# Patient Record
Sex: FEMALE | Race: WHITE | Employment: FULL TIME | URBAN - METROPOLITAN AREA
[De-identification: names, ages, dates, MRNs, and addresses within clinical notes are randomized per-mention and may not be internally consistent; named-entity substitution may affect disease eponyms.]

---

## 2017-05-08 RX ORDER — CITALOPRAM 20 MG/1
TABLET ORAL
Qty: 30 TABLET | Refills: 2 | Status: SHIPPED | OUTPATIENT
Start: 2017-05-08 | End: 2017-05-11 | Stop reason: SDUPTHER

## 2017-05-11 ENCOUNTER — OFFICE VISIT (OUTPATIENT)
Dept: INTERNAL MEDICINE | Age: 57
End: 2017-05-11

## 2017-05-11 VITALS
HEIGHT: 63 IN | SYSTOLIC BLOOD PRESSURE: 120 MMHG | BODY MASS INDEX: 43.23 KG/M2 | DIASTOLIC BLOOD PRESSURE: 82 MMHG | WEIGHT: 244 LBS

## 2017-05-11 DIAGNOSIS — F41.1 ANXIETY STATE: Primary | ICD-10-CM

## 2017-05-11 PROCEDURE — 99213 OFFICE O/P EST LOW 20 MIN: CPT | Performed by: INTERNAL MEDICINE

## 2017-05-11 RX ORDER — ALBUTEROL SULFATE 90 UG/1
2 AEROSOL, METERED RESPIRATORY (INHALATION) EVERY 4 HOURS PRN
Qty: 1 INHALER | Refills: 10 | Status: SHIPPED | OUTPATIENT
Start: 2017-05-11 | End: 2018-05-23 | Stop reason: SDUPTHER

## 2017-05-11 RX ORDER — CITALOPRAM 20 MG/1
20 TABLET ORAL DAILY
Qty: 90 TABLET | Refills: 3 | Status: SHIPPED | OUTPATIENT
Start: 2017-05-11 | End: 2018-05-23 | Stop reason: SDUPTHER

## 2017-05-11 RX ORDER — ALPRAZOLAM 0.25 MG/1
0.25 TABLET ORAL 3 TIMES DAILY
Qty: 90 TABLET | Refills: 0 | Status: ON HOLD | OUTPATIENT
Start: 2017-05-11 | End: 2019-05-19 | Stop reason: HOSPADM

## 2017-05-11 ASSESSMENT — ENCOUNTER SYMPTOMS
COUGH: 0
BACK PAIN: 0
ABDOMINAL PAIN: 0
NAUSEA: 0
CHEST TIGHTNESS: 0
SHORTNESS OF BREATH: 0
EYE REDNESS: 0

## 2017-06-05 ENCOUNTER — OFFICE VISIT (OUTPATIENT)
Dept: URGENT CARE | Age: 57
End: 2017-06-05

## 2017-06-05 VITALS
WEIGHT: 235 LBS | BODY MASS INDEX: 41.64 KG/M2 | HEIGHT: 63 IN | SYSTOLIC BLOOD PRESSURE: 120 MMHG | RESPIRATION RATE: 16 BRPM | TEMPERATURE: 99.4 F | HEART RATE: 89 BPM | DIASTOLIC BLOOD PRESSURE: 78 MMHG | OXYGEN SATURATION: 96 %

## 2017-06-05 DIAGNOSIS — M06.9 RHEUMATOID ARTHRITIS OF CARPOMETACARPAL JOINT OF THUMB (HCC): Primary | ICD-10-CM

## 2017-06-05 DIAGNOSIS — M79.645 THUMB PAIN, LEFT: ICD-10-CM

## 2017-06-05 PROCEDURE — 99203 OFFICE O/P NEW LOW 30 MIN: CPT | Performed by: EMERGENCY MEDICINE

## 2017-06-05 RX ORDER — PREDNISONE 10 MG/1
10 TABLET ORAL 2 TIMES DAILY
Qty: 10 TABLET | Refills: 0 | Status: SHIPPED | OUTPATIENT
Start: 2017-06-05 | End: 2017-06-10

## 2017-06-05 ASSESSMENT — ENCOUNTER SYMPTOMS: COLOR CHANGE: 1

## 2017-06-08 ENCOUNTER — OFFICE VISIT (OUTPATIENT)
Dept: RHEUMATOLOGY | Age: 57
End: 2017-06-08

## 2017-06-08 VITALS
BODY MASS INDEX: 42.7 KG/M2 | HEIGHT: 63 IN | SYSTOLIC BLOOD PRESSURE: 130 MMHG | WEIGHT: 241 LBS | DIASTOLIC BLOOD PRESSURE: 80 MMHG | TEMPERATURE: 98.7 F

## 2017-06-08 DIAGNOSIS — M18.12 PRIMARY OSTEOARTHRITIS OF FIRST CARPOMETACARPAL JOINT OF LEFT HAND: Primary | ICD-10-CM

## 2017-06-08 DIAGNOSIS — D17.79 LIPOMA OF OTHER SPECIFIED SITES: ICD-10-CM

## 2017-06-08 PROCEDURE — 99213 OFFICE O/P EST LOW 20 MIN: CPT | Performed by: INTERNAL MEDICINE

## 2018-02-22 LAB — RUBELLA ANTIBODY IGG: >500 IU/ML

## 2018-02-24 LAB
MUV IGG SER QL: <5 AU/ML
RUBEOLA (MEASLES) AB IGG: >300 AU/ML

## 2018-05-23 ENCOUNTER — OFFICE VISIT (OUTPATIENT)
Dept: INTERNAL MEDICINE | Age: 58
End: 2018-05-23

## 2018-05-23 VITALS
BODY MASS INDEX: 43.59 KG/M2 | WEIGHT: 246 LBS | RESPIRATION RATE: 16 BRPM | HEART RATE: 88 BPM | DIASTOLIC BLOOD PRESSURE: 86 MMHG | OXYGEN SATURATION: 91 % | HEIGHT: 63 IN | SYSTOLIC BLOOD PRESSURE: 134 MMHG

## 2018-05-23 DIAGNOSIS — R49.0 HOARSENESS: ICD-10-CM

## 2018-05-23 DIAGNOSIS — Z12.31 ENCOUNTER FOR SCREENING MAMMOGRAM FOR BREAST CANCER: ICD-10-CM

## 2018-05-23 DIAGNOSIS — Z72.0 TOBACCO ABUSE: ICD-10-CM

## 2018-05-23 DIAGNOSIS — J45.20 MILD INTERMITTENT ASTHMA WITHOUT COMPLICATION: Primary | ICD-10-CM

## 2018-05-23 DIAGNOSIS — F41.1 ANXIETY STATE: ICD-10-CM

## 2018-05-23 PROCEDURE — 99213 OFFICE O/P EST LOW 20 MIN: CPT | Performed by: INTERNAL MEDICINE

## 2018-05-23 RX ORDER — CITALOPRAM 20 MG/1
20 TABLET ORAL DAILY
Qty: 90 TABLET | Refills: 3 | Status: SHIPPED | OUTPATIENT
Start: 2018-05-23 | End: 2019-04-16 | Stop reason: SDUPTHER

## 2018-05-23 RX ORDER — ALBUTEROL SULFATE 90 UG/1
2 AEROSOL, METERED RESPIRATORY (INHALATION) EVERY 4 HOURS PRN
Qty: 1 INHALER | Refills: 10 | Status: SHIPPED | OUTPATIENT
Start: 2018-05-23 | End: 2020-11-02 | Stop reason: SDUPTHER

## 2018-05-23 ASSESSMENT — ENCOUNTER SYMPTOMS
EYE REDNESS: 0
ABDOMINAL PAIN: 0
ASSOCIATED PULMONARY SYMPTOMS: COUGH
NAUSEA: 0
BACK PAIN: 0
SHORTNESS OF BREATH: 0
SORE THROAT: 0
WHEEZING: 1
CHEST TIGHTNESS: 0
VOICE CHANGE: 1
COUGH: 0

## 2018-05-23 ASSESSMENT — PATIENT HEALTH QUESTIONNAIRE - PHQ9
SUM OF ALL RESPONSES TO PHQ QUESTIONS 1-9: 0
2. FEELING DOWN, DEPRESSED OR HOPELESS: 0
1. LITTLE INTEREST OR PLEASURE IN DOING THINGS: 0
SUM OF ALL RESPONSES TO PHQ9 QUESTIONS 1 & 2: 0

## 2018-07-16 ENCOUNTER — TELEPHONE (OUTPATIENT)
Dept: INTERNAL MEDICINE | Age: 58
End: 2018-07-16

## 2019-04-16 ENCOUNTER — OFFICE VISIT (OUTPATIENT)
Dept: INTERNAL MEDICINE CLINIC | Age: 59
End: 2019-04-16
Payer: COMMERCIAL

## 2019-04-16 VITALS
WEIGHT: 266 LBS | RESPIRATION RATE: 16 BRPM | HEART RATE: 106 BPM | SYSTOLIC BLOOD PRESSURE: 136 MMHG | BODY MASS INDEX: 47.13 KG/M2 | OXYGEN SATURATION: 80 % | TEMPERATURE: 98.7 F | DIASTOLIC BLOOD PRESSURE: 88 MMHG | HEIGHT: 63 IN

## 2019-04-16 DIAGNOSIS — R00.2 PALPITATIONS: ICD-10-CM

## 2019-04-16 DIAGNOSIS — J45.41 MODERATE PERSISTENT ASTHMATIC BRONCHITIS WITH ACUTE EXACERBATION: Primary | ICD-10-CM

## 2019-04-16 DIAGNOSIS — F41.1 ANXIETY STATE: ICD-10-CM

## 2019-04-16 PROCEDURE — 99213 OFFICE O/P EST LOW 20 MIN: CPT | Performed by: INTERNAL MEDICINE

## 2019-04-16 PROCEDURE — 93000 ELECTROCARDIOGRAM COMPLETE: CPT | Performed by: INTERNAL MEDICINE

## 2019-04-16 RX ORDER — PREDNISONE 20 MG/1
60 TABLET ORAL SEE ADMIN INSTRUCTIONS
Qty: 26 TABLET | Refills: 0 | Status: SHIPPED | OUTPATIENT
Start: 2019-04-16 | End: 2019-04-28

## 2019-04-16 RX ORDER — AZITHROMYCIN 250 MG/1
250 TABLET, FILM COATED ORAL SEE ADMIN INSTRUCTIONS
Qty: 6 TABLET | Refills: 0 | Status: SHIPPED | OUTPATIENT
Start: 2019-04-16 | End: 2019-04-28

## 2019-04-16 RX ORDER — CITALOPRAM 20 MG/1
20 TABLET ORAL DAILY
Qty: 90 TABLET | Refills: 3 | Status: SHIPPED | OUTPATIENT
Start: 2019-04-16 | End: 2019-11-18 | Stop reason: SDUPTHER

## 2019-04-16 ASSESSMENT — ENCOUNTER SYMPTOMS
ABDOMINAL PAIN: 0
ASSOCIATED PULMONARY SYMPTOMS: RED EYES
CHEST TIGHTNESS: 1
FREQUENT THROAT CLEARING: 1
NAUSEA: 0
EYE REDNESS: 0
SHORTNESS OF BREATH: 1
WHEEZING: 1
HOARSE VOICE: 1
BACK PAIN: 0
COUGH: 1
DIFFICULTY BREATHING: 1
SPUTUM PRODUCTION: 1

## 2019-04-16 ASSESSMENT — PATIENT HEALTH QUESTIONNAIRE - PHQ9
2. FEELING DOWN, DEPRESSED OR HOPELESS: 0
SUM OF ALL RESPONSES TO PHQ QUESTIONS 1-9: 0
SUM OF ALL RESPONSES TO PHQ9 QUESTIONS 1 & 2: 0
SUM OF ALL RESPONSES TO PHQ QUESTIONS 1-9: 0
1. LITTLE INTEREST OR PLEASURE IN DOING THINGS: 0

## 2019-04-16 NOTE — PROGRESS NOTES
Subjective:      Patient ID: Mitchel Wilde is a 66 y.o. female    Chief Complaint   Patient presents with    Cough    Congestion    Breathing Problem       Asthma   She complains of chest tightness, cough, difficulty breathing, frequent throat clearing, hoarse voice, shortness of breath, sputum production and wheezing. This is a new problem. The current episode started more than 1 month ago. The problem occurs constantly. The cough is productive of purulent sputum. Associated symptoms include dyspnea on exertion. Pertinent negatives include no chest pain, fever or headaches. Associated symptoms comments: Red eyes . Her symptoms are aggravated by any activity and exposure to smoke (reclining on her back). Her symptoms are alleviated by beta-agonist. She reports minimal (She was given an antibiotic and steroid taper in late March, but her breathing never did improve. ) improvement on treatment. Risk factors for lung disease include smoking/tobacco exposure. Her past medical history is significant for asthma. Current Outpatient Medications on File Prior to Visit   Medication Sig Dispense Refill    albuterol sulfate  (90 Base) MCG/ACT inhaler Inhale 2 puffs into the lungs every 4 hours as needed for Wheezing or Shortness of Breath 1 Inhaler 10    diclofenac sodium 1 % GEL 1 gm in upper extremity joint, 3 times a day as needed. Maximum dose 12 gram/day. 2 Tube 10    ALPRAZolam (XANAX) 0.25 MG tablet Take 1 tablet by mouth 3 times daily 90 tablet 0    diclofenac sodium 1 % GEL 2 gm in upper extremity joint, 3 times a day as needed. Maximum dose 12 gram/day. 3 Tube 5     No current facility-administered medications on file prior to visit. No Known Allergies    Review of Systems   Constitutional: Negative for fatigue, fever and unexpected weight change. HENT: Positive for hoarse voice. Negative for congestion and hearing loss. Eyes: Negative for redness and visual disturbance. suggested she go to the emergency room. She refused to follow instructions. I told her we could get a chest x-ray and blood work start her on antibiotic and steroids. I suggested highly that she reconsider going to the emergency room. - Comprehensive Metabolic Panel; Future  - CBC Auto Differential; Future  - XR CHEST STANDARD (2 VW); Future    2. Palpitations  She is reporting palpitations. I was concerned she may have atrial fibrillation or other arrhythmia. We did an EKG did show sinus tachycardia. - EKG 12 Lead    3. Anxiety state  Chronic anxiety state. Refill medication. - citalopram (CELEXA) 20 MG tablet; Take 1 tablet by mouth daily  Dispense: 90 tablet;  Refill: 3

## 2019-04-17 ENCOUNTER — TELEPHONE (OUTPATIENT)
Dept: INTERNAL MEDICINE CLINIC | Age: 59
End: 2019-04-17

## 2019-04-17 DIAGNOSIS — J45.41 MODERATE PERSISTENT ASTHMATIC BRONCHITIS WITH ACUTE EXACERBATION: Primary | ICD-10-CM

## 2019-04-17 DIAGNOSIS — J45.41 MODERATE PERSISTENT ASTHMA WITH ACUTE EXACERBATION: ICD-10-CM

## 2019-04-17 RX ORDER — ALBUTEROL SULFATE 1.25 MG/3ML
1 SOLUTION RESPIRATORY (INHALATION) EVERY 6 HOURS PRN
Qty: 360 ML | Refills: 3 | Status: ON HOLD | OUTPATIENT
Start: 2019-04-17 | End: 2019-05-29 | Stop reason: HOSPADM

## 2019-04-17 NOTE — TELEPHONE ENCOUNTER
Patient called and stated she spoke to you regarding a nebulizer machine. She said they have them where she works but needs a prescription in order to get one. Can you please right her a RX. Please call to advise.

## 2019-04-17 NOTE — TELEPHONE ENCOUNTER
Faxed to [phone] of [facility]. Patient will need Rx for nebulizer solution sent to [facility] in [place].

## 2019-04-28 ENCOUNTER — APPOINTMENT (OUTPATIENT)
Dept: CT IMAGING | Age: 59
DRG: 207 | End: 2019-04-28
Payer: COMMERCIAL

## 2019-04-28 ENCOUNTER — HOSPITAL ENCOUNTER (INPATIENT)
Age: 59
LOS: 22 days | Discharge: NURSING, REHAB OR LONG-TERM CARE | DRG: 207 | End: 2019-05-20
Attending: EMERGENCY MEDICINE | Admitting: INTERNAL MEDICINE
Payer: COMMERCIAL

## 2019-04-28 ENCOUNTER — APPOINTMENT (OUTPATIENT)
Dept: GENERAL RADIOLOGY | Age: 59
DRG: 207 | End: 2019-04-28
Payer: COMMERCIAL

## 2019-04-28 DIAGNOSIS — I50.9 NEW ONSET OF CONGESTIVE HEART FAILURE (HCC): Primary | ICD-10-CM

## 2019-04-28 DIAGNOSIS — R06.03 RESPIRATORY DISTRESS: ICD-10-CM

## 2019-04-28 DIAGNOSIS — F41.1 ANXIETY STATE: ICD-10-CM

## 2019-04-28 PROBLEM — J96.01 ACUTE RESPIRATORY FAILURE WITH HYPOXIA (HCC): Status: ACTIVE | Noted: 2019-04-28

## 2019-04-28 PROBLEM — J96.00 ACUTE RESPIRATORY FAILURE (HCC): Status: ACTIVE | Noted: 2019-04-28

## 2019-04-28 LAB
A/G RATIO: 1.1 (ref 1.1–2.2)
ALBUMIN SERPL-MCNC: 3.7 G/DL (ref 3.4–5)
ALP BLD-CCNC: 95 U/L (ref 40–129)
ALT SERPL-CCNC: 117 U/L (ref 10–40)
ANION GAP SERPL CALCULATED.3IONS-SCNC: 10 MMOL/L (ref 3–16)
ANISOCYTOSIS: ABNORMAL
AST SERPL-CCNC: 105 U/L (ref 15–37)
BACTERIA: ABNORMAL /HPF
BASE EXCESS ARTERIAL: 7.7 MMOL/L (ref -3–3)
BASOPHILS ABSOLUTE: 0 K/UL (ref 0–0.2)
BASOPHILS RELATIVE PERCENT: 0 %
BILIRUB SERPL-MCNC: 1.3 MG/DL (ref 0–1)
BILIRUBIN URINE: NEGATIVE
BLOOD, URINE: ABNORMAL
BUN BLDV-MCNC: 20 MG/DL (ref 7–20)
CALCIUM SERPL-MCNC: 9.2 MG/DL (ref 8.3–10.6)
CARBOXYHEMOGLOBIN ARTERIAL: 6.1 % (ref 0–1.5)
CASTS: ABNORMAL /LPF
CHLORIDE BLD-SCNC: 91 MMOL/L (ref 99–110)
CLARITY: CLEAR
CO2: 37 MMOL/L (ref 21–32)
COLOR: YELLOW
CREAT SERPL-MCNC: 0.6 MG/DL (ref 0.6–1.1)
EKG ATRIAL RATE: 103 BPM
EKG DIAGNOSIS: NORMAL
EKG P AXIS: 77 DEGREES
EKG P-R INTERVAL: 126 MS
EKG Q-T INTERVAL: 326 MS
EKG QRS DURATION: 82 MS
EKG QTC CALCULATION (BAZETT): 427 MS
EKG R AXIS: 123 DEGREES
EKG T AXIS: 41 DEGREES
EKG VENTRICULAR RATE: 103 BPM
EOSINOPHILS ABSOLUTE: 0 K/UL (ref 0–0.6)
EOSINOPHILS RELATIVE PERCENT: 0 %
EPITHELIAL CELLS, UA: ABNORMAL /HPF
GFR AFRICAN AMERICAN: >60
GFR NON-AFRICAN AMERICAN: >60
GLOBULIN: 3.3 G/DL
GLUCOSE BLD-MCNC: 124 MG/DL (ref 70–99)
GLUCOSE URINE: NEGATIVE MG/DL
HCO3 ARTERIAL: 36.5 MMOL/L (ref 21–29)
HCT VFR BLD CALC: 57 % (ref 36–48)
HEMOGLOBIN, ART, EXTENDED: 19 G/DL (ref 12–16)
HEMOGLOBIN: 18.1 G/DL (ref 12–16)
KETONES, URINE: NEGATIVE MG/DL
LACTIC ACID: 1.5 MMOL/L (ref 0.4–2)
LEUKOCYTE ESTERASE, URINE: NEGATIVE
LYMPHOCYTES ABSOLUTE: 2.4 K/UL (ref 1–5.1)
LYMPHOCYTES RELATIVE PERCENT: 15 %
MCH RBC QN AUTO: 28.9 PG (ref 26–34)
MCHC RBC AUTO-ENTMCNC: 31.7 G/DL (ref 31–36)
MCV RBC AUTO: 91.1 FL (ref 80–100)
METHEMOGLOBIN ARTERIAL: 0.5 %
MICROSCOPIC EXAMINATION: YES
MONOCYTES ABSOLUTE: 1.4 K/UL (ref 0–1.3)
MONOCYTES RELATIVE PERCENT: 9 %
NEUTROPHILS ABSOLUTE: 12.2 K/UL (ref 1.7–7.7)
NEUTROPHILS RELATIVE PERCENT: 76 %
NITRITE, URINE: NEGATIVE
O2 CONTENT ARTERIAL: 26 ML/DL
O2 SAT, ARTERIAL: 97.2 %
O2 THERAPY: ABNORMAL
PCO2 ARTERIAL: 65.7 MMHG (ref 35–45)
PDW BLD-RTO: 16.1 % (ref 12.4–15.4)
PH ARTERIAL: 7.36 (ref 7.35–7.45)
PH UA: 5.5 (ref 5–8)
PLATELET # BLD: 211 K/UL (ref 135–450)
PLATELET SLIDE REVIEW: ADEQUATE
PMV BLD AUTO: 7.3 FL (ref 5–10.5)
PO2 ARTERIAL: 100.6 MMHG (ref 75–108)
POIKILOCYTES: ABNORMAL
POLYCHROMASIA: ABNORMAL
POTASSIUM SERPL-SCNC: 4.1 MMOL/L (ref 3.5–5.1)
PRO-BNP: 3747 PG/ML (ref 0–124)
PROTEIN UA: NEGATIVE MG/DL
RBC # BLD: 6.26 M/UL (ref 4–5.2)
RBC UA: ABNORMAL /HPF (ref 0–2)
SLIDE REVIEW: ABNORMAL
SODIUM BLD-SCNC: 138 MMOL/L (ref 136–145)
SPECIFIC GRAVITY UA: <=1.005 (ref 1–1.03)
TARGET CELLS: ABNORMAL
TCO2 ARTERIAL: 38.6 MMOL/L
TEAR DROP CELLS: ABNORMAL
TOTAL PROTEIN: 7 G/DL (ref 6.4–8.2)
TROPONIN: <0.01 NG/ML
URINE REFLEX TO CULTURE: ABNORMAL
URINE TYPE: ABNORMAL
UROBILINOGEN, URINE: 0.2 E.U./DL
WBC # BLD: 16.1 K/UL (ref 4–11)
WBC UA: ABNORMAL /HPF (ref 0–5)

## 2019-04-28 PROCEDURE — 83880 ASSAY OF NATRIURETIC PEPTIDE: CPT

## 2019-04-28 PROCEDURE — 80053 COMPREHEN METABOLIC PANEL: CPT

## 2019-04-28 PROCEDURE — 96366 THER/PROPH/DIAG IV INF ADDON: CPT

## 2019-04-28 PROCEDURE — 36415 COLL VENOUS BLD VENIPUNCTURE: CPT

## 2019-04-28 PROCEDURE — 94761 N-INVAS EAR/PLS OXIMETRY MLT: CPT

## 2019-04-28 PROCEDURE — 94660 CPAP INITIATION&MGMT: CPT

## 2019-04-28 PROCEDURE — 51702 INSERT TEMP BLADDER CATH: CPT

## 2019-04-28 PROCEDURE — 6360000002 HC RX W HCPCS: Performed by: INTERNAL MEDICINE

## 2019-04-28 PROCEDURE — 2580000003 HC RX 258: Performed by: INTERNAL MEDICINE

## 2019-04-28 PROCEDURE — 81001 URINALYSIS AUTO W/SCOPE: CPT

## 2019-04-28 PROCEDURE — 83605 ASSAY OF LACTIC ACID: CPT

## 2019-04-28 PROCEDURE — 96365 THER/PROPH/DIAG IV INF INIT: CPT

## 2019-04-28 PROCEDURE — 96375 TX/PRO/DX INJ NEW DRUG ADDON: CPT

## 2019-04-28 PROCEDURE — 87040 BLOOD CULTURE FOR BACTERIA: CPT

## 2019-04-28 PROCEDURE — 84443 ASSAY THYROID STIM HORMONE: CPT

## 2019-04-28 PROCEDURE — 6370000000 HC RX 637 (ALT 250 FOR IP): Performed by: INTERNAL MEDICINE

## 2019-04-28 PROCEDURE — 85025 COMPLETE CBC W/AUTO DIFF WBC: CPT

## 2019-04-28 PROCEDURE — 99291 CRITICAL CARE FIRST HOUR: CPT

## 2019-04-28 PROCEDURE — 84484 ASSAY OF TROPONIN QUANT: CPT

## 2019-04-28 PROCEDURE — 71260 CT THORAX DX C+: CPT

## 2019-04-28 PROCEDURE — 82803 BLOOD GASES ANY COMBINATION: CPT

## 2019-04-28 PROCEDURE — 2500000003 HC RX 250 WO HCPCS: Performed by: PHYSICIAN ASSISTANT

## 2019-04-28 PROCEDURE — 93010 ELECTROCARDIOGRAM REPORT: CPT | Performed by: INTERNAL MEDICINE

## 2019-04-28 PROCEDURE — 6360000004 HC RX CONTRAST MEDICATION: Performed by: EMERGENCY MEDICINE

## 2019-04-28 PROCEDURE — 2000000000 HC ICU R&B

## 2019-04-28 PROCEDURE — 71045 X-RAY EXAM CHEST 1 VIEW: CPT

## 2019-04-28 PROCEDURE — 2700000000 HC OXYGEN THERAPY PER DAY

## 2019-04-28 PROCEDURE — 6360000002 HC RX W HCPCS: Performed by: PHYSICIAN ASSISTANT

## 2019-04-28 PROCEDURE — 6370000000 HC RX 637 (ALT 250 FOR IP): Performed by: PHYSICIAN ASSISTANT

## 2019-04-28 PROCEDURE — 93005 ELECTROCARDIOGRAM TRACING: CPT | Performed by: EMERGENCY MEDICINE

## 2019-04-28 RX ORDER — SODIUM CHLORIDE 0.9 % (FLUSH) 0.9 %
10 SYRINGE (ML) INJECTION EVERY 12 HOURS SCHEDULED
Status: DISCONTINUED | OUTPATIENT
Start: 2019-04-28 | End: 2019-05-20 | Stop reason: HOSPADM

## 2019-04-28 RX ORDER — CITALOPRAM 20 MG/1
20 TABLET ORAL DAILY
Status: DISCONTINUED | OUTPATIENT
Start: 2019-04-28 | End: 2019-05-20 | Stop reason: HOSPADM

## 2019-04-28 RX ORDER — SODIUM CHLORIDE 0.9 % (FLUSH) 0.9 %
10 SYRINGE (ML) INJECTION PRN
Status: DISCONTINUED | OUTPATIENT
Start: 2019-04-28 | End: 2019-05-20 | Stop reason: HOSPADM

## 2019-04-28 RX ORDER — ALBUTEROL SULFATE 90 UG/1
2 AEROSOL, METERED RESPIRATORY (INHALATION) EVERY 4 HOURS PRN
Status: DISCONTINUED | OUTPATIENT
Start: 2019-04-28 | End: 2019-05-09

## 2019-04-28 RX ORDER — ALBUTEROL SULFATE 90 UG/1
2 AEROSOL, METERED RESPIRATORY (INHALATION) EVERY 4 HOURS PRN
Status: DISCONTINUED | OUTPATIENT
Start: 2019-04-28 | End: 2019-04-28

## 2019-04-28 RX ORDER — ONDANSETRON 2 MG/ML
4 INJECTION INTRAMUSCULAR; INTRAVENOUS EVERY 6 HOURS PRN
Status: DISCONTINUED | OUTPATIENT
Start: 2019-04-28 | End: 2019-05-20 | Stop reason: HOSPADM

## 2019-04-28 RX ORDER — LISINOPRIL 5 MG/1
5 TABLET ORAL DAILY
Status: DISCONTINUED | OUTPATIENT
Start: 2019-04-28 | End: 2019-04-30

## 2019-04-28 RX ORDER — IPRATROPIUM BROMIDE AND ALBUTEROL SULFATE 2.5; .5 MG/3ML; MG/3ML
1 SOLUTION RESPIRATORY (INHALATION) ONCE
Status: COMPLETED | OUTPATIENT
Start: 2019-04-28 | End: 2019-04-28

## 2019-04-28 RX ORDER — ALPRAZOLAM 0.25 MG/1
0.25 TABLET ORAL 3 TIMES DAILY
Status: DISCONTINUED | OUTPATIENT
Start: 2019-04-28 | End: 2019-05-03

## 2019-04-28 RX ORDER — FUROSEMIDE 10 MG/ML
40 INJECTION INTRAMUSCULAR; INTRAVENOUS 2 TIMES DAILY
Status: DISCONTINUED | OUTPATIENT
Start: 2019-04-28 | End: 2019-05-02

## 2019-04-28 RX ORDER — NITROGLYCERIN 20 MG/100ML
5 INJECTION INTRAVENOUS CONTINUOUS
Status: DISCONTINUED | OUTPATIENT
Start: 2019-04-28 | End: 2019-04-30

## 2019-04-28 RX ORDER — FUROSEMIDE 10 MG/ML
40 INJECTION INTRAMUSCULAR; INTRAVENOUS ONCE
Status: COMPLETED | OUTPATIENT
Start: 2019-04-28 | End: 2019-04-28

## 2019-04-28 RX ADMIN — IOPAMIDOL 85 ML: 755 INJECTION, SOLUTION INTRAVENOUS at 16:18

## 2019-04-28 RX ADMIN — FUROSEMIDE 40 MG: 10 INJECTION, SOLUTION INTRAMUSCULAR; INTRAVENOUS at 21:15

## 2019-04-28 RX ADMIN — LISINOPRIL 5 MG: 5 TABLET ORAL at 21:15

## 2019-04-28 RX ADMIN — IPRATROPIUM BROMIDE AND ALBUTEROL SULFATE 1 AMPULE: .5; 3 SOLUTION RESPIRATORY (INHALATION) at 14:18

## 2019-04-28 RX ADMIN — ENOXAPARIN SODIUM 40 MG: 40 INJECTION SUBCUTANEOUS at 21:15

## 2019-04-28 RX ADMIN — Medication 10 ML: at 21:16

## 2019-04-28 RX ADMIN — NITROGLYCERIN 5 MCG/MIN: 20 INJECTION INTRAVENOUS at 14:22

## 2019-04-28 RX ADMIN — FUROSEMIDE 40 MG: 10 INJECTION, SOLUTION INTRAMUSCULAR; INTRAVENOUS at 14:18

## 2019-04-28 NOTE — ED PROVIDER NOTES
[address] ED  eMERGENCY dEPARTMENT eNCOUnter        Pt Name: Deidre Busby  MRN: [id number]  [place] 1960  Date of evaluation: 4/28/2019  Provider: DAYAMI Tee  PCP: Dee Meade MD    This patient was seen and evaluated by the attending physician Izzy Dela Cruz, [address]       Chief Complaint   Patient presents with    Shortness of Breath     SOB x2 months. Has been on 2 different antibiotics, 2 rounds of steroid with no improvement. RA 77% upon arrival, lips cyanotic. HISTORY OF PRESENT ILLNESS   (Location/Symptom, Timing/Onset, Context/Setting, Quality, Duration, Modifying Factors, Severity)  Note limiting factors. Deidre Busby is a 64 y.o. female who presents to the emergency department, the patient was in respiratory distress with an oxygen saturation rate of 77%. 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 She took that, she was not any better, she then went to her primary care physician who gave her Zithromax and prednisone and ordered a chest x-ray. She states that she did not get the chest x-ray, today she woke up and she felt very short of breath, she states that her left breast felt very full, she has had swelling to her feet, legs, and even feels as if her pants are a little tight. Nothing seems to make her feel completely better. She states that she feels very short of breath at the present time. Nursing Notes were all reviewed and agreed with or any disagreements were addressed  in the HPI. REVIEW OF SYSTEMS    (2-9 systems for level 4, 10 or more for level 5)     Review of Systems    Positives and Pertinent negatives as per HPI. Except as noted abovein the ROS, all other systems were reviewed and negative.        PAST MEDICAL HISTORY     Past Medical History:   Diagnosis Date    Anxiety state, unspecified 5/17/2010    Impacted cerumen 5/17/2010    Rheumatoid arthritis(714.0) 5/17/2010         SURGICAL HISTORY     Past Surgical History:   Procedure Laterality Date    CARPAL TUNNEL RELEASE      both hands 1990's    LUMBAR SPINE SURGERY           CURRENTMEDICATIONS       Previous Medications    ALBUTEROL (ACCUNEB) 1.25 MG/3ML NEBULIZER SOLUTION    Inhale 3 mLs into the lungs every 6 hours as needed for Wheezing    ALBUTEROL SULFATE  (90 BASE) MCG/ACT INHALER    Inhale 2 puffs into the lungs every 4 hours as needed for Wheezing or Shortness of Breath    ALPRAZOLAM (XANAX) 0.25 MG TABLET    Take 1 tablet by mouth 3 times daily    CITALOPRAM (CELEXA) 20 MG TABLET    Take 1 tablet by mouth daily    DICLOFENAC SODIUM 1 % GEL    2 gm in upper extremity joint, 3 times a day as needed. Maximum dose 12 gram/day. ALLERGIES     Patient has no known allergies.     FAMILYHISTORY       Family History   Problem Relation Age of Onset    Stroke Paternal Grandmother     Heart Attack Paternal Grandfather 62    Diabetes Paternal Grandfather     Other Mother         Factor 5 Lieden           SOCIAL HISTORY       Social History     Socioeconomic History    Marital status: Single     Spouse name: None    Number of children: 0    Years of education: None    Highest education level: None   Occupational History    None   Social Needs    Financial resource strain: None    Food insecurity:     Worry: None     Inability: None    Transportation needs:     Medical: None     Non-medical: None   Tobacco Use    Smoking status: Current Every Day Smoker     Packs/day: 0.10     Years: 30.00     Pack years: 3.00     Types: Cigarettes    Smokeless tobacco: Never Used   Substance and Sexual Activity    Alcohol use: No     Alcohol/week: 0.0 oz    Drug use: No    Sexual activity: None   Lifestyle    Physical activity:     Days per week: None     Minutes per session: None    Stress: None   Relationships    Social connections:     Talks on phone: None     Gets together: None     Attends Restorationism service: None     Active member of club or organization: None     Attends meetings of clubs or organizations: None     Relationship status: None    Intimate partner violence:     Fear of current or ex partner: None     Emotionally abused: None     Physically abused: None     Forced sexual activity: None   Other Topics Concern    None   Social History Narrative    None       SCREENINGS    [place] Coma Scale  Eye Opening: Spontaneous  Best Verbal Response: Oriented  Best Motor Response: Obeys commands  [place] Coma Scale Score: 15        PHYSICAL EXAM    (up to 7 for level 4, 8 or more for level 5)     ED Triage Vitals   BP Temp Temp Source Pulse Resp SpO2 Height Weight   04/28/19 1349 04/28/19 1340 04/28/19 1340 04/28/19 1340 04/28/19 1340 04/28/19 1340 04/28/19 1346 04/28/19 1346   (!) 154/102 99 °F (37.2 °C) Oral 104 26 (!) 77 % 5' 3\" (1.6 m) 240 lb (108.9 kg)       Physical Exam   Constitutional: She is oriented to person, place, and time. She appears well-developed and well-nourished. HENT:   Head: Normocephalic and atraumatic. Right Ear: External ear normal.   Left Ear: External ear normal.   Nose: Nose normal.   Eyes: Right eye exhibits no discharge. Left eye exhibits no discharge. Neck: Normal range of motion. Neck supple. Cardiovascular: Regular rhythm, normal heart sounds and intact distal pulses. Tachycardia present. Exam reveals no gallop and no friction rub. No murmur heard. Pulmonary/Chest: No stridor. She is in respiratory distress. She has wheezes. She has rales. She exhibits no tenderness. Abdominal: Soft. Bowel sounds are normal. She exhibits no distension and no mass. There is no tenderness. There is no rebound and no guarding. Musculoskeletal: Normal range of motion. She exhibits edema (4+ pitting edema). Neurological: She is alert and oriented to person, place, and time.    Skin: Skin is warm and dry. She is not diaphoretic. No pallor. Psychiatric: She has a normal mood and affect. Her behavior is normal.   Nursing note and vitals reviewed.       DIAGNOSTIC RESULTS   LABS:    Labs Reviewed   URINE RT REFLEX TO CULTURE - Abnormal; Notable for the following components:       Result Value    Blood, Urine TRACE-INTACT (*)     All other components within normal limits    Narrative:     Performed at:  [facility],  [facility]   Phone [phone]   BLOOD GAS, ARTERIAL - Abnormal; Notable for the following components:    pCO2, Arterial 65.7 (*)     HCO3, Arterial 36.5 (*)     Base Excess, Arterial 7.7 (*)     Hemoglobin, Art, Extended 19.0 (*)     Carboxyhgb, Arterial 6.1 (*)     All other components within normal limits    Narrative:     Performed at:  [facility],  [facility]   Phone [phone]   MICROSCOPIC URINALYSIS - Abnormal; Notable for the following components:    Casts 0-1 Hyaline (*)     Bacteria, UA 1+ (*)     All other components within normal limits    Narrative:     Performed at:  [facility],  [facility] [facility]   Phone [phone]   CBC WITH AUTO DIFFERENTIAL - Abnormal; Notable for the following components:    WBC 16.1 (*)     RBC 6.26 (*)     Hemoglobin 18.1 (*)     Hematocrit 57.0 (*)     RDW 16.1 (*)     Neutrophils # 12.2 (*)     Monocytes # 1.4 (*)     Anisocytosis 2+ (*)     Polychromasia Occasional (*)     Poikilocytes Occasional (*)     Target Cells Occasional (*)     Tear Drop Cells Occasional (*)     All other components within normal limits    Narrative:     Performed at:  [facility]) - [facility],  [employer]   Phone [phone]   COMPREHENSIVE METABOLIC PANEL - Abnormal; Notable for the following components:    Chloride 91 (*)     CO2 37 (*)     Glucose 124 (*)     Total Bilirubin 1.3 (*)      (*)      (*)     All other components within normal limits    Narrative:     Performed at:  [facility]) - [facility],  [place], [facility]   Phone [phone]   BRAIN NATRIURETIC PEPTIDE - Abnormal; Notable for the following components:    Pro-BNP 3,747 (*)     All other components within normal limits    Narrative:     Performed at:  [facility],  [place], [facility]   Phone [phone]   CULTURE BLOOD #1   LACTIC ACID, PLASMA    Narrative:     Performed at:  [facility] [address],  [place], [facility]   Phone [phone]   TROPONIN    Narrative:     Performed at:  [facility]) - [facility],  [place], [facility]   Phone [phone]       All other labs were within normal range or not returned as of this dictation. EKG: All EKG's are interpreted by the Emergency Department Physician who either signs orCo-signs this chart in the absence of a cardiologist.  Please see their note for interpretation of EKG. RADIOLOGY:   Non-plain film images such as CT, Ultrasound and MRI are read by the radiologist. Jose Sciara radiographic images are visualized andpreliminarily interpreted by the  ED Provider with the below findings:        Interpretation perthe Radiologist below, if available at the time of this note:    CT Chest Pulmonary Embolism W Contrast   Final Result   Suboptimal contrast timing although there is no evidence for large or central   pulmonary embolism. The segmental and subsegmental branches are not well   evaluated. Small left basilar effusion with adjacent consolidation and there are also   infiltrates within the lingula and right lung base that may represent   atelectasis versus pneumonia. Small amount of free fluid in the visualized upper abdomen.          XR CHEST PORTABLE   Final Result   Cardiomegaly and mild pulmonary edema. Xr Chest Portable    Result Date: 4/28/2019  EXAMINATION: SINGLE XRAY VIEW OF THE CHEST 4/28/2019 1:59 pm COMPARISON: Radiograph 06/09/2011 HISTORY: ORDERING SYSTEM PROVIDED HISTORY: SOB TECHNOLOGIST PROVIDED HISTORY: Reason for exam:->SOB Ordering Physician Provided Reason for Exam: sob Acuity: Acute Type of Exam: Initial FINDINGS: Cardiomediastinal silhouette is enlarged. No pneumothorax. Bilateral perihilar airspace opacities. Small left effusion. No acute osseous abnormality. Cardiomegaly and mild pulmonary edema. PROCEDURES   Unless otherwise noted below, none     Procedures    CRITICAL CARE TIME     Critical Care  There was a high probability of life-threatening clinical deterioration in the patient's condition requiring my urgent intervention. Total critical care time with the patient was 45 minutes excluding separately reportable procedures.   Critical care required due to patients respiratory distress and new onset CHF/fluid overload      CONSULTS:  IP CONSULT TO HOSPITALIST  IP CONSULT TO PULMONOLOGY  IP CONSULT TO DIETITIAN  IP CONSULT TO CARDIOLOGY  IP CONSULT TO PULMONOLOGY      EMERGENCY DEPARTMENT COURSE and DIFFERENTIALDIAGNOSIS/MDM:   Vitals:    Vitals:    04/28/19 1744 04/28/19 1753 04/28/19 1803 04/28/19 1813   BP: (!) 148/77 108/82 (!) 133/90 125/82   Pulse: 100 108 100 98   Resp: 30 19 18 24   Temp:       TempSrc:       SpO2: 93% 90% 92% 92%   Weight:       Height:           Patient was given thefollowing medications:  Medications   nitroGLYCERIN 50 mg in dextrose 5% 250 mL infusion (20 mcg/min Intravenous Rate/Dose Change 4/28/19 1534)   ipratropium-albuterol (DUONEB) nebulizer solution 1 ampule (1 ampule Inhalation Given 4/28/19 1418)   furosemide (LASIX) injection 40 mg (40 mg Intravenous Given 4/28/19 1418)   iopamidol (ISOVUE-370) 76 % injection 85 mL (85 mLs Intravenous Given 4/28/19 1618) Patient presented to the emergency department in respiratory distress, she was evaluated immediately, placed on high flow oxygen via nasal cannula. Because of the clinical presentation of congestive heart failure, we began nitroglycerin, Lasix, and BiPAP. She did not tolerate BiPAP very well however she did diurese, and upon multiple re-evaluations continued to improve. We discontinued the BiPAP, she has remained stable here in the department, currently on 20 mcg/m of nitroglycerin and 8 L of high flow oxygen by nasal cannula. I spoke to the intensivist and to the hospitalist who agreed to admit the patient      FINAL IMPRESSION      1. New onset of congestive heart failure ([facility] [address]    2. Respiratory distress          DISPOSITION/PLAN   DISPOSITION Admitted 04/28/2019 06:10:20 PM      PATIENT REFERREDTO:  Rachell Gill MD  [address] [place] [address]  [phone]            DISCHARGE MEDICATIONS:  New Prescriptions    No medications on file       DISCONTINUED MEDICATIONS:  Discontinued Medications    AZITHROMYCIN (ZITHROMAX) 250 MG TABLET    Take 1 tablet by mouth See Admin Instructions Take 2 tablets at once on the first day , then one tablet daily till all are gone    DICLOFENAC SODIUM 1 % GEL    1 gm in upper extremity joint, 3 times a day as needed. Maximum dose 12 gram/day.     PREDNISONE (DELTASONE) 20 MG TABLET    Take 3 tablets by mouth See Admin Instructions for 16 days 3 x 4 days, 2 x 4 days, 1x 4 days, 1/2 x 4 days              (Please note that portions ofthis note were completed with a voice recognition program.  Efforts were made to edit the dictations but occasionally words are mis-transcribed.)    DAYAMI Fofana (electronically signed)           DAYAMI Fofana  04/28/19 7362

## 2019-04-28 NOTE — PROGRESS NOTES
Called pulmonary answering service and spoke with Geeta regarding consult.      Also called cardiology answering service and spoke with [facility] regarding consult Carlito Phlegm

## 2019-04-28 NOTE — ED TRIAGE NOTES
This RN discussed plan of care w/provider who agreed that pt. Can be taken off BiPap and replaced on hiflow d/t saturation levels and toleration of BiPap. This RN notified Respiratory who stated they would be to bedside. This RN called lab x2 and requested to bedside to 3rd lab draw. This RN notified provider pt. Will be increased to 20mcg on Nitro drip and pressures are increasing rather than decreasing. Provider requested to maintain Nitro drip @ 20mcg and monitor. Plan of care communicated to pt. Who verbalized understanding. Will cont. To monitor.

## 2019-04-28 NOTE — ED NOTES
Ok per provider for pt. To po ice chips. Pt. Provided w/ice chips. Will cont. To monitor.      Tim Ardon RN  04/28/19 6127

## 2019-04-28 NOTE — ED NOTES
Andrew completed with a call back from Dr. Liz Cortés at [facility]  04/28/19 1759    Consult to Pulmonology, Dr. Eliazar Duffy, at [address] [place] [address]  04/28/19 1811    Dr. Eliazar Duffy called back at [address] [place] [address]  04/28/19 1812

## 2019-04-28 NOTE — ED NOTES
Ok per MD Narciso Bernstein for pt's Nitro drip to be paused while in [address], [address] [facility]  04/28/19 3329

## 2019-04-28 NOTE — ED NOTES
Nitro increased to 10mcg/min to main sbp <140. piv to R ac and R hand appears wnl. This RN notified by lab that hemolyzed labs had been received. Requested lab to bedside to re-collect. Pt. Is alert and oriented at this time, tolerating bipap w/out difficulty.      Orlando Acuna RN  04/28/19 7803

## 2019-04-28 NOTE — ED NOTES
Chief Complaint   Patient presents with    Shortness of Breath     SOB x2 months. Has been on 2 different antibiotics, 2 rounds of steroid with no improvement. RA 77% upon arrival, lips cyanotic. Pt. Is alert and oriented at this time. Piv placed to R ac and R hand w/labs collected. Pt. Is visibly sob and provider to bedside for more rapid evaluation. Pt. Placed on hr/rr monitors and bp is cycling q5 mins w/nitro infusing. Respiratory at bedside to initiate BiPap. Will cont. To monitor.      Bravo Love RN  04/28/19 0964

## 2019-04-28 NOTE — ED NOTES
This RN notified CT that CMP had resulted and pt. Could be taken for CT. CT stated they would be to retrieve pt.      Vin Christy RN  04/28/19 5314

## 2019-04-29 LAB
ANION GAP SERPL CALCULATED.3IONS-SCNC: 8 MMOL/L (ref 3–16)
BASE EXCESS ARTERIAL: 13.4 MMOL/L (ref -3–3)
BASOPHILS ABSOLUTE: 0.1 K/UL (ref 0–0.2)
BASOPHILS RELATIVE PERCENT: 0.6 %
BUN BLDV-MCNC: 16 MG/DL (ref 7–20)
CALCIUM SERPL-MCNC: 8.6 MG/DL (ref 8.3–10.6)
CARBOXYHEMOGLOBIN ARTERIAL: 2.5 % (ref 0–1.5)
CHLORIDE BLD-SCNC: 90 MMOL/L (ref 99–110)
CHOLESTEROL, TOTAL: 145 MG/DL (ref 0–199)
CO2: 45 MMOL/L (ref 21–32)
CREAT SERPL-MCNC: <0.5 MG/DL (ref 0.6–1.1)
EOSINOPHILS ABSOLUTE: 0.1 K/UL (ref 0–0.6)
EOSINOPHILS RELATIVE PERCENT: 0.5 %
GFR AFRICAN AMERICAN: >60
GFR NON-AFRICAN AMERICAN: >60
GLUCOSE BLD-MCNC: 101 MG/DL (ref 70–99)
HCO3 ARTERIAL: 48.7 MMOL/L (ref 21–29)
HCT VFR BLD CALC: 55.1 % (ref 36–48)
HDLC SERPL-MCNC: 19 MG/DL (ref 40–60)
HEMOGLOBIN, ART, EXTENDED: 17.9 G/DL (ref 12–16)
HEMOGLOBIN: 17.4 G/DL (ref 12–16)
LDL CHOLESTEROL CALCULATED: 101 MG/DL
LV EF: 55 %
LVEF MODALITY: NORMAL
LYMPHOCYTES ABSOLUTE: 1.5 K/UL (ref 1–5.1)
LYMPHOCYTES RELATIVE PERCENT: 10.5 %
MAGNESIUM: 2 MG/DL (ref 1.8–2.4)
MCH RBC QN AUTO: 29.6 PG (ref 26–34)
MCHC RBC AUTO-ENTMCNC: 31.7 G/DL (ref 31–36)
MCV RBC AUTO: 93.3 FL (ref 80–100)
METHEMOGLOBIN ARTERIAL: 0.5 %
MONOCYTES ABSOLUTE: 1.3 K/UL (ref 0–1.3)
MONOCYTES RELATIVE PERCENT: 9.6 %
NEUTROPHILS ABSOLUTE: 11 K/UL (ref 1.7–7.7)
NEUTROPHILS RELATIVE PERCENT: 78.8 %
O2 CONTENT ARTERIAL: 24 ML/DL
O2 SAT, ARTERIAL: 95.6 %
O2 THERAPY: ABNORMAL
PCO2 ARTERIAL: 121.1 MMHG (ref 35–45)
PDW BLD-RTO: 16.6 % (ref 12.4–15.4)
PH ARTERIAL: 7.22 (ref 7.35–7.45)
PLATELET # BLD: 165 K/UL (ref 135–450)
PMV BLD AUTO: 7.7 FL (ref 5–10.5)
PO2 ARTERIAL: 99.2 MMHG (ref 75–108)
POTASSIUM SERPL-SCNC: 3.7 MMOL/L (ref 3.5–5.1)
PROCALCITONIN: 0.09 NG/ML (ref 0–0.15)
RBC # BLD: 5.9 M/UL (ref 4–5.2)
SODIUM BLD-SCNC: 143 MMOL/L (ref 136–145)
TCO2 ARTERIAL: 52.4 MMOL/L
TRIGL SERPL-MCNC: 123 MG/DL (ref 0–150)
VLDLC SERPL CALC-MCNC: 25 MG/DL
WBC # BLD: 14 K/UL (ref 4–11)

## 2019-04-29 PROCEDURE — 94640 AIRWAY INHALATION TREATMENT: CPT

## 2019-04-29 PROCEDURE — 80048 BASIC METABOLIC PNL TOTAL CA: CPT

## 2019-04-29 PROCEDURE — 2700000000 HC OXYGEN THERAPY PER DAY

## 2019-04-29 PROCEDURE — 83735 ASSAY OF MAGNESIUM: CPT

## 2019-04-29 PROCEDURE — 99232 SBSQ HOSP IP/OBS MODERATE 35: CPT | Performed by: INTERNAL MEDICINE

## 2019-04-29 PROCEDURE — 6360000002 HC RX W HCPCS: Performed by: INTERNAL MEDICINE

## 2019-04-29 PROCEDURE — 99254 IP/OBS CNSLTJ NEW/EST MOD 60: CPT | Performed by: INTERNAL MEDICINE

## 2019-04-29 PROCEDURE — 94660 CPAP INITIATION&MGMT: CPT

## 2019-04-29 PROCEDURE — 82803 BLOOD GASES ANY COMBINATION: CPT

## 2019-04-29 PROCEDURE — 84145 PROCALCITONIN (PCT): CPT

## 2019-04-29 PROCEDURE — 6370000000 HC RX 637 (ALT 250 FOR IP): Performed by: INTERNAL MEDICINE

## 2019-04-29 PROCEDURE — 2000000000 HC ICU R&B

## 2019-04-29 PROCEDURE — 80061 LIPID PANEL: CPT

## 2019-04-29 PROCEDURE — 85025 COMPLETE CBC W/AUTO DIFF WBC: CPT

## 2019-04-29 PROCEDURE — 2580000003 HC RX 258: Performed by: INTERNAL MEDICINE

## 2019-04-29 PROCEDURE — 99255 IP/OBS CONSLTJ NEW/EST HI 80: CPT | Performed by: INTERNAL MEDICINE

## 2019-04-29 PROCEDURE — 36415 COLL VENOUS BLD VENIPUNCTURE: CPT

## 2019-04-29 PROCEDURE — 93306 TTE W/DOPPLER COMPLETE: CPT

## 2019-04-29 PROCEDURE — 94761 N-INVAS EAR/PLS OXIMETRY MLT: CPT

## 2019-04-29 RX ORDER — POTASSIUM CHLORIDE 750 MG/1
40 TABLET, EXTENDED RELEASE ORAL ONCE
Status: COMPLETED | OUTPATIENT
Start: 2019-04-29 | End: 2019-04-29

## 2019-04-29 RX ORDER — GUAIFENESIN 600 MG/1
600 TABLET, EXTENDED RELEASE ORAL 2 TIMES DAILY
Status: DISCONTINUED | OUTPATIENT
Start: 2019-04-29 | End: 2019-04-30

## 2019-04-29 RX ORDER — IBUPROFEN 600 MG/1
600 TABLET ORAL EVERY 8 HOURS PRN
Status: DISCONTINUED | OUTPATIENT
Start: 2019-04-29 | End: 2019-05-20 | Stop reason: HOSPADM

## 2019-04-29 RX ADMIN — IBUPROFEN 600 MG: 600 TABLET ORAL at 16:59

## 2019-04-29 RX ADMIN — LISINOPRIL 5 MG: 5 TABLET ORAL at 12:36

## 2019-04-29 RX ADMIN — ENOXAPARIN SODIUM 40 MG: 40 INJECTION SUBCUTANEOUS at 10:21

## 2019-04-29 RX ADMIN — Medication 10 ML: at 17:00

## 2019-04-29 RX ADMIN — GUAIFENESIN 600 MG: 600 TABLET, EXTENDED RELEASE ORAL at 21:03

## 2019-04-29 RX ADMIN — MUPIROCIN: 20 OINTMENT TOPICAL at 21:05

## 2019-04-29 RX ADMIN — CITALOPRAM HYDROBROMIDE 20 MG: 20 TABLET ORAL at 10:15

## 2019-04-29 RX ADMIN — FUROSEMIDE 40 MG: 10 INJECTION, SOLUTION INTRAMUSCULAR; INTRAVENOUS at 10:15

## 2019-04-29 RX ADMIN — MUPIROCIN: 20 OINTMENT TOPICAL at 10:15

## 2019-04-29 RX ADMIN — POTASSIUM CHLORIDE 40 MEQ: 10 TABLET, EXTENDED RELEASE ORAL at 12:36

## 2019-04-29 RX ADMIN — FUROSEMIDE 40 MG: 10 INJECTION, SOLUTION INTRAMUSCULAR; INTRAVENOUS at 21:04

## 2019-04-29 RX ADMIN — Medication 10 ML: at 21:04

## 2019-04-29 ASSESSMENT — PAIN SCALES - GENERAL: PAINLEVEL_OUTOF10: 4

## 2019-04-29 NOTE — FLOWSHEET NOTE
04/28/19 1900   Vital Signs   Pulse 99   Resp 20   /68   MAP (mmHg) 86   Oxygen Therapy   SpO2 95 %     Pt alert & oriented x 4. Denies pain. Was on 6 L NC HF but patient kept desating to 87-88 % increased to 8 L HF. Now sats are now 93%. Pt desats with minimal exertion. Lungs sounds diminished with exp wheezes. Nitroglycerin infusion @ 20 mcg/min. Pt tolerating well. PIV x 2 in right hand and AC. WNL. Inserted andujar using sterile procedure. Admission question completed. Scheduled medications to be given. No signs of distress noted  Will continue to monitor.

## 2019-04-29 NOTE — PROGRESS NOTES
RESPIRATORY THERAPY ASSESSMENT    Name:  [facility]al[facility] Record Number:  [id number]  Age: 64 y.o. Gender: female  : 1960  Today's Date:  2019  Room:  30093009-01    Assessment     Is the patient being admitted for a COPD or Asthma exacerbation? No   (If yes the patient will be seen every 4 hours for the first 24 hours and then reassessed)    Patient Admission Diagnosis      Allergies  No Known Allergies    Minimum Predicted Vital Capacity:     782          Actual Vital Capacity:      750              Pulmonary History:CHF/Pulmonary Edema  Home Oxygen Therapy:  room air  Home Respiratory Therapy:Albuterol   Current Respiratory Therapy:  Albuterol 2 puff Q4 PRN, Albuterol Q6 PRN          Respiratory Severity Index(RSI)   Patients with orders for inhalation medications, oxygen, or any therapeutic treatment modality will be placed on Respiratory Protocol. They will be assessed with the first treatment and at least every 72 hours thereafter. The following severity scale will be used to determine frequency of treatment intervention.     Smoking History: Smoking History Less than 1ppd or less than 15 pack year = 1    Social History  Social History     Tobacco Use    Smoking status: Current Every Day Smoker     Packs/day: 0.10     Years: 30.00     Pack years: 3.00     Types: Cigarettes    Smokeless tobacco: Never Used   Substance Use Topics    Alcohol use: No     Alcohol/week: 0.0 oz    Drug use: No       Recent Surgical History: None = 0  Past Surgical History  Past Surgical History:   Procedure Laterality Date    CARPAL TUNNEL RELEASE      both hands     LUMBAR SPINE SURGERY         Level of Consciousness: Alert, Oriented, and Cooperative = 0    Level of Activity: Walking unassisted = 0    Respiratory Pattern: Regular Pattern; RR 8-20 = 0    Breath Sounds: Diminshed bilaterally and/or crackles = 2    Sputum   ,  ,    Cough: Strong, spontaneous, non-productive = 0    Vital Signs Home        c. Unable to demonstrate proper use of MDI with spacer     d. RR > 30 bpm   5. Bronchodilators will be delivered via Metered Dose Inhaler (MDI), HHN, Aerogen to intubated patients on mechanical ventilation. 6. Inhalation medication orders will be delivered and/or substituted as outlined below. Aerosolized Medications Ordering and Administration Guidelines:    1. All Medications will be ordered by a physician, and their frequency and/or modality will be adjusted as defined by the patients Respiratory Severity Index (RSI) score. 2. If the patient does not have documented COPD, consider discontinuing anticholinergics when RSI is less than 9.  3. If the bronchospasm worsens (increased RSI), then the bronchodilator frequency can be increased to a maximum of every 4 hours. If greater than every 4 hours is required, the physician will be contacted. 4. If the bronchospasm improves, the frequency of the bronchodilator can be decreased, based on the patient's RSI, but not less than home treatment regimen frequency. 5. Bronchodilator(s) will be discontinued if patient has a RSI less than 9 and has received no scheduled or as needed treatment for 72  Hrs. Patients Ordered on a Mucolytic Agent:    1. Must always be administered with a bronchodilator. 2. Discontinue if patient experiences worsened bronchospasm, or secretions have lessened to the point that the patient is able to clear them with a cough. Anti-inflammatory and Combination Medications:    1. If the patient lacks prior history of lung disease, is not using inhaled anti-inflammatory medication at home, and lacks wheezing by examination or by history for at least 24 hours, contact physician for possible discontinuation.

## 2019-04-29 NOTE — PLAN OF CARE
Nutrition Problem: Overweight/Obese  Intervention: Food and/or Nutrient Delivery: Continue current diet  Nutritional Goals: patient will consume 50% or greater of meals on 2 g Na diet order x 3 meals per day

## 2019-04-29 NOTE — PROGRESS NOTES
Nutrition Assessment    Type and Reason for Visit: Initial, Positive Nutrition Screen, Consult(+ screen for MST = 2; consult for CHF nutrition education)    Nutrition Recommendations:   1. Continue 2 g Na diet order. 2. Monitor appetite, po intake, and respiratory function/cardiac function. 3. Monitor for additional in-patient weight changes. 4. Monitor nutrition-related lab values and bowel function/regimen. 5. Will provide CHF nutrition education once patient is transferred out of ICU. Nutrition Assessment: patient is nutritionally compromised AEB not feeling well x 1 month PTA and SOB/hypoxia upon admission and she is at risk for further compromise d/t weight gain of approx 15# within the few days PTA (new diagnosis of CHF) and altered nutrition-related lab values; will continue 2 g Na diet order and monitor nutrition status    Malnutrition Assessment:  · Malnutrition Status: At risk for malnutrition  · Context: Acute illness or injury  · Findings of the 6 clinical characteristics of malnutrition (Minimum of 2 out of 6 clinical characteristics is required to make the diagnosis of moderate or severe Protein Calorie Malnutrition based on AND/ASPEN Guidelines):  1. Energy Intake-(> 75% of estimated nutrition needs), Greater than or equal to 7 days    2. Weight Loss-No significant weight loss, in 1 year  3. Fat Loss-No significant subcutaneous fat loss,    4. Muscle Loss-No significant muscle mass loss,    5. Fluid Accumulation-Moderate to severe fluid accumulation, Extremities(BLE + 1-2 pitting edema )  6.  Strength-Not measured    Nutrition Risk Level:  Moderate    Nutrient Needs:  · Estimated Daily Total Kcal: 984 - 1845 kcals based on 8-15 kcals/kg/CBW  · Estimated Daily Protein (g): 104 - 114 g protein based on 2.0-2.2 g/kg/IBW  · Estimated Daily Total Fluid (ml/day): 1000 - 1500 ml    Nutrition Diagnosis:   · Problem: Overweight/Obese  · Etiology: related to Cardiac dysfunction, Excessive

## 2019-04-29 NOTE — FLOWSHEET NOTE
04/29/19 0400   Assessment   Charting Type Reassessment   Neurological   Neuro (WDL) WDL   Level of Consciousness 0   Swallow Screening   Is the patient able to remain alert for testing? Yes   [place] Coma Scale   Eye Opening 4   Best Verbal Response 5   Best Motor Response 6   [place] Coma Scale Score 15   NIH/MNHISS Stroke Scale   NIH/MNIHSS Stroke Scale Assessed No   HEENT   HEENT (WDL) WDL   Respiratory   Respiratory (WDL) WDL   Breath Sounds   Right Upper Lobe Expiratory Wheezes   Right Middle Lobe Expiratory Wheezes   Right Lower Lobe Diminished   Left Upper Lobe Expiratory Wheezes   Left Lower Lobe Diminished   Cardiac   Cardiac (WDL)   (denies cp)   Cardiac Rhythm ST   Rhythm Interpretation   Pulse 91   Cardiac Monitor   Telemetry Monitor On Yes   Telemetry Audible Yes   Telemetry Alarms Set Yes   Telemetry Box Number 9   Telemetry Monitor Alarm Parameters yes   Gastrointestinal   Abdominal (WDL) WDL   Peripheral Vascular   Peripheral Vascular (WDL) X   Edema Right lower extremity; Left lower extremity   RLE Edema +2;Non-pitting   LLE Edema +2;Pitting   Skin Color/Condition   Skin Color/Condition (WDL) WDL   Skin Integrity   Skin Integrity (WDL) X   Skin Integrity Intact   Location BLE   Musculoskeletal   Musculoskeletal (WDL) WDL   Genitourinary   Genitourinary (WDL) WDL   Anus/Rectum   Anus/Rectum (WDL) WDL   Urethral Catheter   Placement Date/Time: 04/28/19 1956   Inserted by: MANE Horne RN  Catheter Balloon Size: 10 mL  Securement Method: Securing device (Describe)  Urine Returned: Yes   Catheter Indications Need for fluid management in critically ill patients in a critical care setting not able to be managed by other means such as BSC with hat, bedpan, urinal, condom catheter, or short term intermittent urethral catherization   Site Assessment Pink;Moist   Urine Color Yellow   Urine Appearance Clear   Psychosocial   Psychosocial (WDL) WDL     Reassessment complete. No changes noted.  Pt alert and oriented. States she feels better this morning. No signs of distress noted. Will continue to monitor.

## 2019-04-29 NOTE — CARE COORDINATION
Case Management Assessment  Initial Evaluation    Date/Time of Evaluation: 4/29/2019 1:09 PM  Assessment Completed by: Jd Wills    Patient Name: Lela Phillips  YOB: 1960  Diagnosis: Acute respiratory failure ([address] [J96.00]  Date / Time: 4/28/2019  1:37 PM  Admission status/Date:04/28/19  Chart Reviewed: Yes      Patient Interviewed: Yes   Family Interviewed:  No      Hospitalization in the last 30 days:  No    Contacts  :     Relationship to Patient:   Phone Number:    Alternate Contact:     Relationship to Patient:     Phone Number:    Met with:    Current PCP  [facility]    Financial  Commercial  Precert required for SNF : Y, N        3 night stay required - Y, N    ADLS  Support Systems: Family Members  Transportation: self    Meal Preparation: self    Housing  Home Environment: house  Steps: 1  Plans to Return to Present Housing: Yes  Other Identified Issues: Huey Cottrell  Currently active with [facility] [address] : No     Passport/Waiver : No  :                      Phone Number:    Passport/Waiver Services:  Ruth Ann Marquez   [facility] Provider: n/a  Equipment: Walker__Cane__RTS__ BSC__Shower Chair__  02__ HHN__ CPAP__  BiPap__  Hospital Bed__ W/C___ Other__________      Has Home O2 in place on admit:  No  Informed of need to bring portable home O2 tank on day of discharge for nursing to connect prior to leaving:   Not Indicated  Verbalized agreement/Understanding:   Not Indicated    Community Service Affiliation  Dialysis:  No    · Name:  · Location  · Dialysis Schedule:  · Phone:   · Fax: Outpatient PT/OT: No    Cancer Center: No     CHF Clinic: No     Pulmonary Rehab: No  Pain Clinic: No  Community Mental Health: No    Wound Clinic: No     Other: n/a    DISCHARGE PLAN: Plans to return home alone. [employer]-pta. Works. CM to follow. Explained Case Management role/services.

## 2019-04-29 NOTE — PROGRESS NOTES
04/28/19 7511   NIV Type   $NIV $Daily Charge   Equipment Type V60   Mode BIPAP   Mask Type Full face mask   Mask Size Medium   Settings/Measurements   Comfort Level Good   Using Accessory Muscles No   IPAP 12 cmH20   EPAP 6 cmH2O   Rate Ordered 12   Resp 26   SpO2 95   O2 Flow Rate (L/min) 8 L/min   FiO2  60 %   Vt Exhaled 532 mL   Mask Leak (lpm) 1 lpm   Skin Protection for O2 Device Yes   Breath Sounds   Right Upper Lobe Expiratory Wheezes   Right Middle Lobe Expiratory Wheezes   Right Lower Lobe Diminished   Left Upper Lobe Expiratory Wheezes   Left Lower Lobe Diminished   Alarm Settings   Alarms On Y   Press Low Alarm 8 cmH2O   High Pressure Alarm 35 cmH2O   Delay Alarm 20 sec(s)   Resp Rate Low Alarm 12   High Respiratory Rate 40 br/min

## 2019-04-29 NOTE — PROGRESS NOTES
04/29/19 0335   NIV Type   Equipment Type v60   Mode BIPAP   Mask Type Full face mask   Mask Size Medium   Settings/Measurements   Comfort Level Good   Using Accessory Muscles No   IPAP 12 cmH20   EPAP 6 cmH2O   Rate Ordered 12   Resp 15   SpO2 95   FiO2  60 %   Vt Exhaled 481 mL   Mask Leak (lpm) 5 lpm   Skin Protection for O2 Device Yes   Breath Sounds   Right Upper Lobe Expiratory Wheezes   Right Middle Lobe Expiratory Wheezes   Right Lower Lobe Diminished   Left Upper Lobe Expiratory Wheezes   Left Lower Lobe Diminished   Alarm Settings   Alarms On Y   Press Low Alarm 8 cmH2O   High Pressure Alarm 35 cmH2O   Delay Alarm 20 sec(s)   Resp Rate Low Alarm 12   High Respiratory Rate 40 br/min

## 2019-04-29 NOTE — PLAN OF CARE
Patient's EF (Ejection Fraction) is unknown echo ordered    Patient has a past medical history of Anxiety state, unspecified, Impacted cerumen, and Rheumatoid arthritis(714.0). Comorbidities reviewed and education provided. Patient and family's stated goal of care: reduce shortness of breath, increase activity tolerance, better understand heart failure and disease management, be more comfortable and reduce lower extremity edema prior to discharge    Patient's current functional capacity:  No limitation of physical activity. Ordinary physical activity does not cause undue fatigue, palpitation, dyspnea. Pt up in chair at this time on BiPAP. Pt denies shortness of breath. Pt with pitting lower extremity edema.  Patient's weights and intake/output reviewed:    Patient Vitals for the past 96 hrs (Last 3 readings):   Weight   04/28/19 1840 275 lb (124.7 kg)   04/28/19 1346 240 lb (108.9 kg)       Intake/Output Summary (Last 24 hours) at 4/29/2019 0434  Last data filed at 4/28/2019 2256  Gross per 24 hour   Intake 47 ml   Output 1925 ml   Net -1878 ml         >>For CHF and Comorbidity documentation on Education Time and Topics, please see Education Tab

## 2019-04-29 NOTE — PROGRESS NOTES
07:15 Bedside report received, pt stable on bipap at handoff  08:30 Pt awake a&o x4, vss pt off bipap on high flow NC now, assessment as charted  09:45 Critical care rounds completed w/ Dr. Jose Toussaint at bedside , pt., mother , and father updated by MD. Tanner Half  10:00 Cardiologist @ bedside to see pt   11:38 VS remain stable, pt remains awake a&o x4 reassessment as charted  15:36 Hospitalist  at bedside  15:59 Pt remains awake a&o x4, VSS reassessment as charted pt remains on High Flow  NC @ 10 Liters o2 sats range fro 91-94 %  19:14 Bedside report given to night nurse Tim Child, pt awake A&o x4, pt stable @ handoff

## 2019-04-29 NOTE — PROGRESS NOTES
Admit: 2019    Name:  Mane Betancourt  Room:  [id number]  MRN:    [id number]    Critical Care Daily Progress Note for 2019     Interval History:     She is on 10 L of O2. Scheduled Meds:   mupirocin   Nasal BID    diclofenac sodium  2 g Topical BID    ALPRAZolam  0.25 mg Oral TID    citalopram  20 mg Oral Daily    sodium chloride flush  10 mL Intravenous 2 times per day    enoxaparin  40 mg Subcutaneous Daily    lisinopril  5 mg Oral Daily    furosemide  40 mg Intravenous BID       Continuous Infusions:   nitroGLYCERIN Stopped (19 1001)       PRN Meds:  sodium chloride flush, magnesium hydroxide, ondansetron, albuterol, albuterol sulfate HFA                  Objective:     Temp  Av.5 °F (36.9 °C)  Min: 97.7 °F (36.5 °C)  Max: 99 °F (37.2 °C)  Pulse  Av.1  Min: 91  Max: 108  BP  Min: 102/39  Max: 203/169  SpO2  Av.6 %  Min: 77 %  Max: 96 %  FiO2   Av %  Min: 40 %  Max: 60 %  Patient Vitals for the past 4 hrs:   BP Temp Temp src Pulse Resp SpO2 Height   19 1205 (!) 117/96 -- -- 97 22 91 % --   19 1105 127/82 98.9 °F (37.2 °C) Oral 95 25 92 % --   19 1056 -- -- -- -- -- -- 5' 3\" (1.6 m)   19 1005 114/76 -- -- 95 18 93 % --         Intake/Output Summary (Last 24 hours) at 2019 1313  Last data filed at 2019 1100  Gross per 24 hour   Intake 518.7 ml   Output 3975 ml   Net -3456.3 ml       Physical Exam:  General:  Awake, alert and oriented. Appears to be not in any distress  Mucous Membranes:  Pink , anicteric  Neck: No JVD, no carotid bruit, no thyromegaly  Chest:  Clear to auscultation bilaterally, no added sounds  Cardiovascular:  RRR S1S2 heard, no murmurs or gallops  Abdomen:  Soft, undistended, non tender, no organomegaly, BS present  Extremities: No edema or cyanosis.  Distal pulses well felt  Neurological : no focal deficits    Lab Data:  CBC:   Recent Labs     19  1530 19  0435   WBC 16.1* 14.0*   RBC 6.26* 5.90* HGB 18.1* 17.4*   HCT 57.0* 55.1*   MCV 91.1 93.3   RDW 16.1* 16.6*    165     BMP:   Recent Labs     04/28/19  1530 04/29/19  0435    143   K 4.1 3.7   CL 91* 90*   CO2 37* 45*   BUN 20 16   CREATININE 0.6 <0.5*     BNP: No results for input(s): BNP in the last 72 hours. PT/INR: No results for input(s): PROTIME, INR in the last 72 hours. APTT:No results for input(s): APTT in the last 72 hours. CARDIAC ENZYMES:   Recent Labs     04/28/19  1530 04/28/19  1937 04/28/19  2334   TROPONINI <0.01 <0.01 <0.01     FASTING LIPID PANEL:  Lab Results   Component Value Date    CHOL 145 04/29/2019    HDL 19 04/29/2019    TRIG 123 04/29/2019     LIVER PROFILE:   Recent Labs     04/28/19  1530   *   *   BILITOT 1.3*   ALKPHOS 95         CT A chest   Suboptimal contrast timing although there is no evidence for large or central   pulmonary embolism.  The segmental and subsegmental branches are not well   evaluated.       Small left basilar effusion with adjacent consolidation and there are also   infiltrates within the lingula and right lung base that may represent   atelectasis versus pneumonia.       Small amount of free fluid in the visualized upper abdomen. ECHO-  Left ventricular systolic function is normal with ejection fraction   estimated at 55 %.   No regional wall motion abnormality.   Left ventricle size is normal.   There is moderate concentric left ventricular hypertrophy.   Normal left ventricular diastolic filling pressure.   The aortic valve leaflets are not well visualized.   Aortic valve appears sclerotic but opens adequately.   No evidence of aortic valve stenosis.   Trivial aortic regurgitation is present.   Right ventricular systolic function is normal.   The right ventricle is moderately enlarged.   Systolic pulmonary artery pressure (SPAP) estimated at 54 mmHg (RA pressure   15 mmHg), consistent with moderate pulmonary hypertension.     Assessment & Plan:     Patient Active Problem List    Diagnosis Date Noted    Tobacco abuse 05/31/2011     Priority: Medium    Rheumatoid arthritis ([facility] 05/17/2010     Priority: Medium    Anxiety state 05/17/2010     Priority: Low    Acute respiratory failure with hypoxia ([facility] 04/28/2019    New onset of congestive heart failure ([facility] 04/28/2019    Moderate persistent asthma with acute exacerbation 04/17/2019    SOB (shortness of breath) 06/09/2011     1. Acute hypoxic resp failure. possibly secondary toacute pulmonary edema and acute congestive heart failure. currently on IV Lasix. Echocardiogram is been obtained. Echocardiogram however shows normal LV function does not show diastolic heart failure. she is currently on 10 L of oxygen. 2.  Chronic hypercarbic respiratory failure likely obesity hypoventilation syndrome. 3.  Acute bronchitis. No fevers. Has mildly elevated leukocytosis. Pro-calcitonin however is negative. Likely viral in etiology. 4.  Morbid obesity. Nutrition consult. Lovenox for DVT prophylaxis.           Chico Gaffney

## 2019-04-29 NOTE — CONSULTS
Patient is being seen at the request of Dr. Flo Meyer  for a consultation for Acute Respiratory Failure, CHF    HISTORY OF PRESENT ILLNESS: This is a 61-year-old white female with a history of morbid obesity and rheumatoid arthritis who presented to the emergency department on 4/28/19 with a several week history of progressively worsening severe shortness of breath, worse with exertion associated with bilateral lower extremity swelling and orthopnea. Had 15 pound weight gain over just a few days. No prior similar episodes. She was treated as an outpatient with steroids and antibiotics without improvement. In the emergency department she was noted to be 77% on room air, but had improvement with NIV and IV Lasix. She was placed on a nitroglycerin drip and transferred to the ICU. She specifically denies fevers, chills, body aches, cough, sputum production. PAST MEDICAL HISTORY:  Past Medical History:   Diagnosis Date    Anxiety state, unspecified 5/17/2010    Impacted cerumen 5/17/2010    Rheumatoid arthritis(714.0) 5/17/2010     PAST SURGICAL HISTORY:  Past Surgical History:   Procedure Laterality Date    CARPAL TUNNEL RELEASE      both hands 1990's    LUMBAR SPINE SURGERY         FAMILY HISTORY:  family history includes Diabetes in her paternal grandfather; Heart Attack (age of onset: 62) in her paternal grandfather; Other in her mother; Stroke in her paternal grandmother. SOCIAL HISTORY:   reports that she has been smoking cigarettes. She has a 3.00 pack-year smoking history.  She has never used smokeless tobacco.    Scheduled Meds:   diclofenac sodium  2 g Topical BID    ALPRAZolam  0.25 mg Oral TID    citalopram  20 mg Oral Daily    sodium chloride flush  10 mL Intravenous 2 times per day    enoxaparin  40 mg Subcutaneous Daily    lisinopril  5 mg Oral Daily    furosemide  40 mg Intravenous BID     Continuous Infusions:   nitroGLYCERIN 20 mcg/min (04/28/19 1534)     PRN Meds:  sodium chloride flush, magnesium hydroxide, ondansetron, albuterol, albuterol sulfate HFA    ALLERGIES:  Patient has No Known Allergies. REVIEW OF SYSTEMS:  Constitutional: Negative for fever  HENT: Negative for sore throat  Eyes: Negative for redness   Respiratory: + dyspnea, cough  Cardiovascular: Negative for chest pain  Gastrointestinal: Negative for vomiting, diarrhea   Genitourinary: Negative for hematuria   Musculoskeletal: Negative for arthralgias   Skin: Negative for rash  Neurological: Negative for syncope  Hematological: Negative for adenopathy  Psychiatric/Behavorial: Negative for anxiety    PHYSICAL EXAM:  Blood pressure 128/72, pulse 91, temperature 98 °F (36.7 °C), temperature source Oral, resp. rate 15, height 5' 3\" (1.6 m), weight 270 lb (122.5 kg), SpO2 95 %, not currently breastfeeding.' 10 L  General: ill appearing. Eyes: PERRL. No sclera icterus. No conjunctival injection. ENT: No discharge. Pharynx clear. Neck: Trachea midline. Normal thyroid. Resp: No accessory muscle use. No crackles. No wheezing. No rhonchi. No dullness on percussion. CV: Regular rate. Regular rhythm. No mumur or rub. Bilateral lower extremity edema. Peripheral pulses are 2+. Capillary refill is less than 3 seconds. GI: Non-tender. Non-distended. No masses. No organomegaly. Normal bowel sounds. No hernia. Skin: Warm and dry. No nodule on exposed extremities. No rash on exposed extremities. Lymph: No cervical LAD. No supraclavicular LAD. M/S: No cyanosis. No joint deformity. No clubbing. Neuro: Alert and oriented ×3. Patellar reflexes are symmetric. Psych: No agitation, no anxiety, affect is full.      LABS:  CBC:   Recent Labs     04/28/19  1530 04/29/19  0435   WBC 16.1* 14.0*   HGB 18.1* 17.4*   HCT 57.0* 55.1*   MCV 91.1 93.3    165     BMP:   Recent Labs     04/28/19  1530 04/29/19  0435    143   K 4.1 3.7   CL 91* 90*   CO2 37* 45*   BUN 20 16   CREATININE 0.6 <0.5*     LIVER PROFILE:   Recent Labs     04/28/19  1530   *   *   BILITOT 1.3*   ALKPHOS 95     PT/INR: No results for input(s): PROTIME, INR in the last 72 hours. APTT: No results for input(s): APTT in the last 72 hours. UA:  Recent Labs     04/28/19  1414   COLORU Yellow   PHUR 5.5   LABCAST 0-1 Hyaline*   WBCUA 0-2   RBCUA 0-2   BACTERIA 1+*   CLARITYU Clear   SPECGRAV <=1.005   LEUKOCYTESUR Negative   UROBILINOGEN 0.2   BILIRUBINUR Negative   BLOODU TRACE-INTACT*   GLUCOSEU Negative     Recent Labs     04/28/19  1402   PHART 7.363   OAU2PSC 65.7*   PO2ART 100.6       Cultures:      4/28/19 blood sent    Chest imaging was reviewed by me and showed:   CTPA 4/28/19  FINDINGS:   Pulmonary Arteries: The contrast timing is mildly suboptimal limiting   evaluation of the segmental and subsegmental branches.  There is no large or   central pulmonary embolism.  Main pulmonary artery is normal in caliber.       Mediastinum: No evidence of mediastinal lymphadenopathy.  The heart and   pericardium demonstrate no acute abnormality.  There is no acute abnormality   of the thoracic aorta.       Lungs/pleura: There is a linear infiltrate within the lingula.  There is a   small left basilar effusion with adjacent consolidation.  There is minimal   right basilar infiltrate.  There is no pneumothorax.  The tracheobronchial   tree is patent.       Upper Abdomen: There is a small amount of fluid in the abdomen.       Soft Tissues/Bones: No acute bone or soft tissue abnormality.           Impression   Suboptimal contrast timing although there is no evidence for large or central   pulmonary embolism.  The segmental and subsegmental branches are not well   evaluated.       Small left basilar effusion with adjacent consolidation and there are also   infiltrates within the lingula and right lung base that may represent   atelectasis versus pneumonia.       Small amount of free fluid in the visualized upper abdomen.      ASSESSMENT:  · Acute hypoxemic respiratory failure   · Chronic hypercapnic respiratory failure, favor obesity hypoventilation syndrome  · Acute congestive heart failure  · Pulmonary edema  · Abnormal CT chest, appearance is more consistent with atelectasis than with infiltrative process  · Morbid obesity    PLAN:  · Bilevel non-invasive positive pressure ventilation PRN respiratory failure  · Supplemental oxygen to maintain SaO2 >92%; wean as tolerated    · Diuresis, monitoring of electrolytes  · Inhaled bronchodilators   · ECHO  · Procalcitonin, if elevated add BS abx  · Prophylaxis: Lovenox, Bactroban  · Okay with me for PCU once off nitroglycerin drip  · Discussed with Dr. Heaven Estrada

## 2019-04-29 NOTE — CONSULTS
[facility]  [phone]        Reason for Consultation/Chief Complaint: \"I have been having SOB. \" Consulted for SOB; concern for CHF    History of Present Illness:  Lowell Delcid is a 64 y.o. patient who was admitted to [facility] 4/28/19 with c/o SOB x 2 weeks. No prior cardiology care or cardiac testing. She has PMH tobacco abuse, asthma, obesity, and RA. She c/o 2 weeks of increasing SOB both at rest and exertion. Also with cough with yellow-greenish sputum production. Works at [employer] office and around sick people daily. Also c/o some swelling in feet and legs. Noted hypoxic 77% on RA in ED. Note CXR showed cardiomegaly and mild pulmonary edema. Note elevated BNP 3747, troiponin neg x 3; WBC 16.1, H&H 18.1/57.0. Admitted  To ICU and placed on nitroglycerin gtt and Bipap but now on 10L NC oxygen. Patient with no complaints of chest pain, palpitations, dizziness, or orthopnea/PND. I have been asked to provide consultation regarding further management and testing. Past Medical History:   has a past medical history of Anxiety state, unspecified, Impacted cerumen, and Rheumatoid arthritis(714.0). Surgical History:   has a past surgical history that includes Carpal tunnel release and Lumbar spine surgery. Social History:   She is , works at [employer] [address] in [place], [place], lives alone in [place], [place]. She reports that she has been smoking cigarettes since age 18 1/2-3/4 ppd down to 2 cigs per day. She has a 3.00 pack-year smoking history. She has never used smokeless tobacco. She reports that she does not drink alcohol or use drugs. Family History:  Mom Iliac artery stent, factor 5 age 62 alive age 66 Dad lymphoma alive age 66  family history includes Diabetes in her paternal grandfather; Heart Attack (age of onset: 62) in her paternal grandfather; Other in her mother; Stroke in her paternal grandmother. Home Medications:  Were reviewed and are listed in nursing record. edema   Pulses: 2+ and symmetric   Skin: Skin color, texture, turgor normal, no rashes or lesions   Pysch: Normal mood and affect   Neurologic: Normal gross motor and sensory exam.         Labs  CBC:   Lab Results   Component Value Date    WBC 14.0 2019    RBC 5.90 2019    HGB 17.4 2019    HCT 55.1 2019    MCV 93.3 2019    RDW 16.6 2019     2019     CMP:    Lab Results   Component Value Date     2019    K 3.7 2019    CL 90 2019    CO2 45 2019    BUN 16 2019    CREATININE <0.5 2019    GFRAA >60 2019    GFRAA >60 2012    AGRATIO 1.1 2019    LABGLOM >60 2019    GLUCOSE 101 2019    PROT 7.0 2019    PROT 6.8 2012    CALCIUM 8.6 2019    BILITOT 1.3 2019    ALKPHOS 95 2019     2019     2019     PT/INR:  No results found for: PTINR  Lab Results   Component Value Date    TROPONINI <0.01 2019     CXR 19  Cardiomegaly and mild pulmonary edema. FINDINGS: Cardiomediastinal silhouette is enlarged. No pneumothorax. Bilateral perihilar airspace opacities. Small left effusion. No acute osseous abnormality. EK19  I have reviewed EKG with the following interpretation:  Sinus tachycardia with Premature supraventricular complexesPossible Left atrial enlargementLeft posterior fascicular blockAbnormal ECGNo previous ECGs availableConfirmed by Keli Bell MD, Peyton De Leon ([id number]) on 2019 8:04:56 PM    Assessment:  Tita Weston is a 64 y.o. patient who was admitted to [facility] 19 with c/o SOB x 2 weeks. No prior cardiology care or cardiac testing. She has PMH tobacco abuse, asthma, obesity, and RA. She c/o 2 weeks of increasing SOB both at rest and exertion. Also with cough with yellow-greenish sputum production. Works at [employer] office and around sick people daily. Also c/o some swelling in feet and legs. Noted hypoxic 77% on RA in ED.  Note CXR showed cardiomegaly and mild pulmonary edema. Note elevated BNP 3747, troiponin neg x 3; WBC 16.1, H&H 18.1/57.0. Admitted  To ICU and placed on nitroglycerin gtt and Bipap but now on 10L NC oxygen. Diagnosis of SOB concerning for acute CHF of undetermined type +/- PNA. Recs:  1. IV diurese 40mg BID and adjust accordingly. Note UOP 3L and total output 2.6L. 2. Continue lisinopril 5mg daily. .  3. Ween nitro gtt off.  4. ECHO pending. 5. Treat with abx if necessary. Defer to ICU doc. 6. Further recs to follow after results of ECHO. Patient Active Problem List   Diagnosis    Anxiety state    Rheumatoid [facility] ([facility] [address]    Tobacco abuse    Moderate persistent asthma with acute exacerbation    Acute respiratory failure with hypoxia (HCC)    Acute CHF (congestive heart failure) ([facility] [address]       Thank you for allowing to us to participate in the care or Eladia Angel. Further evaluation will be based upon the patient's clinical course and testing results.

## 2019-04-29 NOTE — H&P
Hospital Medicine History & Physical      PCP: Adal Odonnell MD    Date of Admission: 4/28/2019    Date of Service: Pt seen/examined on 4/28/2019  and Admitted to Inpatient with expected LOS greater than two midnights due to medical therapy. Chief Complaint:    Chief Complaint   Patient presents with    Shortness of Breath     SOB x2 months. Has been on 2 different antibiotics, 2 rounds of steroid with no improvement. RA 77% upon arrival, lips cyanotic. History Of Present Illness: The patient is a 64 y.o. female with history of RA, HLD, GERD, CLBP who has been unwell for the past few weeks with progressively worsening SOB and associated bilateral ankle/leg swelling with orthopnea but no PND. No chest pains, palpitations, nausea/vomiting. On presentation to ER she was desaturating to 77% room with cyanosis. She improved on BiPAP with lasix IV. She is seen in ICU on NC with sats 92%     Past Medical History:        Diagnosis Date    Anxiety state, unspecified 5/17/2010    Impacted cerumen 5/17/2010    Rheumatoid arthritis(714.0) 5/17/2010       Past Surgical History:        Procedure Laterality Date    CARPAL TUNNEL RELEASE      both hands 1990's    LUMBAR SPINE SURGERY         Medications Prior to Admission:    Prior to Admission medications    Medication Sig Start Date End Date Taking? Authorizing Provider   albuterol (ACCUNEB) 1.25 MG/3ML nebulizer solution Inhale 3 mLs into the lungs every 6 hours as needed for Wheezing 4/17/19  Yes Adal Odonnell MD   citalopram (CELEXA) 20 MG tablet Take 1 tablet by mouth daily 4/16/19  Yes Adal Odonnell MD   albuterol sulfate  (90 Base) MCG/ACT inhaler Inhale 2 puffs into the lungs every 4 hours as needed for Wheezing or Shortness of Breath 5/23/18  Yes Adal Odonnell MD   ALPRAZolam Elfida Scarlet) 0.25 MG tablet Take 1 tablet by mouth 3 times daily 5/11/17   Alvin Crespo MD   diclofenac sodium 1 % GEL 2 gm in upper extremity joint, 3 times a day as needed.  Maximum sensory/motor intact upper extremities/lower extremities, bilaterally. Cranial nerves: II-XII intact, grossly non-focal.  Mental status: Alert, oriented, thought content appropriate. CXR:  I have reviewed the CXR with the following interpretation: Cardiomegaly and mild pulmonary edema. EKG:  I have reviewed the EKG with the following interpretation: Sinus tachycardia with Premature supraventricular complexes    CBC   Recent Labs     04/28/19  1530   WBC 16.1*   HGB 18.1*   HCT 57.0*         RENAL  Recent Labs     04/28/19  1530      K 4.1   CL 91*   CO2 37*   BUN 20   CREATININE 0.6     LFT'S  Recent Labs     04/28/19  1530   *   *   BILITOT 1.3*   ALKPHOS 95     COAG  No results for input(s): INR in the last 72 hours.   CARDIAC ENZYMES  Recent Labs     04/28/19  1530 04/28/19  1937   TROPONINI <0.01 <0.01       U/A:    Lab Results   Component Value Date    COLORU Yellow 04/28/2019    WBCUA 0-2 04/28/2019    RBCUA 0-2 04/28/2019    BACTERIA 1+ 04/28/2019    CLARITYU Clear 04/28/2019    SPECGRAV <=1.005 04/28/2019    LEUKOCYTESUR Negative 04/28/2019    BLOODU TRACE-INTACT 04/28/2019    GLUCOSEU Negative 04/28/2019       ABG    Lab Results   Component Value Date    XIV0DHM 36.5 04/28/2019    BEART 7.7 04/28/2019    C0BXXBID 97.2 04/28/2019    PHART 7.363 04/28/2019    QXZ3XNP 65.7 04/28/2019    PO2ART 100.6 04/28/2019    JOT4LHN 38.6 04/28/2019           Active Hospital Problems    Diagnosis Date Noted    Tobacco abuse [Z72.0] 05/31/2011     Priority: Medium    Rheumatoid arthritis ([facility] [address] [M06.9] 05/17/2010     Priority: Medium    Anxiety state [F41.1] 05/17/2010     Priority: Low    Acute respiratory failure with hypoxia (HCC) [J96.01] 04/28/2019    Acute CHF (congestive heart failure) ([facility] [address] [I50.9] 04/28/2019    Moderate persistent asthma with acute exacerbation [J45.41] 04/17/2019         ASSESSMENT/PLAN:  64 y.o. female with history of RA, HLD, GERD, CLBP who has been unwell for

## 2019-04-30 LAB
ANION GAP SERPL CALCULATED.3IONS-SCNC: 8 MMOL/L (ref 3–16)
BASE EXCESS ARTERIAL: 13.3 MMOL/L (ref -3–3)
BASE EXCESS ARTERIAL: 13.7 MMOL/L (ref -3–3)
BASE EXCESS ARTERIAL: 15.2 MMOL/L (ref -3–3)
BASE EXCESS ARTERIAL: 21 (ref -3–3)
BASOPHILS ABSOLUTE: 0 K/UL (ref 0–0.2)
BASOPHILS RELATIVE PERCENT: 0.3 %
BUN BLDV-MCNC: 18 MG/DL (ref 7–20)
CALCIUM SERPL-MCNC: 8.7 MG/DL (ref 8.3–10.6)
CARBOXYHEMOGLOBIN ARTERIAL: 1.4 % (ref 0–1.5)
CARBOXYHEMOGLOBIN ARTERIAL: 2 % (ref 0–1.5)
CARBOXYHEMOGLOBIN ARTERIAL: 2.1 % (ref 0–1.5)
CHLORIDE BLD-SCNC: 88 MMOL/L (ref 99–110)
CO2: 44 MMOL/L (ref 21–32)
CREAT SERPL-MCNC: <0.5 MG/DL (ref 0.6–1.1)
EOSINOPHILS ABSOLUTE: 0.1 K/UL (ref 0–0.6)
EOSINOPHILS RELATIVE PERCENT: 0.5 %
GFR AFRICAN AMERICAN: >60
GFR NON-AFRICAN AMERICAN: >60
GLUCOSE BLD-MCNC: 84 MG/DL (ref 70–99)
HCO3 ARTERIAL: 48.3 MMOL/L (ref 21–29)
HCO3 ARTERIAL: 48.5 MMOL/L (ref 21–29)
HCO3 ARTERIAL: 49.8 MMOL/L (ref 21–29)
HCO3 ARTERIAL: 50.5 MMOL/L (ref 21–29)
HCT VFR BLD CALC: 56.8 % (ref 36–48)
HEMOGLOBIN, ART, EXTENDED: 18.1 G/DL (ref 12–16)
HEMOGLOBIN: 17.5 G/DL (ref 12–16)
LYMPHOCYTES ABSOLUTE: 1.1 K/UL (ref 1–5.1)
LYMPHOCYTES RELATIVE PERCENT: 10.3 %
MAGNESIUM: 2.2 MG/DL (ref 1.8–2.4)
MCH RBC QN AUTO: 29.3 PG (ref 26–34)
MCHC RBC AUTO-ENTMCNC: 30.9 G/DL (ref 31–36)
MCV RBC AUTO: 95.1 FL (ref 80–100)
METHEMOGLOBIN ARTERIAL: 0.5 %
METHEMOGLOBIN ARTERIAL: 0.6 %
METHEMOGLOBIN ARTERIAL: 0.6 %
MONOCYTES ABSOLUTE: 1 K/UL (ref 0–1.3)
MONOCYTES RELATIVE PERCENT: 9.2 %
NEUTROPHILS ABSOLUTE: 8.2 K/UL (ref 1.7–7.7)
NEUTROPHILS RELATIVE PERCENT: 79.7 %
O2 CONTENT ARTERIAL: 22 ML/DL
O2 CONTENT ARTERIAL: 24 ML/DL
O2 CONTENT ARTERIAL: 24 ML/DL
O2 SAT, ARTERIAL: 85.7 %
O2 SAT, ARTERIAL: 94 %
O2 SAT, ARTERIAL: 95.8 %
O2 SAT, ARTERIAL: 97 % (ref 93–100)
O2 THERAPY: ABNORMAL
PCO2 ARTERIAL: 104.2 MM HG (ref 35–45)
PCO2 ARTERIAL: 113.6 MMHG (ref 35–45)
PCO2 ARTERIAL: 120.5 MMHG (ref 35–45)
PCO2 ARTERIAL: 137.7 MMHG (ref 35–45)
PDW BLD-RTO: 16.4 % (ref 12.4–15.4)
PERFORMED ON: ABNORMAL
PH ARTERIAL: 7.18 (ref 7.35–7.45)
PH ARTERIAL: 7.22 (ref 7.35–7.45)
PH ARTERIAL: 7.26 (ref 7.35–7.45)
PH ARTERIAL: 7.27 (ref 7.35–7.45)
PLATELET # BLD: 151 K/UL (ref 135–450)
PMV BLD AUTO: 8 FL (ref 5–10.5)
PO2 ARTERIAL: 101.3 MMHG (ref 75–108)
PO2 ARTERIAL: 112.2 MM HG (ref 75–108)
PO2 ARTERIAL: 66.7 MMHG (ref 75–108)
PO2 ARTERIAL: 84.8 MMHG (ref 75–108)
POC SAMPLE TYPE: ABNORMAL
POTASSIUM SERPL-SCNC: 4.2 MMOL/L (ref 3.5–5.1)
PRO-BNP: 825 PG/ML (ref 0–124)
RBC # BLD: 5.98 M/UL (ref 4–5.2)
SODIUM BLD-SCNC: 140 MMOL/L (ref 136–145)
TCO2 ARTERIAL: 52.2 MMOL/L
TCO2 ARTERIAL: 53.3 MMOL/L
TCO2 ARTERIAL: 54.7 MMOL/L
TSH SERPL DL<=0.05 MIU/L-ACNC: 0.89 UIU/ML (ref 0.27–4.2)
WBC # BLD: 10.3 K/UL (ref 4–11)

## 2019-04-30 PROCEDURE — 94761 N-INVAS EAR/PLS OXIMETRY MLT: CPT

## 2019-04-30 PROCEDURE — 82803 BLOOD GASES ANY COMBINATION: CPT

## 2019-04-30 PROCEDURE — 99291 CRITICAL CARE FIRST HOUR: CPT | Performed by: INTERNAL MEDICINE

## 2019-04-30 PROCEDURE — 2580000003 HC RX 258: Performed by: INTERNAL MEDICINE

## 2019-04-30 PROCEDURE — 6370000000 HC RX 637 (ALT 250 FOR IP): Performed by: INTERNAL MEDICINE

## 2019-04-30 PROCEDURE — 99232 SBSQ HOSP IP/OBS MODERATE 35: CPT | Performed by: INTERNAL MEDICINE

## 2019-04-30 PROCEDURE — 2000000000 HC ICU R&B

## 2019-04-30 PROCEDURE — 2700000000 HC OXYGEN THERAPY PER DAY

## 2019-04-30 PROCEDURE — 85025 COMPLETE CBC W/AUTO DIFF WBC: CPT

## 2019-04-30 PROCEDURE — 6360000002 HC RX W HCPCS: Performed by: INTERNAL MEDICINE

## 2019-04-30 PROCEDURE — 80048 BASIC METABOLIC PNL TOTAL CA: CPT

## 2019-04-30 PROCEDURE — 83735 ASSAY OF MAGNESIUM: CPT

## 2019-04-30 PROCEDURE — 94660 CPAP INITIATION&MGMT: CPT

## 2019-04-30 PROCEDURE — 36415 COLL VENOUS BLD VENIPUNCTURE: CPT

## 2019-04-30 PROCEDURE — 83880 ASSAY OF NATRIURETIC PEPTIDE: CPT

## 2019-04-30 PROCEDURE — 94640 AIRWAY INHALATION TREATMENT: CPT

## 2019-04-30 RX ORDER — LEVOFLOXACIN 5 MG/ML
750 INJECTION, SOLUTION INTRAVENOUS DAILY
Status: DISCONTINUED | OUTPATIENT
Start: 2019-04-30 | End: 2019-05-02

## 2019-04-30 RX ORDER — LEVOFLOXACIN 750 MG/1
750 TABLET ORAL DAILY
Status: DISCONTINUED | OUTPATIENT
Start: 2019-04-30 | End: 2019-04-30

## 2019-04-30 RX ORDER — IPRATROPIUM BROMIDE AND ALBUTEROL SULFATE 2.5; .5 MG/3ML; MG/3ML
1 SOLUTION RESPIRATORY (INHALATION)
Status: DISCONTINUED | OUTPATIENT
Start: 2019-04-30 | End: 2019-05-03

## 2019-04-30 RX ORDER — IPRATROPIUM BROMIDE AND ALBUTEROL SULFATE 2.5; .5 MG/3ML; MG/3ML
1 SOLUTION RESPIRATORY (INHALATION) EVERY 4 HOURS PRN
Status: DISCONTINUED | OUTPATIENT
Start: 2019-04-30 | End: 2019-04-30

## 2019-04-30 RX ADMIN — ENOXAPARIN SODIUM 40 MG: 40 INJECTION SUBCUTANEOUS at 09:42

## 2019-04-30 RX ADMIN — VANCOMYCIN HYDROCHLORIDE 1000 MG: 1 INJECTION, POWDER, LYOPHILIZED, FOR SOLUTION INTRAVENOUS at 20:00

## 2019-04-30 RX ADMIN — DICLOFENAC 2 G: 10 GEL TOPICAL at 09:44

## 2019-04-30 RX ADMIN — Medication 10 ML: at 20:00

## 2019-04-30 RX ADMIN — LEVOFLOXACIN 750 MG: 5 INJECTION, SOLUTION INTRAVENOUS at 09:42

## 2019-04-30 RX ADMIN — ALPRAZOLAM 0.25 MG: 0.25 TABLET ORAL at 20:02

## 2019-04-30 RX ADMIN — MUPIROCIN: 20 OINTMENT TOPICAL at 20:02

## 2019-04-30 RX ADMIN — MUPIROCIN: 20 OINTMENT TOPICAL at 09:42

## 2019-04-30 RX ADMIN — FUROSEMIDE 40 MG: 10 INJECTION, SOLUTION INTRAMUSCULAR; INTRAVENOUS at 09:42

## 2019-04-30 RX ADMIN — IPRATROPIUM BROMIDE AND ALBUTEROL SULFATE 1 AMPULE: .5; 3 SOLUTION RESPIRATORY (INHALATION) at 11:42

## 2019-04-30 RX ADMIN — IPRATROPIUM BROMIDE AND ALBUTEROL SULFATE 1 AMPULE: .5; 3 SOLUTION RESPIRATORY (INHALATION) at 19:16

## 2019-04-30 RX ADMIN — FUROSEMIDE 40 MG: 10 INJECTION, SOLUTION INTRAMUSCULAR; INTRAVENOUS at 20:01

## 2019-04-30 RX ADMIN — VANCOMYCIN HYDROCHLORIDE 1000 MG: 1 INJECTION, POWDER, LYOPHILIZED, FOR SOLUTION INTRAVENOUS at 11:32

## 2019-04-30 RX ADMIN — ALPRAZOLAM 0.25 MG: 0.25 TABLET ORAL at 13:31

## 2019-04-30 RX ADMIN — Medication 10 ML: at 09:42

## 2019-04-30 RX ADMIN — DICLOFENAC 2 G: 10 GEL TOPICAL at 20:02

## 2019-04-30 RX ADMIN — IPRATROPIUM BROMIDE AND ALBUTEROL SULFATE 1 AMPULE: .5; 3 SOLUTION RESPIRATORY (INHALATION) at 15:04

## 2019-04-30 RX ADMIN — IPRATROPIUM BROMIDE AND ALBUTEROL SULFATE 1 AMPULE: .5; 3 SOLUTION RESPIRATORY (INHALATION) at 23:14

## 2019-04-30 ASSESSMENT — PAIN SCALES - GENERAL: PAINLEVEL_OUTOF10: 2

## 2019-04-30 NOTE — PROGRESS NOTES
04/30/19 1919   NIV Type   $NIV $Daily Charge   Equipment Type V60   Mode BIPAP   Mask Type Full face mask   Mask Size Medium   Settings/Measurements   Comfort Level Good   Using Accessory Muscles No   IPAP 20 cmH20   EPAP 5 cmH2O   Rate Ordered 16   Resp 23   SpO2 92   FiO2  45 %   Vt Exhaled 783 mL   Mask Leak (lpm) 11 lpm   Skin Protection for O2 Device Yes   Breath Sounds   Right Upper Lobe Expiratory Wheezes   Right Middle Lobe Expiratory Wheezes   Right Lower Lobe Expiratory Wheezes   Left Upper Lobe Expiratory Wheezes   Left Lower Lobe Expiratory Wheezes   Patient Observation   Observations SPO2  92%  on 45%  FIO2  BIPAP.     Alarm Settings   Alarms On Y

## 2019-04-30 NOTE — PROGRESS NOTES
Results for Leora Delcid (MRN [id number]) as of 4/30/2019 06:48   Ref.  Range 4/30/2019 06:29   pCO2, Arterial Latest Ref Range: 35.0 - 45.0 mmHg 137.7 (HH)   pO2, Arterial Latest Ref Range: 75.0 - 108.0 mmHg 66.7 (L)     Dr. Sourav Flores aware instructed to call Pulmonology

## 2019-04-30 NOTE — PROGRESS NOTES
Handoff received from [employer]. Reviewed plan of care. Patient is in bed resting at this time. VSS. Patient is on 10L high flow canula with O2 stat of 91%. HR 97 NSR. Lopez draining to gravity appropriately. Taking over care at this time.      Boris Brice RN

## 2019-04-30 NOTE — PROGRESS NOTES
04/29/19 2300   NIV Type   $NIV $Daily Charge   Equipment Type V60   Mode BIPAP   Mask Type Full face mask   Mask Size Medium   Settings/Measurements   Comfort Level Good   Using Accessory Muscles No   IPAP 18 cmH20   EPAP 8 cmH2O   Resp 18   SpO2 94   FiO2  60 %   Vt Exhaled 623 mL   Mask Leak (lpm) 22 lpm   Skin Protection for O2 Device Yes   Breath Sounds   Right Upper Lobe Diminished; Expiratory Wheezes   Right Middle Lobe Diminished; Expiratory Wheezes   Right Lower Lobe Diminished   Left Upper Lobe Diminished; Expiratory Wheezes   Left Lower Lobe Diminished   Patient Observation   Observations SPO2  94%  on 60%  FIO2  BIPAP.     Alarm Settings   Alarms On Y

## 2019-04-30 NOTE — FLOWSHEET NOTE
Attempted to follow up again with Pt for Advance Directives consult. Pt asleep. Will attempt to follow up at a later time.     [facility]       04/30/19 1627   Encounter Summary   Services provided to: Patient not available   Continue Visiting   (4/30 AD consult, Pt still asleep)   Advance Directives (For Healthcare)   Healthcare Directive No, patient does not have an advance directive for healthcare treatment   Advance Directives Unable to complete

## 2019-04-30 NOTE — CONSULTS
Pharmacy Note  Vancomycin Consult    Amrita Zimmerman is a 64 y.o. female started on Vancomycin for pneumonia; consult received from Dr. Betty Carr to manage therapy. Also receiving the following antibiotics: levaquin. Patient Active Problem List   Diagnosis    Anxiety state    Rheumatoid arthritis ([address]    Tobacco abuse    SOB (shortness of breath)    Moderate persistent asthma with acute exacerbation    Acute respiratory failure with hypoxia (HCC)    New onset of congestive heart failure (HCC)    Morbid obesity (HCC)    Obesity hypoventilation syndrome (HCC)       Allergies:  Patient has no known allergies. Temp max:     Recent Labs     04/29/19  0435 04/30/19  0423   BUN 16 18       Recent Labs     04/29/19  0435 04/30/19  0423   CREATININE <0.5* <0.5*       Recent Labs     04/28/19  1530 04/29/19  0435   WBC 16.1* 14.0*         Intake/Output Summary (Last 24 hours) at 4/30/2019 0752  Last data filed at 4/30/2019 3485  Gross per 24 hour   Intake 1360 ml   Output 2280 ml   Net -920 ml       Culture Date      Source                       Results      Ht Readings from Last 1 Encounters:   04/30/19 5' 3\" (1.6 m)        Wt Readings from Last 1 Encounters:   04/30/19 263 lb (119.3 kg)         Body mass index is 46.59 kg/m². CrCl cannot be calculated (This lab value cannot be used to calculate CrCl because it is not a number: <0.5). Goal Trough Level: 15-20 mcg/mL    Assessment/Plan:  Please start vancomycin 1 gram ivpb q12h at 0900 on 4-30-19. Please check vancomycin trough 5-1-19 at 2030. Thank you for the consult. Will continue to follow. [address]. Ph.  4/30/2019 7:54 AM

## 2019-04-30 NOTE — PROGRESS NOTES
[address]   Progress Note  Cardiology      HPI: Ms. Janet Murray is being seen today for f/u SOB and ECHO results. Currently on Bipap but denies specific complaints. Note low SBP overnight (90's mmHg) and ABG with PO2=67 and QMA9=796. Physical Examination:    Vitals:    19 0719   BP:    Pulse:    Resp: 19   Temp:    SpO2:           Constitutional and General Appearance: NAD; wearing Bipap mask  Respiratory:  · Normal excursion and expansion without use of accessory muscles  · Resp Auscultation: +soft breath sounds  Cardiovascular:  · The apical impulses not displaced  · Heart tones are crisp and normal  · Cervical veins are not engorged  · The carotid upstroke is normal in amplitude and contour without delay or bruit  · Normal S1S2, No S3, No Murmur  · Peripheral pulses are symmetrical and full  · There is no clubbing, cyanosis of the extremities.   · No edema  · Pedal Pulses: 2+ and equal   Abdomen:  · No masses or tenderness  · Liver/Spleen: No Abnormalities Noted  Neurological/Psychiatric:  · Alert and oriented in all spheres  · Moves all extremities well  · No abnormalities of mood, affect, memory, mentation, or behavior are noted  Skin: warm and dry      Lab Results   Component Value Date    WBC 14.0 (H) 2019    HGB 17.4 (H) 2019    HCT 55.1 (H) 2019    MCV 93.3 2019     2019     Lab Results   Component Value Date    CREATININE <0.5 (L) 2019    BUN 18 2019     2019    K 4.2 2019    CL 88 (L) 2019    CO2 44 (H) 2019        CXR 19  Cardiomegaly and mild pulmonary edema.     FINDINGS: Cardiomediastinal silhouette is enlarged.  No pneumothorax.  Bilateral perihilar airspace opacities.  Small left effusion.  No acute osseous abnormality.      EK19  I have reviewed EKG with the following interpretation:  Sinus tachycardia with Premature supraventricular complexesPossible Left atrial enlargementLeft posterior fascicular blockAbnormal ECGNo previous ECGs availableConfirmed by Keli Bell MD, Peyton Moises ([id number]) on 4/28/2019 8:04:56 PM     ECHO 4/29/19 Summary   Left ventricular systolic function is normal with EF estimated at 55 %.   No regional wall motion abnormality.   Left ventricle size is normal.   There is moderate concentric left ventricular hypertrophy.   Normal left ventricular diastolic filling pressure.   The aortic valve leaflets are not well visualized.   Aortic valve appears sclerotic but opens adequately.   No evidence of aortic valve stenosis.   Trivial aortic regurgitation is present.   Right ventricular systolic function is normal.   The right ventricle is moderately enlarged.   Systolic pulmonary artery pressure (SPAP) estimated at 54 mmHg (RA pressure   15 mmHg), consistent with moderate pulmonary hypertension. Assessment:  Tita Weston is a 64 y.o. patient who was admitted to [facility] 4/28/19 with c/o SOB x 2 weeks. No prior cardiology care or cardiac testing. She has PMH tobacco abuse, asthma, obesity, and RA. She c/o 2 weeks of increasing SOB both at rest and exertion. Also with cough with yellow-greenish sputum production. Works at [employer] office and around sick people daily. Also c/o some swelling in feet and legs. Noted hypoxic 77% on RA in ED. Note CXR showed cardiomegaly and mild pulmonary edema. Note elevated BNP 3747, troiponin neg x 3; WBC 16.1, H&H 18.1/57.0. Admitted  To ICU and placed on nitroglycerin gtt and Bipap but now on 10L NC oxygen. Diagnosis of SOB concerning for acute CHF of undetermined type +/- PNA.      Recs:  1. IV diurese 40mg BID and adjust accordingly. Note UOP 2.3L and total output 3.6L. 2. D/C lisinopril 5mg daily due to hypotension overnight. 3. D/C Nitro gtt  4. I personally reviewed ECHO from 4/29/19 showing normal EF=55%; moderate LVH; RV enlarged; moderate pulm HTN estimated SPAP 54mmHg. .  5. ? Etiology of respiratory decompensation.  No overt diastolic dysfunction or elevated filling pressures and LV function normal on ECHO. I d/w Dr. Tere Byrd and not definite PNA either but will treat empirically for both for now and follow.    6. Respiratory support per ICU team.    Patient Active Problem List   Diagnosis    Anxiety state    Rheumatoid arthritis ([facility] [address]    Tobacco abuse    SOB (shortness of breath)    Moderate persistent asthma with acute exacerbation    Acute respiratory failure with hypoxia (HCC)    New onset of congestive heart failure (HCC)    Morbid obesity (HCC)    Obesity hypoventilation syndrome ([facility] [address]

## 2019-04-30 NOTE — PROGRESS NOTES
Pulmonary & Critical Care Medicine ICU Progress Note    CC: Acute respiratory failure, acute pulmonary edema    Events of Last 24 hours: Worsening hypercapnia  Confusion overnight  Feels okay this morning, is tolerating BiPAP    Invasive Lines: IV: Peripheral    MV: BiPAP     / / /FiO2 : 60 %  Recent Labs     19  2229 19  0021   PHART 7.223* 7.274*   [id number] 120.5* 104.2*   PO2ART 101.3 112.2*       IV:   nitroGLYCERIN Stopped (19 1001)       Vitals:  Blood pressure (!) 100/59, pulse 80, temperature 97.3 °F (36.3 °C), temperature source Oral, resp. rate 22, height 5' 3\" (1.6 m), weight 270 lb (122.5 kg), SpO2 94 %, not currently breastfeeding. BiPAP  Temp  Av.3 °F (36.8 °C)  Min: 97.3 °F (36.3 °C)  Max: 99 °F (37.2 °C)    Intake/Output Summary (Last 24 hours) at 2019 2813  Last data filed at 2019 0200  Gross per 24 hour   Intake 1360 ml   Output 2030 ml   Net -670 ml     EXAM:  General: ill appearing    Eyes: PERRL. No sclera icterus. No conjunctival injection. ENT: No discharge. Pharynx clear. Neck: Trachea midline. Normal thyroid. Resp: No accessory muscle use. No crackles. No wheezing. No rhonchi. No dullness on percussion. CV: Regular rate. Regular rhythm. No mumur or rub. ++ edema. Peripheral pulses are 2+. Capillary refill is less than 3 seconds. GI: Non-tender. Non-distended. No masses. No organomegaly. Normal bowel sounds. No hernia. Skin: Warm and dry. No nodule on exposed extremities. No rash on exposed extremities. Lymph: No cervical LAD. No supraclavicular LAD. M/S: No cyanosis. No joint deformity. No clubbing. Neuro: Alert and oriented ×3.  Patellar reflexes are symmetric  Psych: No agitation, no anxiety, affect is full     Scheduled Meds:   mupirocin   Nasal BID    guaiFENesin  600 mg Oral BID    diclofenac sodium  2 g Topical BID    ALPRAZolam  0.25 mg Oral TID    citalopram  20 mg Oral Daily    sodium chloride flush  10 mL Intravenous 2 times per day    enoxaparin  40 mg Subcutaneous Daily    lisinopril  5 mg Oral Daily    furosemide  40 mg Intravenous BID     PRN Meds:  ibuprofen, sodium chloride flush, magnesium hydroxide, ondansetron, albuterol, albuterol sulfate HFA    Results:  CBC:   Recent Labs     04/28/19  1530 04/29/19  0435   WBC 16.1* 14.0*   HGB 18.1* 17.4*   HCT 57.0* 55.1*   MCV 91.1 93.3    165     BMP:   Recent Labs     04/28/19  1530 04/29/19  0435 04/30/19  0423    143 140   K 4.1 3.7 4.2   CL 91* 90* 88*   CO2 37* 45* 44*   BUN 20 16 18   CREATININE 0.6 <0.5* <0.5*     LIVER PROFILE:   Recent Labs     04/28/19  1530   *   *   BILITOT 1.3*   ALKPHOS 95       Cultures:  4/28/19 blood no growth    CT CHEST 4/28/19  Impression   Suboptimal contrast timing although there is no evidence for large or central   pulmonary embolism.  The segmental and subsegmental branches are not well   evaluated.       Small left basilar effusion with adjacent consolidation and there are also   infiltrates within the lingula and right lung base that may represent   atelectasis versus pneumonia.       Small amount of free fluid in the visualized upper abdomen.      ECHO 4/28/19   Left ventricular systolic function is normal with ejection fraction   estimated at 55 %. No regional wall motion abnormality.   Left ventricle size is normal. There is moderate concentric left ventricular hypertrophy. Normal left ventricular diastolic filling pressure. The aortic valve leaflets are not well visualized.   Aortic valve appears sclerotic but opens adequately. No evidence of aortic valve stenosis. Trivial aortic regurgitation is present.   Right ventricular systolic function is normal. The right ventricle is moderately enlarged. Systolic pulmonary artery pressure (SPAP) estimated at 54 mmHg (RA pressure 15 mmHg), consistent with moderate pulmonary hypertension.     ASSESSMENT:  · Acute hypercapnic respiratory failure - it is not clear to me why she has had a clinical worsening. · Acute hypoxemic respiratory failure   · Chronic hypercapnic respiratory failure, favor obesity hypoventilation syndrome  · Acute HFpEF - ECHO shows surprisingly little evidence of pressure/volume overload  · Abnormal CT chest, appearance is more consistent with atelectasis than with infiltrative process. Pro calcitonin level is normal.  · Morbid obesity     PLAN:  · Bilevel non-invasive positive pressure ventilation for life threatening respiratory failure   · Supplemental oxygen to maintain SaO2 >92%; wean as tolerated    · Diuresis, monitoring of electrolytes  · While pneumonia is unlikely clinically, given worsening will cover empirically with antibiotics levaquin and vancomycin D#1  · Inhaled bronchodilators   · Work up erythrocytosis/DOROTHY-2 once improving. I favor chronic hypoxia as etiology  · Prophylaxis: Lovenox, Bactroban    Total critical care time caring for this patient with life threatening, unstable organ failure, including direct patient contact, management of life support systems, review of data including imaging and labs, discussions with other team members and physicians is 40 minutes so far today, excluding procedures.

## 2019-04-30 NOTE — CONSULTS
[address] [facility] 1960    History:  Past Medical History:   Diagnosis Date    Anxiety state, unspecified 5/17/2010    Impacted cerumen 5/17/2010    Rheumatoid arthritis(714.0) 5/17/2010       ECHO: 55%    Discharge plans: lives alone, but parents and sister are readily available. Family Present: parents    Advanced Directives: patient does not have advance directives and declines assistance completing them at this time    Patient's stated goal of care: reduce fluid intake      Patient's current functional capacity:  Marked limitation of physical activity. Comfortable at rest. Less than ordinary activity causes fatigue, palpitation, or dyspnea. Pt resting in bed at this time high flow o2 and bipap Pt with complaints of shortness of breath. Pt with pitting lower extremity edema. Patient's weights and intake/output reviewed:    Patient states she drinks 4 or more 17oz pepsi's a day and is a current smoker. Last three weights hospital weights reviewed:    Patient Vitals for the past 96 hrs (Last 3 readings):   Weight   04/30/19 0605 263 lb (119.3 kg)   04/29/19 0445 270 lb (122.5 kg)   04/28/19 1840 275 lb (124.7 kg)       Patient provided with both written and verbal education on CHF signs/symptoms, causes, discharge medications, daily weights, low sodium diet, activity, and follow-up. HF zone self management written instructions provided/reviewed and advised to call MD when in \"yellow\" zone. Instructed them to call the doctor post discharge if they experiences increasing worsening shortness of breath, worsening chest pains, increased swelling from their baseline, worsening cough, or weight gain of >2- 3 lbs in a day/ 5 lb gain in a week. Also advised to call the doctor if they feels dizzy, increased fatigue, decreased or difficulty urinating.      Instructed on and emphasized importance of scheduled hospital follow-up appointment with Marisol Torres MD on 5-3-19 at 3:40pm.    Mel Moise education needs reinforcement. No additional questions at this time. Stated she will alert nurse with any questions. PATIENT/CAREGIVER TEACHING:    Level of patient/caregiver understanding able to:   [ X ] Verbalize understanding [ ] Demonstrate understanding [ ] Teach back   [ X ] Needs reinforcement [ ] Other:     Education Time: 15 minutes    Recommendations:   1. Encourage follow-up appointment compliance. Next appointment: 5-3-19  2. Educate further on fluid restriction of <64oz during inpatient stay so they can understand how to measure intake at home. 3. Review sodium restrictions. Encouraged to not add table salt to their foods and avoid foods that are high in sodium. 4. Continue to educate on S/S. Stress the importance of calling the MD with the earliest signs of an acute exacerbation. 5. Emphasize daily weights - instructed to call the MD if the patient gains 3 lb in a day or 5 lb in a week. 6. Provided patient with CHF Resource Line for questions and concerns.      Jalil Portillo HF BSN-RN  Heart Failure Navigator  [address] [place] [address]  [phone]

## 2019-04-30 NOTE — PROGRESS NOTES
Admit: 2019    Name:  Ashley Olea  Room:  [facility]4520-  MRN:    [id number]    Critical Care Daily Progress Note for 2019     Interval History:     She is on 10 L of O2.       Was on bipap all night and this am  Now on 10 L       Scheduled Meds:   vancomycin  1,000 mg Intravenous Q12H    levofloxacin  750 mg Intravenous Daily    ipratropium-albuterol  1 ampule Inhalation Q4H While awake    mupirocin   Nasal BID    diclofenac sodium  2 g Topical BID    ALPRAZolam  0.25 mg Oral TID    citalopram  20 mg Oral Daily    sodium chloride flush  10 mL Intravenous 2 times per day    enoxaparin  40 mg Subcutaneous Daily    furosemide  40 mg Intravenous BID       Continuous Infusions:      PRN Meds:  ibuprofen, sodium chloride flush, magnesium hydroxide, ondansetron, albuterol, albuterol sulfate HFA                  Objective:     Temp  Av.7 °F (36.5 °C)  Min: 96.5 °F (35.8 °C)  Max: 99 °F (37.2 °C)  Pulse  Av.7  Min: 79  Max: 101  BP  Min: 88/52  Max: 110/64  SpO2  Av.6 %  Min: 90 %  Max: 99 %  FiO2   Av.7 %  Min: 45 %  Max: 60 %  Patient Vitals for the past 4 hrs:   BP Pulse Resp SpO2   19 1205 -- 101 27 94 %   19 1147 -- -- 24 --   19 1146 -- -- -- 93 %   19 1101 (!) 101/59 98 24 91 %   19 1004 -- 90 18 93 %   19 0915 -- 87 24 92 %         Intake/Output Summary (Last 24 hours) at 2019 1309  Last data filed at 2019 1132  Gross per 24 hour   Intake 1040 ml   Output 1805 ml   Net -765 ml       Physical Exam:  General:  Awake, alert and oriented. Mucous Membranes:  Pink , anicteric  Neck: No JVD, no carotid bruit, no thyromegaly  Chest:  Clear to auscultation bilaterally, no added sounds  Cardiovascular:  RRR S1S2 heard, no murmurs or gallops  Abdomen:  Soft, undistended, non tender, no organomegaly, BS present  Extremities: No edema or cyanosis.  Distal pulses well felt  Neurological : no focal deficits    Lab Data:  CBC:   Recent

## 2019-04-30 NOTE — PROGRESS NOTES
Patient's EF (Ejection Fraction) is greater than 40%    Patient has a past medical history of Anxiety state, unspecified, Impacted cerumen, and Rheumatoid arthritis(714.0). Comorbidities reviewed and education provided. Patient and family's stated goal of care: reduce shortness of breath, increase activity tolerance, better understand heart failure and disease management, be more comfortable and reduce lower extremity edema prior to discharge    Patient's current functional capacity:  No limitation of physical activity. Ordinary physical activity does not cause undue fatigue, palpitation, dyspnea. Pt resting in bed at this time on BiPAP. Pt with complaints of shortness of breath. Pt with pitting lower extremity edema. Patient's weights and intake/output reviewed:    Patient Vitals for the past 96 hrs (Last 3 readings):   Weight   04/30/19 0605 263 lb (119.3 kg)   04/29/19 0445 270 lb (122.5 kg)   04/28/19 1840 275 lb (124.7 kg)       Intake/Output Summary (Last 24 hours) at 4/30/2019 1603  Last data filed at 4/30/2019 1400  Gross per 24 hour   Intake 740 ml   Output 2105 ml   Net -1365 ml       10 minutes spent discussing CHF topics including: medications, labs, treatment and follow-up after hospitalization.      >>For CHF and Comorbidity documentation on Education Time and Topics, please see Education Tab

## 2019-04-30 NOTE — FLOWSHEET NOTE
Attempted to follow up with Pt for Advance Directives consult. Pt asleep. Will attempt to follow up again at a later time.     [address] [facility]       04/30/19 1110   Encounter Summary   Services provided to: Patient not available   Continue Visiting   (4/30 AD consult, Pt asleep)   Advance Directives (For Healthcare)   Healthcare Directive No, patient does not have an advance directive for healthcare treatment   Advance Directives Unable to complete

## 2019-04-30 NOTE — PROGRESS NOTES
Handoff given to [facility]. Patient was alert and oriented at this time. Transferring care.

## 2019-04-30 NOTE — PROGRESS NOTES
Prevention  dressing applied to bridge of nose and other facial bony prominences upon BiPAP/CPAP initiation. Consulted RN regarding the assessment of skin integrity.

## 2019-04-30 NOTE — PROGRESS NOTES
04/30/19 0258   NIV Type   Equipment Type V60   Mode BIPAP   Mask Type Full face mask   Mask Size Medium   Settings/Measurements   Comfort Level Good   Using Accessory Muscles No   IPAP 18 cmH20   EPAP 8 cmH2O   Rate Ordered 16   Resp 19   SpO2 94   FiO2  60 %   Vt Exhaled 568 mL   Mask Leak (lpm) 15 lpm   Skin Protection for O2 Device Yes   Breath Sounds   Right Upper Lobe Diminished; Expiratory Wheezes   Right Middle Lobe Diminished; Expiratory Wheezes   Right Lower Lobe Diminished; Expiratory Wheezes   Left Upper Lobe Diminished; Expiratory Wheezes   Left Lower Lobe Diminished; Expiratory Wheezes   Patient Observation   Observations SPO2  94%  on 60%  FIO2  BIPAP.     Alarm Settings   Alarms On Y

## 2019-05-01 ENCOUNTER — APPOINTMENT (OUTPATIENT)
Dept: GENERAL RADIOLOGY | Age: 59
DRG: 207 | End: 2019-05-01
Payer: COMMERCIAL

## 2019-05-01 ENCOUNTER — APPOINTMENT (OUTPATIENT)
Dept: CT IMAGING | Age: 59
DRG: 207 | End: 2019-05-01
Payer: COMMERCIAL

## 2019-05-01 LAB
ANION GAP SERPL CALCULATED.3IONS-SCNC: 6 MMOL/L (ref 3–16)
BASE EXCESS ARTERIAL: 19.9 MMOL/L (ref -3–3)
BASOPHILS ABSOLUTE: 0 K/UL (ref 0–0.2)
BASOPHILS RELATIVE PERCENT: 0.4 %
BUN BLDV-MCNC: 14 MG/DL (ref 7–20)
CALCIUM SERPL-MCNC: 8.6 MG/DL (ref 8.3–10.6)
CARBOXYHEMOGLOBIN ARTERIAL: 1.7 % (ref 0–1.5)
CARBOXYHEMOGLOBIN: 3.2 % (ref 0–1.5)
CHLORIDE BLD-SCNC: 83 MMOL/L (ref 99–110)
CO2: 47 MMOL/L (ref 21–32)
CREAT SERPL-MCNC: <0.5 MG/DL (ref 0.6–1.1)
EOSINOPHILS ABSOLUTE: 0 K/UL (ref 0–0.6)
EOSINOPHILS RELATIVE PERCENT: 0.4 %
GFR AFRICAN AMERICAN: >60
GFR NON-AFRICAN AMERICAN: >60
GLUCOSE BLD-MCNC: 92 MG/DL (ref 70–99)
HCO3 ARTERIAL: 54.5 MMOL/L (ref 21–29)
HCT VFR BLD CALC: 52 % (ref 36–48)
HEMOGLOBIN, ART, EXTENDED: 18 G/DL (ref 12–16)
HEMOGLOBIN: 16.5 G/DL (ref 12–16)
LYMPHOCYTES ABSOLUTE: 0.7 K/UL (ref 1–5.1)
LYMPHOCYTES RELATIVE PERCENT: 6.1 %
MAGNESIUM: 1.9 MG/DL (ref 1.8–2.4)
MCH RBC QN AUTO: 29.2 PG (ref 26–34)
MCHC RBC AUTO-ENTMCNC: 31.7 G/DL (ref 31–36)
MCV RBC AUTO: 92.2 FL (ref 80–100)
METHEMOGLOBIN ARTERIAL: 0.5 %
MONOCYTES ABSOLUTE: 1.2 K/UL (ref 0–1.3)
MONOCYTES RELATIVE PERCENT: 10.5 %
NEUTROPHILS ABSOLUTE: 9.2 K/UL (ref 1.7–7.7)
NEUTROPHILS RELATIVE PERCENT: 82.6 %
O2 CONTENT ARTERIAL: 24 ML/DL
O2 SAT, ARTERIAL: 93 %
O2 THERAPY: ABNORMAL
PCO2 ARTERIAL: 111.5 MMHG (ref 35–45)
PDW BLD-RTO: 16.5 % (ref 12.4–15.4)
PH ARTERIAL: 7.31 (ref 7.35–7.45)
PLATELET # BLD: 149 K/UL (ref 135–450)
PMV BLD AUTO: 7.6 FL (ref 5–10.5)
PO2 ARTERIAL: 77.3 MMHG (ref 75–108)
POTASSIUM SERPL-SCNC: 3.9 MMOL/L (ref 3.5–5.1)
RBC # BLD: 5.64 M/UL (ref 4–5.2)
SODIUM BLD-SCNC: 136 MMOL/L (ref 136–145)
TCO2 ARTERIAL: 57.9 MMOL/L
VANCOMYCIN TROUGH: 8.2 UG/ML (ref 10–20)
WBC # BLD: 11.2 K/UL (ref 4–11)

## 2019-05-01 PROCEDURE — 71250 CT THORAX DX C-: CPT

## 2019-05-01 PROCEDURE — 2580000003 HC RX 258: Performed by: INTERNAL MEDICINE

## 2019-05-01 PROCEDURE — 94761 N-INVAS EAR/PLS OXIMETRY MLT: CPT

## 2019-05-01 PROCEDURE — 93970 EXTREMITY STUDY: CPT

## 2019-05-01 PROCEDURE — 82803 BLOOD GASES ANY COMBINATION: CPT

## 2019-05-01 PROCEDURE — 2580000003 HC RX 258

## 2019-05-01 PROCEDURE — 2000000000 HC ICU R&B

## 2019-05-01 PROCEDURE — 36600 WITHDRAWAL OF ARTERIAL BLOOD: CPT

## 2019-05-01 PROCEDURE — 99232 SBSQ HOSP IP/OBS MODERATE 35: CPT | Performed by: INTERNAL MEDICINE

## 2019-05-01 PROCEDURE — 94660 CPAP INITIATION&MGMT: CPT

## 2019-05-01 PROCEDURE — 83735 ASSAY OF MAGNESIUM: CPT

## 2019-05-01 PROCEDURE — 6360000002 HC RX W HCPCS: Performed by: INTERNAL MEDICINE

## 2019-05-01 PROCEDURE — 80202 ASSAY OF VANCOMYCIN: CPT

## 2019-05-01 PROCEDURE — 99291 CRITICAL CARE FIRST HOUR: CPT | Performed by: INTERNAL MEDICINE

## 2019-05-01 PROCEDURE — 94150 VITAL CAPACITY TEST: CPT

## 2019-05-01 PROCEDURE — 6370000000 HC RX 637 (ALT 250 FOR IP): Performed by: INTERNAL MEDICINE

## 2019-05-01 PROCEDURE — 85025 COMPLETE CBC W/AUTO DIFF WBC: CPT

## 2019-05-01 PROCEDURE — 94640 AIRWAY INHALATION TREATMENT: CPT

## 2019-05-01 PROCEDURE — 93308 TTE F-UP OR LMTD: CPT

## 2019-05-01 PROCEDURE — 2700000000 HC OXYGEN THERAPY PER DAY

## 2019-05-01 PROCEDURE — 81270 JAK2 GENE: CPT

## 2019-05-01 PROCEDURE — 80048 BASIC METABOLIC PNL TOTAL CA: CPT

## 2019-05-01 PROCEDURE — 94667 MNPJ CHEST WALL 1ST: CPT

## 2019-05-01 PROCEDURE — 36415 COLL VENOUS BLD VENIPUNCTURE: CPT

## 2019-05-01 PROCEDURE — 71045 X-RAY EXAM CHEST 1 VIEW: CPT

## 2019-05-01 PROCEDURE — 94668 MNPJ CHEST WALL SBSQ: CPT

## 2019-05-01 PROCEDURE — 82375 ASSAY CARBOXYHB QUANT: CPT

## 2019-05-01 PROCEDURE — 82668 ASSAY OF ERYTHROPOIETIN: CPT

## 2019-05-01 RX ORDER — ACETAZOLAMIDE 500 MG/5ML
250 INJECTION, POWDER, LYOPHILIZED, FOR SOLUTION INTRAVENOUS ONCE
Status: COMPLETED | OUTPATIENT
Start: 2019-05-01 | End: 2019-05-01

## 2019-05-01 RX ORDER — MAGNESIUM SULFATE 1 G/100ML
1 INJECTION INTRAVENOUS ONCE
Status: COMPLETED | OUTPATIENT
Start: 2019-05-01 | End: 2019-05-01

## 2019-05-01 RX ORDER — SODIUM CHLORIDE 9 MG/ML
INJECTION, SOLUTION INTRAVENOUS
Status: COMPLETED
Start: 2019-05-01 | End: 2019-05-01

## 2019-05-01 RX ADMIN — IPRATROPIUM BROMIDE AND ALBUTEROL SULFATE 1 AMPULE: .5; 3 SOLUTION RESPIRATORY (INHALATION) at 16:30

## 2019-05-01 RX ADMIN — DICLOFENAC 2 G: 10 GEL TOPICAL at 22:26

## 2019-05-01 RX ADMIN — WATER 20 ML: 1 INJECTION INTRAMUSCULAR; INTRAVENOUS; SUBCUTANEOUS at 09:01

## 2019-05-01 RX ADMIN — ALPRAZOLAM 0.25 MG: 0.25 TABLET ORAL at 15:24

## 2019-05-01 RX ADMIN — SODIUM CHLORIDE 250 ML: 9 INJECTION, SOLUTION INTRAVENOUS at 10:49

## 2019-05-01 RX ADMIN — VANCOMYCIN HYDROCHLORIDE 1000 MG: 1 INJECTION, POWDER, LYOPHILIZED, FOR SOLUTION INTRAVENOUS at 09:01

## 2019-05-01 RX ADMIN — MAGNESIUM SULFATE HEPTAHYDRATE 1 G: 1 INJECTION, SOLUTION INTRAVENOUS at 10:49

## 2019-05-01 RX ADMIN — Medication 10 ML: at 09:02

## 2019-05-01 RX ADMIN — ENOXAPARIN SODIUM 40 MG: 40 INJECTION SUBCUTANEOUS at 09:00

## 2019-05-01 RX ADMIN — VANCOMYCIN HYDROCHLORIDE 1000 MG: 1 INJECTION, POWDER, LYOPHILIZED, FOR SOLUTION INTRAVENOUS at 22:25

## 2019-05-01 RX ADMIN — MUPIROCIN: 20 OINTMENT TOPICAL at 09:00

## 2019-05-01 RX ADMIN — IPRATROPIUM BROMIDE AND ALBUTEROL SULFATE 1 AMPULE: .5; 3 SOLUTION RESPIRATORY (INHALATION) at 07:17

## 2019-05-01 RX ADMIN — MUPIROCIN: 20 OINTMENT TOPICAL at 22:26

## 2019-05-01 RX ADMIN — ENOXAPARIN SODIUM 120 MG: 120 INJECTION SUBCUTANEOUS at 16:20

## 2019-05-01 RX ADMIN — IPRATROPIUM BROMIDE AND ALBUTEROL SULFATE 1 AMPULE: .5; 3 SOLUTION RESPIRATORY (INHALATION) at 23:22

## 2019-05-01 RX ADMIN — DICLOFENAC 2 G: 10 GEL TOPICAL at 09:02

## 2019-05-01 RX ADMIN — Medication 10 ML: at 22:26

## 2019-05-01 RX ADMIN — CITALOPRAM HYDROBROMIDE 20 MG: 20 TABLET ORAL at 09:01

## 2019-05-01 RX ADMIN — ALPRAZOLAM 0.25 MG: 0.25 TABLET ORAL at 08:59

## 2019-05-01 RX ADMIN — ALPRAZOLAM 0.25 MG: 0.25 TABLET ORAL at 22:24

## 2019-05-01 RX ADMIN — IPRATROPIUM BROMIDE AND ALBUTEROL SULFATE 1 AMPULE: .5; 3 SOLUTION RESPIRATORY (INHALATION) at 11:17

## 2019-05-01 RX ADMIN — IPRATROPIUM BROMIDE AND ALBUTEROL SULFATE 1 AMPULE: .5; 3 SOLUTION RESPIRATORY (INHALATION) at 19:22

## 2019-05-01 RX ADMIN — FUROSEMIDE 40 MG: 10 INJECTION, SOLUTION INTRAMUSCULAR; INTRAVENOUS at 09:01

## 2019-05-01 RX ADMIN — FUROSEMIDE 40 MG: 10 INJECTION, SOLUTION INTRAMUSCULAR; INTRAVENOUS at 22:24

## 2019-05-01 RX ADMIN — LEVOFLOXACIN 750 MG: 5 INJECTION, SOLUTION INTRAVENOUS at 09:00

## 2019-05-01 RX ADMIN — ACETAZOLAMIDE 250 MG: 500 INJECTION, POWDER, LYOPHILIZED, FOR SOLUTION INTRAVENOUS at 09:00

## 2019-05-01 ASSESSMENT — PAIN SCALES - GENERAL: PAINLEVEL_OUTOF10: 0

## 2019-05-01 NOTE — PROGRESS NOTES
RESPIRATORY THERAPY ASSESSMENT    Name:  Magalie [facility] Record Number:  [id number]  Age: 64 y.o. Gender: female  : 1960  Today's Date:  2019  Room:  3003009-01    Assessment     Is the patient being admitted for a COPD or Asthma exacerbation? No   (If yes the patient will be seen every 4 hours for the first 24 hours and then reassessed)    Patient Admission Diagnosis      Allergies  No Known Allergies    Minimum Predicted Vital Capacity:     782          Actual Vital Capacity:      770              Pulmonary History: Probable COPD, Current smoker, CHF  Home Oxygen Therapy:  room air  Home Respiratory Therapy:Albuterol mdi prn   Current Respiratory Therapy:  Duoneb q4 w/a  Treatment Type: HHN  Medications: Albuterol/Ipratropium    Respiratory Severity Index(RSI)   Patients with orders for inhalation medications, oxygen, or any therapeutic treatment modality will be placed on Respiratory Protocol. They will be assessed with the first treatment and at least every 72 hours thereafter. The following severity scale will be used to determine frequency of treatment intervention.     Smoking History: Pulmonary Disease or Smoking History, Greater than 15 pack year = 2    Social History  Social History     Tobacco Use    Smoking status: Current Every Day Smoker     Packs/day: 0.10     Years: 30.00     Pack years: 3.00     Types: Cigarettes    Smokeless tobacco: Never Used   Substance Use Topics    Alcohol use: No     Alcohol/week: 0.0 oz    Drug use: No       Recent Surgical History: None = 0  Past Surgical History  Past Surgical History:   Procedure Laterality Date    CARPAL TUNNEL RELEASE      both hands     LUMBAR SPINE SURGERY         Level of Consciousness: Alert, Oriented, and Cooperative = 0    Level of Activity: Mostly sedentary, minimal walking = 2    Respiratory Pattern: Dyspnea with exertion;Irregular pattern;or RR less than 6 = 2    Breath Sounds: Absent bilaterally and/or

## 2019-05-01 NOTE — PROGRESS NOTES
previous ECGs availableConfirmed by Jesus Metzger MD, Pernell Mckay ([id number]) on 4/28/2019 8:04:56 PM     ECHO 4/29/19 Summary   Left ventricular systolic function is normal with EF estimated at 55 %.   No regional wall motion abnormality.   Left ventricle size is normal.   There is moderate concentric left ventricular hypertrophy.   Normal left ventricular diastolic filling pressure.   The aortic valve leaflets are not well visualized.   Aortic valve appears sclerotic but opens adequately.   No evidence of aortic valve stenosis.   Trivial aortic regurgitation is present.   Right ventricular systolic function is normal.   The right ventricle is moderately enlarged.   Systolic pulmonary artery pressure (SPAP) estimated at 54 mmHg (RA pressure   15 mmHg), consistent with moderate pulmonary hypertension. Assessment:  Luis A Helm is a 64 y.o. patient who was admitted to [facility] 4/28/19 with c/o SOB x 2 weeks. No prior cardiology care or cardiac testing. She has PMH tobacco abuse, asthma, obesity, and RA. She c/o 2 weeks of increasing SOB both at rest and exertion. Also with cough with yellow-greenish sputum production. Works at [employer] office and around sick people daily. Also c/o some swelling in feet and legs. Noted hypoxic 77% on RA in ED. Note CXR showed cardiomegaly and mild pulmonary edema. Note elevated BNP 3747, troiponin neg x 3; WBC 16.1, H&H 18.1/57.0. Admitted  To ICU and placed on nitroglycerin gtt and Bipap but now on 10L NC oxygen. Diagnosis of SOB concerning for acute CHF of undetermined type +/- PNA. Likely NOT CHF (See below).     Recs:  1. IV diurese 40mg BID and adjust accordingly. Note UOP 3.5L overnight and total output 6.7L. Weight down 14# to 261#. Note BNP decreased from 3747 to 825.   2. I personally reviewed ECHO from 4/29/19 showing normal EF=55%; moderate LVH; RV enlarged; moderate pulm HTN estimated SPAP 54mmHg. .  3. ? Etiology of respiratory decompensation.  No overt diastolic dysfunction or elevated filling pressures and LV function normal on ECHO. She is still significantly hypoxic despite aggressive diuresis with brisk UOP and weight loss. This is likely not CHF. 4. ? Intracardiac shunt causing hypoxia. Limited ECHO for bubble study pending. 5. Respiratory support and abx per ICU team.  6. Heme/onc consult with Dr. Ivone Golden for erythrocytosis. If no evidence for intracardiac shunt will likely sign off.      Patient Active Problem List   Diagnosis    Anxiety state    Rheumatoid arthritis ([facility] [address]    Tobacco abuse    SOB (shortness of breath)    Moderate persistent asthma with acute exacerbation    Acute respiratory failure with hypoxia (HCC)    New onset of congestive heart failure (HCC)    Morbid obesity (HCC)    Obesity hypoventilation syndrome (HCC)    Erythrocytosis

## 2019-05-01 NOTE — FLOWSHEET NOTE
Followed up with Pt when her parents arrived per her request. Advance Directives completed. Copy in chart. [address] [facility]       05/01/19 3725   Encounter Summary   Services provided to: Patient and family together   Referral/Consult From: Patient   Support System Family members   Continue Visiting   (5/1 follow up, ADs completed, copy in chart)   Complexity of Encounter Moderate   Length of Encounter 30 minutes   Routine   Type Follow up   Assessment Calm; Approachable   Intervention Active listening   Outcome Expressed gratitude   Advance Directives (For Healthcare)   Healthcare Directive Yes, patient has an advance directive for healthcare treatment   Type of Healthcare Directive Durable power of  for health care;Living will   Copy in Chart Yes, copy in chart   Chart Copy Status : Active;Current   Date Reviewed and Current: 05/01/19   Advance Directives Living will completed; Healthcare power of attornery completed   Healthcare Agent Appointed Patient's parents   Healthcare Agent's Name Π[email or web address] Agent's Phone Number [phone] (home); [phone] (cell)

## 2019-05-01 NOTE — PROGRESS NOTES
Patient's EF (Ejection Fraction) is greater than 40%    Patient has a past medical history of Anxiety state, unspecified, Impacted cerumen, and Rheumatoid arthritis(714.0). Comorbidities reviewed and education provided. Patient and family's stated goal of care: reduce shortness of breath, increase activity tolerance, better understand heart failure and disease management, be more comfortable and reduce lower extremity edema prior to discharge    Patient's current functional capacity:  Slight limitation of physical activity. Comfortable at rest. Ordinary physical activity results in fatigue, palpitation, dyspnea. Pt resting in bed at this time on Vapotherm L O2. Pt with complaints of shortness of breath. Pt with pitting lower extremity edema.  Patient's weights and intake/output reviewed:    Patient Vitals for the past 96 hrs (Last 3 readings):   Weight   05/01/19 0400 261 lb 3.2 oz (118.5 kg)   04/30/19 0605 263 lb (119.3 kg)   04/29/19 0445 270 lb (122.5 kg)       Intake/Output Summary (Last 24 hours) at 5/1/2019 1240  Last data filed at 5/1/2019 1055  Gross per 24 hour   Intake 920 ml   Output 3670 ml   Net -2750 ml         >>For CHF and Comorbidity documentation on Education Time and Topics, please see Education Tab    Electronically signed by Chelo Patterson RN on 5/1/2019 at 12:41 PM

## 2019-05-01 NOTE — PROGRESS NOTES
04/30/19 2319   NIV Type   Equipment Type V60   Mode BIPAP   Mask Type Full face mask   Mask Size Medium   Settings/Measurements   Comfort Level Good   Using Accessory Muscles No   IPAP 20 cmH20   EPAP 5 cmH2O   Rate Ordered 16   Resp 21   SpO2 97   FiO2  45 %   Vt Exhaled 688 mL   Mask Leak (lpm) 0 lpm   Skin Protection for O2 Device Yes   Breath Sounds   Right Upper Lobe Expiratory Wheezes   Right Middle Lobe Diminished   Right Lower Lobe Diminished   Left Upper Lobe Expiratory Wheezes   Left Lower Lobe Diminished   Patient Observation   Observations SPO2  97%  on 45%  FIO2  BIPAP.     Alarm Settings   Alarms On Y

## 2019-05-01 NOTE — FLOWSHEET NOTE
Met with Pt for Advance Directives consult. Pt wanting to read over with her parents when they arrive. Left copy of AD forms with Pt and informed her of 's availability should she wish to complete at a later time. Also listened and offered emotional support. Pt expressed worry for her parents and regret for worrying them with her health issues.  acknowledged and affirmed Pt's emotions. Prayed with Pt for her care and her and her family's support. [address] [place] [address]       05/01/19 1013   Encounter Summary   Services provided to: Patient   Referral/Consult From: Patient   Support System Family members   Continue Visiting   (5/1 initial, left ADs, sppt and prayer)   Complexity of Encounter Moderate   Length of Encounter 15 minutes   Spiritual Assessment Completed Yes   Spiritual/Nondenominational   Type Spiritual support   Assessment Approachable;Calm; Anxious; Coping   Intervention Active listening;Explored feelings, thoughts, concerns;Prayer;Discussed belief system/Orthodox practices/nicole;Discussed illness/injury and it's impact   Outcome Comfort;Expressed gratitude;Engaged in conversation;Expressed feelings/needs/concerns   Advance Directives (For Healthcare)   Healthcare Directive No, patient does not have an advance directive for healthcare treatment   Advance Directives Documents given

## 2019-05-01 NOTE — PROGRESS NOTES
Pulmonary & Critical Care Medicine ICU Progress Note    CC: Acute respiratory failure, acute pulmonary edema    Events of Last 24 hours: Worsening hypercapnia, wore BiPAP ovenight  Confusion overnight    Invasive Lines: IV: Peripheral    MV: BiPAP     / / /FiO2 : 60 %  Recent Labs     19  0629 19  0936   PHART 7.182* 7.260*   [id number] 137.7* 113.6*   PO2ART 66.7* 84.8       IV:      Vitals:  Blood pressure (!) 144/76, pulse 97, temperature 99 °F (37.2 °C), temperature source Oral, resp. rate 16, height 5' 3\" (1.6 m), weight 261 lb 3.2 oz (118.5 kg), SpO2 96 %, not currently breastfeeding. BiPAP  Temp  Av °F (37.2 °C)  Min: 98.4 °F (36.9 °C)  Max: 100 °F (37.8 °C)    Intake/Output Summary (Last 24 hours) at 2019 0751  Last data filed at 2019 0610  Gross per 24 hour   Intake 440 ml   Output 3495 ml   Net -3055 ml     EXAM:  General: ill appearing    Eyes: PERRL. No sclera icterus. No conjunctival injection. ENT: No discharge. Pharynx clear. Neck: Trachea midline. Normal thyroid. Resp: No accessory muscle use. No crackles. No wheezing. No rhonchi. No dullness on percussion. CV: Regular rate. Regular rhythm. No mumur or rub. ++ edema. Peripheral pulses are 2+. Capillary refill is less than 3 seconds. GI: Non-tender. Non-distended. No masses. No organomegaly. Normal bowel sounds. No hernia. Skin: Warm and dry. No nodule on exposed extremities. No rash on exposed extremities. Lymph: No cervical LAD. No supraclavicular LAD. M/S: No cyanosis. No joint deformity. No clubbing. Neuro: Alert and oriented ×3.  Patellar reflexes are symmetric  Psych: No agitation, no anxiety, affect is full     Scheduled Meds:   vancomycin  1,000 mg Intravenous Q12H    levofloxacin  750 mg Intravenous Daily    ipratropium-albuterol  1 ampule Inhalation Q4H While awake    mupirocin   Nasal BID    diclofenac sodium  2 g Topical BID    ALPRAZolam  0.25 mg Oral TID    citalopram  20 mg Oral Daily    concentric left ventricular hypertrophy. Normal left ventricular diastolic filling pressure. The aortic valve leaflets are not well visualized.   Aortic valve appears sclerotic but opens adequately. No evidence of aortic valve stenosis. Trivial aortic regurgitation is present.   Right ventricular systolic function is normal. The right ventricle is moderately enlarged. Systolic pulmonary artery pressure (SPAP) estimated at 54 mmHg (RA pressure 15 mmHg), consistent with moderate pulmonary hypertension. ASSESSMENT:  · Acute hypercapnic respiratory failure - it is not clear to me why she has had a clinical worsening. · Acute hypoxemic respiratory failure   · Chronic hypercapnic respiratory failure, favor obesity hypoventilation syndrome  · Acute HFpEF - ECHO shows surprisingly little evidence of pressure/volume overload  · Abnormal CT chest, appearance is more consistent with atelectasis than with infiltrative process.   Pro calcitonin level is normal.  · Morbid obesity     PLAN:  · Bilevel non-invasive positive pressure ventilation for life threatening respiratory failure -- will change to AVAPS, High flow nasal oxygen for life threatening respiratory failure during periods off  · Supplemental oxygen to maintain SaO2 >92%; wean as tolerated    · Diuresis, monitoring of electrolytes  · While pneumonia is unlikely clinically, given worsening covering with antibiotics levaquin and vancomycin D#2  · Inhaled bronchodilators   · Hematology for opinion on erythrocytosis -  I favor chronic hypoxia as etiology but has no h/o cardiopulmonary disease  · Echo bubble study rule out shunt  · Stat PCXR then possibly CT chest, reevaluate intrapulmonary process  · Dopplers to evaluate for DVT, I still am suspecting possible pulmonary embolism despite the negative CTPA  · Diamox ×1  · Prophylaxis: Lovenox, Bactroban    Total critical care time caring for this patient with life threatening, unstable organ failure, including direct

## 2019-05-01 NOTE — CARE COORDINATION
INTERDISCIPLINARY PLAN OF CARE CONFERENCE    Date/Time: 5/1/2019 10:04 AM  Completed by: Campos Garcia Case Management      Patient Name:  Lakshmi Gonzalez  YOB: 1960  Admitting Diagnosis: Acute respiratory failure ([address] [J96.00]     Admit Date/Time:  4/28/2019  1:37 PM    Chart reviewed. Interdisciplinary team met to discuss patient progress and discharge plans. Disciplines included Case Management, Nursing, and Dietitian. Current Status:ongoing    PT/OT recommendation:tbd    Anticipated Discharge Date: tbd  Expected D/C Disposition:  Home  Confirmed plan with patient and/or family Yes  Discharge Plan Comments: Reviewed chart. Pt in ICU. Plan home alone. Will need PT/OT evaluation when appropriate. Following for home O2 and [facility] SNF.           Home O2 in place on admit: No  Pt informed of need to bring portable home O2 tank on day of discharge for nursing to connect prior to leaving:  No  Verbalized agreement/Understanding:  Not Indicated

## 2019-05-01 NOTE — PLAN OF CARE
Problem: OXYGENATION/RESPIRATORY FUNCTION  Goal: Patient will maintain patent airway  Outcome: Ongoing  Goal: Patient will achieve/maintain normal respiratory rate/effort  Description  Respiratory rate and effort will be within normal limits for the patient  Outcome: Ongoing     Problem: HEMODYNAMIC STATUS  Goal: Patient has stable vital signs and fluid balance  Outcome: Ongoing     Problem: FLUID AND ELECTROLYTE IMBALANCE  Goal: Fluid and electrolyte balance are achieved/maintained  Outcome: Ongoing     Problem: Nutrition  Goal: Optimal nutrition therapy  Outcome: Ongoing  Goal: Understanding of nutritional guidelines  Outcome: Ongoing     Problem: Falls - Risk of:  Goal: Will remain free from falls  Description  Will remain free from falls  Outcome: Ongoing  Note:   [place]ange fall-risk light on outside door, fall precautions in place. Goal: Absence of physical injury  Description  Absence of physical injury  Outcome: Ongoing     Problem: Risk for Impaired Skin Integrity  Goal: Tissue integrity - skin and mucous membranes  Description  Structural intactness and normal physiological function of skin and  mucous membranes. Outcome: Ongoing  Note:   Patient turned/repositioned every 2 hours and as needed to maintain and improve skin integrity.

## 2019-05-01 NOTE — PLAN OF CARE
Patient's EF (Ejection Fraction) is greater than 40%    Patient has a past medical history of Anxiety state, unspecified, Impacted cerumen, and Rheumatoid arthritis(714.0). Comorbidities reviewed and education provided as appropriate. Patient and/or family's stated goal of care: reduce shortness of breath prior to discharge, increase activity tolerance prior to discharge, better understand heart failure and disease management prior to discharge, reduce lower extremity edema prior to discharge, comfort care measures only and unable to assess at this time    Pt is currently on BiPAP. Pt with pitting lower extremity edema. Patient's weights and intake/output reviewed:    Patient Vitals for the past 96 hrs (Last 3 readings):   Weight   04/30/19 0605 263 lb (119.3 kg)   04/29/19 0445 270 lb (122.5 kg)   04/28/19 1840 275 lb (124.7 kg)       Intake/Output Summary (Last 24 hours) at 4/30/2019 2135  Last data filed at 4/30/2019 2000  Gross per 24 hour   Intake 380 ml   Output 2125 ml   Net -1745 ml         Patient's current functional capacity:  Marked limitation of physical activity. Comfortable at rest. Less than ordinary activity causes fatigue, palpitation, or dyspnea.      >> For CHF and Comorbidity Education Time and Topics, please see Education Tab.

## 2019-05-01 NOTE — PROGRESS NOTES
Vancomycin Day: 2    Patient's labs, cultures, vitals, and vancomycin regimen reviewed.  Trough due this eveing at 2030  Simran MOODY 5/1/20192:35 PM  .

## 2019-05-01 NOTE — DISCHARGE INSTR - COC
Continuity of Care Form    Patient Name: Jr Fletcherster   :  1960  MRN:  [id number]    Admit date:  2019  Discharge date:      Code Status Order: Full Code   Advance Directives:   [address] [facility] Documentation     Date/Time Healthcare Directive Type of Healthcare Directive Copy in [facility] [address] Agent's Name Healthcare Agent's Phone Number    19 1013  No, patient does not have an advance directive for healthcare treatment -- -- -- -- --    19 1627  No, patient does not have an advance directive for healthcare treatment -- -- -- -- --    19 1110  No, patient does not have an advance directive for healthcare treatment -- -- -- -- --    19  No, patient does not have an advance directive for healthcare treatment -- -- -- -- --          Admitting Physician:  Tadeo Bose MD  PCP: Claudetta Comer, MD    Discharging Nurse: [facility] Unit/Room#: 8792/2671-91  Discharging Unit Phone Number: [phone]    Emergency Contact:   Extended Emergency Contact Information  Primary Emergency Contact: [place], [facility] [address] Phone: [phone]  Work Phone: [phone]  Relation: Parent  Secondary Emergency Contact: Linda Son  my[employer]Card Phone: [phone]  Relation: Brother/Sister    Past Surgical History:  Past Surgical History:   Procedure Laterality Date    CARPAL TUNNEL RELEASE      both hands     LUMBAR SPINE SURGERY         Immunization History: There is no immunization history on file for this patient.     Active Problems:  Patient Active Problem List   Diagnosis Code    Anxiety state F41.1    Rheumatoid arthritis ([facility] [address] M06.9    Tobacco abuse Z72.0    SOB (shortness of breath) R06.02    Moderate persistent asthma with acute exacerbation J45.41    Acute respiratory failure with hypoxia (HCC) J96.01    New onset of congestive heart failure ([facility] [address] I50.9    Morbid obesity ([facility]) E66.01    Obesity hypoventilation syndrome ([facility]) E66.2    Erythrocytosis D75.1       Isolation/Infection:   Isolation          No Isolation            Nurse Assessment:  Last Vital Signs: /63   Pulse 97   Temp 98.8 °F (37.1 °C) (Oral)   Resp 23   Ht 5' 3\" (1.6 m)   Wt 261 lb 3.2 oz (118.5 kg)   SpO2 91%   BMI 46.27 kg/m²     Last documented pain score (0-10 scale): Pain Level: 2  Last Weight:   Wt Readings from Last 1 Encounters:   05/01/19 261 lb 3.2 oz (118.5 kg)     Mental Status:  oriented and alert    IV Access:  - None    Nursing Mobility/ADLs:  Walking   Assisted  Transfer  Assisted  Bathing  Assisted  Dressing  Assisted  Toileting  Assisted  Feeding  Assisted  Med Admin  Independent  Med Delivery   Independent    Wound Care Documentation and Therapy:        Elimination:  Continence:   · Bowel: Yes  · Bladder: Yes  Urinary Catheter: None   Colostomy/Ileostomy/Ileal Conduit: No       Date of Last BM:     Intake/Output Summary (Last 24 hours) at 5/1/2019 1018  Last data filed at 5/1/2019 0922  Gross per 24 hour   Intake 440 ml   Output 3545 ml   Net -3105 ml     I/O last 3 completed shifts: In: 26 [P.O.:180; I.V.:260]  Out: [address] [Urine:3495]    Safety Concerns: At Risk for Falls    Impairments/Disabilities:      None    Nutrition Therapy:  Current Nutrition Therapy:   - Oral Diet:  Dysphagia 3 advanced    Routes of Feeding: Oral  Liquids: Thin Liquids  Daily Fluid Restriction: no  Last Modified Barium Swallow with Video (Video Swallowing Test): not done    Treatments at the Time of Hospital Discharge:   Respiratory Treatments: ***  Oxygen Therapy:  is on oxygen at 4 L/min per nasal cannula.   Ventilator:    BIPAP HS 12/6    Rehab Therapies: Physical Therapy and Occupational Therapy  Weight Bearing Status/Restrictions: Left foot drop  Other Medical Equipment (for information only, NOT a DME order):  wheelchair, walker and bedside commode  Other Treatments: ***    Patient's personal belongings (please select all that are sent with patient):  Glasses    RN SIGNATURE:  Electronically signed by James Holland RN on 5/20/19 at 2:20 PM    CASE MANAGEMENT/SOCIAL WORK SECTION    Inpatient Status Date: 4/28/2019    Readmission Risk Assessment Score:  Readmission Risk              Risk of Unplanned Readmission:        7           Discharging to Facility/ Agency   · Name:   · Address:  · Phone:  · Fax:    Dialysis Facility (if applicable)   · Name:  · Address:  · Dialysis Schedule:  · Phone:  · Fax:    / signature: {Esignature:[id number]}    PHYSICIAN SECTION    Prognosis: Good    Condition at Discharge: Stable    Rehab Potential (if transferring to Rehab): Good    Recommended Labs or Other Treatments After Discharge:     Physician Certification: I certify the above information and transfer of Kelsey Latham  is necessary for the continuing treatment of the diagnosis listed and that she requires Acute Rehab for less 30 days.      Update Admission H&P: No change in H&P    PHYSICIAN SIGNATURE:  Electronically signed by Margie Jose MD on 5/19/19 at 11:03 AM

## 2019-05-01 NOTE — PROGRESS NOTES
Patient report given to Keli Caldera [facility]. Patient resting comfortably on BiPAP at present. Experienced desaturations to mid 80's overnight resulting in upward titration of FiO2 . Care transferred.

## 2019-05-01 NOTE — PROGRESS NOTES
This nurse attempted to draw ABG from right wrist and was unsuccessful. Cj Finders w/RT contacted to draw ABG.

## 2019-05-01 NOTE — PROGRESS NOTES
Admit: 2019    Name:  Michael Alvarez  Room:  5345/5189-83  MRN:    [id number]    Critical Care Daily Progress Note for 2019     Interval History:     She is on 10 L of O2.       Was on bipap all night and this am  Now on 10 L       Now on vapotherm      Scheduled Meds:   vancomycin  1,000 mg Intravenous Q12H    levofloxacin  750 mg Intravenous Daily    ipratropium-albuterol  1 ampule Inhalation Q4H While awake    mupirocin   Nasal BID    diclofenac sodium  2 g Topical BID    ALPRAZolam  0.25 mg Oral TID    citalopram  20 mg Oral Daily    sodium chloride flush  10 mL Intravenous 2 times per day    enoxaparin  40 mg Subcutaneous Daily    furosemide  40 mg Intravenous BID       Continuous Infusions:      PRN Meds:  perflutren lipid microspheres, ibuprofen, sodium chloride flush, magnesium hydroxide, ondansetron, albuterol, albuterol sulfate HFA                  Objective:     Temp  Av.2 °F (37.3 °C)  Min: 98.4 °F (36.9 °C)  Max: 100 °F (37.8 °C)  Pulse  Av  Min: 88  Max: 106  BP  Min: 104/63  Max: 146/79  SpO2  Av.4 %  Min: 86 %  Max: 97 %  FiO2   Av.3 %  Min: 45 %  Max: 100 %  Patient Vitals for the past 4 hrs:   BP Temp Temp src Pulse Resp SpO2   19 1300 122/71 -- -- 98 19 92 %   19 1200 136/74 -- -- 99 17 95 %   19 1123 -- -- -- -- 22 94 %   19 1104 126/79 99.5 °F (37.5 °C) Oral 99 23 93 %   19 1100 -- 99.5 °F (37.5 °C) -- 96 21 91 %   19 1004 133/67 -- -- 101 25 92 %         Intake/Output Summary (Last 24 hours) at 2019 1326  Last data filed at 2019 1246  Gross per 24 hour   Intake 920 ml   Output 4120 ml   Net -3200 ml       Physical Exam:  General:  Awake, alert and oriented.    Mucous Membranes:  Pink , anicteric  Neck: No JVD, no carotid bruit, no thyromegaly  Chest:  Clear to auscultation bilaterally, no added sounds  Cardiovascular:  RRR S1S2 heard, no murmurs or gallops  Abdomen:  Soft, undistended, non tender, no stenosis.   Trivial aortic regurgitation is present.   Right ventricular systolic function is normal.   The right ventricle is moderately enlarged.   Systolic pulmonary artery pressure (SPAP) estimated at 54 mmHg (RA pressure   15 mmHg), consistent with moderate pulmonary hypertension. Assessment & Plan:     Patient Active Problem List    Diagnosis Date Noted    Tobacco abuse 05/31/2011     Priority: Medium    Rheumatoid arthritis ([facility] [address] 05/17/2010     Priority: Medium    Anxiety state 05/17/2010     Priority: Low    Erythrocytosis     Morbid obesity ([facility] [address]     Obesity hypoventilation syndrome ([address]     Acute respiratory failure with hypoxia ([facility] [address] 04/28/2019    New onset of congestive heart failure ([facility] [address] 04/28/2019    Moderate persistent asthma with acute exacerbation 04/17/2019    SOB (shortness of breath) 06/09/2011     1. Acute hypoxic resp failure. possibly secondary toacute pulmonary edema and acute congestive heart failure. currently on IV Lasix. Echocardiogram is been obtained. Echocardiogram however shows normal LV function does not show diastolic heart failure. shows moderate pulm HTN. now on Vapotherm. One dose of diamox today    2. Chronic hypercarbic respiratory failure likely obesity hypoventilation syndrome. mod pulm HTN on ECHO    3. Acute bronchitis. No fevers. Has mildly elevated leukocytosis. Pro-calcitonin however is negative. Started on levaquin and iv vanc    4. Morbid obesity. Nutrition consult. Lovenox for DVT prophylaxis.           Efe Jennings

## 2019-05-01 NOTE — PROGRESS NOTES
05/01/19 0320   NIV Type   Equipment Type V60   Mode BIPAP   Mask Type Full face mask   Mask Size Medium   Settings/Measurements   Comfort Level Good   Using Accessory Muscles No   IPAP 20 cmH20   EPAP 5 cmH2O   Rate Ordered 16   Resp 19   SpO2 96   FiO2  70 %   Vt Exhaled 488 mL   Mask Leak (lpm) 0 lpm   Skin Protection for O2 Device Yes   Patient Observation   Observations SPO2  96%  on 70%  FIO2  BIPAP.     Alarm Settings   Alarms On Y

## 2019-05-01 NOTE — PROGRESS NOTES
Patient care assumed, assessment completed as charted. Patient resting in bed, C/O feeling \"confined\" on BiPAP. Placed on 10L/HFNC for HS medication administration. States no further needs at this time. Will monitor.

## 2019-05-01 NOTE — CONSULTS
on file    Stress: Not on file   Relationships    Social connections:     Talks on phone: Not on file     Gets together: Not on file     Attends Jain service: Not on file     Active member of club or organization: Not on file     Attends meetings of clubs or organizations: Not on file     Relationship status: Not on file    Intimate partner violence:     Fear of current or ex partner: Not on file     Emotionally abused: Not on file     Physically abused: Not on file     Forced sexual activity: Not on file   Other Topics Concern    Not on file   Social History Narrative    Not on file       FAMILY HISTORY:     Family History   Problem Relation Age of Onset    Stroke Paternal Grandmother     Heart Attack Paternal Grandfather 62    Diabetes Paternal Grandfather     Other Mother         Factor 5 Lieden        ALLERGIES:     Allergies as of 04/28/2019    (No Known Allergies)       MEDICATIONS:     No current facility-administered medications on file prior to encounter. Current Outpatient Medications on File Prior to Encounter   Medication Sig Dispense Refill    albuterol (ACCUNEB) 1.25 MG/3ML nebulizer solution Inhale 3 mLs into the lungs every 6 hours as needed for Wheezing 360 mL 3    citalopram (CELEXA) 20 MG tablet Take 1 tablet by mouth daily 90 tablet 3    albuterol sulfate  (90 Base) MCG/ACT inhaler Inhale 2 puffs into the lungs every 4 hours as needed for Wheezing or Shortness of Breath 1 Inhaler 10    ALPRAZolam (XANAX) 0.25 MG tablet Take 1 tablet by mouth 3 times daily 90 tablet 0    diclofenac sodium 1 % GEL 2 gm in upper extremity joint, 3 times a day as needed. Maximum dose 12 gram/day. 3 Tube 5     REVIEW OF SYSTEMS:       10 point ROS completed. Pertinent positives in HPI, otherwise negative.      PHYSICAL EXAM:       Vitals:    05/01/19 0722   BP:    Pulse:    Resp: 16   Temp:    SpO2:        General appearance: alert and cooperative  Head: Normocephalic, without obvious abnormality, atraumatic  Neck: No palpable lymphadenopathy in supraclavicular or cervical chains  Lungs: Clear to auscultation bilaterally, no audible rales, wheezes or crackles  Heart: Regular rate and rhythm, S1, S2 normal  Abdomen: Soft, non-tender; bowel sounds normal; no masses,  no organomegaly  Extremities: without cyanosis, clubbing, edema or asymmetry  Skin: No jaundice, purpura or petechiae      LABS:     Lab Results   Component Value Date    WBC 11.2 (H) 05/01/2019    HGB 16.5 (H) 05/01/2019    HCT 52.0 (H) 05/01/2019    MCV 92.2 05/01/2019     05/01/2019       Lab Results   Component Value Date    GLUCOSE 92 05/01/2019    BUN 14 05/01/2019    CREATININE <0.5 (L) 05/01/2019    K 3.9 05/01/2019    BCR 18 04/17/2019       Lab Results   Component Value Date    ALKPHOS 95 04/28/2019     (H) 04/28/2019     (H) 04/28/2019    BILITOT 1.3 (H) 04/28/2019    PROT 7.0 04/28/2019       IMAGING:     Xr Chest Portable  Result Date: 4/28/2019  Cardiomegaly and mild pulmonary edema. Ct Chest Pulmonary Embolism W Contrast  Result Date: 4/28/2019  Suboptimal contrast timing although there is no evidence for large or central pulmonary embolism. The segmental and subsegmental branches are not well evaluated. Small left basilar effusion with adjacent consolidation and there are also infiltrates within the lingula and right lung base that may represent atelectasis versus pneumonia. Small amount of free fluid in the visualized upper abdomen. ASSESSMENT:     Problem List Items Addressed This Visit     New onset of congestive heart failure (HCC) - Primary    Relevant Medications    furosemide (LASIX) injection 40 mg (Completed)    enoxaparin (LOVENOX) injection 40 mg    furosemide (LASIX) injection 40 mg    acetaZOLAMIDE (DIAMOX) injection 250 mg      Other Visit Diagnoses     Respiratory distress                    PLAN:     1.  Polycythemia/Erythrocytosis  - persistently mildly elevated Hgb/Hct since 2015 in 50-55 range  - WBC slightly high this admission, previously normal and platelets normal  - suspect most likely secondary to smoking/chronic hypoxia  - will check DOROTHY-2 kinase to evaluate for myeloproliferative disorder, Epo to assess for exogenous production  - check carboxyhemoglobin  - Hct improved since admission - consider phlebotomy if > 55  - monitor CBC    Hazel Shea MD

## 2019-05-02 LAB
ANION GAP SERPL CALCULATED.3IONS-SCNC: 4 MMOL/L (ref 3–16)
BASOPHILS ABSOLUTE: 0 K/UL (ref 0–0.2)
BASOPHILS RELATIVE PERCENT: 0.4 %
BUN BLDV-MCNC: 14 MG/DL (ref 7–20)
CALCIUM SERPL-MCNC: 9.1 MG/DL (ref 8.3–10.6)
CHLORIDE BLD-SCNC: 82 MMOL/L (ref 99–110)
CO2: 48 MMOL/L (ref 21–32)
CREAT SERPL-MCNC: <0.5 MG/DL (ref 0.6–1.1)
EOSINOPHILS ABSOLUTE: 0.1 K/UL (ref 0–0.6)
EOSINOPHILS RELATIVE PERCENT: 0.8 %
ERYTHROPOIETIN: 6 MU/ML (ref 4–27)
GFR AFRICAN AMERICAN: >60
GFR NON-AFRICAN AMERICAN: >60
GLUCOSE BLD-MCNC: 99 MG/DL (ref 70–99)
HCT VFR BLD CALC: 53.4 % (ref 36–48)
HEMOGLOBIN: 16.8 G/DL (ref 12–16)
LYMPHOCYTES ABSOLUTE: 1 K/UL (ref 1–5.1)
LYMPHOCYTES RELATIVE PERCENT: 10 %
MAGNESIUM: 2.2 MG/DL (ref 1.8–2.4)
MCH RBC QN AUTO: 29.1 PG (ref 26–34)
MCHC RBC AUTO-ENTMCNC: 31.4 G/DL (ref 31–36)
MCV RBC AUTO: 92.6 FL (ref 80–100)
MONOCYTES ABSOLUTE: 1.1 K/UL (ref 0–1.3)
MONOCYTES RELATIVE PERCENT: 11.8 %
NEUTROPHILS ABSOLUTE: 7.4 K/UL (ref 1.7–7.7)
NEUTROPHILS RELATIVE PERCENT: 77 %
PDW BLD-RTO: 16.2 % (ref 12.4–15.4)
PLATELET # BLD: 140 K/UL (ref 135–450)
PMV BLD AUTO: 7.3 FL (ref 5–10.5)
POTASSIUM SERPL-SCNC: 3.6 MMOL/L (ref 3.5–5.1)
PRO-BNP: 661 PG/ML (ref 0–124)
RBC # BLD: 5.77 M/UL (ref 4–5.2)
SODIUM BLD-SCNC: 134 MMOL/L (ref 136–145)
WBC # BLD: 9.6 K/UL (ref 4–11)

## 2019-05-02 PROCEDURE — 99291 CRITICAL CARE FIRST HOUR: CPT | Performed by: INTERNAL MEDICINE

## 2019-05-02 PROCEDURE — 2000000000 HC ICU R&B

## 2019-05-02 PROCEDURE — 83880 ASSAY OF NATRIURETIC PEPTIDE: CPT

## 2019-05-02 PROCEDURE — 6370000000 HC RX 637 (ALT 250 FOR IP): Performed by: INTERNAL MEDICINE

## 2019-05-02 PROCEDURE — 6360000002 HC RX W HCPCS: Performed by: INTERNAL MEDICINE

## 2019-05-02 PROCEDURE — 36415 COLL VENOUS BLD VENIPUNCTURE: CPT

## 2019-05-02 PROCEDURE — 94668 MNPJ CHEST WALL SBSQ: CPT

## 2019-05-02 PROCEDURE — 94761 N-INVAS EAR/PLS OXIMETRY MLT: CPT

## 2019-05-02 PROCEDURE — 94660 CPAP INITIATION&MGMT: CPT

## 2019-05-02 PROCEDURE — 99232 SBSQ HOSP IP/OBS MODERATE 35: CPT | Performed by: INTERNAL MEDICINE

## 2019-05-02 PROCEDURE — 2580000003 HC RX 258: Performed by: INTERNAL MEDICINE

## 2019-05-02 PROCEDURE — 80048 BASIC METABOLIC PNL TOTAL CA: CPT

## 2019-05-02 PROCEDURE — 97530 THERAPEUTIC ACTIVITIES: CPT

## 2019-05-02 PROCEDURE — 94640 AIRWAY INHALATION TREATMENT: CPT

## 2019-05-02 PROCEDURE — 97162 PT EVAL MOD COMPLEX 30 MIN: CPT

## 2019-05-02 PROCEDURE — 83735 ASSAY OF MAGNESIUM: CPT

## 2019-05-02 PROCEDURE — 2700000000 HC OXYGEN THERAPY PER DAY

## 2019-05-02 PROCEDURE — 85025 COMPLETE CBC W/AUTO DIFF WBC: CPT

## 2019-05-02 PROCEDURE — 6370000000 HC RX 637 (ALT 250 FOR IP)

## 2019-05-02 PROCEDURE — 97165 OT EVAL LOW COMPLEX 30 MIN: CPT

## 2019-05-02 RX ORDER — ACETAZOLAMIDE 500 MG/5ML
250 INJECTION, POWDER, LYOPHILIZED, FOR SOLUTION INTRAVENOUS ONCE
Status: COMPLETED | OUTPATIENT
Start: 2019-05-02 | End: 2019-05-02

## 2019-05-02 RX ORDER — LEVOFLOXACIN 750 MG/1
750 TABLET ORAL DAILY
Status: DISCONTINUED | OUTPATIENT
Start: 2019-05-03 | End: 2019-05-03

## 2019-05-02 RX ORDER — GUAIFENESIN 600 MG/1
1200 TABLET, EXTENDED RELEASE ORAL 2 TIMES DAILY
Status: DISCONTINUED | OUTPATIENT
Start: 2019-05-02 | End: 2019-05-03

## 2019-05-02 RX ORDER — DIMETHICONE, OXYBENZONE, AND PADIMATE O 2; 2.5; 6.6 G/100G; G/100G; G/100G
STICK TOPICAL
Status: COMPLETED
Start: 2019-05-02 | End: 2019-05-02

## 2019-05-02 RX ORDER — POTASSIUM CHLORIDE 750 MG/1
40 TABLET, EXTENDED RELEASE ORAL ONCE
Status: COMPLETED | OUTPATIENT
Start: 2019-05-02 | End: 2019-05-02

## 2019-05-02 RX ORDER — METHYLPREDNISOLONE SODIUM SUCCINATE 40 MG/ML
40 INJECTION, POWDER, LYOPHILIZED, FOR SOLUTION INTRAMUSCULAR; INTRAVENOUS DAILY
Status: DISCONTINUED | OUTPATIENT
Start: 2019-05-02 | End: 2019-05-11

## 2019-05-02 RX ADMIN — DICLOFENAC 2 G: 10 GEL TOPICAL at 20:42

## 2019-05-02 RX ADMIN — ENOXAPARIN SODIUM 120 MG: 120 INJECTION SUBCUTANEOUS at 20:43

## 2019-05-02 RX ADMIN — GUAIFENESIN 1200 MG: 600 TABLET, EXTENDED RELEASE ORAL at 11:01

## 2019-05-02 RX ADMIN — MUPIROCIN: 20 OINTMENT TOPICAL at 09:33

## 2019-05-02 RX ADMIN — LEVOFLOXACIN 750 MG: 5 INJECTION, SOLUTION INTRAVENOUS at 09:24

## 2019-05-02 RX ADMIN — IPRATROPIUM BROMIDE AND ALBUTEROL SULFATE 1 AMPULE: .5; 3 SOLUTION RESPIRATORY (INHALATION) at 23:00

## 2019-05-02 RX ADMIN — ALPRAZOLAM 0.25 MG: 0.25 TABLET ORAL at 15:00

## 2019-05-02 RX ADMIN — Medication 10 ML: at 20:42

## 2019-05-02 RX ADMIN — MUPIROCIN: 20 OINTMENT TOPICAL at 20:42

## 2019-05-02 RX ADMIN — Medication 10 ML: at 09:32

## 2019-05-02 RX ADMIN — ALPRAZOLAM 0.25 MG: 0.25 TABLET ORAL at 09:23

## 2019-05-02 RX ADMIN — ENOXAPARIN SODIUM 120 MG: 120 INJECTION SUBCUTANEOUS at 09:26

## 2019-05-02 RX ADMIN — POTASSIUM CHLORIDE 40 MEQ: 10 TABLET, EXTENDED RELEASE ORAL at 11:01

## 2019-05-02 RX ADMIN — METHYLPREDNISOLONE SODIUM SUCCINATE 40 MG: 40 INJECTION, POWDER, FOR SOLUTION INTRAMUSCULAR; INTRAVENOUS at 11:13

## 2019-05-02 RX ADMIN — GUAIFENESIN 1200 MG: 600 TABLET, EXTENDED RELEASE ORAL at 20:41

## 2019-05-02 RX ADMIN — ACETAZOLAMIDE SODIUM 250 MG: 500 INJECTION, POWDER, LYOPHILIZED, FOR SOLUTION INTRAVENOUS at 09:25

## 2019-05-02 RX ADMIN — IPRATROPIUM BROMIDE AND ALBUTEROL SULFATE 1 AMPULE: .5; 3 SOLUTION RESPIRATORY (INHALATION) at 12:09

## 2019-05-02 RX ADMIN — IPRATROPIUM BROMIDE AND ALBUTEROL SULFATE 1 AMPULE: .5; 3 SOLUTION RESPIRATORY (INHALATION) at 19:20

## 2019-05-02 RX ADMIN — IPRATROPIUM BROMIDE AND ALBUTEROL SULFATE 1 AMPULE: .5; 3 SOLUTION RESPIRATORY (INHALATION) at 15:10

## 2019-05-02 RX ADMIN — IPRATROPIUM BROMIDE AND ALBUTEROL SULFATE 1 AMPULE: .5; 3 SOLUTION RESPIRATORY (INHALATION) at 07:45

## 2019-05-02 RX ADMIN — DICLOFENAC 2 G: 10 GEL TOPICAL at 09:33

## 2019-05-02 RX ADMIN — Medication: at 05:04

## 2019-05-02 RX ADMIN — ALPRAZOLAM 0.25 MG: 0.25 TABLET ORAL at 20:40

## 2019-05-02 RX ADMIN — CITALOPRAM HYDROBROMIDE 20 MG: 20 TABLET ORAL at 09:23

## 2019-05-02 RX ADMIN — VANCOMYCIN HYDROCHLORIDE 1250 MG: 5 INJECTION, POWDER, LYOPHILIZED, FOR SOLUTION INTRAVENOUS at 12:15

## 2019-05-02 ASSESSMENT — PAIN DESCRIPTION - ONSET: ONSET: ON-GOING

## 2019-05-02 ASSESSMENT — PAIN DESCRIPTION - PAIN TYPE: TYPE: ACUTE PAIN

## 2019-05-02 ASSESSMENT — PAIN DESCRIPTION - PROGRESSION: CLINICAL_PROGRESSION: NOT CHANGED

## 2019-05-02 ASSESSMENT — PAIN - FUNCTIONAL ASSESSMENT: PAIN_FUNCTIONAL_ASSESSMENT: ACTIVITIES ARE NOT PREVENTED

## 2019-05-02 ASSESSMENT — PAIN DESCRIPTION - FREQUENCY: FREQUENCY: CONTINUOUS

## 2019-05-02 ASSESSMENT — PAIN DESCRIPTION - LOCATION: LOCATION: GENERALIZED

## 2019-05-02 ASSESSMENT — PAIN DESCRIPTION - DESCRIPTORS: DESCRIPTORS: ACHING;CONSTANT

## 2019-05-02 ASSESSMENT — PAIN SCALES - GENERAL
PAINLEVEL_OUTOF10: 4
PAINLEVEL_OUTOF10: 4

## 2019-05-02 NOTE — PROGRESS NOTES
Reassessment completed (see Flowsheet). All ICU lines remain intact, ICU monitoring continued- pt lung sounds still diminished with wheezing. pt's blood pressures remain WDL. Vapootherm down to 70%.  Continuing to monitor.

## 2019-05-02 NOTE — PROGRESS NOTES
Care rounds completed with Dr. Nick Roy and multidisciplinary team. Reviewed labs, meds, VS, assessment, & plan of care for today. Try Easy pap- RT aware, Add mucinex and steroid and 40meq of oral K+. Change Levaquin to oral Verify order for PT/OT. Check with social work in regards to cheapest medication- Xarleto/ Eliquis.  See dictated note and new orders for details.

## 2019-05-02 NOTE — PROGRESS NOTES
Vancomycin Day: 3    Patient's labs, cultures, vitals, and vancomycin regimen reviewed. No changes today.   Trough due on 5/3 at [facility] D 4/4/840909:29 PM  .

## 2019-05-02 NOTE — PROGRESS NOTES
Shift handoff report given to Eran WASHINGTON at bedside. . Pt denies any needs at this time, on Vapotherm. Call light within reach, bed in lowest position, end of shift checks completed.

## 2019-05-02 NOTE — PROGRESS NOTES
Reassessment completed (see Flowsheet). All ICU lines remain intact, ICU monitoring continued- pt lung sounds- diminished/ rhonchi throughout. pt's blood pressures WDL. Pt states she has been most comfortable laying on L side with pillow support.  Continuing to monitor.

## 2019-05-02 NOTE — PROGRESS NOTES
Inpatient Physical Therapy Evaluation and Treatment    Unit: ICU  Date:  5/2/2019  Patient Name:    Russ Bautista  Admitting diagnosis:  Acute respiratory failure ([facility] [address] [J96.00]  Admit Date:  4/28/2019  Precautions/Restrictions/WB Status/ Lines/ Wounds/ Oxygen: fall risk, IV and andujar catheter , Vapotherm (40 lpm, 75% ), ICU monitoring    Treatment Time:  7364-0083  Treatment Number:  1   Timed Code Treatment Minutes: 21 minutes  Total Treatment Minutes:  31  minutes    Patient Goals for Therapy: \" Get out of bed \"          Discharge Recommendations: Home w/ 24/7 assist and home therapy (pending progress)  DME needs for discharge: RW       Therapy recommendations for staff:   Assist of 1 with use of rolling walker (RW) for all transfers to/from chair    Home Health S4 Level Recommendation:  Level 3 Safety  AM-PAC Mobility Score    AM-PAC Inpatient Mobility Raw Score : 17       Preadmission Environment    Pt. Lives Alone  Home environment:  two story home  Steps to enter first floor: 2-3 Steps to enter  Steps to second floor: Partial flight and One Hand Rail (3 + 7)  Bathroom: Bath Tub Shower and Standard height toilet  Equipment owned: none     If needed increased assist, could stay with parents. One story home with 3-4 small steps to enter with HR. Tub shower with shower seat. Equipment: Shower chair, wheelchair, RW, rollator, raised commode with grab bars    Preadmission Status:  Pt. Able to drive: Yes  Pt Fully independent with ADLs: Yes  Pt. Required assistance from family for: Independent PTA  Pt. Fully independent for transfers and gait and walked with No Device  History of falls No   FT at [facility] ([address] [place] [address])    Pain   Yes  Rating: mild - reports from no moving  Location: Neck and back  Pain Medicine Status: Denies need    Cognition    A&O x4   Able to follow 2 step commands    Subjective  Patient lying supine in bed with no family present  Pt agreeable to this PT eval & tx.      Upper tolerance, Balance, Safety and Strength and decreased independence with Ambulation, Bed Mobility  and Transfers. Goals : To be met in 3 visits:  1). Independent with LE Ex x 10 reps    To be met in 6 visits:  1). Supine to/from sit: Independent  2). Sit to/from stand: Supervision  3). Bed to chair: Supervision  4). Gait: Ambulate 150 ft   with  SBA  and use of LRAD  5). Tolerate B LE exercises 3 sets of 10-15 reps  6). Ascend/descend 3 steps with Min A with use of One Hand rail and LRAD. Rehabilitation Potential: Good  Strengths for achieving goals include:   Pt motivated, PLOF and Family Support  Barriers to achieving goals include: Other: O2 requirements    Plan    To be seen 3-5 x / week  while in acute care setting for therapeutic exercises, bed mobility, transfers, progressive gait training, balance training, and family/patient education. Taylor Marte, PT, DPT #[id number]    If patient discharges from this facility prior to next visit, this note will serve as the Discharge Summary.

## 2019-05-02 NOTE — PROGRESS NOTES
ONCOLOGY FOLLOW-UP:         PROBLEM LIST:       Patient Active Problem List   Diagnosis Code    Anxiety state F41.1    Rheumatoid arthritis ([facility] [address] M06.9    Tobacco abuse Z72.0    SOB (shortness of breath) R06.02    Moderate persistent asthma with acute exacerbation J45.41    Acute respiratory failure with hypoxia (HCC) J96.01    New onset of congestive heart failure (HCC) I50.9    Morbid obesity ([facility]) E66.01    Obesity hypoventilation syndrome ([facility] [address] E66.2    Erythrocytosis D75.1       INTERVAL HISTORY:       Pt remains in ICU. Afebrile. REVIEW OF SYSTEMS:       10 point ROS completed. Pertinent positives in HPI, otherwise negative.      PHYSICAL EXAM:       /68   Pulse 98   Temp 98.6 °F (37 °C) (Oral)   Resp 17   Ht 5' 3\" (1.6 m)   Wt 261 lb 3.2 oz (118.5 kg)   SpO2 94%   BMI 46.27 kg/m²     General appearance: alert and cooperative  Head: Normocephalic, without obvious abnormality, atraumatic  Neck: No palpable lymphadenopathy in supraclavicular or cervical chains  Lungs: Clear to auscultation bilaterally, no audible rales, wheezes or crackles  Heart: Regular rate and rhythm, S1, S2 normal  Abdomen: Soft, non-tender; bowel sounds normal; no masses,  no organomegaly  Extremities: without cyanosis, clubbing, edema or asymmetry  Skin: No jaundice, purpura or petechiae    LABS:     CBC:   Lab Results   Component Value Date    WBC 9.6 05/02/2019    HGB 16.8 (H) 05/02/2019    HCT 53.4 (H) 05/02/2019    MCV 92.6 05/02/2019     05/02/2019    LYMPHOPCT 10.0 05/02/2019    RBC 5.77 (H) 05/02/2019    MCH 29.1 05/02/2019    MCHC 31.4 05/02/2019    RDW 16.2 (H) 05/02/2019       BMP:   Lab Results   Component Value Date     05/02/2019    K 3.6 05/02/2019    CL 82 05/02/2019    CO2 48 05/02/2019    BUN 14 05/02/2019    CREATININE <0.5 05/02/2019    GLUCOSE 99 05/02/2019    CALCIUM 9.1 05/02/2019        Hepatic Function Panel:   Lab Results   Component Value Date    ALKPHOS 95 04/28/2019    ALT 117 04/28/2019     04/28/2019    PROT 7.0 04/28/2019    PROT 6.8 06/18/2012    BILITOT 1.3 04/28/2019    LABALBU 3.7 04/28/2019      Carboxyhemoglbin 3.2 (H)    IMAGING:     Xr Chest Portable  Result Date: 4/28/2019  Cardiomegaly and mild pulmonary edema. Ct Chest Pulmonary Embolism W Contrast  Result Date: 4/28/2019  Suboptimal contrast timing although there is no evidence for large or central pulmonary embolism. The segmental and subsegmental branches are not well evaluated. Small left basilar effusion with adjacent consolidation and there are also infiltrates within the lingula and right lung base that may represent atelectasis versus pneumonia. Small amount of free fluid in the visualized upper abdomen. LE Doppler:  Summary        1. There is evidence of isolated acute deep venous thrombosis involving the    peroneal vein below the knee in the left lower extremity.    2. There is no other evidence of deep or superficial venous thrombosis    involving the lower extremities bilaterally. ASSESSMENT:     Problem List Items Addressed This Visit     New onset of congestive heart failure (HCC) - Primary    Relevant Medications    furosemide (LASIX) injection 40 mg (Completed)    acetaZOLAMIDE (DIAMOX) injection 250 mg (Completed)    enoxaparin (LOVENOX) injection 120 mg    acetaZOLAMIDE (DIAMOX) injection 250 mg      Other Visit Diagnoses     Respiratory distress                    PLAN:     1. Polycythemia/Erythrocytosis  - persistently mildly elevated Hgb/Hct since 2015 in 50-55 range  - WBC slightly high this admission, previously normal and platelets normal  - suspect most likely secondary to smoking/chronic hypoxia  - will check DOROTHY-2 kinase to evaluate for myeloproliferative disorder, Epo to assess for exogenous production - results pending  - check carboxyhemoglobin - high - consistent with hypoxia  - Hct improved since admission - consider phlebotomy if > 55  - monitor CBC    2.  DVT  -

## 2019-05-02 NOTE — PROGRESS NOTES
Inpatient Occupational Therapy  Evaluation and Treatment    Unit: ICU  Date:  5/2/2019  Patient Name:    Deidre Busby  Admitting diagnosis:  Acute respiratory failure ([facility] [address] [J96.00]  Admit Date:  4/28/2019  Precautions/Restrictions/WB Status/ Lines/ Wounds/ Oxygen: fall risk, IV and andujar catheter , Vapotherm (40 lpm, 75% ), ICU monitoring    Treatment Time:  11:10-11:44  Treatment Number: 1   Billable Treatment Time: 21 minutes   Total Treatment Time:   34   minutes    Patient Goals for Therapy:  \" to go to my parents for awhile \"      Discharge Recommendations: Home w/ 24/7 assist and home therapy  DME needs for discharge: RW       Therapy recommendations for staff:   Assist of 1 with use of rolling walker (RW) for all transfers to/from chair      Home Health S4 Level Recommendation:  Level 1 Standard  AM-PAC Score: 16    Preadmission Environment    Pt. Lives Alone  Home environment:    two story home  Steps to enter first floor: 2-3 Steps to enter  Steps to second floor: Partial flight and One Hand Rail (3 + 7)  Bathroom: Bath Tub Shower and Standard height toilet  Equipment owned: none      If needed increased assist, could stay with parents. One story home with 3-4 small steps to enter with HR. Tub shower with shower seat. Equipment: Shower chair, wheelchair, RW, rollator, raised commode with grab bars     Preadmission Status:  Pt. Able to drive: Yes  Pt Fully independent with ADLs: Yes  Pt. Required assistance from family for: Independent PTA  Pt. Fully independent for transfers and gait and walked with No Device  History of falls No   FT at [facility] ([address] [place] [address])      Pain  No  Rating:NA  Location:  Pain Medicine Status: Denies need      Cognition    A&O x4   Able to follow 2 step commands    Subjective  Patient lying supine in bed with no family present  Pt agreeable to this OT eval & tx.      Upper Extremity ROM:    WFL,  pt able to perform all bed mobility, transfers, and gait without include:  Complexity of condition     Plan: To be seen 3-5 x/wk while in acute care setting for therapeutic exercises, bed mobility, transfers, dressing, bathing, family/patient education with adaptive equipment, breathing technique instruction.        Lorena Lang, OTR/L #[id number]      If patient discharges from this facility prior to next visit, this note will serve as the Discharge Summary

## 2019-05-02 NOTE — PROGRESS NOTES
Vapotherm FiO2 decreased to 80% at 40lpm       05/02/19 0928   Oxygen Therapy/Pulse Ox   O2 Device Heated high flow cannula   O2 Flow Rate (L/min) 40 L/min   FiO2  80 %   Resp 18   SpO2 92 %

## 2019-05-02 NOTE — PROGRESS NOTES
Shift change, bedside report given to  [facility]. Pt exhibits no s/s of distress. Call light in reach. Care has been transferred at this time.

## 2019-05-02 NOTE — PROGRESS NOTES
Pharmacy Vancomycin Consult     Vancomycin Day: 2 continued  Current Dosing: Vancomycin 1 gm IVPB q 12 hours    Temp max:  99.5    Recent Labs     04/30/19  0423 05/01/19  0439   BUN 18 14       Recent Labs     04/30/19  0423 05/01/19  0439   CREATININE <0.5* <0.5*       Recent Labs     04/30/19  0423 05/01/19  0439   WBC 10.3 11.2*         Intake/Output Summary (Last 24 hours) at 5/1/2019 2252  Last data filed at 5/1/2019 1621  Gross per 24 hour   Intake 540 ml   Output 2320 ml   Net -1780 ml         Ht Readings from Last 1 Encounters:   04/30/19 5' 3\" (1.6 m)        Wt Readings from Last 1 Encounters:   05/01/19 261 lb 3.2 oz (118.5 kg)         Body mass index is 46.27 kg/m². CrCl cannot be calculated (This lab value cannot be used to calculate CrCl because it is not a number: <0.5). Trough: 8.2    Assessment/Plan:  Vancomycin 1 gm IVPB q 12 hours discontinued. Beginning with the 5/2/19 1100 dose the dose will be Vancomycin 1250 mg IVPB q 12 hours. Vancomycin trough ordered for 5/3/19 at 2230. Sticky note on chart.   Rachell ABRAHAM Ph 5/1/2019 10:55 PM

## 2019-05-02 NOTE — PROGRESS NOTES
gallops  Abdomen:  Soft, undistended, non tender, no organomegaly, BS present  Extremities: No edema or cyanosis. Distal pulses well felt  Neurological : no focal deficits    Lab Data:  CBC:   Recent Labs     04/30/19 0423 05/01/19 0439 05/02/19 0423   WBC 10.3 11.2* 9.6   RBC 5.98* 5.64* 5.77*   HGB 17.5* 16.5* 16.8*   HCT 56.8* 52.0* 53.4*   MCV 95.1 92.2 92.6   RDW 16.4* 16.5* 16.2*    149 140     BMP:   Recent Labs     04/30/19 0423 05/01/19 0439 05/02/19 0423    136 134*   K 4.2 3.9 3.6   CL 88* 83* 82*   CO2 44* 47* 48*   BUN 18 14 14   CREATININE <0.5* <0.5* <0.5*     BNP: No results for input(s): BNP in the last 72 hours. PT/INR: No results for input(s): PROTIME, INR in the last 72 hours. APTT:No results for input(s): APTT in the last 72 hours. CARDIAC ENZYMES:   No results for input(s): CKMB, CKMBINDEX, TROPONINI in the last 72 hours. Invalid input(s): CKTOTAL;3  FASTING LIPID PANEL:  Lab Results   Component Value Date    CHOL 145 04/29/2019    HDL 19 04/29/2019    TRIG 123 04/29/2019     LIVER PROFILE:   No results for input(s): AST, ALT, ALB, BILIDIR, BILITOT, ALKPHOS in the last 72 hours. CT A chest   Suboptimal contrast timing although there is no evidence for large or central   pulmonary embolism.  The segmental and subsegmental branches are not well   evaluated.       Small left basilar effusion with adjacent consolidation and there are also   infiltrates within the lingula and right lung base that may represent   atelectasis versus pneumonia.       Small amount of free fluid in the visualized upper abdomen.        ECHO-  Left ventricular systolic function is normal with ejection fraction   estimated at 55 %.   No regional wall motion abnormality.   Left ventricle size is normal.   There is moderate concentric left ventricular hypertrophy.   Normal left ventricular diastolic filling pressure.   The aortic valve leaflets are not well visualized.   Aortic valve appears sclerotic but opens adequately.   No evidence of aortic valve stenosis.   Trivial aortic regurgitation is present.   Right ventricular systolic function is normal.   The right ventricle is moderately enlarged.   Systolic pulmonary artery pressure (SPAP) estimated at 54 mmHg (RA pressure   15 mmHg), consistent with moderate pulmonary hypertension. Venous doppler    1. There is evidence of isolated acute deep venous thrombosis involving the   peroneal vein below the knee in the left lower extremity.   2. There is no other evidence of deep or superficial venous thrombosis   involving the lower extremities bilaterally. Assessment & Plan:     Patient Active Problem List    Diagnosis Date Noted    Tobacco abuse 05/31/2011     Priority: Medium    Rheumatoid arthritis ([facility] [address] 05/17/2010     Priority: Medium    Anxiety state 05/17/2010     Priority: Low    Acute deep vein thrombosis (DVT) of left peroneal vein (HCC)     Pulmonary embolus (HCC)     Erythrocytosis     Morbid obesity (HCC)     Obesity hypoventilation syndrome (HCC)     Acute respiratory failure with hypoxia ([facility] [address] 04/28/2019    New onset of congestive heart failure ([facility] [address] 04/28/2019    Moderate persistent asthma with acute exacerbation 04/17/2019    SOB (shortness of breath) 06/09/2011     1. Acute hypoxic resp failure. possibly secondary toacute pulmonary edema and acute congestive heart failure. currently on IV Lasix. Echocardiogram is been obtained. Echocardiogram however shows normal LV function does not show diastolic heart failure. shows moderate pulm HTN. now on Vapotherm. One dose of diamox again today  Likely acute PE. Venous doppler - DVT below knee. CTA was suboptimal( also severe hypoxia otherwise unexplained). Start anticoagulation. 2.  Chronic hypercarbic respiratory failure likely obesity hypoventilation syndrome. mod pulm HTN on ECHO    3. Acute bronchitis. No fevers. Has mildly elevated leukocytosis.   Pro-calcitonin however is negative. Started on levaquin and iv vanc. Iv steroids     4. Morbid obesity. Nutrition consult. Lovenox for DVT prophylaxis.           Jen Dorado

## 2019-05-02 NOTE — CARE COORDINATION
INTERDISCIPLINARY PLAN OF CARE CONFERENCE    Date/Time: 5/2/2019 10:17 AM  Completed by: Mi Sapp Case Management      Patient Name:  Julio César Mathis  YOB: 1960  Admitting Diagnosis: Acute respiratory failure ([address] [J96.00]     Admit Date/Time:  4/28/2019  1:37 PM    Chart reviewed. Interdisciplinary team met to discuss patient progress and discharge plans. Disciplines included Case Management, Nursing, and Dietitian. Current Status: Inpt status    PT/OT recommendation:na    Anticipated Discharge Date: TBD  Expected D/C Disposition:  Home  Confirmed plan with patient and/or family Yes  Discharge Plan Comments:Pt in ICU. Plan home alone. Will need PT/OT evaluation when appropriate. Following for home O2 and [facility] SNF.  DVT -Called [facility] to run [place] for Eliquis and Xarelto for dc.        Home O2 in place on admit: No  Pt informed of need to bring portable home O2 tank on day of discharge for nursing to connect prior to leaving:  Not Indicated  Verbalized agreement/Understanding:  Not Indicated

## 2019-05-02 NOTE — PROGRESS NOTES
05/02/19 0305   NIV Type   Equipment Type V60   Mode AVAPS   Mask Type Full face mask   Mask Size Medium   Settings/Measurements   Comfort Level Good   Using Accessory Muscles No   EPAP 5 cmH2O   Vt Ordered 550 mL   Rate Ordered 14   Resp 15   SpO2 95   FiO2  90 %   Vt Exhaled 587 mL   Mask Leak (lpm) 4 lpm   Skin Protection for O2 Device Yes   Patient Observation   Observations SPO2  95%  on 90%  FIO2  AVAPS.     Alarm Settings   Alarms On Y   Press Low Alarm 10 cmH2O   High Pressure Alarm 25 cmH2O

## 2019-05-02 NOTE — PROGRESS NOTES
FiO2 decreased to 75% via 40lpm vapotherm         05/02/19 0958   Oxygen Therapy/Pulse Ox   O2 Therapy Oxygen humidified   O2 Device Heated high flow cannula   O2 Flow Rate (L/min) 40 L/min   FiO2  75 %   Resp 18   SpO2 94 %

## 2019-05-02 NOTE — PROGRESS NOTES
Shift assessment was completed (see flow sheet). Pt is A/O, Complains of Generalized pain- repositioned, denies other needs at this time. Respirations are even, labored and shallow, with diminished, inspiratory/expiratory wheeze sounds. Scheduled medications to follow- whole with water. Call light within reach. Bed in lowest position. Bed alarm on. Will continue to monitor.

## 2019-05-02 NOTE — PROGRESS NOTES
I personally reviewed study below. No evidence of intracardiac shunt. She is + for LLE DVT on LE dopplers and Dr. Leatha Larose believes PE likely even though CTPA did not show. No CHF evident and no cardiac cause for SOB/respiratory failure found. Signing off. Thanks.        Limited ECHO Summary   This is a limited study to r/o shunt.   A bubble study was performed and fails to show evidence of shunting.

## 2019-05-02 NOTE — PROGRESS NOTES
Pulmonary & Critical Care Medicine ICU Progress Note    CC: Acute respiratory failure, acute pulmonary edema    Events of Last 24 hours:   Echo with bubble, no shunt  Repeat CT chest   Lower extremity Dopplers positive for DVT LLE  Started AVAPS  Did well overnight  C/O general body aches    Invasive Lines: IV: Peripheral    MV: BiPAP     /Vt Ordered: 550 mL/ /FiO2 : 90 %  Recent Labs     19  0936 19  0830   PHART 7.260* 7.307*   EJW5NSS 113.6* 111.5*   PO2ART 84.8 77.3       IV:      Vitals:  Blood pressure 89/61, pulse 90, temperature 98.6 °F (37 °C), temperature source Oral, resp. rate 20, height 5' 3\" (1.6 m), weight 261 lb 3.2 oz (118.5 kg), SpO2 95 %, not currently breastfeeding. AVAPS NIV  Temp  Av.8 °F (37.1 °C)  Min: 96.7 °F (35.9 °C)  Max: 99.6 °F (37.6 °C)    Intake/Output Summary (Last 24 hours) at 2019 0627  Last data filed at 2019 0324  Gross per 24 hour   Intake 480 ml   Output 5375 ml   Net -4895 ml     EXAM:  General: ill appearing    Eyes: PERRL. No sclera icterus. No conjunctival injection. ENT: No discharge. Pharynx clear. Neck: Trachea midline. Normal thyroid. Resp: No accessory muscle use. No crackles. No wheezing. No rhonchi. No dullness on percussion. CV: Regular rate. Regular rhythm. No mumur or rub. ++ edema but less. GI: Non-tender. Non-distended. No masses. No organomegaly. Normal bowel sounds. No hernia. Skin: Warm and dry. No nodule on exposed extremities. No rash on exposed extremities. Lymph: No cervical LAD. No supraclavicular LAD. M/S: No cyanosis. No joint deformity. No clubbing. Neuro: Alert and oriented ×3.  Patellar reflexes are symmetric  Psych: No agitation, no anxiety, affect is full     Scheduled Meds:   enoxaparin  1 mg/kg Subcutaneous BID    vancomycin  1,250 mg Intravenous Q12H    levofloxacin  750 mg Intravenous Daily    ipratropium-albuterol  1 ampule Inhalation Q4H While awake    mupirocin   Nasal BID    diclofenac sodium 2 g Topical BID    ALPRAZolam  0.25 mg Oral TID    citalopram  20 mg Oral Daily    sodium chloride flush  10 mL Intravenous 2 times per day    furosemide  40 mg Intravenous BID     PRN Meds:  perflutren lipid microspheres, ibuprofen, sodium chloride flush, magnesium hydroxide, ondansetron, albuterol, albuterol sulfate HFA    Results:  CBC:   Recent Labs     04/30/19 0423 05/01/19 0439 05/02/19 0423   WBC 10.3 11.2* 9.6   HGB 17.5* 16.5* 16.8*   HCT 56.8* 52.0* 53.4*   MCV 95.1 92.2 92.6    149 140     BMP:   Recent Labs     04/30/19 0423 05/01/19 0439 05/02/19 0423    136 134*   K 4.2 3.9 3.6   CL 88* 83* 82*   CO2 44* 47* 48*   BUN 18 14 14   CREATININE <0.5* <0.5* <0.5*     LIVER PROFILE:   No results for input(s): AST, ALT, LIPASE, BILIDIR, BILITOT, ALKPHOS in the last 72 hours. Invalid input(s): AMYLASE,  ALB    Cultures:  4/28/19 blood no growth    CT chest 5/1/19  Impression:        1. Stable small left pleural effusion with a new small right pleural effusion. 2. Interval worsening of dependent consolidative opacity within bilateral  lower lobe since the study of 04/28/2019.  In combination with worsening  bibasilar predominant mucous plugging, this likely represents either  atelectasis or aspiration.  Clinical correlation is advised. 3. Slight interval improvement in appearance of lingular airspace  consolidation, likely representing resolving pneumonia.        CT CHEST 4/28/19  Impression   Suboptimal contrast timing although there is no evidence for large or central   pulmonary embolism.  The segmental and subsegmental branches are not well   evaluated.       Small left basilar effusion with adjacent consolidation and there are also   infiltrates within the lingula and right lung base that may represent   atelectasis versus pneumonia.       Small amount of free fluid in the visualized upper abdomen.      ECHO 4/28/19   Left ventricular systolic function is normal with ejection fraction   estimated at 55 %. No regional wall motion abnormality.   Left ventricle size is normal. There is moderate concentric left ventricular hypertrophy. Normal left ventricular diastolic filling pressure. The aortic valve leaflets are not well visualized.   Aortic valve appears sclerotic but opens adequately. No evidence of aortic valve stenosis. Trivial aortic regurgitation is present.   Right ventricular systolic function is normal. The right ventricle is moderately enlarged. Systolic pulmonary artery pressure (SPAP) estimated at 54 mmHg (RA pressure 15 mmHg), consistent with moderate pulmonary hypertension. ECHO Bubble 5/1/19 no shunt    LLE DOPPLER 5/1/19  Left Impression   1. There is isolated hypoechoic totally occluding material present in the   lumen of a single peroneal vein. ASSESSMENT:  · Acute hypercapnic respiratory failure - it is not clear to me why she has had a clinical worsening. · Left lower extremities Acute DVT  · Acute PE (clinical diagnosis, despite negative (suboptimal) CTPA but with severe hypoxemia otherwise unexplained & DVT)  · Acute hypoxemic respiratory failure   · Chronic hypercapnic respiratory failure, favor obesity hypoventilation syndrome  · Abnormal CT chest, appearance is more consistent with atelectasis than with infiltrative process.   Pro calcitonin level is normal.  Treating empirically for pneumonia given severity of acute illness and findings on CT scan  · Morbid obesity     PLAN:  · AVAPS for life threatening respiratory failure, HFNO during periods off  · Supplemental oxygen to maintain SaO2 >92%; wean as tolerated    · Decrease Diuresis but goal slightly negative, monitoring of electrolytes  · Levaquin and vancomycin D#3, empiric treatment  · Inhaled bronchodilators   · Hematology input appreciated, JAK2 pending  · Diamox ×1 again today  · Acapella    · EZ PAP  · PT/OT  · Prophylaxis: Lovenox, Bactroban    Total critical care time caring for this patient with life threatening, unstable organ failure, including direct patient contact, management of life support systems, review of data including imaging and labs, discussions with other team members and physicians is 34 minutes so far today, excluding procedures.

## 2019-05-03 ENCOUNTER — ANESTHESIA EVENT (OUTPATIENT)
Dept: ICU | Age: 59
DRG: 207 | End: 2019-05-03
Payer: COMMERCIAL

## 2019-05-03 ENCOUNTER — ANESTHESIA (OUTPATIENT)
Dept: ICU | Age: 59
DRG: 207 | End: 2019-05-03
Payer: COMMERCIAL

## 2019-05-03 ENCOUNTER — APPOINTMENT (OUTPATIENT)
Dept: GENERAL RADIOLOGY | Age: 59
DRG: 207 | End: 2019-05-03
Payer: COMMERCIAL

## 2019-05-03 LAB
ANION GAP SERPL CALCULATED.3IONS-SCNC: 6 MMOL/L (ref 3–16)
APTT: 49.3 SEC (ref 26–36)
APTT: >248 SEC (ref 26–36)
BASE EXCESS ARTERIAL: 7.7 MMOL/L (ref -3–3)
BASE EXCESS ARTERIAL: 9.2 MMOL/L (ref -3–3)
BASE EXCESS ARTERIAL: 9.4 MMOL/L (ref -3–3)
BASOPHILS ABSOLUTE: 0.1 K/UL (ref 0–0.2)
BASOPHILS RELATIVE PERCENT: 0.7 %
BLOOD CULTURE, ROUTINE: NORMAL
BUN BLDV-MCNC: 15 MG/DL (ref 7–20)
CALCIUM SERPL-MCNC: 9.8 MG/DL (ref 8.3–10.6)
CARBOXYHEMOGLOBIN ARTERIAL: 1.1 % (ref 0–1.5)
CARBOXYHEMOGLOBIN ARTERIAL: 1.5 % (ref 0–1.5)
CARBOXYHEMOGLOBIN ARTERIAL: 1.6 % (ref 0–1.5)
CHLORIDE BLD-SCNC: 93 MMOL/L (ref 99–110)
CO2: 39 MMOL/L (ref 21–32)
CREAT SERPL-MCNC: <0.5 MG/DL (ref 0.6–1.1)
EOSINOPHILS ABSOLUTE: 0 K/UL (ref 0–0.6)
EOSINOPHILS RELATIVE PERCENT: 0.2 %
GFR AFRICAN AMERICAN: >60
GFR NON-AFRICAN AMERICAN: >60
GLUCOSE BLD-MCNC: 110 MG/DL (ref 70–99)
GLUCOSE BLD-MCNC: 128 MG/DL (ref 70–99)
HCO3 ARTERIAL: 37.9 MMOL/L (ref 21–29)
HCO3 ARTERIAL: 40.5 MMOL/L (ref 21–29)
HCO3 ARTERIAL: 40.9 MMOL/L (ref 21–29)
HCT VFR BLD CALC: 56.2 % (ref 36–48)
HEMOGLOBIN, ART, EXTENDED: 17.7 G/DL (ref 12–16)
HEMOGLOBIN, ART, EXTENDED: 17.8 G/DL (ref 12–16)
HEMOGLOBIN, ART, EXTENDED: 18.2 G/DL (ref 12–16)
HEMOGLOBIN: 17.7 G/DL (ref 12–16)
LYMPHOCYTES ABSOLUTE: 0.5 K/UL (ref 1–5.1)
LYMPHOCYTES RELATIVE PERCENT: 5.2 %
MAGNESIUM: 2.2 MG/DL (ref 1.8–2.4)
MCH RBC QN AUTO: 29.6 PG (ref 26–34)
MCHC RBC AUTO-ENTMCNC: 31.5 G/DL (ref 31–36)
MCV RBC AUTO: 94 FL (ref 80–100)
METHEMOGLOBIN ARTERIAL: 0.3 %
METHEMOGLOBIN ARTERIAL: 0.4 %
METHEMOGLOBIN ARTERIAL: 0.4 %
MONOCYTES ABSOLUTE: 0.9 K/UL (ref 0–1.3)
MONOCYTES RELATIVE PERCENT: 8.3 %
NEUTROPHILS ABSOLUTE: 9.1 K/UL (ref 1.7–7.7)
NEUTROPHILS RELATIVE PERCENT: 85.6 %
O2 CONTENT ARTERIAL: 23 ML/DL
O2 CONTENT ARTERIAL: 24 ML/DL
O2 CONTENT ARTERIAL: 25 ML/DL
O2 SAT, ARTERIAL: 92.3 %
O2 SAT, ARTERIAL: 96.6 %
O2 SAT, ARTERIAL: 98 %
O2 THERAPY: ABNORMAL
PCO2 ARTERIAL: 100.1 MMHG (ref 35–45)
PCO2 ARTERIAL: 66.4 MMHG (ref 35–45)
PCO2 ARTERIAL: 83.6 MMHG (ref 35–45)
PDW BLD-RTO: 16.3 % (ref 12.4–15.4)
PERFORMED ON: ABNORMAL
PH ARTERIAL: 7.23 (ref 7.35–7.45)
PH ARTERIAL: 7.3 (ref 7.35–7.45)
PH ARTERIAL: 7.37 (ref 7.35–7.45)
PLATELET # BLD: 145 K/UL (ref 135–450)
PMV BLD AUTO: 7.8 FL (ref 5–10.5)
PO2 ARTERIAL: 136.5 MMHG (ref 75–108)
PO2 ARTERIAL: 67 MMHG (ref 75–108)
PO2 ARTERIAL: 99 MMHG (ref 75–108)
POTASSIUM SERPL-SCNC: 4.7 MMOL/L (ref 3.5–5.1)
RBC # BLD: 5.98 M/UL (ref 4–5.2)
SODIUM BLD-SCNC: 138 MMOL/L (ref 136–145)
TCO2 ARTERIAL: 39.9 MMOL/L
TCO2 ARTERIAL: 43.1 MMOL/L
TCO2 ARTERIAL: 44 MMOL/L
WBC # BLD: 10.6 K/UL (ref 4–11)

## 2019-05-03 PROCEDURE — 0BH17EZ INSERTION OF ENDOTRACHEAL AIRWAY INTO TRACHEA, VIA NATURAL OR ARTIFICIAL OPENING: ICD-10-PCS | Performed by: INTERNAL MEDICINE

## 2019-05-03 PROCEDURE — 87070 CULTURE OTHR SPECIMN AEROBIC: CPT

## 2019-05-03 PROCEDURE — 85025 COMPLETE CBC W/AUTO DIFF WBC: CPT

## 2019-05-03 PROCEDURE — 94003 VENT MGMT INPAT SUBQ DAY: CPT

## 2019-05-03 PROCEDURE — 99291 CRITICAL CARE FIRST HOUR: CPT | Performed by: INTERNAL MEDICINE

## 2019-05-03 PROCEDURE — C1751 CATH, INF, PER/CENT/MIDLINE: HCPCS

## 2019-05-03 PROCEDURE — 36415 COLL VENOUS BLD VENIPUNCTURE: CPT

## 2019-05-03 PROCEDURE — 2580000003 HC RX 258: Performed by: INTERNAL MEDICINE

## 2019-05-03 PROCEDURE — 89220 SPUTUM SPECIMEN COLLECTION: CPT

## 2019-05-03 PROCEDURE — 2500000003 HC RX 250 WO HCPCS: Performed by: INTERNAL MEDICINE

## 2019-05-03 PROCEDURE — 80202 ASSAY OF VANCOMYCIN: CPT

## 2019-05-03 PROCEDURE — 02HV33Z INSERTION OF INFUSION DEVICE INTO SUPERIOR VENA CAVA, PERCUTANEOUS APPROACH: ICD-10-PCS | Performed by: INTERNAL MEDICINE

## 2019-05-03 PROCEDURE — 31500 INSERT EMERGENCY AIRWAY: CPT | Performed by: ANESTHESIOLOGY

## 2019-05-03 PROCEDURE — 6370000000 HC RX 637 (ALT 250 FOR IP): Performed by: INTERNAL MEDICINE

## 2019-05-03 PROCEDURE — 6360000002 HC RX W HCPCS

## 2019-05-03 PROCEDURE — 36573 INSJ PICC RS&I 5 YR+: CPT

## 2019-05-03 PROCEDURE — 94002 VENT MGMT INPAT INIT DAY: CPT

## 2019-05-03 PROCEDURE — 2700000000 HC OXYGEN THERAPY PER DAY

## 2019-05-03 PROCEDURE — 94640 AIRWAY INHALATION TREATMENT: CPT

## 2019-05-03 PROCEDURE — 99233 SBSQ HOSP IP/OBS HIGH 50: CPT | Performed by: INTERNAL MEDICINE

## 2019-05-03 PROCEDURE — 94750 HC PULMONARY COMPLIANCE STUDY: CPT

## 2019-05-03 PROCEDURE — 5A1955Z RESPIRATORY VENTILATION, GREATER THAN 96 CONSECUTIVE HOURS: ICD-10-PCS | Performed by: INTERNAL MEDICINE

## 2019-05-03 PROCEDURE — 83735 ASSAY OF MAGNESIUM: CPT

## 2019-05-03 PROCEDURE — 87205 SMEAR GRAM STAIN: CPT

## 2019-05-03 PROCEDURE — 2000000000 HC ICU R&B

## 2019-05-03 PROCEDURE — 85730 THROMBOPLASTIN TIME PARTIAL: CPT

## 2019-05-03 PROCEDURE — 80048 BASIC METABOLIC PNL TOTAL CA: CPT

## 2019-05-03 PROCEDURE — 71045 X-RAY EXAM CHEST 1 VIEW: CPT

## 2019-05-03 PROCEDURE — 82803 BLOOD GASES ANY COMBINATION: CPT

## 2019-05-03 PROCEDURE — 6360000002 HC RX W HCPCS: Performed by: INTERNAL MEDICINE

## 2019-05-03 PROCEDURE — 94761 N-INVAS EAR/PLS OXIMETRY MLT: CPT

## 2019-05-03 RX ORDER — LIDOCAINE HYDROCHLORIDE 10 MG/ML
5 INJECTION, SOLUTION EPIDURAL; INFILTRATION; INTRACAUDAL; PERINEURAL ONCE
Status: DISCONTINUED | OUTPATIENT
Start: 2019-05-03 | End: 2019-05-10 | Stop reason: SDUPTHER

## 2019-05-03 RX ORDER — MIDAZOLAM HYDROCHLORIDE 1 MG/ML
5 INJECTION INTRAMUSCULAR; INTRAVENOUS ONCE
Status: COMPLETED | OUTPATIENT
Start: 2019-05-03 | End: 2019-05-03

## 2019-05-03 RX ORDER — HEPARIN SODIUM 1000 [USP'U]/ML
9000 INJECTION, SOLUTION INTRAVENOUS; SUBCUTANEOUS ONCE
Status: COMPLETED | OUTPATIENT
Start: 2019-05-03 | End: 2019-05-03

## 2019-05-03 RX ORDER — SODIUM CHLORIDE 0.9 % (FLUSH) 0.9 %
10 SYRINGE (ML) INJECTION PRN
Status: DISCONTINUED | OUTPATIENT
Start: 2019-05-03 | End: 2019-05-10 | Stop reason: SDUPTHER

## 2019-05-03 RX ORDER — FENTANYL CITRATE 50 UG/ML
50 INJECTION, SOLUTION INTRAMUSCULAR; INTRAVENOUS
Status: DISCONTINUED | OUTPATIENT
Start: 2019-05-03 | End: 2019-05-11

## 2019-05-03 RX ORDER — ALBUTEROL SULFATE 90 UG/1
4 AEROSOL, METERED RESPIRATORY (INHALATION) EVERY 4 HOURS
Status: DISCONTINUED | OUTPATIENT
Start: 2019-05-03 | End: 2019-05-09

## 2019-05-03 RX ORDER — ALBUTEROL SULFATE 90 UG/1
4 AEROSOL, METERED RESPIRATORY (INHALATION) EVERY 4 HOURS PRN
Status: DISCONTINUED | OUTPATIENT
Start: 2019-05-03 | End: 2019-05-03

## 2019-05-03 RX ORDER — HEPARIN SODIUM 10000 [USP'U]/100ML
12 INJECTION, SOLUTION INTRAVENOUS CONTINUOUS
Status: DISCONTINUED | OUTPATIENT
Start: 2019-05-03 | End: 2019-05-04

## 2019-05-03 RX ORDER — WHITE PETROLATUM 57.7 %-MINERAL OIL 31.9 % EYE OINTMENT
EVERY 4 HOURS
Status: DISCONTINUED | OUTPATIENT
Start: 2019-05-03 | End: 2019-05-09

## 2019-05-03 RX ORDER — HEPARIN SODIUM 1000 [USP'U]/ML
9000 INJECTION, SOLUTION INTRAVENOUS; SUBCUTANEOUS PRN
Status: DISCONTINUED | OUTPATIENT
Start: 2019-05-03 | End: 2019-05-14

## 2019-05-03 RX ORDER — SODIUM CHLORIDE 0.9 % (FLUSH) 0.9 %
10 SYRINGE (ML) INJECTION EVERY 12 HOURS SCHEDULED
Status: DISCONTINUED | OUTPATIENT
Start: 2019-05-03 | End: 2019-05-10 | Stop reason: SDUPTHER

## 2019-05-03 RX ORDER — HEPARIN SODIUM 1000 [USP'U]/ML
4500 INJECTION, SOLUTION INTRAVENOUS; SUBCUTANEOUS PRN
Status: DISCONTINUED | OUTPATIENT
Start: 2019-05-03 | End: 2019-05-14

## 2019-05-03 RX ORDER — PROPOFOL 10 MG/ML
10 INJECTION, EMULSION INTRAVENOUS CONTINUOUS
Status: DISCONTINUED | OUTPATIENT
Start: 2019-05-03 | End: 2019-05-09

## 2019-05-03 RX ORDER — CHLORHEXIDINE GLUCONATE 0.12 MG/ML
15 RINSE ORAL 2 TIMES DAILY
Status: DISCONTINUED | OUTPATIENT
Start: 2019-05-03 | End: 2019-05-09

## 2019-05-03 RX ORDER — CARBOXYMETHYLCELLULOSE SODIUM 10 MG/ML
1 GEL OPHTHALMIC EVERY 4 HOURS
Status: DISCONTINUED | OUTPATIENT
Start: 2019-05-03 | End: 2019-05-09

## 2019-05-03 RX ORDER — HEPARIN SODIUM 10000 [USP'U]/100ML
18 INJECTION, SOLUTION INTRAVENOUS CONTINUOUS
Status: DISCONTINUED | OUTPATIENT
Start: 2019-05-03 | End: 2019-05-03 | Stop reason: DRUGHIGH

## 2019-05-03 RX ORDER — PROPOFOL 10 MG/ML
INJECTION, EMULSION INTRAVENOUS
Status: COMPLETED
Start: 2019-05-03 | End: 2019-05-03

## 2019-05-03 RX ADMIN — CITALOPRAM HYDROBROMIDE 20 MG: 20 TABLET ORAL at 15:39

## 2019-05-03 RX ADMIN — MIDAZOLAM 5 MG: 1 INJECTION INTRAMUSCULAR; INTRAVENOUS at 10:55

## 2019-05-03 RX ADMIN — Medication 10 ML: at 20:08

## 2019-05-03 RX ADMIN — MUPIROCIN: 20 OINTMENT TOPICAL at 15:40

## 2019-05-03 RX ADMIN — IPRATROPIUM BROMIDE AND ALBUTEROL SULFATE 1 AMPULE: .5; 3 SOLUTION RESPIRATORY (INHALATION) at 07:54

## 2019-05-03 RX ADMIN — VANCOMYCIN HYDROCHLORIDE 1250 MG: 5 INJECTION, POWDER, LYOPHILIZED, FOR SOLUTION INTRAVENOUS at 00:06

## 2019-05-03 RX ADMIN — PROPOFOL 55 MCG/KG/MIN: 10 INJECTION, EMULSION INTRAVENOUS at 19:11

## 2019-05-03 RX ADMIN — CARBOXYMETHYLCELLULOSE SODIUM 1 DROP: 10 GEL OPHTHALMIC at 15:39

## 2019-05-03 RX ADMIN — HEPARIN SODIUM AND DEXTROSE 13.6 ML/HR: 10000; 5 INJECTION INTRAVENOUS at 19:45

## 2019-05-03 RX ADMIN — Medication 4 PUFF: at 11:47

## 2019-05-03 RX ADMIN — FENTANYL CITRATE 100 MCG/HR: 50 INJECTION, SOLUTION INTRAMUSCULAR; INTRAVENOUS at 11:06

## 2019-05-03 RX ADMIN — FENTANYL CITRATE 100 MCG/HR: 50 INJECTION, SOLUTION INTRAMUSCULAR; INTRAVENOUS at 18:39

## 2019-05-03 RX ADMIN — Medication 4 PUFF: at 23:20

## 2019-05-03 RX ADMIN — HEPARIN SODIUM 18 UNITS/KG/HR: 10000 INJECTION, SOLUTION INTRAVENOUS at 10:19

## 2019-05-03 RX ADMIN — PROPOFOL 55 MCG/KG/MIN: 10 INJECTION, EMULSION INTRAVENOUS at 12:42

## 2019-05-03 RX ADMIN — PROPOFOL 1000 MG: 10 INJECTION, EMULSION INTRAVENOUS at 10:46

## 2019-05-03 RX ADMIN — PROPOFOL 55 MCG/KG/MIN: 10 INJECTION, EMULSION INTRAVENOUS at 15:38

## 2019-05-03 RX ADMIN — Medication 4 PUFF: at 19:04

## 2019-05-03 RX ADMIN — DICLOFENAC 2 G: 10 GEL TOPICAL at 20:07

## 2019-05-03 RX ADMIN — VANCOMYCIN HYDROCHLORIDE 1250 MG: 5 INJECTION, POWDER, LYOPHILIZED, FOR SOLUTION INTRAVENOUS at 12:00

## 2019-05-03 RX ADMIN — HEPARIN SODIUM 18 UNITS/KG/HR: 10000 INJECTION, SOLUTION INTRAVENOUS at 17:13

## 2019-05-03 RX ADMIN — Medication 10 ML: at 17:55

## 2019-05-03 RX ADMIN — METHYLPREDNISOLONE SODIUM SUCCINATE 40 MG: 40 INJECTION, POWDER, FOR SOLUTION INTRAMUSCULAR; INTRAVENOUS at 15:38

## 2019-05-03 RX ADMIN — Medication 4 PUFF: at 15:48

## 2019-05-03 RX ADMIN — WHITE PETROLATUM 57.7 %-MINERAL OIL 31.9 % EYE OINTMENT: at 17:56

## 2019-05-03 RX ADMIN — Medication 10 ML: at 20:07

## 2019-05-03 RX ADMIN — Medication 10 ML: at 15:40

## 2019-05-03 RX ADMIN — IBUPROFEN 600 MG: 600 TABLET ORAL at 15:39

## 2019-05-03 RX ADMIN — WHITE PETROLATUM 57.7 %-MINERAL OIL 31.9 % EYE OINTMENT: at 20:06

## 2019-05-03 RX ADMIN — CARBOXYMETHYLCELLULOSE SODIUM 1 DROP: 10 GEL OPHTHALMIC at 20:18

## 2019-05-03 RX ADMIN — CHLORHEXIDINE GLUCONATE 0.12% ORAL RINSE 15 ML: 1.2 LIQUID ORAL at 20:18

## 2019-05-03 RX ADMIN — FAMOTIDINE 20 MG: 10 INJECTION, SOLUTION INTRAVENOUS at 20:18

## 2019-05-03 RX ADMIN — MUPIROCIN: 20 OINTMENT TOPICAL at 20:06

## 2019-05-03 RX ADMIN — HEPARIN SODIUM 9000 UNITS: 1000 INJECTION, SOLUTION INTRAVENOUS; SUBCUTANEOUS at 10:17

## 2019-05-03 ASSESSMENT — PAIN DESCRIPTION - PROGRESSION
CLINICAL_PROGRESSION: NOT CHANGED

## 2019-05-03 ASSESSMENT — PULMONARY FUNCTION TESTS
PIF_VALUE: 30
PIF_VALUE: 30
PIF_VALUE: 28
PIF_VALUE: 34

## 2019-05-03 ASSESSMENT — PAIN SCALES - GENERAL
PAINLEVEL_OUTOF10: 0

## 2019-05-03 NOTE — PROGRESS NOTES
Pharmacy to start High Dose Heparin Drip per Dr Rosy Issa on 64 year with DVT and possible PE.   Will use actual wt of 113kg per Dr Rosy Issa,   Start with 9000 unit bolus and start Drip at 18u/kg/hr (20.4ml/hr)  Ptt at 1700, will adujust to goal of 54-90 secs  Simran MOODY 5/3/884857:48 AM  .

## 2019-05-03 NOTE — PROGRESS NOTES
Bedside report and Pt care transferred to [facility]. Pt denies any assistance at this time.

## 2019-05-03 NOTE — PROGRESS NOTES
Vancomycin Day: 4    Patient's labs, cultures, vitals, and vancomycin regimen reviewed. No changes today.   Trough due on 3rd at [address] D 7/9/264208:30 PM  .

## 2019-05-03 NOTE — PROGRESS NOTES
Per MD and ABG results, 02 weaned down to 60%, with PEEP remaining at 10. sp02 is 97% and pt is tolerating well at this time. Will continue to monitor.

## 2019-05-03 NOTE — CARE COORDINATION
INTERDISCIPLINARY PLAN OF CARE CONFERENCE    Date/Time: 5/3/2019 11:20 AM  Completed by: Arabella Senior Case Management      Patient Name:  Diogo Nayak  YOB: 1960  Admitting Diagnosis: Acute respiratory failure ([address] [J96.00]     Admit Date/Time:  4/28/2019  1:37 PM    Chart reviewed. Interdisciplinary team met to discuss patient progress and discharge plans. Disciplines included Case Management, Nursing, and Dietitian. Current Status: patient remains in ICU, required intubation / mechanical ventilation this am    PT/OT recommendation:05/02 PT eval: home with 24/7 assist, home PT, pending contiuned eval    Anticipated Discharge Date: TBD  Expected D/C Disposition:  TBD  Confirmed plan with patient and/or family did not attempt contact due to intubation procedure at the bedside. Discharge Plan Comments: Chart reviewed. Noted staff at bedside for intubation / mechanical ventilation. CM will follow and develop DCP pending patient progression.            Home O2 in place on admit: No  Pt informed of need to bring portable home O2 tank on day of discharge for nursing to connect prior to leaving:  Not Indicated  Verbalized agreement/Understanding:  Not Indicated

## 2019-05-03 NOTE — PROGRESS NOTES
Admit: 2019    Name:  Eliana Polanco  Room:  9430/7771-33  MRN:    [id number]    Critical Care Daily Progress Note for 5/3/2019     Interval History:     She is on 10 L of O2.       Was on bipap all night and this am  Now on 10 L       Now on vapotherm       continues to be on vapotherm    5/3   Intubated by anesthesiologist this am for worsening hypercarbia and resp acidosis      Scheduled Meds:   midazolam  5 mg Intravenous Once    carboxymethylcellulose PF  1 drop Both Eyes Q4H    And    STYE   Both Eyes Q4H    chlorhexidine  15 mL Mouth/Throat BID    famotidine (PEPCID) injection  20 mg Intravenous BID    methylPREDNISolone  40 mg Intravenous Daily    vancomycin  1,250 mg Intravenous Q12H    mupirocin   Nasal BID    diclofenac sodium  2 g Topical BID    citalopram  20 mg Oral Daily    sodium chloride flush  10 mL Intravenous 2 times per day       Continuous Infusions:   heparin (porcine) 18 Units/kg/hr (19 1019)    fentaNYL (SUBLIMAZE) infusion 100 mcg/hr (19 1106)    propofol 55 mcg/kg/min (19 1119)       PRN Meds:  heparin (porcine), heparin (porcine), fentanNYL, albuterol sulfate HFA **AND** ipratropium **AND** MDI Treatment, perflutren lipid microspheres, ibuprofen, sodium chloride flush, magnesium hydroxide, ondansetron, albuterol, albuterol sulfate HFA                  Objective:     Temp  Av.9 °F (37.2 °C)  Min: 98.4 °F (36.9 °C)  Max: 99.4 °F (37.4 °C)  Pulse  Av.3  Min: 89  Max: 100  BP  Min: 128/71  Max: 180/97  SpO2  Av.7 %  Min: 91 %  Max: 99 %  FiO2   Av.7 %  Min: 65 %  Max: 90 %  Patient Vitals for the past 4 hrs:   BP Pulse Resp SpO2   19 1005 (!) 180/97 95 17 97 %   19 0905 (!) 174/94 95 19 96 %   19 0800 (!) 173/91 95 16 99 %         Intake/Output Summary (Last 24 hours) at 5/3/2019 1132  Last data filed at 5/3/2019 1118  Gross per 24 hour   Intake 779 ml   Output 3300 ml   Net -2521 ml       Physical Exam:  General:  Sedated,intubated   Mucous Membranes:  Pink , anicteric  Neck: No JVD, no carotid bruit, no thyromegaly  Chest:  Clear to auscultation bilaterally, no added sounds  Cardiovascular:  RRR S1S2 heard, no murmurs or gallops  Abdomen:  Soft, undistended, non tender, no organomegaly, BS present  Extremities: No edema or cyanosis. Distal pulses well felt  Neurological :sedated     Lab Data:  CBC:   Recent Labs     05/01/19 0439 05/02/19 0423 05/03/19 0457   WBC 11.2* 9.6 10.6   RBC 5.64* 5.77* 5.98*   HGB 16.5* 16.8* 17.7*   HCT 52.0* 53.4* 56.2*   MCV 92.2 92.6 94.0   RDW 16.5* 16.2* 16.3*    140 145     BMP:   Recent Labs     05/01/19 0439 05/02/19 0423 05/03/19 0457    134* 138   K 3.9 3.6 4.7   CL 83* 82* 93*   CO2 47* 48* 39*   BUN 14 14 15   CREATININE <0.5* <0.5* <0.5*     BNP: No results for input(s): BNP in the last 72 hours. PT/INR: No results for input(s): PROTIME, INR in the last 72 hours. APTT:  Recent Labs     05/03/19 0456   APTT 49.3*     CARDIAC ENZYMES:   No results for input(s): CKMB, CKMBINDEX, TROPONINI in the last 72 hours. Invalid input(s): CKTOTAL;3  FASTING LIPID PANEL:  Lab Results   Component Value Date    CHOL 145 04/29/2019    HDL 19 04/29/2019    TRIG 123 04/29/2019     LIVER PROFILE:   No results for input(s): AST, ALT, ALB, BILIDIR, BILITOT, ALKPHOS in the last 72 hours. CT A chest   Suboptimal contrast timing although there is no evidence for large or central   pulmonary embolism.  The segmental and subsegmental branches are not well   evaluated.       Small left basilar effusion with adjacent consolidation and there are also   infiltrates within the lingula and right lung base that may represent   atelectasis versus pneumonia.       Small amount of free fluid in the visualized upper abdomen.        ECHO-  Left ventricular systolic function is normal with ejection fraction   estimated at 55 %.   No regional wall motion abnormality.   Left ventricle size is normal.   There is moderate concentric left ventricular hypertrophy.   Normal left ventricular diastolic filling pressure.   The aortic valve leaflets are not well visualized.   Aortic valve appears sclerotic but opens adequately.   No evidence of aortic valve stenosis.   Trivial aortic regurgitation is present.   Right ventricular systolic function is normal.   The right ventricle is moderately enlarged.   Systolic pulmonary artery pressure (SPAP) estimated at 54 mmHg (RA pressure   15 mmHg), consistent with moderate pulmonary hypertension. Venous doppler    1. There is evidence of isolated acute deep venous thrombosis involving the   peroneal vein below the knee in the left lower extremity.   2. There is no other evidence of deep or superficial venous thrombosis   involving the lower extremities bilaterally. Assessment & Plan:     Patient Active Problem List    Diagnosis Date Noted    Tobacco abuse 05/31/2011     Priority: Medium    Rheumatoid arthritis ([facility] 05/17/2010     Priority: Medium    Anxiety state 05/17/2010     Priority: Low    Acute deep vein thrombosis (DVT) of left peroneal vein (HCC)     Pulmonary embolus (HCC)     Erythrocytosis     Morbid obesity (HCC)     Obesity hypoventilation syndrome (HCC)     Acute respiratory failure with hypoxia ([facility] 04/28/2019    New onset of congestive heart failure ([facility] 04/28/2019    Moderate persistent asthma with acute exacerbation 04/17/2019    SOB (shortness of breath) 06/09/2011     1. Acute hypoxic resp failure. possibly secondary toacute pulmonary edema and acute congestive heart failure. currently on IV Lasix. Echocardiogram is been obtained. Echocardiogram however shows normal LV function does not show diastolic heart failure. shows moderate pulm HTN. now on Vapotherm. One dose of diamox again today  Likely acute PE. Venous doppler - DVT below knee. CTA was suboptimal( also severe hypoxia otherwise unexplained). Start anticoagulation. Intubated 5/3 for worsening hypercarbia. 2.  Chronic hypercarbic respiratory failure likely obesity hypoventilation syndrome. mod pulm HTN on ECHO    3. Acute bronchitis. No fevers. Has mildly elevated leukocytosis. Pro-calcitonin however is negative. Continue  levaquin and iv vanc. Iv steroids     4. Morbid obesity. Nutrition consult. Lovenox for DVT prophylaxis.           Micah Shay

## 2019-05-03 NOTE — PROGRESS NOTES
Nutrition Assessment    Type and Reason for Visit: Reassess, Consult(consult for TF ordering and management)    Nutrition Recommendations:   1. TF recommendations - Vital High-Protein with a goal rate of 25 ml/hr x 20 hours (TF goal rate is so low because propofol at 55 mcg x 24 hours provides 984 kcals from lipids). Start at 25 ml/hr and keep this rate until propofol needs are reduced and RD increases TF goal rate in order. Water flushes, 60 ml every 6 hours or per MD guidance. 2. Monitor TF start, rate, intake, and tolerance + water flushes. 3. Monitor vent status, sedation type/amount, and plan of care. 4. Monitor for additional in-patient weight changes. 5. Monitor nutrition-related labs and bowel function (no BM x 5 days admission). Nutrition Assessment: patient is declining from a nutritional standpoint AEB inadequate po intake x 5 days admission and weight loss during this admission and she is at risk for further compromise d/t intubation status (intubated early this am), need for EN, and altered nutrition-related lab values; will provide enteral nutrition support recommendations, to be started as appropriate    Malnutrition Assessment:  · Malnutrition Status: At risk for malnutrition  · Context: Acute illness or injury  · Findings of the 6 clinical characteristics of malnutrition (Minimum of 2 out of 6 clinical characteristics is required to make the diagnosis of moderate or severe Protein Calorie Malnutrition based on AND/ASPEN Guidelines):  1. Energy Intake-Less than or equal to 75% of estimated energy requirement, (x 4 days admission)    2. Weight Loss-7.5% loss or greater(- 26# or 9.5% weight loss ), in 1 week  3. Fat Loss-No significant subcutaneous fat loss,    4. Muscle Loss-No significant muscle mass loss,    5. Fluid Accumulation-Mild fluid accumulation, Extremities(BLE + 1 pitting edema )  6.   Strength-Not measured    Nutrition Risk Level: High    Nutrient Needs:  · Estimated Daily Total Kcal: 984 - 1845 kcals based on 8-15 kcals/kg/CBW  · Estimated Daily Protein (g): 104 - 114 g protein based on 2.0-2.2 g/kg/IBW  · Estimated Daily Total Fluid (ml/day): 1000 - 1500 ml    Nutrition Diagnosis:   · Problem: Inadequate oral intake  · Etiology: related to Impaired respiratory function-inability to consume food, Insufficient energy/nutrient consumption     Signs and symptoms:  as evidenced by NPO status due to medical condition, Intubation, Diet history of poor intake, Weight loss greater than or equal to 2% in 1 week, Localized or generalized fluid accumulation, Lab values    Objective Information:  · Nutrition-Focused Physical Findings: patient required intubation early this am for worsening hypercarbia and respiratory acidosis; patient is currently receiving 55 mcg propofol while intubated; skin color is pale and skin is warm + clammy; + 1 pitting edema in BLE; no BM x 5 days but abdomen is soft and non-distended  · Wound Type: None  · Current Nutrition Therapies:  · Oral Diet Orders: NPO   · Oral Diet intake: NPO  · Oral Nutrition Supplement (ONS) Orders: None  · ONS intake: NPO  · Anthropometric Measures:  · Ht: 5' 3\" (160 cm)   · Current Body Wt: 249 lb 14 oz (113.3 kg)  · Admission Body Wt: 275 lb (124.7 kg)  · Usual Body Wt: (unable to obtain )  · Weight Change:  - 26# or 9.5% weight loss (using admission weight and CBW)  · Ideal Body Wt: 115 lb (52.2 kg), % Ideal Body 217%  · BMI Classification: BMI > or equal to 40.0 Obese Class III(44.4)    Nutrition Interventions:   Continue NPO, Start Tube Feeding  Continued Inpatient Monitoring, Coordination of Care, Coordination of Community Care    Nutrition Evaluation:   · Evaluation: Goals set   · Goals: patient will tolerate Vital High-Protein at goal rate of 25 ml/hr x 20 hours without GI distress, without s/s of aspiration, and without additional lab/fluid disturbances; weight will remain stable during remainder of admission · Monitoring: Nutrition Progression, TF Intake, TF Tolerance, Skin Integrity, Weight, Pertinent Labs, Constipation, Monitor Bowel Function      Electronically signed by Savana Arboleda RD, LD on 5/3/19 at 1:42 PM    Contact Number: [phone]

## 2019-05-03 NOTE — PROGRESS NOTES
RESPIRATORY THERAPY ASSESSMENT    Name:  Magalie [facility] Record Number:  [id number]  Age: 64 y.o. Gender: female  : 1960  Today's Date:  5/3/2019  Room:  [facility]9-    Assessment     Is the patient being admitted for a COPD or Asthma exacerbation? No   (If yes the patient will be seen every 4 hours for the first 24 hours and then reassessed)    Patient Admission Diagnosis      Allergies  No Known Allergies    Minimum Predicted Vital Capacity:     782          Actual Vital Capacity:      Pt on vent              Pulmonary History:No history  Home Oxygen Therapy:   none  Home Respiratory Therapy: albuterol Q4 PRN     Current Respiratory Therapy:  Albuterol Q6PRN, Albuterol MDI Q4 PRN, Atrovent MDI Q4 PRN  Treatment Type: HHN  Medications: Albuterol/Ipratropium    Respiratory Severity Index(RSI)   Patients with orders for inhalation medications, oxygen, or any therapeutic treatment modality will be placed on Respiratory Protocol. They will be assessed with the first treatment and at least every 72 hours thereafter. The following severity scale will be used to determine frequency of treatment intervention.     Smoking History: Smoking History Less than 1ppd or less than 15 pack year = 1    Social History  Social History     Tobacco Use    Smoking status: Current Every Day Smoker     Packs/day: 0.10     Years: 30.00     Pack years: 3.00     Types: Cigarettes    Smokeless tobacco: Never Used   Substance Use Topics    Alcohol use: No     Alcohol/week: 0.0 oz    Drug use: No       Recent Surgical History: None = 0  Past Surgical History  Past Surgical History:   Procedure Laterality Date    CARPAL TUNNEL RELEASE      both hands     LUMBAR SPINE SURGERY         Level of Consciousness: Comatose = 4    Level of Activity: Bedridden, unresponsive or quadriplegic = 4    Respiratory Pattern: Regular Pattern; RR 8-20 = 0    Breath Sounds: Diminshed bilaterally and/or crackles = 2    Sputum  Sputum Color: Creamy, Tenacity: Tenacious, Sputum How Obtained: Spontaneous cough  Cough: Strong, productive = 1    Vital Signs   BP (!) 180/97   Pulse 95   Temp 98.4 °F (36.9 °C)   Resp 17   Ht 5' 3\" (1.6 m)   Wt 249 lb 14.4 oz (113.4 kg)   SpO2 97%   BMI 44.27 kg/m²   SPO2 (COPD values may differ): Less than 86% on room air or greater than 92% on FiO2 greater than 50% = 4    Peak Flow (asthma only): not applicable = 0    RSI: 04-11 = Q6H or QID and Q4HPRN for dyspnea        Plan       Goals: medication delivery, mobilize retained secretions, volume expansion and improve oxygenation    Patient/caregiver was educated on the proper method of use for Respiratory Care Devices:  No: on vent      Level of patient/caregiver understanding able to:   ? Verbalize understanding   ? Demonstrate understanding       ? Teach back        ? Needs reinforcement       ? No available caregiver               ? Other:     Response to education:  on vent     Is patient being placed on Home Treatment Regimen? NA     Does the patient have everything they need prior to discharge? NA     Comments: chart reviewed and pt assessed     Plan of Care: pt to go on Q4 regiment    Electronically signed by Marlene Hdez RCP on 5/3/2019 at 11:29 AM    Respiratory Protocol Guidelines     1. Assessment and treatment by Respiratory Therapy will be initiated for medication and therapeutic interventions upon initiation of aerosolized medication. 2. Physician will be contacted for respiratory rate (RR) greater than 35 breaths per minute. Therapy will be held for heart rate (HR) greater than 140 beats per minute, pending direction from physician. 3. Bronchodilators will be administered via Metered Dose Inhaler (MDI) with spacer when the following criteria are met:  a. Alert and cooperative     b. HR < 140 bpm  c. RR < 30 bpm                d. Can demonstrate a 2-3 second inspiratory hold  4.  Bronchodilators will be administered via Hand Held Nebulizer AILYN [facility]) to patients when ANY of the following criteria are met  a. Incognizant or uncooperative          b. Patients treated with HHN at Home        c. Unable to demonstrate proper use of MDI with spacer     d. RR > 30 bpm   5. Bronchodilators will be delivered via Metered Dose Inhaler (MDI), HHN, Aerogen to intubated patients on mechanical ventilation. 6. Inhalation medication orders will be delivered and/or substituted as outlined below. Aerosolized Medications Ordering and Administration Guidelines:    1. All Medications will be ordered by a physician, and their frequency and/or modality will be adjusted as defined by the patients Respiratory Severity Index (RSI) score. 2. If the patient does not have documented COPD, consider discontinuing anticholinergics when RSI is less than 9.  3. If the bronchospasm worsens (increased RSI), then the bronchodilator frequency can be increased to a maximum of every 4 hours. If greater than every 4 hours is required, the physician will be contacted. 4. If the bronchospasm improves, the frequency of the bronchodilator can be decreased, based on the patient's RSI, but not less than home treatment regimen frequency. 5. Bronchodilator(s) will be discontinued if patient has a RSI less than 9 and has received no scheduled or as needed treatment for 72  Hrs. Patients Ordered on a Mucolytic Agent:    1. Must always be administered with a bronchodilator. 2. Discontinue if patient experiences worsened bronchospasm, or secretions have lessened to the point that the patient is able to clear them with a cough. Anti-inflammatory and Combination Medications:    1. If the patient lacks prior history of lung disease, is not using inhaled anti-inflammatory medication at home, and lacks wheezing by examination or by history for at least 24 hours, contact physician for possible discontinuation.

## 2019-05-03 NOTE — PROGRESS NOTES
Reassessment completed. No changes from previous assessment. Pt continues to rest and tolerate BiPAP without issue. Respirations remain easy and even. Will continue to monitor.

## 2019-05-03 NOTE — PROGRESS NOTES
ONCOLOGY FOLLOW-UP:         PROBLEM LIST:       Patient Active Problem List   Diagnosis Code    Anxiety state F41.1    [facility] M06[facility]    Tobacco abuse Z72.0    SOB (shortness of breath) R06.02    Moderate persistent asthma with acute exacerbation J45.41    Acute respiratory failure with hypoxia (HCC) J96.01    New onset of congestive heart failure (HCC) I50.9    Morbid obesity (HCC) E66.01    Obesity hypoventilation syndrome (HCC) E66.2    Erythrocytosis D75.1    Acute deep vein thrombosis (DVT) of left peroneal vein (HCC) I82.492    Pulmonary embolus (HCC) I26.99       INTERVAL HISTORY:       Pt remains in ICU. On Bipap this am. Mother at bedside. REVIEW OF SYSTEMS:       10 point ROS completed. Pertinent positives in HPI, otherwise negative.      PHYSICAL EXAM:       BP (!) 180/97   Pulse 95   Temp 98.4 °F (36.9 °C)   Resp 17   Ht 5' 3\" (1.6 m)   Wt 249 lb 14.4 oz (113.4 kg)   SpO2 97%   BMI 44.27 kg/m²     General appearance: Bipap  Head: Normocephalic, without obvious abnormality, atraumatic  Neck: No palpable lymphadenopathy in supraclavicular or cervical chains  Lungs: Clear to auscultation bilaterally, no audible rales, wheezes or crackles  Heart: Regular rate and rhythm, S1, S2 normal  Abdomen: Soft, non-tender; bowel sounds normal; no masses,  no organomegaly  Extremities: without cyanosis, clubbing, edema or asymmetry  Skin: No jaundice, purpura or petechiae    LABS:     CBC:   Lab Results   Component Value Date    WBC 10.6 05/03/2019    HGB 17.7 (H) 05/03/2019    HCT 56.2 (H) 05/03/2019    MCV 94.0 05/03/2019     05/03/2019    LYMPHOPCT 5.2 05/03/2019    RBC 5.98 (H) 05/03/2019    MCH 29.6 05/03/2019    MCHC 31.5 05/03/2019    RDW 16.3 (H) 05/03/2019       BMP:   Lab Results   Component Value Date     05/03/2019    K 4.7 05/03/2019    CL 93 05/03/2019    CO2 39 05/03/2019    BUN 15 05/03/2019    CREATININE <0.5 05/03/2019    GLUCOSE 110 05/03/2019 CALCIUM 9.8 05/03/2019        Hepatic Function Panel:   Lab Results   Component Value Date    ALKPHOS 95 04/28/2019     04/28/2019     04/28/2019    PROT 7.0 04/28/2019    PROT 6.8 06/18/2012    BILITOT 1.3 04/28/2019    LABALBU 3.7 04/28/2019      Carboxyhemoglbin 3.2 (H)  Epo 6    IMAGING:     Xr Chest Portable  Result Date: 4/28/2019  Cardiomegaly and mild pulmonary edema. Ct Chest Pulmonary Embolism W Contrast  Result Date: 4/28/2019  Suboptimal contrast timing although there is no evidence for large or central pulmonary embolism. The segmental and subsegmental branches are not well evaluated. Small left basilar effusion with adjacent consolidation and there are also infiltrates within the lingula and right lung base that may represent atelectasis versus pneumonia. Small amount of free fluid in the visualized upper abdomen. LE Doppler:  Summary        1. There is evidence of isolated acute deep venous thrombosis involving the    peroneal vein below the knee in the left lower extremity.    2. There is no other evidence of deep or superficial venous thrombosis    involving the lower extremities bilaterally. ASSESSMENT:     Problem List Items Addressed This Visit     New onset of congestive heart failure (HCC) - Primary    Relevant Medications    furosemide (LASIX) injection 40 mg (Completed)    acetaZOLAMIDE (DIAMOX) injection 250 mg (Completed)    acetaZOLAMIDE (DIAMOX) injection 250 mg (Completed)    heparin (porcine) injection 9,000 Units (Completed)    heparin (porcine) injection 9,000 Units    heparin (porcine) injection 4,500 Units    heparin 25,000 units in dextrose 5% 250 mL infusion      Other Visit Diagnoses     Respiratory distress                    PLAN:     1.  Polycythemia/Erythrocytosis  - persistently mildly elevated Hgb/Hct since 2015 in 50-55 range  - WBC slightly high this admission, previously normal and platelets normal  - suspect most likely secondary to smoking/chronic hypoxia  - will check DOROTHY-2 kinase to evaluate for myeloproliferative disorder - results pending  - checked Epo to assess for exogenous production - normal  - check carboxyhemoglobin - high - consistent with hypoxia  - Hct improved since admission - consider phlebotomy if Hct increasing - hold for now  - monitor CBC    2.  DVT  - on lovenox  - transition to NOAC at d/c  - needs minimum of 6 months Bhavik Mccoy MD

## 2019-05-03 NOTE — PROGRESS NOTES
Pt intubated with a 7.5 ETT, 22 lip line. A/C 18/ 400/100%/ +5. Pt tolerated well. Will continue to monitor.

## 2019-05-03 NOTE — PROGRESS NOTES
High Dose Heparin Protocol:  APTT at 1752 was >248 seconds. Hold the infusion x1Hr and restart at a reduced rate of 12units/kg/hr (13.6mL/h) starting at [facility]. Recheck the aPTT 6hrs later 5/4/19 at 0130 per protocol.   Edelmira Baker PharmD 5/3/2019 7:12 PM

## 2019-05-03 NOTE — PROGRESS NOTES
Triple lumen PICC placed to JONATHAN, patient tolerated well. Line ok to use per Dr. Chuck Painter, [facility]   Jacqui MARVIN RN

## 2019-05-03 NOTE — PROGRESS NOTES
Pulmonary & Critical Care Medicine ICU Progress Note    CC: Acute respiratory failure, acute pulmonary edema    Events of Last 24 hours:   ABG ordered and worse this am  Some mild confusion overnight    Invasive Lines: IV: Peripheral    MV: BiPAP     /Vt Ordered: 550 mL/ /FiO2 : 90 %  Recent Labs     19  0830 19  0615   PHART 7.307* 7.229*   [id number] 111.5* 100.1*   PO2ART 77.3 136.5*       IV:      Vitals:  Blood pressure (!) 174/94, pulse 95, temperature 98.4 °F (36.9 °C), resp. rate 19, height 5' 3\" (1.6 m), weight 249 lb 14.4 oz (113.4 kg), SpO2 96 %, not currently breastfeeding. BiPAP  Temp  Av.7 °F (37.1 °C)  Min: 98 °F (36.7 °C)  Max: 99.4 °F (37.4 °C)    Intake/Output Summary (Last 24 hours) at 5/3/2019 0936  Last data filed at 5/3/2019 0400  Gross per 24 hour   Intake 1119 ml   Output 3800 ml   Net -2681 ml     EXAM:  General: ill appearing. More somnolent today  Eyes: PERRL. No sclera icterus. No conjunctival injection. ENT: No discharge. Pharynx clear. Neck: Trachea midline. Normal thyroid. Resp: No accessory muscle use. No crackles. No wheezing. No rhonchi. No dullness on percussion. CV: Regular rate. Regular rhythm. No mumur or rub. ++ edema but less. GI: Non-tender. Non-distended. No masses. No organomegaly. Normal bowel sounds. No hernia. Skin: Warm and dry. No nodule on exposed extremities. No rash on exposed extremities. Lymph: No cervical LAD. No supraclavicular LAD. M/S: No cyanosis. No joint deformity. No clubbing. Neuro: Alert and oriented ×3.  Patellar reflexes are symmetric  Psych: No agitation, no anxiety, affect is full     Scheduled Meds:   levofloxacin  750 mg Oral Daily    guaiFENesin  1,200 mg Oral BID    methylPREDNISolone  40 mg Intravenous Daily    enoxaparin  1 mg/kg Subcutaneous BID    vancomycin  1,250 mg Intravenous Q12H    ipratropium-albuterol  1 ampule Inhalation Q4H While awake    mupirocin   Nasal BID    diclofenac sodium  2 g Topical BID    ALPRAZolam  0.25 mg Oral TID    citalopram  20 mg Oral Daily    sodium chloride flush  10 mL Intravenous 2 times per day     PRN Meds:  perflutren lipid microspheres, ibuprofen, sodium chloride flush, magnesium hydroxide, ondansetron, albuterol, albuterol sulfate HFA    Results:  CBC:   Recent Labs     05/01/19 0439 05/02/19 0423 05/03/19 0457   WBC 11.2* 9.6 10.6   HGB 16.5* 16.8* 17.7*   HCT 52.0* 53.4* 56.2*   MCV 92.2 92.6 94.0    140 145     BMP:   Recent Labs     05/01/19 0439 05/02/19 0423 05/03/19 0457    134* 138   K 3.9 3.6 4.7   CL 83* 82* 93*   CO2 47* 48* 39*   BUN 14 14 15   CREATININE <0.5* <0.5* <0.5*     LIVER PROFILE:   No results for input(s): AST, ALT, LIPASE, BILIDIR, BILITOT, ALKPHOS in the last 72 hours. Invalid input(s): AMYLASE,  ALB    Cultures:  4/28/19 blood no growth    CT chest 5/1/19  Impression:        1. Stable small left pleural effusion with a new small right pleural effusion. 2. Interval worsening of dependent consolidative opacity within bilateral  lower lobe since the study of 04/28/2019.  In combination with worsening  bibasilar predominant mucous plugging, this likely represents either  atelectasis or aspiration.  Clinical correlation is advised. 3. Slight interval improvement in appearance of lingular airspace  consolidation, likely representing resolving pneumonia.        CT CHEST 4/28/19  Impression   Suboptimal contrast timing although there is no evidence for large or central   pulmonary embolism.  The segmental and subsegmental branches are not well   evaluated.       Small left basilar effusion with adjacent consolidation and there are also   infiltrates within the lingula and right lung base that may represent   atelectasis versus pneumonia.       Small amount of free fluid in the visualized upper abdomen.      ECHO 4/28/19   Left ventricular systolic function is normal with ejection fraction   estimated at 55 %. No regional wall motion abnormality.   Left ventricle size is normal. There is moderate concentric left ventricular hypertrophy. Normal left ventricular diastolic filling pressure. The aortic valve leaflets are not well visualized.   Aortic valve appears sclerotic but opens adequately. No evidence of aortic valve stenosis. Trivial aortic regurgitation is present.   Right ventricular systolic function is normal. The right ventricle is moderately enlarged. Systolic pulmonary artery pressure (SPAP) estimated at 54 mmHg (RA pressure 15 mmHg), consistent with moderate pulmonary hypertension. ECHO Bubble 5/1/19 no shunt    LLE DOPPLER 5/1/19  Left Impression   1. There is isolated hypoechoic totally occluding material present in the   lumen of a single peroneal vein. ASSESSMENT:  · Acute hypercapnic and hypoxemic respiratory failure - progressively worsening  · Left lower extremity acute DVT  · Acute PE (clinical diagnosis, despite negative (suboptimal) CTPA but with severe unexplained hypoxemia & DVT)  · Chronic hypercapnic respiratory failure, favor obesity hypoventilation syndrome  · Erythrocytosis  · Abnormal CT chest, appearance is more consistent with atelectasis than with infiltrative process. Pro calcitonin level is normal.  Treating empirically for pneumonia given severity of acute illness and findings on CT scan  · Morbid obesity     PLAN:  · Has failed noninvasive ventilation and requires mechanical ventilation.   I have asked anesthesia to assist with establishing a secure airway  · Conservative fluid strategy: Goal is slightly negative, monitoring of electrolytes  · Levaquin and vancomycin D#4, empiric treatment  · Inhaled bronchodilators   · Hematology input appreciated, JAK2 pending  · Change treatment dose Lovenox to heparin drip given possible need for fibrinolytics if significant clot burden and oxygenation continues to deteriorate  · Prophylaxis: Bactroban    Total critical care time caring for this patient with life threatening, unstable organ failure, including direct patient contact, management of life support systems, review of data including imaging and labs, discussions with other team members and physicians is 50 minutes so far today, excluding procedures and excluding time spent with anesthesia at the bedside.

## 2019-05-03 NOTE — PLAN OF CARE
Nutrition Problem: Inadequate oral intake  Intervention: Food and/or Nutrient Delivery: Continue NPO, Start Tube Feeding  Nutritional Goals: patient will tolerate Vital High-Protein at goal rate of 25 ml/hr x 20 hours without GI distress, without s/s of aspiration, and without additional lab/fluid disturbances; weight will remain stable during remainder of admission

## 2019-05-03 NOTE — PROGRESS NOTES
05/03/19 1900   Vent Information   $Ventilation $Subsequent Day   Vent Type 840   Vent Mode AC/VC   Vt Ordered 400 mL   Rate Set 18 bmp   Peak Flow 60 L/min   FiO2  60 %   Sensitivity 3   PEEP/CPAP 10   Humidification Source Heated wire   Humidification Temp Measured 37   Circuit Condensation Drained   Vent Patient Data   Peak Inspiratory Pressure 30 cmH2O   Mean Airway Pressure 14 cmH20   Rate Measured 18 br/min   Vt Exhaled 442 mL   Minute Volume 7.9 Liters   I:E Ratio 1:3.6   Plateau Pressure 23 IQX18   Static Compliance 33 mL/cmH2O   Dynamic Compliance 23 mL/cmH2O   Cough/Sputum   Sputum How Obtained Endotracheal   Cough Productive   Sputum Amount Small   Sputum Color White   Tenacity Thick   Spontaneous Breathing Trial (SBT) RT Doc   Pulse 87   SpO2 91 %   Breath Sounds   Right Upper Lobe Diminished   Right Middle Lobe Diminished   Right Lower Lobe Diminished   Left Upper Lobe Diminished   Left Lower Lobe Diminished   Additional Respiratory  Assessments   Resp 18   Alarm Settings   High Pressure Alarm 45 cmH2O   Low Minute Volume Alarm 2.5 L/min   Apnea (secs) 20 secs   High Respiratory Rate 40 br/min   Low Exhaled Vt  255 mL   Patient Observation   Observations 7.5 ett 23 at lip

## 2019-05-03 NOTE — PROGRESS NOTES
05/02/19 2300   NIV Type   Equipment Type V60   Mode AVAPS   Mask Type Full face mask   Mask Size Medium   Settings/Measurements   Comfort Level Good   Using Accessory Muscles No   IPAP   (Max p 25 min p 10)   EPAP 5 cmH2O   Vt Ordered 550 mL   Rate Ordered 14   Resp 20   SpO2 95   FiO2  90 %   I Time/ I Time % 0.9 s   Mask Leak (lpm) 15 lpm   Skin Protection for O2 Device Yes   Breath Sounds   Right Upper Lobe Diminished   Right Middle Lobe Diminished   Right Lower Lobe Diminished   Left Upper Lobe Diminished   Left Lower Lobe Diminished   Alarm Settings   Alarms On Y   Press Low Alarm 5 cmH2O   High Pressure Alarm 40 cmH2O   Delay Alarm 20 sec(s)   Apnea (secs) 20 secs   Resp Rate Low Alarm 14   High Respiratory Rate 40 br/min

## 2019-05-03 NOTE — PROGRESS NOTES
05/03/19 0314   NIV Type   Equipment Type V60   Mode AVAPS   Mask Type Full face mask   Mask Size Medium   Settings/Measurements   Comfort Level Good   Using Accessory Muscles No   IPAP   (Max P 25 Min P 10)   EPAP 5 cmH2O   Vt Ordered 550 mL   Rate Ordered 14   Resp 16   SpO2 96   FiO2  90 %   I Time/ I Time % 0.9 s   Vt Exhaled 411 mL   Mask Leak (lpm) 27 lpm   Skin Protection for O2 Device Yes

## 2019-05-03 NOTE — ANESTHESIA PROCEDURE NOTES
Airway  Date/Time: 5/3/2019 9:43 AM  Urgency: elective    Difficult airway    General Information and Staff    Patient location during procedure: ICU  Anesthesiologist: Lindsay oMnahan MD  Performed: anesthesiologist     Indications and Patient Condition  Indications for airway management: respiratory failure  Spontaneous ventilation: present  Sedation level: no sedation  Preoxygenated: yes  Patient position: sniffing  MILS not maintained throughout  Mask difficulty assessment: vent by bag mask    Final Airway Details  Final airway type: endotracheal airway      Successful airway: ETT  Cuffed: yes   Successful intubation technique: video laryngoscopy  Facilitating devices/methods: intubating stylet  Endotracheal tube insertion site: oral  Blade: Blas  Blade size: #3  ETT size (mm): 7.5  Cormack-Lehane Classification: grade IIa - partial view of glottis  Placement verified by: chest auscultation and capnometry   Measured from: teeth  ETT to teeth (cm): 22  Number of attempts at approach: 1  Ventilation between attempts: bag mask  Number of other approaches attempted: 1    Additional Comments  Propofol 100mg IVP  Harry 100mg IVP  No cxs  ETT secured by RT  Mechanical ventilation initiated  Sedation recommended  CXR pending

## 2019-05-03 NOTE — PROGRESS NOTES
Physical Therapy/Occupational Therapy    Holding PT/OT at this time noted in chart review pt intubated this AM. Will need new orders to resume therapy services when appropriate. No charge.     Johanna Gu, PT, DPT #[id number]  Fernanda Snyder, OTR/L [id number]

## 2019-05-04 ENCOUNTER — APPOINTMENT (OUTPATIENT)
Dept: GENERAL RADIOLOGY | Age: 59
DRG: 207 | End: 2019-05-04
Payer: COMMERCIAL

## 2019-05-04 LAB
ANION GAP SERPL CALCULATED.3IONS-SCNC: 12 MMOL/L (ref 3–16)
APTT: 181.8 SEC (ref 26–36)
APTT: 51 SEC (ref 26–36)
APTT: 57.6 SEC (ref 26–36)
APTT: >248 SEC (ref 26–36)
BASE EXCESS ARTERIAL: 9.4 MMOL/L (ref -3–3)
BASOPHILS ABSOLUTE: 0.1 K/UL (ref 0–0.2)
BASOPHILS RELATIVE PERCENT: 0.6 %
BUN BLDV-MCNC: 17 MG/DL (ref 7–20)
CALCIUM SERPL-MCNC: 9.4 MG/DL (ref 8.3–10.6)
CARBOXYHEMOGLOBIN ARTERIAL: 1.4 % (ref 0–1.5)
CHLORIDE BLD-SCNC: 91 MMOL/L (ref 99–110)
CO2: 34 MMOL/L (ref 21–32)
CREAT SERPL-MCNC: <0.5 MG/DL (ref 0.6–1.1)
EOSINOPHILS ABSOLUTE: 0 K/UL (ref 0–0.6)
EOSINOPHILS RELATIVE PERCENT: 0.3 %
GFR AFRICAN AMERICAN: >60
GFR NON-AFRICAN AMERICAN: >60
GLUCOSE BLD-MCNC: 105 MG/DL (ref 70–99)
GLUCOSE BLD-MCNC: 126 MG/DL (ref 70–99)
GLUCOSE BLD-MCNC: 133 MG/DL (ref 70–99)
GLUCOSE BLD-MCNC: 135 MG/DL (ref 70–99)
GLUCOSE BLD-MCNC: 139 MG/DL (ref 70–99)
GLUCOSE BLD-MCNC: 92 MG/DL (ref 70–99)
GLUCOSE BLD-MCNC: 96 MG/DL (ref 70–99)
HCO3 ARTERIAL: 37.5 MMOL/L (ref 21–29)
HCT VFR BLD CALC: 54.8 % (ref 36–48)
HEMOGLOBIN, ART, EXTENDED: 17.9 G/DL (ref 12–16)
HEMOGLOBIN: 17.5 G/DL (ref 12–16)
LYMPHOCYTES ABSOLUTE: 0.8 K/UL (ref 1–5.1)
LYMPHOCYTES RELATIVE PERCENT: 8 %
MCH RBC QN AUTO: 29.4 PG (ref 26–34)
MCHC RBC AUTO-ENTMCNC: 32 G/DL (ref 31–36)
MCV RBC AUTO: 92 FL (ref 80–100)
METHEMOGLOBIN ARTERIAL: 0.4 %
MONOCYTES ABSOLUTE: 0.8 K/UL (ref 0–1.3)
MONOCYTES RELATIVE PERCENT: 8.7 %
NEUTROPHILS ABSOLUTE: 8 K/UL (ref 1.7–7.7)
NEUTROPHILS RELATIVE PERCENT: 82.4 %
O2 CONTENT ARTERIAL: 24 ML/DL
O2 SAT, ARTERIAL: 96.3 %
O2 THERAPY: ABNORMAL
PCO2 ARTERIAL: 62.8 MMHG (ref 35–45)
PDW BLD-RTO: 16.2 % (ref 12.4–15.4)
PERFORMED ON: ABNORMAL
PERFORMED ON: NORMAL
PERFORMED ON: NORMAL
PH ARTERIAL: 7.39 (ref 7.35–7.45)
PLATELET # BLD: 151 K/UL (ref 135–450)
PMV BLD AUTO: 8.1 FL (ref 5–10.5)
PO2 ARTERIAL: 87 MMHG (ref 75–108)
POTASSIUM SERPL-SCNC: 4.5 MMOL/L (ref 3.5–5.1)
PRO-BNP: 584 PG/ML (ref 0–124)
RBC # BLD: 5.96 M/UL (ref 4–5.2)
SODIUM BLD-SCNC: 137 MMOL/L (ref 136–145)
TCO2 ARTERIAL: 39.4 MMOL/L
VANCOMYCIN TROUGH: 10.4 UG/ML (ref 10–20)
WBC # BLD: 9.6 K/UL (ref 4–11)

## 2019-05-04 PROCEDURE — 71045 X-RAY EXAM CHEST 1 VIEW: CPT

## 2019-05-04 PROCEDURE — 94750 HC PULMONARY COMPLIANCE STUDY: CPT

## 2019-05-04 PROCEDURE — 6370000000 HC RX 637 (ALT 250 FOR IP): Performed by: INTERNAL MEDICINE

## 2019-05-04 PROCEDURE — 6360000002 HC RX W HCPCS: Performed by: INTERNAL MEDICINE

## 2019-05-04 PROCEDURE — 99291 CRITICAL CARE FIRST HOUR: CPT | Performed by: INTERNAL MEDICINE

## 2019-05-04 PROCEDURE — 83880 ASSAY OF NATRIURETIC PEPTIDE: CPT

## 2019-05-04 PROCEDURE — 94003 VENT MGMT INPAT SUBQ DAY: CPT

## 2019-05-04 PROCEDURE — 2580000003 HC RX 258: Performed by: INTERNAL MEDICINE

## 2019-05-04 PROCEDURE — 36415 COLL VENOUS BLD VENIPUNCTURE: CPT

## 2019-05-04 PROCEDURE — 85730 THROMBOPLASTIN TIME PARTIAL: CPT

## 2019-05-04 PROCEDURE — 2000000000 HC ICU R&B

## 2019-05-04 PROCEDURE — 82803 BLOOD GASES ANY COMBINATION: CPT

## 2019-05-04 PROCEDURE — 80048 BASIC METABOLIC PNL TOTAL CA: CPT

## 2019-05-04 PROCEDURE — 94640 AIRWAY INHALATION TREATMENT: CPT

## 2019-05-04 PROCEDURE — 2500000003 HC RX 250 WO HCPCS: Performed by: INTERNAL MEDICINE

## 2019-05-04 PROCEDURE — 85025 COMPLETE CBC W/AUTO DIFF WBC: CPT

## 2019-05-04 PROCEDURE — 94761 N-INVAS EAR/PLS OXIMETRY MLT: CPT

## 2019-05-04 PROCEDURE — 2700000000 HC OXYGEN THERAPY PER DAY

## 2019-05-04 RX ORDER — LEVOFLOXACIN 5 MG/ML
750 INJECTION, SOLUTION INTRAVENOUS EVERY 24 HOURS
Status: DISCONTINUED | OUTPATIENT
Start: 2019-05-04 | End: 2019-05-08

## 2019-05-04 RX ORDER — HEPARIN SODIUM 1000 [USP'U]/ML
4500 INJECTION, SOLUTION INTRAVENOUS; SUBCUTANEOUS ONCE
Status: COMPLETED | OUTPATIENT
Start: 2019-05-04 | End: 2019-05-04

## 2019-05-04 RX ORDER — HEPARIN SODIUM 10000 [USP'U]/100ML
11 INJECTION, SOLUTION INTRAVENOUS CONTINUOUS
Status: DISCONTINUED | OUTPATIENT
Start: 2019-05-04 | End: 2019-05-14

## 2019-05-04 RX ADMIN — Medication 4 PUFF: at 23:08

## 2019-05-04 RX ADMIN — FENTANYL CITRATE 50 MCG: 50 INJECTION INTRAMUSCULAR; INTRAVENOUS at 22:21

## 2019-05-04 RX ADMIN — Medication 4 PUFF: at 11:10

## 2019-05-04 RX ADMIN — PROPOFOL 65 MCG/KG/MIN: 10 INJECTION, EMULSION INTRAVENOUS at 18:59

## 2019-05-04 RX ADMIN — FENTANYL CITRATE 100 MCG/HR: 50 INJECTION, SOLUTION INTRAMUSCULAR; INTRAVENOUS at 04:51

## 2019-05-04 RX ADMIN — PROPOFOL 55 MCG/KG/MIN: 10 INJECTION, EMULSION INTRAVENOUS at 04:52

## 2019-05-04 RX ADMIN — DICLOFENAC 2 G: 10 GEL TOPICAL at 08:35

## 2019-05-04 RX ADMIN — Medication 10 ML: at 08:27

## 2019-05-04 RX ADMIN — HEPARIN SODIUM 4500 UNITS: 1000 INJECTION, SOLUTION INTRAVENOUS; SUBCUTANEOUS at 18:36

## 2019-05-04 RX ADMIN — Medication 10 ML: at 21:02

## 2019-05-04 RX ADMIN — WHITE PETROLATUM 57.7 %-MINERAL OIL 31.9 % EYE OINTMENT: at 00:48

## 2019-05-04 RX ADMIN — Medication 10 ML: at 08:24

## 2019-05-04 RX ADMIN — WHITE PETROLATUM 57.7 %-MINERAL OIL 31.9 % EYE OINTMENT: at 16:42

## 2019-05-04 RX ADMIN — PROPOFOL 65 MCG/KG/MIN: 10 INJECTION, EMULSION INTRAVENOUS at 22:51

## 2019-05-04 RX ADMIN — CARBOXYMETHYLCELLULOSE SODIUM 1 DROP: 10 GEL OPHTHALMIC at 23:29

## 2019-05-04 RX ADMIN — CARBOXYMETHYLCELLULOSE SODIUM 1 DROP: 10 GEL OPHTHALMIC at 08:25

## 2019-05-04 RX ADMIN — FAMOTIDINE 20 MG: 10 INJECTION, SOLUTION INTRAVENOUS at 20:56

## 2019-05-04 RX ADMIN — Medication 4 PUFF: at 19:42

## 2019-05-04 RX ADMIN — CARBOXYMETHYLCELLULOSE SODIUM 1 DROP: 10 GEL OPHTHALMIC at 00:41

## 2019-05-04 RX ADMIN — WHITE PETROLATUM 57.7 %-MINERAL OIL 31.9 % EYE OINTMENT: at 21:01

## 2019-05-04 RX ADMIN — WHITE PETROLATUM 57.7 %-MINERAL OIL 31.9 % EYE OINTMENT: at 03:36

## 2019-05-04 RX ADMIN — FENTANYL CITRATE 100 MCG/HR: 50 INJECTION, SOLUTION INTRAMUSCULAR; INTRAVENOUS at 14:14

## 2019-05-04 RX ADMIN — PROPOFOL 65 MCG/KG/MIN: 10 INJECTION, EMULSION INTRAVENOUS at 11:57

## 2019-05-04 RX ADMIN — Medication 4 PUFF: at 16:16

## 2019-05-04 RX ADMIN — Medication 1.5 G: at 13:22

## 2019-05-04 RX ADMIN — LEVOFLOXACIN 750 MG: 5 INJECTION, SOLUTION INTRAVENOUS at 08:42

## 2019-05-04 RX ADMIN — Medication 4 PUFF: at 07:31

## 2019-05-04 RX ADMIN — CARBOXYMETHYLCELLULOSE SODIUM 1 DROP: 10 GEL OPHTHALMIC at 15:41

## 2019-05-04 RX ADMIN — PROPOFOL 65 MCG/KG/MIN: 10 INJECTION, EMULSION INTRAVENOUS at 09:40

## 2019-05-04 RX ADMIN — PROPOFOL 65 MCG/KG/MIN: 10 INJECTION, EMULSION INTRAVENOUS at 20:42

## 2019-05-04 RX ADMIN — Medication 10 ML: at 20:56

## 2019-05-04 RX ADMIN — Medication 4 PUFF: at 03:15

## 2019-05-04 RX ADMIN — PROPOFOL 55 MCG/KG/MIN: 10 INJECTION, EMULSION INTRAVENOUS at 07:18

## 2019-05-04 RX ADMIN — CITALOPRAM HYDROBROMIDE 20 MG: 20 TABLET ORAL at 08:26

## 2019-05-04 RX ADMIN — CHLORHEXIDINE GLUCONATE 0.12% ORAL RINSE 15 ML: 1.2 LIQUID ORAL at 20:55

## 2019-05-04 RX ADMIN — CARBOXYMETHYLCELLULOSE SODIUM 1 DROP: 10 GEL OPHTHALMIC at 03:36

## 2019-05-04 RX ADMIN — CHLORHEXIDINE GLUCONATE 0.12% ORAL RINSE 15 ML: 1.2 LIQUID ORAL at 08:27

## 2019-05-04 RX ADMIN — PROPOFOL 65 MCG/KG/MIN: 10 INJECTION, EMULSION INTRAVENOUS at 16:41

## 2019-05-04 RX ADMIN — CARBOXYMETHYLCELLULOSE SODIUM 1 DROP: 10 GEL OPHTHALMIC at 11:59

## 2019-05-04 RX ADMIN — FAMOTIDINE 20 MG: 10 INJECTION, SOLUTION INTRAVENOUS at 08:24

## 2019-05-04 RX ADMIN — METHYLPREDNISOLONE SODIUM SUCCINATE 40 MG: 40 INJECTION, POWDER, FOR SOLUTION INTRAMUSCULAR; INTRAVENOUS at 08:23

## 2019-05-04 RX ADMIN — HEPARIN SODIUM 9.1 ML/HR: 10000 INJECTION, SOLUTION INTRAVENOUS at 04:22

## 2019-05-04 RX ADMIN — WHITE PETROLATUM 57.7 %-MINERAL OIL 31.9 % EYE OINTMENT: at 08:25

## 2019-05-04 RX ADMIN — WHITE PETROLATUM 57.7 %-MINERAL OIL 31.9 % EYE OINTMENT: at 13:23

## 2019-05-04 RX ADMIN — Medication 4 PUFF: at 16:15

## 2019-05-04 RX ADMIN — PROPOFOL 65 MCG/KG/MIN: 10 INJECTION, EMULSION INTRAVENOUS at 14:16

## 2019-05-04 RX ADMIN — Medication 1.5 G: at 01:02

## 2019-05-04 RX ADMIN — DICLOFENAC 2 G: 10 GEL TOPICAL at 20:57

## 2019-05-04 RX ADMIN — CARBOXYMETHYLCELLULOSE SODIUM 1 DROP: 10 GEL OPHTHALMIC at 18:41

## 2019-05-04 ASSESSMENT — PULMONARY FUNCTION TESTS
PIF_VALUE: 28
PIF_VALUE: 28
PIF_VALUE: 27
PIF_VALUE: 28
PIF_VALUE: 34
PIF_VALUE: 28
PIF_VALUE: 27
PIF_VALUE: 34
PIF_VALUE: 27
PIF_VALUE: 28
PIF_VALUE: 29
PIF_VALUE: 30
PIF_VALUE: 29
PIF_VALUE: 27
PIF_VALUE: 29
PIF_VALUE: 30
PIF_VALUE: 28
PIF_VALUE: 28
PIF_VALUE: 29
PIF_VALUE: 27
PIF_VALUE: 27
PIF_VALUE: 28
PIF_VALUE: 35
PIF_VALUE: 27

## 2019-05-04 ASSESSMENT — PAIN SCALES - GENERAL
PAINLEVEL_OUTOF10: 3
PAINLEVEL_OUTOF10: 5
PAINLEVEL_OUTOF10: 4

## 2019-05-04 ASSESSMENT — PAIN DESCRIPTION - PROGRESSION
CLINICAL_PROGRESSION: NOT CHANGED

## 2019-05-04 NOTE — PROGRESS NOTES
Pharmacy note:  Heparin  aPTT = 51 seconds:  below goal range of 54 -90 seconds. Per protocol, give heparin bolus of 4500 units and increase heparin infusion to 11.3 ml/hr. Will recheck aPTT 6 hours after bolus and rate increase. Floresita Sal, Pharm. D. 5/4/2019 6:13 PM

## 2019-05-04 NOTE — PROGRESS NOTES
05/04/19 1618   Vent Information   Vent Type 840   Vent Mode AC/VC   Vt Ordered 400 mL   Rate Set 18 bmp   Peak Flow 60 L/min   Pressure Support 0 cmH20   FiO2  60 %   Sensitivity 3   PEEP/CPAP 10   I Time/ I Time % 0 s   Humidification Source Heated wire   Humidification Temp 36.8   Circuit Condensation Drained   Vent Patient Data   High Peep/I Pressure 0   Peak Inspiratory Pressure 28 cmH2O   Mean Airway Pressure 14 cmH20   Rate Measured 18 br/min   Vt Exhaled 444 mL   Minute Volume 7.96 Liters   I:E Ratio 1:3.60   Cough/Sputum   Sputum How Obtained Endotracheal   Cough Non-productive   Spontaneous Breathing Trial (SBT) RT Doc   Pulse 72   SpO2 93 %   Breath Sounds   Right Upper Lobe Diminished   Right Middle Lobe Diminished   Right Lower Lobe Diminished   Left Upper Lobe Diminished   Left Lower Lobe Diminished   Additional Respiratory  Assessments   Resp 18   Position Semi-Nguyen's   Subglottic Suction Done? Yes   Alarm Settings   High Pressure Alarm 45 cmH2O   Low Minute Volume Alarm 2.5 L/min   High Respiratory Rate 40 br/min   Patient Observation   Observations #7.5 ETT @ 23 lip line   ETT (adult)   Placement Date/Time: 05/03/19 (c) 4638   Preoxygenation: Yes  Mask Ventilation: Ventilated by mask (1)  Technique: Video laryngoscopy  Tube Size: 7.5 mm  Laryngoscope: Mac  Blade Size: 3  Location: Oral  Grade View: Partial view of the glottis  Insert. ..    Secured at 23 cm   Measured From Lips   ET Placement Left   Secured By Commercial tube booker   Site Condition Dry

## 2019-05-04 NOTE — PLAN OF CARE
limitation of physical activity. Comfortable at rest. Less than ordinary activity causes fatigue, palpitation, or dyspnea. Pt resting in bed at this time on ventilator . Pt with complaints of shortness of breath. Pt with nonpitting lower extremity edema.  Patient's weights and intake/output reviewed:    Patient Vitals for the past 96 hrs (Last 3 readings):   Weight   05/03/19 0500 249 lb 14.4 oz (113.4 kg)   05/02/19 0600 262 lb (118.8 kg)   05/01/19 0400 261 lb 3.2 oz (118.5 kg)       Intake/Output Summary (Last 24 hours) at 5/4/2019 1309  Last data filed at 5/4/2019 1235  Gross per 24 hour   Intake 2611.97 ml   Output 2405 ml   Net 206.97 ml         >>For CHF and Comorbidity documentation on Education Time and Topics, please see Education Tab      5/4/2019 0410 by Calvin Orta RN  Outcome: Ongoing  Goal: Patient will achieve/maintain normal respiratory rate/effort  Description  Respiratory rate and effort will be within normal limits for the patient  5/4/2019 1309 by Matt Hensley RN  Outcome: Ongoing  5/4/2019 0410 by Calvin Orta RN  Outcome: Ongoing     Problem: HEMODYNAMIC STATUS  Goal: Patient has stable vital signs and fluid balance  5/4/2019 1309 by Matt Hensley RN  Outcome: Ongoing  5/4/2019 0410 by Calvin Orta RN  Outcome: Ongoing     Problem: FLUID AND ELECTROLYTE IMBALANCE  Goal: Fluid and electrolyte balance are achieved/maintained  5/4/2019 1309 by Matt Hensley RN  Outcome: Ongoing  5/4/2019 0410 by Calvin Orta RN  Outcome: Ongoing     Problem: Nutrition  Goal: Optimal nutrition therapy  5/4/2019 1309 by Matt Hensley RN  Outcome: Ongoing  5/4/2019 0410 by Calvin Orta RN  Outcome: Ongoing  Goal: Understanding of nutritional guidelines  5/4/2019 1309 by Matt Hensley RN  Outcome: Ongoing  5/4/2019 0410 by Calvin Orta RN  Outcome: Ongoing     Problem: Risk for Impaired Skin Integrity  Goal: Tissue integrity - skin and mucous membranes  Description  Structural intactness

## 2019-05-04 NOTE — PROGRESS NOTES
pulses well felt  Neurological :sedated    Labs:   Recent Labs     05/02/19  0423 05/03/19  0457 05/04/19  0530   WBC 9.6 10.6 9.6   HGB 16.8* 17.7* 17.5*   HCT 53.4* 56.2* 54.8*    145 151     Recent Labs     05/02/19  0423 05/03/19  0457 05/04/19  0530   * 138 137   K 3.6 4.7 4.5   CL 82* 93* 91*   CO2 48* 39* 34*   BUN 14 15 17   CREATININE <0.5* <0.5* <0.5*   CALCIUM 9.1 9.8 9.4     No results for input(s): AST, ALT, BILIDIR, BILITOT, ALKPHOS in the last 72 hours. No results for input(s): INR in the last 72 hours. No results for input(s): Elizabeth Lacrosse in the last 72 hours. Urinalysis:      Lab Results   Component Value Date    NITRU Negative 04/28/2019    WBCUA 0-2 04/28/2019    BACTERIA 1+ 04/28/2019    RBCUA 0-2 04/28/2019    BLOODU TRACE-INTACT 04/28/2019    SPECGRAV <=1.005 04/28/2019    GLUCOSEU Negative 04/28/2019       Radiology:  XR CHEST PORTABLE   Final Result   1. No significant change. XR CHEST PORTABLE   Final Result   Supportive devices are positioned appropriately. There is increasing   pulmonary edema since prior exam.         VL Extremity Venous Bilateral   Final Result      CT Chest WO Contrast   Final Result   1. Stable small left pleural effusion with a new small right pleural effusion. 2. Interval worsening of dependent consolidative opacity within bilateral   lower lobe since the study of 04/28/2019. In combination with worsening   bibasilar predominant mucous plugging, this likely represents either   atelectasis or aspiration. Clinical correlation is advised. 3. Slight interval improvement in appearance of lingular airspace   consolidation, likely representing resolving pneumonia. XR CHEST PORTABLE   Final Result   Mild cardiomegaly and chronic pulmonary change without definite acute   pulmonary process.          CT Chest Pulmonary Embolism W Contrast   Final Result   Suboptimal contrast timing although there is no evidence for large or central negative. Continue  levaquin and iv vanc. Iv steroids      4. Morbid obesity. Nutrition consult.     5. Anasarca - monitor I/O net -14L, ?nutritional        Diet: Diet NPO Effective Now  Code Status: Full Code    PT/OT Eval Status: not indicated    Dispo - cont care in ICU    Martin Pereira MD

## 2019-05-04 NOTE — PROGRESS NOTES
Pt mother Orlando Rodriguez called and update given per request. Electronically signed by Venita Raya RN on 5/4/2019 at 6:13 PM

## 2019-05-04 NOTE — PROGRESS NOTES
05/04/19 1942   Vent Information   $Ventilation $Subsequent Day   Vent Type 840   Vent Mode AC/VC   Vt Ordered 400 mL   Rate Set 18 bmp   Peak Flow 60 L/min   Pressure Support 0 cmH20   FiO2  60 %   Sensitivity 3   PEEP/CPAP 10   I Time/ I Time % 0 s   Humidification Source Heated wire   Humidification Temp Measured 36.9   Circuit Condensation Drained   Vent Patient Data   High Peep/I Pressure 0   Peak Inspiratory Pressure 29 cmH2O   Mean Airway Pressure 14 cmH20   Rate Measured 18 br/min   Vt Exhaled 439 mL   Minute Volume 7.91 Liters   I:E Ratio 1:3.60   Plateau Pressure 21 XOF94   Static Compliance 39 mL/cmH2O   Dynamic Compliance 23 mL/cmH2O   Cough/Sputum   Sputum How Obtained Endotracheal   Cough Non-productive   Sputum Amount None   Spontaneous Breathing Trial (SBT) RT Doc   Pulse 69   SpO2 95 %   Breath Sounds   Right Upper Lobe Diminished   Right Middle Lobe Diminished   Right Lower Lobe Diminished   Left Upper Lobe Diminished   Left Lower Lobe Diminished   Additional Respiratory  Assessments   Resp 18   Alarm Settings   High Pressure Alarm 45 cmH2O   Low Minute Volume Alarm 2.5 L/min   Apnea (secs) 20 secs   High Respiratory Rate 40 br/min   Low Exhaled Vt  255 mL   Patient Observation   Observations 7.5 ett 23 at lip

## 2019-05-04 NOTE — PROGRESS NOTES
05/04/19 1111   Vent Information   Vent Type 840   Vent Mode AC/VC   Vt Ordered 400 mL   Rate Set 18 bmp   Peak Flow 60 L/min   Pressure Support 0 cmH20   FiO2  60 %   Sensitivity 3   PEEP/CPAP 10   I Time/ I Time % 0 s   Humidification Source Heated wire   Humidification Temp 36.9   Circuit Condensation Drained   Vent Patient Data   High Peep/I Pressure 0   Peak Inspiratory Pressure 28 cmH2O   Mean Airway Pressure 14 cmH20   Rate Measured 18 br/min   Vt Exhaled 438 mL   Minute Volume 7.88 Liters   I:E Ratio 1:3.60   Cough/Sputum   Sputum How Obtained Endotracheal   Cough Non-productive   Spontaneous Breathing Trial (SBT) RT Doc   Pulse 70   SpO2 92 %   Breath Sounds   Right Upper Lobe Diminished   Right Middle Lobe Diminished   Right Lower Lobe Diminished   Left Upper Lobe Diminished   Left Lower Lobe Diminished   Additional Respiratory  Assessments   Resp 18   Position Semi-Nguyen's   Subglottic Suction Done? Yes   Alarm Settings   High Pressure Alarm 45 cmH2O   Low Minute Volume Alarm 2.5 L/min   High Respiratory Rate 40 br/min   Patient Observation   Observations #7.5 ETT @ 23 lip line   ETT (adult)   Placement Date/Time: 05/03/19 c) 4372   Preoxygenation: Yes  Mask Ventilation: Ventilated by mask (1)  Technique: Video laryngoscopy  Tube Size: 7.5 mm  Laryngoscope: Mac  Blade Size: 3  Location: Oral  Grade View: Partial view of the glottis  Insert. ..    Secured at 23 cm   Measured From Lips   ET Placement Left   Secured By Commercial tube booker   Site Condition Dry

## 2019-05-04 NOTE — PROGRESS NOTES
Pulmonary & Critical Care Medicine ICU Progress Note  CC:Acute respiratory failure with hypoxemia    Events of Last 24 hours: Patient intubated and placed upon MV    Invasive Lines:   IV Line: PIVs    MV:  5/3  Vent Mode: AC/VC Rate Set: 18 bmp/Vt Ordered: 400 mL/ /FiO2 : 60 %  Recent Labs     05/03/19  1328 05/04/19  0530   PHART 7.374 7.394   [id number] 66.4* 62.8*   PO2ART 67.0* 87.0       IV:   heparin (porcine) 9.1 mL/hr (05/04/19 0422)    fentaNYL (SUBLIMAZE) infusion 100 mcg/hr (05/04/19 0451)    propofol 55 mcg/kg/min (05/04/19 0452)       EXAM:  BP (!) 147/88   Pulse 81   Temp 98.6 °F (37 °C) (Axillary)   Resp 18   Ht 5' 3\" (1.6 m)   Wt 249 lb 14.4 oz (113.4 kg)   SpO2 93%   BMI 44.27 kg/m²  on vent    CVP:      Intake/Output Summary (Last 24 hours) at 5/4/2019 0602  Last data filed at 5/3/2019 2252  Gross per 24 hour   Intake 1573 ml   Output 1575 ml   Net -2 ml     General: No distress. NCAT. Eyes: PERRL. No sclera icterus. No conjunctival injection. ENT: No discharge. Pharynx clear. Neck: Trachea midline. Normal thyroid. Resp: No accessory muscle use. No crackles. No wheezing. No rhonchi. No dullness on percussion. CV: Regular rate. Regular rhythm. No mumur or rub. No edema. GI: Non-tender. Non-distended. No masses. No organmegaly. Normal bowel sounds. No hernia. Skin: Warm and dry. No nodule on exposed extremities. No rash on exposed extremities. Lymph: No cervical LAD. No supraclavicular LAD. M/S: No cyanosis. No joint deformity. No clubbing.    Neuro:sedated, not moving extremities    Medications:   vancomycin  1,500 mg Intravenous Q12H    carboxymethylcellulose PF  1 drop Both Eyes Q4H    And    STYE   Both Eyes Q4H    chlorhexidine  15 mL Mouth/Throat BID    famotidine (PEPCID) injection  20 mg Intravenous BID    albuterol sulfate HFA  4 puff Inhalation Q4H    And    ipratropium  4 puff Inhalation Q4H    lidocaine 1 % injection  5 mL Intradermal Once    sodium chloride plateau pressures <09 cm H2O  Goal to pursue conservative fluid strategy- Goal to keep patient I/Os even if possible  HOB greater than 30 degrees at all times  Daily SBT once FIO2<60% and PEEP = 5, patient arousable, hemodynamically stable  IV Sedation with propofol and fentanyl targeted RASS -2  · Hematology consulted  · Antibiotics: Vancomycin day #5 and levaquin D5  · Heparin gtt  · ICU care: bactroban  · D/w family at bedside    Critical care time spent reviewing labs/films, examining patient, collaborating with other physicians but excluding procedures for life threatening organ failure is 32 minutes.

## 2019-05-04 NOTE — PROGRESS NOTES
Writer called Dr. Mei Murray with VBG results. Stated to restart sedation, will try to wean again tomorrow.

## 2019-05-04 NOTE — PROGRESS NOTES
Hand off report given to Cassia Mercer RN. Fentanyl key given to oncoming RN. Pt is in stable condition at this time. Care transferred.  Electronically signed by Chidi Blanco RN on 5/4/2019 at 7:24 PM

## 2019-05-04 NOTE — PROGRESS NOTES
Shift assessment complete. Medications given per order. Pt continues on current vent setting; tolerating well. Family is at bedside. Pt mother Hilda Manuel would like to be called before weaning trials each day, phone number in sticky note. Propofol infusing at  65 mcg/kg/min. Fentanyl infusing at 25 mcg//h.

## 2019-05-04 NOTE — PROGRESS NOTES
Reassessment complete. JONATHAN  Picc bleeding at insertion site. Picc dressing changed. No bleeding observed afterward. No other changes observed. Will continue to monitor.

## 2019-05-04 NOTE — FLOWSHEET NOTE
05/03/19 1900   Vital Signs   Temp 99.6 °F (37.6 °C)   Temp Source Axillary   Pulse 87   Heart Rate Source Monitor   Resp 18   BP (!) 141/77   MAP (mmHg) 96   Level of Consciousness 1   MEWS Score 2   Oxygen Therapy   SpO2 91 %   FiO2  60 %   Patient Observation   Observations 7.5 ett 23 at lip       Shift assessment completed. Pt on mechanical ventilator sedated with fentanyl 100 mcg/hr and Propofol 55 mcg/kg/min. Heparin infusion @ 13.6 ml/hr recheck aptt @ 0130. Resting comfortably. Restrained to prevent from pulling lines and tubes. Lung sounds: rhonchi through out. JONATHAN  TL picc. All lines patent and intact. PIV R and L hand WNL. Foey catheter draining clear yellow urine. No signs of distress noted. Will continue to monitor.

## 2019-05-04 NOTE — PROGRESS NOTES
Pharmacy - RE:  High-dose Heparin drip  Current rate = 13.6 ml/h  (1360 units/h)  aPTT drawn peripherally @ 0226 = 181.8 sec. Goal aPTT = 54 - 90 sec. Per protocol, turn drip off x1 h (3am-4am), then restart at a reduced rate of 910 units/h (9.1 ml/h). Obtain another aPTT 5/4 @ 1030.

## 2019-05-04 NOTE — PROGRESS NOTES
05/03/19 2325   Vent Information   Vent Type 840   Vent Mode AC/VC   Vt Ordered 400 mL   Rate Set 18 bmp   Peak Flow 60 L/min   Pressure Support 0 cmH20   FiO2  60 %   Sensitivity 3   PEEP/CPAP 10   I Time/ I Time % 0 s   Humidification Source Heated wire   Humidification Temp Measured 37   Circuit Condensation Drained   Vent Patient Data   High Peep/I Pressure 0   Peak Inspiratory Pressure 28 cmH2O   Mean Airway Pressure 14 cmH20   Rate Measured 18 br/min   Vt Exhaled 446 mL   Minute Volume 8 Liters   I:E Ratio 1:3.6   Cough/Sputum   Sputum How Obtained Endotracheal   Cough Productive   Sputum Amount Small   Sputum Color White   Tenacity Thick   Spontaneous Breathing Trial (SBT) RT Doc   Pulse 77   SpO2 94 %   Breath Sounds   Right Upper Lobe Diminished   Right Middle Lobe Diminished   Right Lower Lobe Diminished   Left Upper Lobe Diminished   Left Lower Lobe Diminished   Additional Respiratory  Assessments   Resp 18   Alarm Settings   High Pressure Alarm 45 cmH2O   Low Minute Volume Alarm 2.5 L/min   Apnea (secs) 20 secs   High Respiratory Rate 40 br/min   Low Exhaled Vt  255 mL   Patient Observation   Observations 7.5 ett 23 at lip

## 2019-05-04 NOTE — PROGRESS NOTES
05/04/19 0732   Vent Information   Vent Type 840   Vent Mode AC/VC   Vt Ordered 400 mL   Rate Set 18 bmp   Peak Flow 60 L/min   Pressure Support 0 cmH20   FiO2  60 %   Sensitivity 3   PEEP/CPAP 10   I Time/ I Time % 0 s   Humidification Source Heated wire   Humidification Temp 37   Circuit Condensation Drained   Vent Patient Data   High Peep/I Pressure 0   Peak Inspiratory Pressure 27 cmH2O   Mean Airway Pressure 14 cmH20   Rate Measured 18 br/min   Vt Exhaled 441 mL   Minute Volume 7.93 Liters   I:E Ratio 1:3.60   Plateau Pressure 22 OMC23   Static Compliance 37 mL/cmH2O   Dynamic Compliance 25 mL/cmH2O   Cough/Sputum   Sputum How Obtained Endotracheal   Cough Non-productive   Spontaneous Breathing Trial (SBT) RT Doc   Pulse 76   SpO2 93 %   Breath Sounds   Right Upper Lobe Diminished   Right Middle Lobe Diminished   Right Lower Lobe Diminished   Left Upper Lobe Diminished   Left Lower Lobe Diminished   Additional Respiratory  Assessments   Resp 18   Position Semi-Nguyen's   Subglottic Suction Done? Yes   Alarm Settings   High Pressure Alarm 45 cmH2O   Low Minute Volume Alarm 2.5 L/min   High Respiratory Rate 40 br/min   Patient Observation   Observations #7.5 ETT @ 23 lip line   ETT (adult)   Placement Date/Time: 05/03/19 (c) 6959   Preoxygenation: Yes  Mask Ventilation: Ventilated by mask (1)  Technique: Video laryngoscopy  Tube Size: 7.5 mm  Laryngoscope: Mac  Blade Size: 3  Location: Oral  Grade View: Partial view of the glottis  Insert. ..    Secured at 23 cm   Measured From Lips   ET Placement Right   Secured By Commercial tube booker   Site Condition Dry

## 2019-05-04 NOTE — PROGRESS NOTES
Reassessment complete. Pt repositioned. Continues on vent settings. Pt son is at bedside.  Electronically signed by Neil Gates RN on 5/4/2019 at 12:51 PM

## 2019-05-04 NOTE — PROGRESS NOTES
05/04/19 0316   Vent Information   Vent Type 840   Vent Mode AC/VC   Vt Ordered 400 mL   Rate Set 18 bmp   Peak Flow 60 L/min   Pressure Support 0 cmH20   FiO2  60 %   Sensitivity 3   PEEP/CPAP 10   I Time/ I Time % 0 s   Humidification Source Heated wire   Humidification Temp Measured 37   Circuit Condensation Drained   Vent Patient Data   High Peep/I Pressure 0   Peak Inspiratory Pressure 27 cmH2O   Mean Airway Pressure 14 cmH20   Rate Measured 18 br/min   Vt Exhaled 443 mL   Minute Volume 7.95 Liters   I:E Ratio 1:3.60   Cough/Sputum   Sputum How Obtained Endotracheal   Cough Non-productive   Sputum Amount None   Spontaneous Breathing Trial (SBT) RT Doc   Pulse 76   SpO2 93 %   Breath Sounds   Right Upper Lobe Diminished   Right Middle Lobe Diminished   Right Lower Lobe Diminished   Left Upper Lobe Diminished   Left Lower Lobe Diminished   Additional Respiratory  Assessments   Resp 18   Alarm Settings   High Pressure Alarm 45 cmH2O   Low Minute Volume Alarm 2.5 L/min   Apnea (secs) 20 secs   High Respiratory Rate 40 br/min   Low Exhaled Vt  255 mL   Patient Observation   Observations 7.5 ett 23 at lip

## 2019-05-04 NOTE — PLAN OF CARE
improve  Outcome: Ongoing     Problem: Fluid Volume - Deficit:  Goal: Achieves intake and output within specified parameters  Description  Achieves intake and output within specified parameters  Outcome: Ongoing     Problem: Gas Exchange - Impaired:  Goal: Levels of oxygenation will improve  Description  Levels of oxygenation will improve  Outcome: Ongoing     Problem: Hyperthermia:  Goal: Ability to maintain a body temperature in the normal range will improve  Description  Ability to maintain a body temperature in the normal range will improve  Outcome: Ongoing     Problem: Tobacco Use:  Goal: Will participate in inpatient tobacco-use cessation counseling  Description  Will participate in inpatient tobacco-use cessation counseling  Outcome: Ongoing     Problem: Restraint Use - Nonviolent/Non-Self-Destructive Behavior:  Goal: Absence of restraint indications  Description  Absence of restraint indications  Outcome: Ongoing  Goal: Absence of restraint-related injury  Description  Absence of restraint-related injury  Outcome: Ongoing

## 2019-05-04 NOTE — PROGRESS NOTES
Dr. Marin Rios at bedside to evaluate pt. No new orders at this time.  Electronically signed by Neil Gates RN on 5/4/2019 at 2:08 PM

## 2019-05-04 NOTE — PROGRESS NOTES
Vancomycin trough on 1250 mg IV q12h = 10.4 mcg/ml. Will increase dose to 1500 mg IV q12h. A new vancomycin trough has been ordered for 5/5 @ 1230.

## 2019-05-05 ENCOUNTER — APPOINTMENT (OUTPATIENT)
Dept: GENERAL RADIOLOGY | Age: 59
DRG: 207 | End: 2019-05-05
Payer: COMMERCIAL

## 2019-05-05 LAB
ANION GAP SERPL CALCULATED.3IONS-SCNC: 7 MMOL/L (ref 3–16)
APTT: 101.4 SEC (ref 26–36)
APTT: 51 SEC (ref 26–36)
APTT: 85.5 SEC (ref 26–36)
APTT: 99.1 SEC (ref 26–36)
BASE EXCESS ARTERIAL: 9.3 MMOL/L (ref -3–3)
BASOPHILS ABSOLUTE: 0.1 K/UL (ref 0–0.2)
BASOPHILS RELATIVE PERCENT: 1 %
BUN BLDV-MCNC: 16 MG/DL (ref 7–20)
CALCIUM SERPL-MCNC: 9.2 MG/DL (ref 8.3–10.6)
CARBOXYHEMOGLOBIN ARTERIAL: 1.2 % (ref 0–1.5)
CHLORIDE BLD-SCNC: 94 MMOL/L (ref 99–110)
CO2: 36 MMOL/L (ref 21–32)
CREAT SERPL-MCNC: <0.5 MG/DL (ref 0.6–1.1)
CULTURE, RESPIRATORY: NORMAL
EOSINOPHILS ABSOLUTE: 0.1 K/UL (ref 0–0.6)
EOSINOPHILS RELATIVE PERCENT: 0.8 %
GFR AFRICAN AMERICAN: >60
GFR NON-AFRICAN AMERICAN: >60
GLUCOSE BLD-MCNC: 150 MG/DL (ref 70–99)
GLUCOSE BLD-MCNC: 90 MG/DL (ref 70–99)
GLUCOSE BLD-MCNC: 91 MG/DL (ref 70–99)
GRAM STAIN RESULT: NORMAL
HCO3 ARTERIAL: 36.8 MMOL/L (ref 21–29)
HCT VFR BLD CALC: 52.8 % (ref 36–48)
HEMOGLOBIN, ART, EXTENDED: 17.8 G/DL (ref 12–16)
HEMOGLOBIN: 16.9 G/DL (ref 12–16)
LYMPHOCYTES ABSOLUTE: 2 K/UL (ref 1–5.1)
LYMPHOCYTES RELATIVE PERCENT: 20.7 %
MCH RBC QN AUTO: 29.3 PG (ref 26–34)
MCHC RBC AUTO-ENTMCNC: 32.1 G/DL (ref 31–36)
MCV RBC AUTO: 91.5 FL (ref 80–100)
METHEMOGLOBIN ARTERIAL: 0.2 %
MONOCYTES ABSOLUTE: 1.1 K/UL (ref 0–1.3)
MONOCYTES RELATIVE PERCENT: 11.2 %
NEUTROPHILS ABSOLUTE: 6.5 K/UL (ref 1.7–7.7)
NEUTROPHILS RELATIVE PERCENT: 66.3 %
O2 CONTENT ARTERIAL: 24 ML/DL
O2 SAT, ARTERIAL: 95.5 %
O2 THERAPY: ABNORMAL
PCO2 ARTERIAL: 59.3 MMHG (ref 35–45)
PDW BLD-RTO: 16.2 % (ref 12.4–15.4)
PERFORMED ON: NORMAL
PERFORMED ON: NORMAL
PH ARTERIAL: 7.41 (ref 7.35–7.45)
PLATELET # BLD: 149 K/UL (ref 135–450)
PMV BLD AUTO: 8.1 FL (ref 5–10.5)
PO2 ARTERIAL: 78.8 MMHG (ref 75–108)
POTASSIUM SERPL-SCNC: 4.1 MMOL/L (ref 3.5–5.1)
RBC # BLD: 5.77 M/UL (ref 4–5.2)
SODIUM BLD-SCNC: 137 MMOL/L (ref 136–145)
TCO2 ARTERIAL: 38.6 MMOL/L
VANCOMYCIN TROUGH: 13.1 UG/ML (ref 10–20)
WBC # BLD: 9.8 K/UL (ref 4–11)

## 2019-05-05 PROCEDURE — 94761 N-INVAS EAR/PLS OXIMETRY MLT: CPT

## 2019-05-05 PROCEDURE — 2580000003 HC RX 258: Performed by: INTERNAL MEDICINE

## 2019-05-05 PROCEDURE — 80048 BASIC METABOLIC PNL TOTAL CA: CPT

## 2019-05-05 PROCEDURE — 99291 CRITICAL CARE FIRST HOUR: CPT | Performed by: INTERNAL MEDICINE

## 2019-05-05 PROCEDURE — 85025 COMPLETE CBC W/AUTO DIFF WBC: CPT

## 2019-05-05 PROCEDURE — 94750 HC PULMONARY COMPLIANCE STUDY: CPT

## 2019-05-05 PROCEDURE — 2700000000 HC OXYGEN THERAPY PER DAY

## 2019-05-05 PROCEDURE — 94003 VENT MGMT INPAT SUBQ DAY: CPT

## 2019-05-05 PROCEDURE — 94640 AIRWAY INHALATION TREATMENT: CPT

## 2019-05-05 PROCEDURE — 6360000002 HC RX W HCPCS: Performed by: INTERNAL MEDICINE

## 2019-05-05 PROCEDURE — 6370000000 HC RX 637 (ALT 250 FOR IP): Performed by: INTERNAL MEDICINE

## 2019-05-05 PROCEDURE — 36415 COLL VENOUS BLD VENIPUNCTURE: CPT

## 2019-05-05 PROCEDURE — 80202 ASSAY OF VANCOMYCIN: CPT

## 2019-05-05 PROCEDURE — 2000000000 HC ICU R&B

## 2019-05-05 PROCEDURE — 2500000003 HC RX 250 WO HCPCS: Performed by: INTERNAL MEDICINE

## 2019-05-05 PROCEDURE — 85730 THROMBOPLASTIN TIME PARTIAL: CPT

## 2019-05-05 PROCEDURE — 71045 X-RAY EXAM CHEST 1 VIEW: CPT

## 2019-05-05 PROCEDURE — 82803 BLOOD GASES ANY COMBINATION: CPT

## 2019-05-05 RX ORDER — FUROSEMIDE 10 MG/ML
40 INJECTION INTRAMUSCULAR; INTRAVENOUS ONCE
Status: COMPLETED | OUTPATIENT
Start: 2019-05-05 | End: 2019-05-05

## 2019-05-05 RX ORDER — HEPARIN SODIUM 1000 [USP'U]/ML
4500 INJECTION, SOLUTION INTRAVENOUS; SUBCUTANEOUS ONCE
Status: COMPLETED | OUTPATIENT
Start: 2019-05-05 | End: 2019-05-05

## 2019-05-05 RX ADMIN — FENTANYL CITRATE 150 MCG/HR: 50 INJECTION, SOLUTION INTRAMUSCULAR; INTRAVENOUS at 00:42

## 2019-05-05 RX ADMIN — HEPARIN SODIUM 11.99 UNITS/KG/HR: 10000 INJECTION, SOLUTION INTRAVENOUS at 18:15

## 2019-05-05 RX ADMIN — METHYLPREDNISOLONE SODIUM SUCCINATE 40 MG: 40 INJECTION, POWDER, FOR SOLUTION INTRAMUSCULAR; INTRAVENOUS at 07:50

## 2019-05-05 RX ADMIN — Medication 4 PUFF: at 07:15

## 2019-05-05 RX ADMIN — PROPOFOL 75 MCG/KG/MIN: 10 INJECTION, EMULSION INTRAVENOUS at 22:28

## 2019-05-05 RX ADMIN — Medication 4 PUFF: at 19:26

## 2019-05-05 RX ADMIN — Medication 4 PUFF: at 03:05

## 2019-05-05 RX ADMIN — PROPOFOL 75 MCG/KG/MIN: 10 INJECTION, EMULSION INTRAVENOUS at 14:59

## 2019-05-05 RX ADMIN — HEPARIN SODIUM 4500 UNITS: 1000 INJECTION, SOLUTION INTRAVENOUS; SUBCUTANEOUS at 12:59

## 2019-05-05 RX ADMIN — Medication 4 PUFF: at 19:25

## 2019-05-05 RX ADMIN — PROPOFOL 65 MCG/KG/MIN: 10 INJECTION, EMULSION INTRAVENOUS at 12:54

## 2019-05-05 RX ADMIN — FUROSEMIDE 40 MG: 10 INJECTION, SOLUTION INTRAMUSCULAR; INTRAVENOUS at 10:01

## 2019-05-05 RX ADMIN — FENTANYL CITRATE 50 MCG: 50 INJECTION INTRAMUSCULAR; INTRAVENOUS at 00:56

## 2019-05-05 RX ADMIN — FENTANYL CITRATE 150 MCG/HR: 50 INJECTION, SOLUTION INTRAMUSCULAR; INTRAVENOUS at 16:30

## 2019-05-05 RX ADMIN — Medication 4 PUFF: at 10:46

## 2019-05-05 RX ADMIN — Medication 10 ML: at 20:27

## 2019-05-05 RX ADMIN — PROPOFOL 75 MCG/KG/MIN: 10 INJECTION, EMULSION INTRAVENOUS at 19:07

## 2019-05-05 RX ADMIN — Medication 10 ML: at 07:52

## 2019-05-05 RX ADMIN — Medication 4 PUFF: at 23:08

## 2019-05-05 RX ADMIN — PROPOFOL 65 MCG/KG/MIN: 10 INJECTION, EMULSION INTRAVENOUS at 04:04

## 2019-05-05 RX ADMIN — Medication 4 PUFF: at 03:06

## 2019-05-05 RX ADMIN — CARBOXYMETHYLCELLULOSE SODIUM 1 DROP: 10 GEL OPHTHALMIC at 11:49

## 2019-05-05 RX ADMIN — CITALOPRAM HYDROBROMIDE 20 MG: 20 TABLET ORAL at 07:50

## 2019-05-05 RX ADMIN — FENTANYL CITRATE 50 MCG: 50 INJECTION INTRAMUSCULAR; INTRAVENOUS at 22:16

## 2019-05-05 RX ADMIN — FAMOTIDINE 20 MG: 10 INJECTION, SOLUTION INTRAVENOUS at 20:24

## 2019-05-05 RX ADMIN — CARBOXYMETHYLCELLULOSE SODIUM 1 DROP: 10 GEL OPHTHALMIC at 06:53

## 2019-05-05 RX ADMIN — LEVOFLOXACIN 750 MG: 5 INJECTION, SOLUTION INTRAVENOUS at 07:51

## 2019-05-05 RX ADMIN — Medication 1.5 G: at 01:25

## 2019-05-05 RX ADMIN — WHITE PETROLATUM 57.7 %-MINERAL OIL 31.9 % EYE OINTMENT: at 07:51

## 2019-05-05 RX ADMIN — Medication 4 PUFF: at 14:54

## 2019-05-05 RX ADMIN — WHITE PETROLATUM 57.7 %-MINERAL OIL 31.9 % EYE OINTMENT: at 01:19

## 2019-05-05 RX ADMIN — CHLORHEXIDINE GLUCONATE 0.12% ORAL RINSE 15 ML: 1.2 LIQUID ORAL at 20:24

## 2019-05-05 RX ADMIN — CARBOXYMETHYLCELLULOSE SODIUM 1 DROP: 10 GEL OPHTHALMIC at 03:28

## 2019-05-05 RX ADMIN — Medication 4 PUFF: at 10:47

## 2019-05-05 RX ADMIN — Medication 1.5 G: at 13:44

## 2019-05-05 RX ADMIN — WHITE PETROLATUM 57.7 %-MINERAL OIL 31.9 % EYE OINTMENT: at 05:21

## 2019-05-05 RX ADMIN — WHITE PETROLATUM 57.7 %-MINERAL OIL 31.9 % EYE OINTMENT: at 12:55

## 2019-05-05 RX ADMIN — FENTANYL CITRATE 150 MCG/HR: 50 INJECTION, SOLUTION INTRAMUSCULAR; INTRAVENOUS at 08:26

## 2019-05-05 RX ADMIN — FAMOTIDINE 20 MG: 10 INJECTION, SOLUTION INTRAVENOUS at 07:50

## 2019-05-05 RX ADMIN — WHITE PETROLATUM 57.7 %-MINERAL OIL 31.9 % EYE OINTMENT: at 20:25

## 2019-05-05 RX ADMIN — DICLOFENAC 2 G: 10 GEL TOPICAL at 20:25

## 2019-05-05 RX ADMIN — WHITE PETROLATUM 57.7 %-MINERAL OIL 31.9 % EYE OINTMENT: at 18:17

## 2019-05-05 RX ADMIN — Medication 10 ML: at 07:51

## 2019-05-05 RX ADMIN — PROPOFOL 75 MCG/KG/MIN: 10 INJECTION, EMULSION INTRAVENOUS at 17:00

## 2019-05-05 RX ADMIN — PROPOFOL 65 MCG/KG/MIN: 10 INJECTION, EMULSION INTRAVENOUS at 05:47

## 2019-05-05 RX ADMIN — PROPOFOL 65 MCG/KG/MIN: 10 INJECTION, EMULSION INTRAVENOUS at 08:06

## 2019-05-05 RX ADMIN — CHLORHEXIDINE GLUCONATE 0.12% ORAL RINSE 15 ML: 1.2 LIQUID ORAL at 07:50

## 2019-05-05 RX ADMIN — PROPOFOL 65 MCG/KG/MIN: 10 INJECTION, EMULSION INTRAVENOUS at 01:18

## 2019-05-05 RX ADMIN — CARBOXYMETHYLCELLULOSE SODIUM 1 DROP: 10 GEL OPHTHALMIC at 15:00

## 2019-05-05 RX ADMIN — DICLOFENAC 2 G: 10 GEL TOPICAL at 07:52

## 2019-05-05 ASSESSMENT — PULMONARY FUNCTION TESTS
PIF_VALUE: 32
PIF_VALUE: 36
PIF_VALUE: 30
PIF_VALUE: 34
PIF_VALUE: 33
PIF_VALUE: 31
PIF_VALUE: 32
PIF_VALUE: 35
PIF_VALUE: 31
PIF_VALUE: 33
PIF_VALUE: 32
PIF_VALUE: 33
PIF_VALUE: 32
PIF_VALUE: 33
PIF_VALUE: 35
PIF_VALUE: 31
PIF_VALUE: 32
PIF_VALUE: 33
PIF_VALUE: 29
PIF_VALUE: 32
PIF_VALUE: 42
PIF_VALUE: 32
PIF_VALUE: 31
PIF_VALUE: 33
PIF_VALUE: 32
PIF_VALUE: 39
PIF_VALUE: 33

## 2019-05-05 ASSESSMENT — PAIN SCALES - GENERAL
PAINLEVEL_OUTOF10: 0
PAINLEVEL_OUTOF10: 1
PAINLEVEL_OUTOF10: 8
PAINLEVEL_OUTOF10: 0

## 2019-05-05 NOTE — PROGRESS NOTES
Vital high protein TF running at goal. Pt tolerating well. Writer called pt mother, Cleveland Humphrey per request with update on pt.  Electronically signed by Jules Simmons RN on 5/5/2019 at 6:30 PM

## 2019-05-05 NOTE — PROGRESS NOTES
High risk vesicant drug infusing:  ____no______    Multiple incompatible medications infusing:  __no_______    CVP Monitoring:  __no_______    Extremely difficult IV access challenge:  ___yes_____    Continued need for central line access:  ____yes______    Addressed with physician:  __yes______    RIGHT PATIENT, RIGHT TIME, RIGHT LINE

## 2019-05-05 NOTE — PROGRESS NOTES
Fentanyl drip increased per orders to 150mcg/hr. Patient now no longer biting on bite block when resting, but clenches when block is touched. Will continue to monitor and attempt to perform oral care with assessment of tongue when patient is able to tolerate.

## 2019-05-05 NOTE — PROGRESS NOTES
PRN fentanyl 50mcg again given per orders for patient continuing to bite down on tube and tongue. Block now able to be moved, but when cleaning had begun on mouth patient again clenched. Will continue to monitor.

## 2019-05-05 NOTE — PLAN OF CARE
RN  Outcome: Met This Shift  5/5/2019 0309 by Abdiel Buitrago RN  Outcome: Ongoing     Problem: Risk for Impaired Skin Integrity  Goal: Tissue integrity - skin and mucous membranes  Description  Structural intactness and normal physiological function of skin and  mucous membranes.   5/5/2019 1402 by Yayo Leon RN  Outcome: Ongoing  5/5/2019 1400 by Yayo Leon RN  Outcome: Met This Shift  5/5/2019 0309 by Abdiel Buitrago RN  Outcome: Ongoing     Problem: Pain:  Goal: Pain level will decrease  Description  Pain level will decrease  5/5/2019 1402 by Yayo Leon RN  Outcome: Ongoing  5/5/2019 1400 by Yayo Leon RN  Outcome: Ongoing  5/5/2019 0309 by Abdiel Buitrago RN  Outcome: Ongoing  Goal: Control of acute pain  Description  Control of acute pain  5/5/2019 1402 by Yayo Leon RN  Outcome: Ongoing  5/5/2019 1400 by Yayo Leon RN  Outcome: Ongoing  5/5/2019 0309 by Abdiel Buitrago RN  Outcome: Ongoing  Goal: Control of chronic pain  Description  Control of chronic pain  5/5/2019 1402 by Yayo Leon RN  Outcome: Ongoing  5/5/2019 1400 by Yayo Leon RN  Outcome: Ongoing  5/5/2019 0309 by Abdiel Buitrago RN  Outcome: Ongoing     Problem: Discharge Planning:  Goal: Discharged to appropriate level of care  Description  Discharged to appropriate level of care  5/5/2019 1402 by Yayo Leon RN  Outcome: Ongoing  5/5/2019 1400 by Yayo Leon RN  Outcome: Ongoing  5/5/2019 0309 by Abdiel Buitrago RN  Outcome: Ongoing  Goal: Participates in care planning  Description  Participates in care planning  5/5/2019 1402 by Yayo Leon RN  Outcome: Ongoing  5/5/2019 1400 by Yayo Leon RN  Outcome: Ongoing  5/5/2019 0309 by Abdiel Buitrago RN  Outcome: Ongoing     Problem: Airway Clearance - Ineffective:  Goal: Clear lung sounds  Description  Clear lung sounds  5/5/2019 1402 by Yayo Leon RN  Outcome: Ongoing  5/5/2019 1400 by Yayo Leon RN  Outcome: Ongoing  5/5/2019 0309 by Abdiel Buitrago RN  Outcome: Ongoing  Goal: Ability to maintain a clear airway will improve  Description  Ability to maintain a clear airway will improve  5/5/2019 1402 by Lillian Montemayor RN  Outcome: Ongoing  5/5/2019 1400 by Lillian Montemayor RN  Outcome: Ongoing  5/5/2019 0309 by Darry Meckel, RN  Outcome: Ongoing     Problem: Fluid Volume - Deficit:  Goal: Achieves intake and output within specified parameters  Description  Achieves intake and output within specified parameters  5/5/2019 1402 by Lillian Montemayor RN  Outcome: Ongoing  5/5/2019 1400 by Lillian Montemayor RN  Outcome: Ongoing  5/5/2019 0309 by Darry Meckel, RN  Outcome: Ongoing     Problem: Gas Exchange - Impaired:  Goal: Levels of oxygenation will improve  Description  Levels of oxygenation will improve  5/5/2019 1402 by Lillian Montemayor RN  Outcome: Ongoing  5/5/2019 1400 by Lillian Montemayor RN  Outcome: Ongoing  5/5/2019 0309 by Darry Meckel, RN  Outcome: Ongoing     Problem: Hyperthermia:  Goal: Ability to maintain a body temperature in the normal range will improve  Description  Ability to maintain a body temperature in the normal range will improve  5/5/2019 1402 by Lillian Montemayor RN  Outcome: Ongoing  5/5/2019 1400 by Lillian Montemayor RN  Outcome: Ongoing  5/5/2019 0309 by Darry Meckel, RN  Outcome: Ongoing     Problem: Tobacco Use:  Goal: Will participate in inpatient tobacco-use cessation counseling  Description  Will participate in inpatient tobacco-use cessation counseling  5/5/2019 1402 by Lillian Montemayor RN  Outcome: Ongoing  5/5/2019 1400 by Lillian Montemayor RN  Outcome: Ongoing  5/5/2019 0309 by Darry Meckel, RN  Outcome: Ongoing     Problem: Restraint Use - Nonviolent/Non-Self-Destructive Behavior:  Goal: Absence of restraint indications  Description  Absence of restraint indications  5/5/2019 1402 by Lillian Montemayor RN  Outcome: Ongoing  5/5/2019 1400 by Lillian Montemayor RN  Outcome: Ongoing  5/5/2019 0309 by Darry Meckel, RN  Outcome: Ongoing  Goal: Absence of restraint-related injury  Description  Absence of restraint-related injury  5/5/2019 1402 by Cris Garcia RN  Outcome: Ongoing  5/5/2019 1400 by Cris Garcia RN  Outcome: Ongoing  5/5/2019 0309 by Daniella Lomax RN  Outcome: Ongoing

## 2019-05-05 NOTE — PROGRESS NOTES
05/05/19 0306   Vent Information   Vent Type 840   Vent Mode AC/VC   Vt Ordered 400 mL   Rate Set 18 bmp   Peak Flow 60 L/min   Pressure Support 0 cmH20   FiO2  60 %   Sensitivity 3   PEEP/CPAP 10   I Time/ I Time % 0 s   Humidification Source Heated wire   Humidification Temp Measured 37   Circuit Condensation Drained   Vent Patient Data   High Peep/I Pressure 0   Peak Inspiratory Pressure 33 cmH2O   Mean Airway Pressure 15 cmH20   Rate Measured 18 br/min   Vt Exhaled 448 mL   Minute Volume 8.02 Liters   I:E Ratio 1:3.60   Cough/Sputum   Sputum How Obtained Endotracheal   Cough Non-productive   Sputum Amount None   Spontaneous Breathing Trial (SBT) RT Doc   Pulse 85   SpO2 92 %   Breath Sounds   Right Upper Lobe Expiratory Wheezes   Right Middle Lobe Expiratory Wheezes   Right Lower Lobe Diminished   Left Upper Lobe Expiratory Wheezes   Left Lower Lobe Diminished   Additional Respiratory  Assessments   Resp 18   Alarm Settings   High Pressure Alarm 45 cmH2O   Low Minute Volume Alarm 2.5 L/min   Apnea (secs) 20 secs   High Respiratory Rate 40 br/min   Low Exhaled Vt  255 mL   Patient Observation   Observations 7.5 ett 23 at lip

## 2019-05-05 NOTE — PROGRESS NOTES
Propofol increased to 75 mcg/kg/min r/t RASS score +2. Pt continues to clench teeth. Blood noted during oral care. Bite block in place.

## 2019-05-05 NOTE — PROGRESS NOTES
05/05/19 1926   Vent Information   $Ventilation $Subsequent Day   Vent Type 840   Vent Mode AC/VC   Vt Ordered 400 mL   Rate Set 18 bmp   Peak Flow 60 L/min   Pressure Support 0 cmH20   FiO2  60 %   Sensitivity 3   PEEP/CPAP 8   I Time/ I Time % 0 s   Humidification Source Heated wire   Humidification Temp Measured 35.4   Circuit Condensation Drained   Vent Patient Data   High Peep/I Pressure 0   Peak Inspiratory Pressure 32 cmH2O   Mean Airway Pressure 13 cmH20   Rate Measured 18 br/min   Vt Exhaled 420 mL   Minute Volume 7.57 Liters   I:E Ratio 1:3.60   Plateau Pressure 19 BSA96   Static Compliance 38 mL/cmH2O   Dynamic Compliance 19 mL/cmH2O   Cough/Sputum   Sputum How Obtained Endotracheal   Cough Productive   Sputum Amount Small   Sputum Color Red   Tenacity Thick   Spontaneous Breathing Trial (SBT) RT Doc   Pulse 66   SpO2 92 %   Breath Sounds   Right Upper Lobe Expiratory Wheezes; Inspiratory Wheezes   Right Middle Lobe Expiratory Wheezes; Inspiratory Wheezes   Right Lower Lobe Expiratory Wheezes; Inspiratory Wheezes   Left Upper Lobe Expiratory Wheezes; Inspiratory Wheezes   Left Lower Lobe Expiratory Wheezes; Inspiratory Wheezes   Additional Respiratory  Assessments   Resp 18   Alarm Settings   High Pressure Alarm 45 cmH2O   Low Minute Volume Alarm 2.5 L/min   Apnea (secs) 20 secs   High Respiratory Rate 40 br/min   Low Exhaled Vt  255 mL   Patient Observation   Observations 7.5 ett 23 at lip

## 2019-05-05 NOTE — PROGRESS NOTES
Pulmonary & Critical Care Medicine ICU Progress Note  CC:Acute respiratory failure with hypoxemia    Events of Last 24 hours: Patient still on FIO 60% and PEEP 10. Invasive Lines:   IV Line: PIVs    MV:  5/3  Vent Mode: AC/VC Rate Set: 18 bmp/Vt Ordered: 400 mL/ /FiO2 : 60 %  Recent Labs     05/04/19  0530 05/05/19  0535   PHART 7.394 7. 411   XRE3AHH 62.8* 59.3*   PO2ART 87.0 78.8       IV:   heparin (porcine) 11.34 mL/hr (05/05/19 0600)    fentaNYL (SUBLIMAZE) infusion 150 mcg/hr (05/05/19 0600)    propofol 65 mcg/kg/min (05/05/19 0600)       EXAM:  /71   Pulse 78   Temp 99.2 °F (37.3 °C) (Oral)   Resp 18   Ht 5' 3\" (1.6 m)   Wt 245 lb 9.5 oz (111.4 kg)   SpO2 92%   BMI 43.50 kg/m²  on vent    CVP:      Intake/Output Summary (Last 24 hours) at 5/5/2019 0654  Last data filed at 5/5/2019 0600  Gross per 24 hour   Intake 2293.16 ml   Output 2810 ml   Net -516.84 ml     General: No distress. NCAT. Eyes: PERRL. No sclera icterus. No conjunctival injection. ENT: No discharge. Pharynx clear. Neck: Trachea midline. Normal thyroid. Resp: No accessory muscle use. scattered crackles. No wheezing. No rhonchi. No dullness on percussion. CV: Regular rate. Regular rhythm. No mumur or rub. trace edema. GI: Non-tender. Non-distended. No masses. Skin: Warm and dry. No nodule on exposed extremities. No rash on exposed extremities. Lymph: No cervical LAD. No supraclavicular LAD. M/S: No cyanosis. No joint deformity. No clubbing.    Neuro:sedated, not moving extremities    Medications:   vancomycin  1,500 mg Intravenous Q12H    levofloxacin  750 mg Intravenous Q24H    carboxymethylcellulose PF  1 drop Both Eyes Q4H    And    STYE   Both Eyes Q4H    chlorhexidine  15 mL Mouth/Throat BID    famotidine (PEPCID) injection  20 mg Intravenous BID    albuterol sulfate HFA  4 puff Inhalation Q4H    And    ipratropium  4 puff Inhalation Q4H    lidocaine 1 % injection  5 mL Intradermal Once    sodium by me at the bedside for unstable, life threatening respiratory failure. Target plateau pressures <45 cm H2O  Goal to pursue conservative fluid strategy- Goal to keep patient I/Os even if possible  HOB greater than 30 degrees at all times  Daily SBT once FIO2<60% and PEEP = 5, patient arousable, hemodynamically stable  IV Sedation with propofol and fentanyl targeted RASS -2  · Hematology consulted  · Antibiotics: Vancomycin day #6 and levaquin D6  · Heparin gtt  · Lasix 40 mg x 1  · Start tube feeds. · ICU care: bactroban  · D/w family at bedside    Critical care time spent reviewing labs/films, examining patient, collaborating with other physicians but excluding procedures for life threatening organ failure is 32 minutes.

## 2019-05-05 NOTE — PROGRESS NOTES
After administration of prn fentanyl, pulse and BP returned to where they had previously been, see flowsheets. Attempted to perform mouth care. Patient still biting on bite block hard enough that it is unable to be removed. Fentanyl drip rate to be increased per orders for pain.

## 2019-05-05 NOTE — PROGRESS NOTES
Shift assessment, completed, see flow sheet. Intubated and sedated on AC # 7.5 ETT, at 23 LL. 18 / 400 /60 %/ 10. SR normal, /72, SpO2 95%. Respirations are easy, even, and unlabored. Bilateral lung sounds diminished with expiratory wheeze auscultated to FEDE. OG in place at 59, resident remains NPO. PICC/PIV, WNL with propofol @ 65mcg/kg/min, heparin @11.34 mL/hr and fentanyl @ 100mcg/hr  infusing. Lopez in place and patent with STAT lock. Bilateral soft wrist restraints in place for pt safety. Call light within reach. Bed in lowest position. Bed alarm on.  Will continue to monitor

## 2019-05-05 NOTE — PROGRESS NOTES
Pharmacy note:  Vancomycin Day #  6  WBC                BUN/SCr    Tmax   9.8                  16/<0.5        99.4    Vancomycin trough = 13.1    Continue Vancomycin 1500 mg IVPB q12h. Conner Ambrosio D. 5/5/2019 2:16 PM

## 2019-05-05 NOTE — PROGRESS NOTES
Pharmacy note:  Heparin  APTT = 51 seconds:  below goal range of 54 - 90 seconds. Per protocol, give Heparin 4500 units bolus and increase heparin infusion rate to 13.6 ml/hr. Jaci Hagen Pharm. D. 5/5/2019 12:45 PM

## 2019-05-05 NOTE — PLAN OF CARE
Patient's EF (Ejection Fraction) is greater than 40%    Patient has a past medical history of Anxiety state, unspecified, Impacted cerumen, and Rheumatoid arthritis(714.0). Comorbidities reviewed and education provided. Patient and family's stated goal of care: reduce shortness of breath, increase activity tolerance, better understand heart failure and disease management, be more comfortable and reduce lower extremity edema prior to discharge    Patient's current functional capacity:  Unable to carry on any physical activity without discomfort. Symptoms of heart failure at rest.    Pt resting in bed at this time on ventilator FiO2 60 % PEEP 8 . SERGE shortness of breath. Pt with pitting lower extremity edema.  Patient's weights and intake/output reviewed:    Patient Vitals for the past 96 hrs (Last 3 readings):   Weight   05/05/19 0600 245 lb 9.5 oz (111.4 kg)   05/03/19 0500 249 lb 14.4 oz (113.4 kg)   05/02/19 0600 262 lb (118.8 kg)       Intake/Output Summary (Last 24 hours) at 5/5/2019 1401  Last data filed at 5/5/2019 1302  Gross per 24 hour   Intake 1766.8 ml   Output 2980 ml   Net -1213.2 ml         >>For CHF and Comorbidity documentation on Education Time and Topics, please see Education Tab

## 2019-05-05 NOTE — PROGRESS NOTES
well felt  Neurological :sedated      Labs:   Recent Labs     05/03/19  0457 05/04/19  0530 05/05/19  0535   WBC 10.6 9.6 9.8   HGB 17.7* 17.5* 16.9*   HCT 56.2* 54.8* 52.8*    151 149     Recent Labs     05/03/19  0457 05/04/19  0530 05/05/19  0535    137 137   K 4.7 4.5 4.1   CL 93* 91* 94*   CO2 39* 34* 36*   BUN 15 17 16   CREATININE <0.5* <0.5* <0.5*   CALCIUM 9.8 9.4 9.2     No results for input(s): AST, ALT, BILIDIR, BILITOT, ALKPHOS in the last 72 hours. No results for input(s): INR in the last 72 hours. No results for input(s): Lea Akil in the last 72 hours. Urinalysis:      Lab Results   Component Value Date    NITRU Negative 04/28/2019    WBCUA 0-2 04/28/2019    BACTERIA 1+ 04/28/2019    RBCUA 0-2 04/28/2019    BLOODU TRACE-INTACT 04/28/2019    SPECGRAV <=1.005 04/28/2019    GLUCOSEU Negative 04/28/2019       Radiology:  XR CHEST PORTABLE   Final Result   Increasing right basilar and new bilateral upper lobe airspace disease could   represent edema or pneumonia. Left basilar pleuroparenchymal disease is   stable. XR CHEST PORTABLE   Final Result   1. No significant change. XR CHEST PORTABLE   Final Result   Supportive devices are positioned appropriately. There is increasing   pulmonary edema since prior exam.         VL Extremity Venous Bilateral   Final Result      CT Chest WO Contrast   Final Result   1. Stable small left pleural effusion with a new small right pleural effusion. 2. Interval worsening of dependent consolidative opacity within bilateral   lower lobe since the study of 04/28/2019. In combination with worsening   bibasilar predominant mucous plugging, this likely represents either   atelectasis or aspiration. Clinical correlation is advised. 3. Slight interval improvement in appearance of lingular airspace   consolidation, likely representing resolving pneumonia.          XR CHEST PORTABLE   Final Result   Mild cardiomegaly and chronic pulmonary change without definite acute   pulmonary process. CT Chest Pulmonary Embolism W Contrast   Final Result   Suboptimal contrast timing although there is no evidence for large or central   pulmonary embolism. The segmental and subsegmental branches are not well   evaluated. Small left basilar effusion with adjacent consolidation and there are also   infiltrates within the lingula and right lung base that may represent   atelectasis versus pneumonia. Small amount of free fluid in the visualized upper abdomen. XR CHEST PORTABLE   Final Result   Cardiomegaly and mild pulmonary edema. IR PICC WO SQ PORT/PUMP > 5 YEARS    (Results Pending)           Assessment/Plan:    Active Hospital Problems    Diagnosis Date Noted    Tobacco abuse [Z72.0] 05/31/2011     Priority: Medium    Rheumatoid arthritis ([address] [M06.9] 05/17/2010     Priority: Medium    Anxiety state [F41.1] 05/17/2010     Priority: Low    Acute deep vein thrombosis (DVT) of left peroneal vein (HCC) [I82.492]     Pulmonary embolus (HCC) [I26.99]     Erythrocytosis [D75.1]     Morbid obesity ([address] [E66.01]     Obesity hypoventilation syndrome (HCC) [E66.2]     Acute respiratory failure with hypoxia ([address] [J96.01] 04/28/2019    New onset of congestive heart failure ([address] [I50.9] 04/28/2019    Moderate persistent asthma with acute exacerbation [J45.41] 04/17/2019    SOB (shortness of breath) [R06.02] 06/09/2011         1. Acute hypoxic resp failure. possibly secondary to acute pulmonary edema and acute congestive heart failure. was on IV Lasix-->now on prn lasix.  Echocardiogram is been obtained.  Echocardiogram however shows normal LV function does not show diastolic heart failure. shows moderate pulm HTN. now on Vapotherm. Likely acute PE. Venous doppler - DVT below knee. CTA was suboptimal( also severe hypoxia otherwise unexplained). Started anticoagulation with heparin gtt  Intubated 5/3 for worsening hypercarbia.     2.  Chronic hypercarbic respiratory failure likely obesity hypoventilation syndrome. mod pulm HTN on ECHO      3.  Acute bronchitis.  No fevers.  Has mildly elevated leukocytosis.  Pro-calcitonin however is negative. Continue  levaquin and iv vanc. Iv steroids      4.  Morbid obesity.  Nutrition consult.     5. Anasarca - monitor I/O net -15L, ?nutritional, received lasix           Diet: DIET TUBE FEED CONTINUOUS/CYCLIC NPO; Other Tube Feeding (must specify product in comment) (vital high protein);  Orogastric; Continuous; 25; 25; 20 (hours)  Code Status: Full Code    PT/OT Eval Status: not indicated    Dispo - cont care in ICU    Sandra Maria MD

## 2019-05-05 NOTE — PROGRESS NOTES
05/05/19 1050   Vent Information   Vent Type 840   Vent Mode AC/VC   Vt Ordered 400 mL   Rate Set 18 bmp   Peak Flow 60 L/min   Pressure Support 0 cmH20   FiO2  60 %   Sensitivity 3   PEEP/CPAP 8   I Time/ I Time % 0 s   Humidification Source Heated wire   Humidification Temp 35.9   Circuit Condensation Drained   Vent Patient Data   High Peep/I Pressure 0   Peak Inspiratory Pressure 42 cmH2O   Mean Airway Pressure 13 cmH20   Rate Measured 18 br/min   Vt Exhaled 394 mL   Minute Volume 4.91 Liters   I:E Ratio 1:3.60   Cough/Sputum   Sputum How Obtained Endotracheal   Cough Productive   Sputum Amount Moderate   Sputum Color Cloudy   Tenacity Thick   Spontaneous Breathing Trial (SBT) RT Doc   Pulse 80   SpO2 92 %   Breath Sounds   Right Upper Lobe Inspiratory Wheezes   Right Middle Lobe Inspiratory Wheezes   Right Lower Lobe Inspiratory Wheezes   Left Upper Lobe Inspiratory Wheezes   Left Lower Lobe Inspiratory Wheezes   Additional Respiratory  Assessments   Resp 18   Position Semi-Nguyen's   Subglottic Suction Done? Yes   Alarm Settings   High Pressure Alarm 45 cmH2O   Low Minute Volume Alarm 2.5 L/min   High Respiratory Rate 40 br/min   Patient Observation   Observations #7.5 ETT @ 23 lip line   ETT (adult)   Placement Date/Time: 05/03/19 c) 4920   Preoxygenation: Yes  Mask Ventilation: Ventilated by mask (1)  Technique: Video laryngoscopy  Tube Size: 7.5 mm  Laryngoscope: Mac  Blade Size: 3  Location: Oral  Grade View: Partial view of the glottis  Insert. ..    Secured at 23 cm   Measured From Lips   ET Placement Left   Secured By Commercial tube booker   Site Condition Dry

## 2019-05-05 NOTE — PROGRESS NOTES
Shift assessment complete. Medications given per order. Pt continues on current vent settings. Propofol infusing at 65 mcg/kg/min. Fentanyl infusing at 150 mcg/h. Pt family is at bedside with questions about CT and plan of care. Will follow up with Dr. Teodora Briggs.

## 2019-05-05 NOTE — PROGRESS NOTES
05/05/19 0717   Vent Information   Vent Type 840   Vent Mode AC/VC   Vt Ordered 400 mL   Rate Set 18 bmp   Peak Flow 60 L/min   Pressure Support 0 cmH20   FiO2  60 %   Sensitivity 3   PEEP/CPAP 10   I Time/ I Time % 0 s   Humidification Source Heated wire   Humidification Temp 36.9   Circuit Condensation Drained   Vent Patient Data   High Peep/I Pressure 0   Peak Inspiratory Pressure 33 cmH2O   Mean Airway Pressure 15 cmH20   Rate Measured 18 br/min   Vt Exhaled 428 mL   Minute Volume 8.18 Liters   I:E Ratio 1:3.60   Plateau Pressure 22 ZTO41   Static Compliance 35 mL/cmH2O   Dynamic Compliance 18 mL/cmH2O   Cough/Sputum   Sputum How Obtained Endotracheal   Cough Non-productive   Spontaneous Breathing Trial (SBT) RT Doc   Pulse 76   SpO2 92 %   Breath Sounds   Right Upper Lobe Diminished   Right Middle Lobe Diminished   Right Lower Lobe Diminished   Left Upper Lobe Diminished   Left Lower Lobe Diminished   Additional Respiratory  Assessments   Resp 18   Position Semi-Nguyen's   Subglottic Suction Done? Yes   Alarm Settings   High Pressure Alarm 45 cmH2O   Low Minute Volume Alarm 2.5 L/min   High Respiratory Rate 40 br/min   Patient Observation   Observations #7.5 ETT @ 23 lip line   ETT (adult)   Placement Date/Time: 05/03/19 (c) 7889   Preoxygenation: Yes  Mask Ventilation: Ventilated by mask (1)  Technique: Video laryngoscopy  Tube Size: 7.5 mm  Laryngoscope: Mac  Blade Size: 3  Location: Oral  Grade View: Partial view of the glottis  Insert. ..    Secured at 23 cm   Measured From Lips   ET Placement Left   Secured By Commercial tube booker   Site Condition Dry

## 2019-05-05 NOTE — PROGRESS NOTES
Resident sedated/pain controlled enough that mouth was able to be partially cleaned. Tongue not able to be assessed, and no wound to tongue was able to be seen. Amount of blood has lessened since beginning of shift. Resident now resting comfortably.  Will continue to monitor for any changes

## 2019-05-05 NOTE — PROGRESS NOTES
05/04/19 2308   Vent Information   Vent Type 840   Vent Mode AC/VC   Vt Ordered 400 mL   Rate Set 18 bmp   Peak Flow 60 L/min   Pressure Support 0 cmH20   FiO2  60 %   Sensitivity 3   PEEP/CPAP 10   I Time/ I Time % 0 s   Humidification Source Heated wire   Humidification Temp Measured 37   Circuit Condensation Drained   Vent Patient Data   High Peep/I Pressure 0   Peak Inspiratory Pressure 35 cmH2O   Mean Airway Pressure 15 cmH20   Rate Measured 18 br/min   Vt Exhaled 442 mL   Minute Volume 7.97 Liters   I:E Ratio 1:3.60   Cough/Sputum   Sputum How Obtained Endotracheal   Cough Non-productive   Sputum Amount None   Spontaneous Breathing Trial (SBT) RT Doc   Pulse 105   SpO2 92 %   Breath Sounds   Right Upper Lobe Diminished   Right Middle Lobe Diminished   Right Lower Lobe Diminished   Left Upper Lobe Diminished   Left Lower Lobe Diminished   Additional Respiratory  Assessments   Resp 18   Alarm Settings   High Pressure Alarm 45 cmH2O   Low Minute Volume Alarm 2.5 L/min   Apnea (secs) 20 secs   High Respiratory Rate 40 br/min   Low Exhaled Vt  255 mL   Patient Observation   Observations 7.5 ett 23 at lip

## 2019-05-05 NOTE — PROGRESS NOTES
Pharmacy - RE:  High-dose Heparin drip  Current rate = 11.4 ml/h  (1140 units/h)  aPTT redrawn by peripheral stick @ 0318 = 85.5 sec. Goal aPTT = 54 - 90 sec. Per protocol, continue same rate and obtain another aPTT 5/5 @ 1100.

## 2019-05-05 NOTE — PROGRESS NOTES
05/05/19 1459   Vent Information   Vent Type 840   Vent Mode AC/VC   Vt Ordered 400 mL   Rate Set 18 bmp   Peak Flow 60 L/min   Pressure Support 0 cmH20   FiO2  60 %   Sensitivity 3   PEEP/CPAP 8   I Time/ I Time % 0 s   Humidification Source Heated wire   Humidification Temp 35.9   Circuit Condensation Drained   Vent Patient Data   High Peep/I Pressure 0   Peak Inspiratory Pressure 39 cmH2O   Mean Airway Pressure 14 cmH20   Rate Measured 18 br/min   Vt Exhaled 443 mL   Minute Volume 7.93 Liters   I:E Ratio 1:3.60   Cough/Sputum   Sputum How Obtained Endotracheal   Cough Productive   Sputum Amount Small   Sputum Color Cloudy   Tenacity Thick   Spontaneous Breathing Trial (SBT) RT Doc   Pulse 80   SpO2 93 %   Breath Sounds   Right Upper Lobe Diminished   Right Middle Lobe Diminished   Right Lower Lobe Diminished   Left Upper Lobe Diminished   Left Lower Lobe Diminished   Additional Respiratory  Assessments   Resp 18   Position Semi-Nguyen's   Subglottic Suction Done? Yes   Alarm Settings   High Pressure Alarm 45 cmH2O   Low Minute Volume Alarm 2.5 L/min   High Respiratory Rate 40 br/min   Patient Observation   Observations #7.5 ETT @ 23 lip line   ETT (adult)   Placement Date/Time: 05/03/19 (c) 2381   Preoxygenation: Yes  Mask Ventilation: Ventilated by mask (1)  Technique: Video laryngoscopy  Tube Size: 7.5 mm  Laryngoscope: Mac  Blade Size: 3  Location: Oral  Grade View: Partial view of the glottis  Insert. ..    Secured at 23 cm   Measured From Lips   ET Placement Left   Secured By Commercial tube booker   Site Condition Dry

## 2019-05-05 NOTE — PROGRESS NOTES
Dr. Neville Jimenez and interdisciplinary team at bedside to evaluate pt. See new orders. Dr. Neville Jimenez spoke with family about concerns.  Electronically signed by Darshana Darby RN on 5/5/2019 at 10:10 AM

## 2019-05-06 ENCOUNTER — APPOINTMENT (OUTPATIENT)
Dept: GENERAL RADIOLOGY | Age: 59
DRG: 207 | End: 2019-05-06
Payer: COMMERCIAL

## 2019-05-06 LAB
ANION GAP SERPL CALCULATED.3IONS-SCNC: 7 MMOL/L (ref 3–16)
APPEARANCE BAL (LAVAGE): CLEAR
APTT: 63.7 SEC (ref 26–36)
APTT: 80 SEC (ref 26–36)
BASE EXCESS ARTERIAL: 6.1 MMOL/L (ref -3–3)
BASOPHILS ABSOLUTE: 0.1 K/UL (ref 0–0.2)
BASOPHILS RELATIVE PERCENT: 0.8 %
BUN BLDV-MCNC: 15 MG/DL (ref 7–20)
CALCIUM SERPL-MCNC: 9 MG/DL (ref 8.3–10.6)
CARBOXYHEMOGLOBIN ARTERIAL: 1.2 % (ref 0–1.5)
CHLORIDE BLD-SCNC: 93 MMOL/L (ref 99–110)
CLOT EVALUATION BAL: ABNORMAL
CO2: 38 MMOL/L (ref 21–32)
COLOR LAVAGE: COLORLESS
CREAT SERPL-MCNC: <0.5 MG/DL (ref 0.6–1.1)
EOSINOPHILS ABSOLUTE: 0.1 K/UL (ref 0–0.6)
EOSINOPHILS RELATIVE PERCENT: 1.1 %
EPITHELIAL CELLS FLUID: 3 %
GFR AFRICAN AMERICAN: >60
GFR NON-AFRICAN AMERICAN: >60
GLUCOSE BLD-MCNC: 101 MG/DL (ref 70–99)
GLUCOSE BLD-MCNC: 109 MG/DL (ref 70–99)
GLUCOSE BLD-MCNC: 116 MG/DL (ref 70–99)
GLUCOSE BLD-MCNC: 118 MG/DL (ref 70–99)
GLUCOSE BLD-MCNC: 135 MG/DL (ref 70–99)
GLUCOSE BLD-MCNC: 90 MG/DL (ref 70–99)
HCO3 ARTERIAL: 32.8 MMOL/L (ref 21–29)
HCT VFR BLD CALC: 51.5 % (ref 36–48)
HEMOGLOBIN, ART, EXTENDED: 17.9 G/DL (ref 12–16)
HEMOGLOBIN: 16.7 G/DL (ref 12–16)
JAK2 V617F MUTATION: 0 %
LYMPHOCYTES ABSOLUTE: 2.1 K/UL (ref 1–5.1)
LYMPHOCYTES RELATIVE PERCENT: 22.3 %
LYMPHOCYTES, BAL: 5 % (ref 5–10)
MACROPHAGES, BAL: 11 % (ref 90–95)
MCH RBC QN AUTO: 29.1 PG (ref 26–34)
MCHC RBC AUTO-ENTMCNC: 32.4 G/DL (ref 31–36)
MCV RBC AUTO: 89.8 FL (ref 80–100)
METHEMOGLOBIN ARTERIAL: 0.2 %
MONOCYTES ABSOLUTE: 1 K/UL (ref 0–1.3)
MONOCYTES RELATIVE PERCENT: 10.3 %
MONOCYTES, BAL: 1 %
NEUTROPHILS ABSOLUTE: 6.3 K/UL (ref 1.7–7.7)
NEUTROPHILS RELATIVE PERCENT: 65.5 %
NUMBER OF CELLS COUNTED BAL (LAVAGE): 200
O2 CONTENT ARTERIAL: 24 ML/DL
O2 SAT, ARTERIAL: 93.9 %
O2 THERAPY: ABNORMAL
PCO2 ARTERIAL: 53.8 MMHG (ref 35–45)
PDW BLD-RTO: 16.6 % (ref 12.4–15.4)
PERFORMED ON: ABNORMAL
PERFORMED ON: NORMAL
PH ARTERIAL: 7.4 (ref 7.35–7.45)
PLATELET # BLD: 147 K/UL (ref 135–450)
PMV BLD AUTO: 7.9 FL (ref 5–10.5)
PO2 ARTERIAL: 70.1 MMHG (ref 75–108)
POTASSIUM SERPL-SCNC: 3.4 MMOL/L (ref 3.5–5.1)
RBC # BLD: 5.73 M/UL (ref 4–5.2)
RBC, BAL: 1000 /CUMM
SEGMENTED NEUTROPHILS, BAL: 80 % (ref 5–10)
SODIUM BLD-SCNC: 138 MMOL/L (ref 136–145)
TCO2 ARTERIAL: 34.5 MMOL/L
TRIGL SERPL-MCNC: 273 MG/DL (ref 0–150)
WBC # BLD: 9.6 K/UL (ref 4–11)
WBC/EPI CELLS BAL: 220 /CUMM

## 2019-05-06 PROCEDURE — 85025 COMPLETE CBC W/AUTO DIFF WBC: CPT

## 2019-05-06 PROCEDURE — 31624 DX BRONCHOSCOPE/LAVAGE: CPT | Performed by: INTERNAL MEDICINE

## 2019-05-06 PROCEDURE — 6360000002 HC RX W HCPCS: Performed by: INTERNAL MEDICINE

## 2019-05-06 PROCEDURE — 2580000003 HC RX 258: Performed by: INTERNAL MEDICINE

## 2019-05-06 PROCEDURE — 99233 SBSQ HOSP IP/OBS HIGH 50: CPT | Performed by: INTERNAL MEDICINE

## 2019-05-06 PROCEDURE — 85730 THROMBOPLASTIN TIME PARTIAL: CPT

## 2019-05-06 PROCEDURE — 2700000000 HC OXYGEN THERAPY PER DAY

## 2019-05-06 PROCEDURE — 89051 BODY FLUID CELL COUNT: CPT

## 2019-05-06 PROCEDURE — 6370000000 HC RX 637 (ALT 250 FOR IP): Performed by: INTERNAL MEDICINE

## 2019-05-06 PROCEDURE — 0B9D8ZX DRAINAGE OF RIGHT MIDDLE LUNG LOBE, VIA NATURAL OR ARTIFICIAL OPENING ENDOSCOPIC, DIAGNOSTIC: ICD-10-PCS | Performed by: INTERNAL MEDICINE

## 2019-05-06 PROCEDURE — 82803 BLOOD GASES ANY COMBINATION: CPT

## 2019-05-06 PROCEDURE — 94640 AIRWAY INHALATION TREATMENT: CPT

## 2019-05-06 PROCEDURE — 84478 ASSAY OF TRIGLYCERIDES: CPT

## 2019-05-06 PROCEDURE — 94750 HC PULMONARY COMPLIANCE STUDY: CPT

## 2019-05-06 PROCEDURE — 80048 BASIC METABOLIC PNL TOTAL CA: CPT

## 2019-05-06 PROCEDURE — 3609010800 HC BRONCHOSCOPY ALVEOLAR LAVAGE: Performed by: INTERNAL MEDICINE

## 2019-05-06 PROCEDURE — 36415 COLL VENOUS BLD VENIPUNCTURE: CPT

## 2019-05-06 PROCEDURE — 2500000003 HC RX 250 WO HCPCS: Performed by: INTERNAL MEDICINE

## 2019-05-06 PROCEDURE — 99291 CRITICAL CARE FIRST HOUR: CPT | Performed by: INTERNAL MEDICINE

## 2019-05-06 PROCEDURE — 31645 BRNCHSC W/THER ASPIR 1ST: CPT | Performed by: INTERNAL MEDICINE

## 2019-05-06 PROCEDURE — 2709999900 HC NON-CHARGEABLE SUPPLY: Performed by: INTERNAL MEDICINE

## 2019-05-06 PROCEDURE — 87205 SMEAR GRAM STAIN: CPT

## 2019-05-06 PROCEDURE — 2000000000 HC ICU R&B

## 2019-05-06 PROCEDURE — 71045 X-RAY EXAM CHEST 1 VIEW: CPT

## 2019-05-06 PROCEDURE — 94003 VENT MGMT INPAT SUBQ DAY: CPT

## 2019-05-06 PROCEDURE — 94761 N-INVAS EAR/PLS OXIMETRY MLT: CPT

## 2019-05-06 PROCEDURE — 87070 CULTURE OTHR SPECIMN AEROBIC: CPT

## 2019-05-06 RX ORDER — SENNA AND DOCUSATE SODIUM 50; 8.6 MG/1; MG/1
2 TABLET, FILM COATED ORAL 2 TIMES DAILY
Status: DISCONTINUED | OUTPATIENT
Start: 2019-05-06 | End: 2019-05-20 | Stop reason: HOSPADM

## 2019-05-06 RX ORDER — POLYETHYLENE GLYCOL 3350 17 G/17G
17 POWDER, FOR SOLUTION ORAL DAILY PRN
Status: DISCONTINUED | OUTPATIENT
Start: 2019-05-06 | End: 2019-05-10

## 2019-05-06 RX ORDER — ALPRAZOLAM 0.25 MG/1
0.25 TABLET ORAL 3 TIMES DAILY
Status: DISCONTINUED | OUTPATIENT
Start: 2019-05-06 | End: 2019-05-17

## 2019-05-06 RX ORDER — FUROSEMIDE 10 MG/ML
40 INJECTION INTRAMUSCULAR; INTRAVENOUS DAILY
Status: DISCONTINUED | OUTPATIENT
Start: 2019-05-06 | End: 2019-05-07

## 2019-05-06 RX ADMIN — Medication 10 ML: at 08:12

## 2019-05-06 RX ADMIN — CARBOXYMETHYLCELLULOSE SODIUM 1 DROP: 10 GEL OPHTHALMIC at 11:37

## 2019-05-06 RX ADMIN — Medication 4 PUFF: at 07:57

## 2019-05-06 RX ADMIN — Medication 4 PUFF: at 11:46

## 2019-05-06 RX ADMIN — WHITE PETROLATUM 57.7 %-MINERAL OIL 31.9 % EYE OINTMENT: at 20:19

## 2019-05-06 RX ADMIN — WHITE PETROLATUM 57.7 %-MINERAL OIL 31.9 % EYE OINTMENT: at 00:52

## 2019-05-06 RX ADMIN — Medication 4 PUFF: at 07:56

## 2019-05-06 RX ADMIN — PROPOFOL 75 MCG/KG/MIN: 10 INJECTION, EMULSION INTRAVENOUS at 04:45

## 2019-05-06 RX ADMIN — FENTANYL CITRATE 150 MCG/HR: 50 INJECTION, SOLUTION INTRAMUSCULAR; INTRAVENOUS at 00:15

## 2019-05-06 RX ADMIN — Medication 4 PUFF: at 02:58

## 2019-05-06 RX ADMIN — CITALOPRAM HYDROBROMIDE 20 MG: 20 TABLET ORAL at 09:15

## 2019-05-06 RX ADMIN — WHITE PETROLATUM 57.7 %-MINERAL OIL 31.9 % EYE OINTMENT: at 12:34

## 2019-05-06 RX ADMIN — POTASSIUM BICARBONATE 40 MEQ: 782 TABLET, EFFERVESCENT ORAL at 11:06

## 2019-05-06 RX ADMIN — HEPARIN SODIUM 1247.4 UNITS/HR: 10000 INJECTION, SOLUTION INTRAVENOUS at 16:49

## 2019-05-06 RX ADMIN — Medication 4 PUFF: at 22:58

## 2019-05-06 RX ADMIN — SENNOSIDES AND DOCUSATE SODIUM 2 TABLET: 8.6; 5 TABLET ORAL at 11:05

## 2019-05-06 RX ADMIN — FENTANYL CITRATE 125 MCG/HR: 50 INJECTION, SOLUTION INTRAMUSCULAR; INTRAVENOUS at 16:48

## 2019-05-06 RX ADMIN — PROPOFOL 65 MCG/KG/MIN: 10 INJECTION, EMULSION INTRAVENOUS at 14:18

## 2019-05-06 RX ADMIN — Medication 10 ML: at 08:13

## 2019-05-06 RX ADMIN — PROPOFOL 75 MCG/KG/MIN: 10 INJECTION, EMULSION INTRAVENOUS at 09:09

## 2019-05-06 RX ADMIN — ALPRAZOLAM 0.25 MG: 0.25 TABLET ORAL at 14:33

## 2019-05-06 RX ADMIN — Medication 10 ML: at 20:20

## 2019-05-06 RX ADMIN — Medication 4 PUFF: at 19:13

## 2019-05-06 RX ADMIN — Medication 4 PUFF: at 14:30

## 2019-05-06 RX ADMIN — LEVOFLOXACIN 750 MG: 5 INJECTION, SOLUTION INTRAVENOUS at 09:13

## 2019-05-06 RX ADMIN — CARBOXYMETHYLCELLULOSE SODIUM 1 DROP: 10 GEL OPHTHALMIC at 06:27

## 2019-05-06 RX ADMIN — Medication 10 ML: at 20:19

## 2019-05-06 RX ADMIN — CHLORHEXIDINE GLUCONATE 0.12% ORAL RINSE 15 ML: 1.2 LIQUID ORAL at 20:19

## 2019-05-06 RX ADMIN — FUROSEMIDE 40 MG: 10 INJECTION, SOLUTION INTRAMUSCULAR; INTRAVENOUS at 12:34

## 2019-05-06 RX ADMIN — SENNOSIDES AND DOCUSATE SODIUM 2 TABLET: 8.6; 5 TABLET ORAL at 20:19

## 2019-05-06 RX ADMIN — Medication 1.5 G: at 01:00

## 2019-05-06 RX ADMIN — PROPOFOL 50 MCG/KG/MIN: 10 INJECTION, EMULSION INTRAVENOUS at 18:26

## 2019-05-06 RX ADMIN — ALPRAZOLAM 0.25 MG: 0.25 TABLET ORAL at 11:02

## 2019-05-06 RX ADMIN — Medication 1.5 G: at 13:09

## 2019-05-06 RX ADMIN — FAMOTIDINE 20 MG: 10 INJECTION, SOLUTION INTRAVENOUS at 20:20

## 2019-05-06 RX ADMIN — DICLOFENAC 2 G: 10 GEL TOPICAL at 11:03

## 2019-05-06 RX ADMIN — METHYLPREDNISOLONE SODIUM SUCCINATE 40 MG: 40 INJECTION, POWDER, FOR SOLUTION INTRAMUSCULAR; INTRAVENOUS at 09:15

## 2019-05-06 RX ADMIN — CARBOXYMETHYLCELLULOSE SODIUM 1 DROP: 10 GEL OPHTHALMIC at 19:38

## 2019-05-06 RX ADMIN — CARBOXYMETHYLCELLULOSE SODIUM 1 DROP: 10 GEL OPHTHALMIC at 17:02

## 2019-05-06 RX ADMIN — PROPOFOL 75 MCG/KG/MIN: 10 INJECTION, EMULSION INTRAVENOUS at 06:42

## 2019-05-06 RX ADMIN — WHITE PETROLATUM 57.7 %-MINERAL OIL 31.9 % EYE OINTMENT: at 08:11

## 2019-05-06 RX ADMIN — Medication 4 PUFF: at 11:47

## 2019-05-06 RX ADMIN — DICLOFENAC 2 G: 10 GEL TOPICAL at 20:20

## 2019-05-06 RX ADMIN — CHLORHEXIDINE GLUCONATE 0.12% ORAL RINSE 15 ML: 1.2 LIQUID ORAL at 08:13

## 2019-05-06 RX ADMIN — PROPOFOL 75 MCG/KG/MIN: 10 INJECTION, EMULSION INTRAVENOUS at 02:51

## 2019-05-06 RX ADMIN — FENTANYL CITRATE 150 MCG/HR: 50 INJECTION, SOLUTION INTRAMUSCULAR; INTRAVENOUS at 08:07

## 2019-05-06 RX ADMIN — FAMOTIDINE 20 MG: 10 INJECTION, SOLUTION INTRAVENOUS at 09:15

## 2019-05-06 RX ADMIN — PROPOFOL 50 MCG/KG/MIN: 10 INJECTION, EMULSION INTRAVENOUS at 22:16

## 2019-05-06 RX ADMIN — ALPRAZOLAM 0.25 MG: 0.25 TABLET ORAL at 20:19

## 2019-05-06 RX ADMIN — PROPOFOL 65 MCG/KG/MIN: 10 INJECTION, EMULSION INTRAVENOUS at 15:58

## 2019-05-06 RX ADMIN — PROPOFOL 65 MCG/KG/MIN: 10 INJECTION, EMULSION INTRAVENOUS at 11:29

## 2019-05-06 RX ADMIN — WHITE PETROLATUM 57.7 %-MINERAL OIL 31.9 % EYE OINTMENT: at 17:02

## 2019-05-06 RX ADMIN — PROPOFOL 75 MCG/KG/MIN: 10 INJECTION, EMULSION INTRAVENOUS at 00:49

## 2019-05-06 ASSESSMENT — PULMONARY FUNCTION TESTS
PIF_VALUE: 34
PIF_VALUE: 35
PIF_VALUE: 27
PIF_VALUE: 35
PIF_VALUE: 27
PIF_VALUE: 36
PIF_VALUE: 28
PIF_VALUE: 28
PIF_VALUE: 27
PIF_VALUE: 28
PIF_VALUE: 35
PIF_VALUE: 27
PIF_VALUE: 35
PIF_VALUE: 33
PIF_VALUE: 27
PIF_VALUE: 33

## 2019-05-06 NOTE — PROGRESS NOTES
Shift change, bedside report given to  Uri Anderson RN. Pt exhibits no s/s of distress. Call light in reach. Care has been transferred at this time.

## 2019-05-06 NOTE — PROGRESS NOTES
Pt intubated and sedated with Diprivan gtt @ 75 mcg/kg/min and fentanyl gtt @ 150 mcg/hr. RASS -3. CPOT 0. Vent settings:  AC 18, 400, 60%, +8. #7.5 ETT @ 24 cm LL. [facility] PICC site wnl. Lopez patent & draining clear greenish/yellow urine, STAT lock in place. TF @ 30 ml/hr. Heparin gtt infusing @ 12.5 ml/hr. Aptt recheck @ 12:00. NSR on the monitor. Please see full charted am assessment for details. Oral care provided. Face washed. Door open. ICU monitoring in place. Will continue to monitor closely.   [facility]

## 2019-05-06 NOTE — PROCEDURES
See History and Physical or progress note for additional findings. Pertinent changes recorded below if present. Allergies and medications have been reviewed    HENT: Airway patent and reviewed. ETT in place. Cardiovascular: Normal rate, regular rhythm, normal heart sounds. Pulmonary/Chest: No wheezes. No rhonchi. No rales. Abdominal: Soft. Bowel sounds are normal. No distension. ASA: Class 4 - A patient with an incapacitating systemic disease that is a constant threat to life  Level of Sedation Plan: Continue ICU sedation    Post Procedure Plan   Continue ICU care.   ______________________     The risks and benefits as well as alternatives to the procedure have been discussed with the POA. The  POA understands and agrees to proceed. Signed Consent in chart. PROCEDURE:  BRONCHOSCOPY      The risks and benefits as well as alternatives to the procedure have been discussed with the patient or POA. The patient or POA understands and agrees to proceed. Consent signed. DESCRIPTION OF PROCEDURE: A time out was taken. ICU sedation continued. The scope was passed with ease via the ETT. A complete airway inspection was performed. Normal anatomy. No endobronchial lesion was identified. Mucosa appeared normal. There were thick yellow and white secretions in the trachea and left and right mainstem. Therapeutic aspiration completed. Washings were obtained throughout the airways. A Bronchoalveolar lavage was obtained from the RML with good return. The patient tolerated the procedure well. EBL none. Recovery will be per endoscopy protocol. Will discharge home or return to same level of care per recovery protocol.     FOLLOW UP:  [facility]

## 2019-05-06 NOTE — PROGRESS NOTES
FiO2 decraeseed to 70% s/p bronch       05/06/19 1454   Vent Information   Vt Ordered 400 mL   Rate Set 18 bmp   Peak Flow 60 L/min   Pressure Support 0 cmH20   FiO2  70 %   Sensitivity 3   PEEP/CPAP 10   I Time/ I Time % 0 s   Vent Patient Data   High Peep/I Pressure 0   Peak Inspiratory Pressure 27 cmH2O   Mean Airway Pressure 15 cmH20   Rate Measured 18 br/min   Vt Exhaled 460 mL   Minute Volume 8.31 Liters   I:E Ratio 1:3.60   Spontaneous Breathing Trial (SBT) RT Doc   Pulse 82   SpO2 93 %   Additional Respiratory  Assessments   Resp 18   Alarm Settings   High Pressure Alarm 64 cmH2O   Low Minute Volume Alarm 2.5 L/min   High Respiratory Rate 40 br/min

## 2019-05-06 NOTE — PROGRESS NOTES
Peep increased to 10cwp. FiO2 remains at 60%         05/06/19 0758   Vent Information   Vt Ordered 400 mL   Rate Set 18 bmp   Peak Flow 60 L/min   Pressure Support 0 cmH20   FiO2  60 %   Sensitivity 3   PEEP/CPAP 10   I Time/ I Time % 0 s   Humidification Source Heated wire   Humidification Temp 36.7   Vent Patient Data   High Peep/I Pressure 0   Peak Inspiratory Pressure 28 cmH2O   Mean Airway Pressure 14 cmH20   Rate Measured 18 br/min   Vt Exhaled 446 mL   Minute Volume 8 Liters   I:E Ratio 1:3.60   Plateau Pressure 21 XDR86   Static Compliance 43 mL/cmH2O   Dynamic Compliance 24 mL/cmH2O   Cough/Sputum   Sputum How Obtained Endotracheal   Cough Productive   Sputum Amount Large   Sputum Color Wilber Anes; Baxter   Tenacity Tenacious   Spontaneous Breathing Trial (SBT) RT Doc   Pulse 73   SpO2 95 %   Breath Sounds   Right Upper Lobe Rhonchi   Right Middle Lobe Rhonchi   Right Lower Lobe Diminished   Left Upper Lobe Rhonchi   Left Lower Lobe Diminished   Additional Respiratory  Assessments   Resp 18   Oral Care Mouth suctioned   Subglottic Suction Done? Yes   Alarm Settings   High Pressure Alarm 63 cmH2O   Low Minute Volume Alarm 2.5 L/min   High Respiratory Rate 40 br/min   ETT (adult)   Placement Date/Time: 05/03/19 c) 5898   Preoxygenation: Yes  Mask Ventilation: Ventilated by mask (1)  Technique: Video laryngoscopy  Tube Size: 7.5 mm  Laryngoscope: Mac  Blade Size: 3  Location: Oral  Grade View: Partial view of the glottis  Insert. ..    Secured at 23 cm   ET Placement Center   Secured By Commercial tube booker   Site Condition Dry

## 2019-05-06 NOTE — PROGRESS NOTES
05/06/19 0258   Vent Information   Vent Type 840   Vent Mode AC/VC   Vt Ordered 400 mL   Rate Set 18 bmp   Peak Flow 60 L/min   Pressure Support 0 cmH20   FiO2  60 %   Sensitivity 3   PEEP/CPAP 8   I Time/ I Time % 0 s   Humidification Source Heated wire   Humidification Temp Measured 36.8   Circuit Condensation Drained   Vent Patient Data   High Peep/I Pressure 0   Peak Inspiratory Pressure 35 cmH2O   Mean Airway Pressure 13 cmH20   Rate Measured 18 br/min   Vt Exhaled 445 mL   Minute Volume 8 Liters   I:E Ratio 1:3.60   Cough/Sputum   Sputum How Obtained Endotracheal   Cough Non-productive   Sputum Amount None   Spontaneous Breathing Trial (SBT) RT Doc   Pulse 62   SpO2 90 %   Breath Sounds   Right Upper Lobe Expiratory Wheezes   Right Middle Lobe Expiratory Wheezes   Right Lower Lobe Expiratory Wheezes   Left Upper Lobe Expiratory Wheezes   Left Lower Lobe Expiratory Wheezes   Additional Respiratory  Assessments   Resp 18   Alarm Settings   High Pressure Alarm 45 cmH2O   Low Minute Volume Alarm 2.5 L/min   Apnea (secs) 20 secs   High Respiratory Rate 40 br/min   Low Exhaled Vt  255 mL   Patient Observation   Observations 7.5 ett 23 at lip

## 2019-05-06 NOTE — PROGRESS NOTES
Hospitalist Progress Note    This is my first encounter with the patient. Chart reviewed briefly. PCP: Amna Rodríguez MD    Date of Admission: 4/28/2019    Subjective: cont to be on vent, anasarca +    5/6-  she is sedated on the ventilator. Responds to commands like squeezing. Appears sick Plans for bronchoscopy today.       Medications:  Reviewed    Infusion Medications    heparin (porcine) Stopped (05/06/19 1600)    fentaNYL (SUBLIMAZE) infusion 150 mcg/hr (05/06/19 0807)    propofol 65 mcg/kg/min (05/06/19 1101)     Scheduled Medications    ALPRAZolam  0.25 mg Oral TID    furosemide  40 mg Intravenous Daily    sennosides-docusate sodium  2 tablet Oral BID    vancomycin  1,500 mg Intravenous Q12H    levofloxacin  750 mg Intravenous Q24H    carboxymethylcellulose PF  1 drop Both Eyes Q4H    And    STYE   Both Eyes Q4H    chlorhexidine  15 mL Mouth/Throat BID    famotidine (PEPCID) injection  20 mg Intravenous BID    albuterol sulfate HFA  4 puff Inhalation Q4H    And    ipratropium  4 puff Inhalation Q4H    lidocaine 1 % injection  5 mL Intradermal Once    sodium chloride flush  10 mL Intravenous 2 times per day    methylPREDNISolone  40 mg Intravenous Daily    diclofenac sodium  2 g Topical BID    citalopram  20 mg Oral Daily    sodium chloride flush  10 mL Intravenous 2 times per day     PRN Meds: polyethylene glycol, heparin (porcine), heparin (porcine), fentanNYL, sodium chloride flush, perflutren lipid microspheres, ibuprofen, sodium chloride flush, magnesium hydroxide, ondansetron, albuterol, albuterol sulfate HFA      Intake/Output Summary (Last 24 hours) at 5/6/2019 1113  Last data filed at 5/6/2019 0620  Gross per 24 hour   Intake 2957.72 ml   Output 3310 ml   Net -352.28 ml       Physical Exam Performed:    /67   Pulse 77   Temp 99.4 °F (37.4 °C) (Oral)   Resp 18   Ht 5' 3\" (1.6 m)   Wt 239 lb 8 oz (108.6 kg)   SpO2 93%   BMI 42.43 kg/m² General:  Sedated,intubated   Mucous Membranes:  Pink , anicteric  Neck: No JVD, no carotid bruit, no thyromegaly  Chest:  Clear to auscultation bilaterally, no added sounds  Cardiovascular:  RRR S1S2 heard, no murmurs or gallops  Abdomen:  Soft, undistended, non tender, no organomegaly, BS present  Extremities: No edema or cyanosis. Distal pulses well felt  Neurological :sedated      Labs:   Recent Labs     05/04/19  0530 05/05/19  0535 05/06/19  0559   WBC 9.6 9.8 9.6   HGB 17.5* 16.9* 16.7*   HCT 54.8* 52.8* 51.5*    149 147     Recent Labs     05/04/19  0530 05/05/19  0535 05/06/19  0559    137 138   K 4.5 4.1 3.4*   CL 91* 94* 93*   CO2 34* 36* 38*   BUN 17 16 15   CREATININE <0.5* <0.5* <0.5*   CALCIUM 9.4 9.2 9.0     No results for input(s): AST, ALT, BILIDIR, BILITOT, ALKPHOS in the last 72 hours. No results for input(s): INR in the last 72 hours. No results for input(s): Elizabeth Lacrosse in the last 72 hours. Urinalysis:      Lab Results   Component Value Date    NITRU Negative 04/28/2019    WBCUA 0-2 04/28/2019    BACTERIA 1+ 04/28/2019    RBCUA 0-2 04/28/2019    BLOODU TRACE-INTACT 04/28/2019    SPECGRAV <=1.005 04/28/2019    GLUCOSEU Negative 04/28/2019       Radiology:  XR CHEST PORTABLE   Final Result   Improving bilateral airspace disease and pleural effusions. XR CHEST PORTABLE   Final Result   Increasing right basilar and new bilateral upper lobe airspace disease could   represent edema or pneumonia. Left basilar pleuroparenchymal disease is   stable. XR CHEST PORTABLE   Final Result   1. No significant change. IR PICC WO SQ PORT/PUMP > 5 YEARS   Final Result   Successful placement of PICC line. XR CHEST PORTABLE   Final Result   Supportive devices are positioned appropriately. There is increasing   pulmonary edema since prior exam.         VL Extremity Venous Bilateral   Final Result      CT Chest WO Contrast   Final Result   1.  Stable small left pleural effusion with a new small right pleural effusion. 2. Interval worsening of dependent consolidative opacity within bilateral   lower lobe since the study of 04/28/2019. In combination with worsening   bibasilar predominant mucous plugging, this likely represents either   atelectasis or aspiration. Clinical correlation is advised. 3. Slight interval improvement in appearance of lingular airspace   consolidation, likely representing resolving pneumonia. XR CHEST PORTABLE   Final Result   Mild cardiomegaly and chronic pulmonary change without definite acute   pulmonary process. CT Chest Pulmonary Embolism W Contrast   Final Result   Suboptimal contrast timing although there is no evidence for large or central   pulmonary embolism. The segmental and subsegmental branches are not well   evaluated. Small left basilar effusion with adjacent consolidation and there are also   infiltrates within the lingula and right lung base that may represent   atelectasis versus pneumonia. Small amount of free fluid in the visualized upper abdomen. XR CHEST PORTABLE   Final Result   Cardiomegaly and mild pulmonary edema. Assessment/Plan:    Principal Problem:    Acute respiratory failure with hypoxia (HCC)  Active Problems:    Rheumatoid arthritis (HCC)    Tobacco abuse    Anxiety state    SOB (shortness of breath)    Moderate persistent asthma with acute exacerbation    New onset of congestive heart failure (HCC)    Morbid obesity (HCC)    Obesity hypoventilation syndrome (HCC)    Erythrocytosis    Acute deep vein thrombosis (DVT) of left peroneal vein (HCC)    Pulmonary embolus (HCC)  Resolved Problems:    * No resolved hospital problems. *         1. Acute hypoxic resp failure. possibly secondary to acute pulmonary edema and acute congestive heart failure.  was on IV Lasix-->now on prn lasix.  Echocardiogram has been obtained.  Echocardiogram however shows normal LV function does not show diastolic heart failure. shows moderate pulm HTN. Likely acute PE. Venous doppler - DVT below knee. CTA was suboptimal( also severe hypoxia otherwise unexplained). Started anticoagulation with heparin gtt  Intubated 5/3 for worsening hypercarbia. She is on a PEEP of 10. Continue with ventilator support for now. She will have a bronchoscopy today.     2.  Chronic hypercarbic respiratory failure likely obesity hypoventilation syndrome. mod pulm HTN on ECHO      3.  Acute bronchitis.  No fevers.  Has mildly elevated leukocytosis.  Pro-calcitonin however is negative. Continue  levaquin and iv vanc and IV steroids.     4.  Morbid Obesity  - Body mass index is 42.43 kg/m². - Complicating assessment and treatment. Placing patient at risk for multiple co-morbidities as well as early death and contributing to the patient's presentation.        5. Anasarca - monitor I/O net -15L, ?nutritional, received lasix           Diet: DIET TUBE FEED CONTINUOUS/CYCLIC NPO; Low Calorie High Protein (vital high protein);  Orogastric; Continuous; 25; 25; 20 (hours)  Code Status: Full Code    PT/OT Eval Status: not indicated    Dispo - cont care in ICU    [address] 5/6/2019 11:16 AM

## 2019-05-06 NOTE — PROGRESS NOTES
RESPIRATORY THERAPY ASSESSMENT    Name:  [facility] Record Number:  [id number]  Age: 64 y.o. Gender: female  : 1960  Today's Date:  2019  Room:  [facility]3009-    Assessment     Is the patient being admitted for a COPD or Asthma exacerbation? No   (If yes the patient will be seen every 4 hours for the first 24 hours and then reassessed)    Patient Admission Diagnosis      Allergies  No Known Allergies    Minimum Predicted Vital Capacity:     vent          Actual Vital Capacity:      vent              Pulmonary History:No history  Home Oxygen Therapy:  room air  Home Respiratory Therapy:Albuterol MDI Q4prn  Current Respiratory Therapy:  Albuterol q4, Atrovent Q4  Treatment Type: MDI  Medications: Ipratropium Bromide    Respiratory Severity Index(RSI)   Patients with orders for inhalation medications, oxygen, or any therapeutic treatment modality will be placed on Respiratory Protocol. They will be assessed with the first treatment and at least every 72 hours thereafter. The following severity scale will be used to determine frequency of treatment intervention. Smoking History: Smoking History Less than 1ppd or less than 15 pack year = 1    Social History  Social History     Tobacco Use    Smoking status: Current Every Day Smoker     Packs/day: 0.10     Years: 30.00     Pack years: 3.00     Types: Cigarettes    Smokeless tobacco: Never Used   Substance Use Topics    Alcohol use: No     Alcohol/week: 0.0 oz    Drug use: No       Recent Surgical History: None = 0  Past Surgical History  Past Surgical History:   Procedure Laterality Date    CARPAL TUNNEL RELEASE      both hands     LUMBAR SPINE SURGERY         Level of Consciousness: Comatose = 4    Level of Activity: Bedridden, unresponsive or quadriplegic = 4    Respiratory Pattern: Increased; RR 21-30 = 1    Breath Sounds: Diminshed bilaterally and/or crackles = 2    Sputum  Sputum Color:  White, Tenacity: Tenacious, Sputum How Obtained: Endotracheal  Cough: Weak, productive = 2    Vital Signs   BP (!) 146/89   Pulse 80   Temp 99.9 °F (37.7 °C) (Oral)   Resp 18   Ht 5' 3\" (1.6 m)   Wt 239 lb 8 oz (108.6 kg)   SpO2 91%   BMI 42.43 kg/m²   SPO2 (COPD values may differ): Less than 86% on room air or greater than 92% on FiO2 greater than 50% = 4    Peak Flow (asthma only): not applicable = 0    RSI: Greater than 17 = Contact physician        Plan       Goals: medication    Patient/caregiver was educated on the proper method of use for Respiratory Care Devices:  No: NA      Level of patient/caregiver understanding able to:   ? Verbalize understanding   ? Demonstrate understanding       ? Teach back        ? Needs reinforcement       ? No available caregiver               ? Other:     Response to education:       Is patient being placed on Home Treatment Regimen? No     Does the patient have everything they need prior to discharge? NA     Comments: chart reviewed,pt assessed    Plan of Care: continue current regimen    Electronically signed by Ashlyn Tao RCP on 5/6/2019 at 2:18 PM    Respiratory Protocol Guidelines     1. Assessment and treatment by Respiratory Therapy will be initiated for medication and therapeutic interventions upon initiation of aerosolized medication. 2. Physician will be contacted for respiratory rate (RR) greater than 35 breaths per minute. Therapy will be held for heart rate (HR) greater than 140 beats per minute, pending direction from physician. 3. Bronchodilators will be administered via Metered Dose Inhaler (MDI) with spacer when the following criteria are met:  a. Alert and cooperative     b. HR < 140 bpm  c. RR < 30 bpm                d. Can demonstrate a 2-3 second inspiratory hold  4. Bronchodilators will be administered via Hand Held Nebulizer [facility]) to patients when ANY of the following criteria are met  a. Incognizant or uncooperative          b.  Patients treated with HHN at Home c. Unable to demonstrate proper use of MDI with spacer     d. RR > 30 bpm   5. Bronchodilators will be delivered via Metered Dose Inhaler (MDI), HHN, Aerogen to intubated patients on mechanical ventilation. 6. Inhalation medication orders will be delivered and/or substituted as outlined below. Aerosolized Medications Ordering and Administration Guidelines:    1. All Medications will be ordered by a physician, and their frequency and/or modality will be adjusted as defined by the patients Respiratory Severity Index (RSI) score. 2. If the patient does not have documented COPD, consider discontinuing anticholinergics when RSI is less than 9.  3. If the bronchospasm worsens (increased RSI), then the bronchodilator frequency can be increased to a maximum of every 4 hours. If greater than every 4 hours is required, the physician will be contacted. 4. If the bronchospasm improves, the frequency of the bronchodilator can be decreased, based on the patient's RSI, but not less than home treatment regimen frequency. 5. Bronchodilator(s) will be discontinued if patient has a RSI less than 9 and has received no scheduled or as needed treatment for 72  Hrs. Patients Ordered on a Mucolytic Agent:    1. Must always be administered with a bronchodilator. 2. Discontinue if patient experiences worsened bronchospasm, or secretions have lessened to the point that the patient is able to clear them with a cough. Anti-inflammatory and Combination Medications:    1. If the patient lacks prior history of lung disease, is not using inhaled anti-inflammatory medication at home, and lacks wheezing by examination or by history for at least 24 hours, contact physician for possible discontinuation.

## 2019-05-06 NOTE — PROGRESS NOTES
Pt remains intubated and sedated. Weaning Diprivan gtt a tolerated throughout the day with the addition of home Xanax. CPOT 0, RASS -2 to -3. PICC site wnl. Lopez patent & draining clear yellow/green urine, STAT lock in place. Heparin gtt infusing @ 12.5 ml/hr, next Aptt @ 20:30 tonight. NSR. Bilateral soft wrist restraints remain in place for patient safety. See flowsheet and complete assessment for details. Repositioned for comfort. Unable to use call light in reach. Door open. ICU monitoring in place. Will continue to monitor closely.   [facility]

## 2019-05-06 NOTE — PROGRESS NOTES
Shift assessment completed. Pt repositioned in bed. VSS. Heparin gtt adjusted per order. Pt remains intubated and sedated. Lopez in place for strict I/o. TF infusing at goal 25 cc/hr w/ free water flush 60 cc Q6H. No new orders. PICC WNL. Will closely monitor.   Phong Rodrigez RN, BSN

## 2019-05-06 NOTE — CARE COORDINATION
INTERDISCIPLINARY PLAN OF CARE CONFERENCE    Date/Time: 5/6/2019 9:22 AM  Completed by: Lizzy Crane, Case Management      Patient Name:  Ann Marie DeL eon  YOB: 1960  Admitting Diagnosis: Acute respiratory failure ([address] [J96.00]     Admit Date/Time:  4/28/2019  1:37 PM    Chart reviewed. Interdisciplinary team met to discuss patient progress and discharge plans. Disciplines included Case Management, Nursing, and Dietitian. Current Status:ongoing    PT/OT recommendation:on hold d/t pt being intubated    Anticipated Discharge Date: tbd  Expected D/C Disposition:  tbd--home verses SNF  Confirmed plan with patient and/or family Yes, pt's mother  Discharge Plan Comments: Reviewed chart. Role of discharge planner explained and patient unable verbalized understanding d/t being on a vent, but pt's mother was at bedside and verbalized understanding. TORRIE received a CN from Dr. Tere Byrd to call [facility] to inquire about the cost of:  \"Please check coverage/cost to pt for BOTH of the below drugs and leave written progress note with answer. Dosing for direct oral anti-coagulants for pulmonary embolism:   Xarelto 15 mg po BID for 21 days then 20 mg po daily. Eliquis 10 mg po BID for 7 days then 5 mg po BID. Please plan to offer meds to beds as option if possible. \"    Per Jaqueline Mayer (pharmacist) with [facility], pt is able to use the copay assistance cards for $10 for either of them, however, witht he Xarelto, pt would have to sign up for the card, where with Eliquis, pt could just take the card to the pharmacy without signing up. CM did inform pt's RN, Elle Hudson of this, with verbalized understanding. Pt's mother also inquired about insurance coverage. TORRIE explained this and that we are taking care of pt regardless of insurance. CM did explained that pt meets criteria to be int [facility].  Pt's mother states that she has\"peace of mind with this\".

## 2019-05-06 NOTE — PROGRESS NOTES
Pharmacy - RE:  High-dose Heparin drip  Current rate = 12.5 ml/h  (1140 units/h)  aPTT drawn @ 0559 = 80 sec. Goal aPTT = 54 - 90 sec. Per protocol, continue same rate and obtain another aPTT 5/6 @ 1100.

## 2019-05-06 NOTE — PROGRESS NOTES
ONCOLOGY FOLLOW-UP:         PROBLEM LIST:       Patient Active Problem List   Diagnosis Code    Anxiety state F41.1    [facility] M06.9    Tobacco abuse Z72.0    SOB (shortness of breath) R06.02    Moderate persistent asthma with acute exacerbation J45.41    Acute respiratory failure with hypoxia (HCC) J96.01    New onset of congestive heart failure (HCC) I50.9    Morbid obesity (HCC) E66.01    Obesity hypoventilation syndrome (HCC) E66.2    Erythrocytosis D75.1    Acute deep vein thrombosis (DVT) of left peroneal vein (HCC) I82.492    Pulmonary embolus (HCC) I26.99       INTERVAL HISTORY:       Pt remains in ICU. She is now intubated.      REVIEW OF SYSTEMS:       Unable to assess    PHYSICAL EXAM:       BP (!) 159/87   Pulse 70   Temp 99.4 °F (37.4 °C) (Oral)   Resp 16   Ht 5' 3\" (1.6 m)   Wt 239 lb 8 oz (108.6 kg)   SpO2 97%   BMI 42.43 kg/m²     General appearance: intubated  Head: Normocephalic, without obvious abnormality, atraumatic  Neck: No palpable lymphadenopathy in supraclavicular or cervical chains  Lungs: Clear to auscultation bilaterally, no audible rales, wheezes or crackles  Heart: Regular rate and rhythm, S1, S2 normal  Abdomen: Soft, non-tender; bowel sounds normal; no masses,  no organomegaly  Extremities: without cyanosis, clubbing, edema or asymmetry  Skin: No jaundice, purpura or petechiae    LABS:     CBC:   Lab Results   Component Value Date    WBC 9.6 05/06/2019    HGB 16.7 (H) 05/06/2019    HCT 51.5 (H) 05/06/2019    MCV 89.8 05/06/2019     05/06/2019    LYMPHOPCT 22.3 05/06/2019    RBC 5.73 (H) 05/06/2019    MCH 29.1 05/06/2019    MCHC 32.4 05/06/2019    RDW 16.6 (H) 05/06/2019       BMP:   Lab Results   Component Value Date     05/06/2019    K 3.4 05/06/2019    CL 93 05/06/2019    CO2 38 05/06/2019    BUN 15 05/06/2019    CREATININE <0.5 05/06/2019    GLUCOSE 109 05/06/2019    CALCIUM 9.0 05/06/2019        Hepatic Function Panel:   Lab Results   Component Value Date    ALKPHOS 95 04/28/2019     04/28/2019     04/28/2019    PROT 7.0 04/28/2019    PROT 6.8 06/18/2012    BILITOT 1.3 04/28/2019    LABALBU 3.7 04/28/2019      Carboxyhemoglbin 3.2 (H)  Epo 6    IMAGING:     Xr Chest Portable  Result Date: 4/28/2019  Cardiomegaly and mild pulmonary edema. Ct Chest Pulmonary Embolism W Contrast  Result Date: 4/28/2019  Suboptimal contrast timing although there is no evidence for large or central pulmonary embolism. The segmental and subsegmental branches are not well evaluated. Small left basilar effusion with adjacent consolidation and there are also infiltrates within the lingula and right lung base that may represent atelectasis versus pneumonia. Small amount of free fluid in the visualized upper abdomen. LE Doppler:  Summary        1. There is evidence of isolated acute deep venous thrombosis involving the    peroneal vein below the knee in the left lower extremity.    2. There is no other evidence of deep or superficial venous thrombosis    involving the lower extremities bilaterally. ASSESSMENT:     Problem List Items Addressed This Visit     New onset of congestive heart failure (HCC) - Primary    Relevant Medications    furosemide (LASIX) injection 40 mg (Completed)    acetaZOLAMIDE (DIAMOX) injection 250 mg (Completed)    acetaZOLAMIDE (DIAMOX) injection 250 mg (Completed)    heparin (porcine) injection 9,000 Units (Completed)    heparin (porcine) injection 9,000 Units    heparin (porcine) injection 4,500 Units    heparin 25,000 units in dextrose 5% 250 mL infusion    heparin (porcine) injection 4,500 Units (Completed)    furosemide (LASIX) injection 40 mg (Completed)    heparin (porcine) injection 4,500 Units (Completed)      Other Visit Diagnoses     Respiratory distress                    PLAN:     1.  Polycythemia/Erythrocytosis  - persistently mildly elevated Hgb/Hct since 2015 in 50-55 range  - WBC slightly high this admission, previously normal and platelets normal  - suspect most likely secondary to smoking/chronic hypoxia  - will check DOROTHY-2 kinase to evaluate for myeloproliferative disorder - results pending  - checked Epo to assess for exogenous production - normal  - check carboxyhemoglobin - high - consistent with hypoxia  - Hct improved since admission - consider phlebotomy if Hct increasing - hold for now since < 55  - monitor CBC    2.  DVT  - on heparin  - transition to NOAC at d/c  - needs minimum of 6 months Delia Mccoy MD

## 2019-05-06 NOTE — PROGRESS NOTES
05/06/19 1913   Vent Information   Vt Ordered 400 mL   Rate Set 18 bmp   Peak Flow 60 L/min   FiO2  65 %   PEEP/CPAP 10   Vent Patient Data   Peak Inspiratory Pressure 27 cmH2O   Mean Airway Pressure 15 cmH20   Rate Measured 18 br/min   Vt Exhaled 452 mL   Minute Volume 8.13 Liters   I:E Ratio 1:3.60   Plateau Pressure 23 HHU89   Static Compliance 35 mL/cmH2O   Dynamic Compliance 27 mL/cmH2O   Cough/Sputum   Cough Productive   Sputum Amount Small   Sputum Color Cloudy   Tenacity Thick   Spontaneous Breathing Trial (SBT) RT Doc   Pulse 74   SpO2 95 %

## 2019-05-06 NOTE — PROGRESS NOTES
Inhalation Q4H    lidocaine 1 % injection  5 mL Intradermal Once    sodium chloride flush  10 mL Intravenous 2 times per day    methylPREDNISolone  40 mg Intravenous Daily    diclofenac sodium  2 g Topical BID    citalopram  20 mg Oral Daily    sodium chloride flush  10 mL Intravenous 2 times per day     PRN Meds:  heparin (porcine), heparin (porcine), fentanNYL, sodium chloride flush, perflutren lipid microspheres, ibuprofen, sodium chloride flush, magnesium hydroxide, ondansetron, albuterol, albuterol sulfate HFA    Results:  CBC:   Recent Labs     05/04/19  0530 05/05/19  0535 05/06/19  0559   WBC 9.6 9.8 9.6   HGB 17.5* 16.9* 16.7*   HCT 54.8* 52.8* 51.5*   MCV 92.0 91.5 89.8    149 147     BMP:   Recent Labs     05/04/19 0530 05/05/19  0535 05/06/19  0559    137 138   K 4.5 4.1 3.4*   CL 91* 94* 93*   CO2 34* 36* 38*   BUN 17 16 15   CREATININE <0.5* <0.5* <0.5*     LIVER PROFILE: No results for input(s): AST, ALT, LIPASE, BILIDIR, BILITOT, ALKPHOS in the last 72 hours. Invalid input(s): AMYLASE,  ALB    Cultures:  Sputum 5/3 NRF  4/28 blood ngtd    Films:  CXR was reviewed by me and it showed  hazy R basilar ASD, ETT in place, small pleural effusions      Assessment:  · Acute hypoxemic and hypercapnic respiratory failure  · Pneumonia  · Acute lower extremity DVT on left  · Acute PE-clinical diagnosis, CTPA suboptimal, unexplained hypoxemia and DVT  · Cor Pulmonale  · Chronic hypercapnic respiratory failure most likely secondary to obesity hypoventilation syndrome  · Erythrocytosis  · Morbid obesity     Plan:  · Mechanical ventilation per my orders. The ventilator was adjusted by me at the bedside for unstable, life threatening respiratory failure.    · Target plateau pressures <10 cm H2O  · HOB greater than 30 degrees at all times  · Daily SBT once FIO2<60% and PEEP = 5, patient arousable, hemodynamically stable  · IV Sedation with propofol and fentanyl targeted RASS -2  · Hematology consulted  · Antibiotics: Vancomycin day #6 and levaquin D6  · Heparin gtt  · Check TGD  · Lasix 40 mg IV daily  · Replace K  · TFs   · Start a bowel regimen  · Bronch for pulmonary toilet  · Home xanax  · ICU care: bactroban  ·  Total critical care time caring for this patient with life threatening, unstable organ failure, including direct patient contact, management of life support systems, review of data including imaging and labs, discussions with other team members and physicians at least 32 minutes so far today, excluding procedures.

## 2019-05-06 NOTE — PROGRESS NOTES
FiO2 decreased to 65%       05/06/19 1630   Vent Information   Vt Ordered 400 mL   Rate Set 18 bmp   Peak Flow 60 L/min   Pressure Support 0 cmH20   FiO2  65 %   Sensitivity 3   PEEP/CPAP 10   I Time/ I Time % 0 s   Vent Patient Data   High Peep/I Pressure 0   Peak Inspiratory Pressure 28 cmH2O   Mean Airway Pressure 15 cmH20   Rate Measured 18 br/min   Vt Exhaled 443 mL   Minute Volume 7.88 Liters   I:E Ratio 1:3.60   Spontaneous Breathing Trial (SBT) RT Doc   Pulse 73   SpO2 93 %   Additional Respiratory  Assessments   Resp 18   Alarm Settings   High Pressure Alarm 64 cmH2O   Low Minute Volume Alarm 2.5 L/min   High Respiratory Rate 40 br/min

## 2019-05-07 PROBLEM — E44.1 MILD PROTEIN-CALORIE MALNUTRITION (HCC): Status: ACTIVE | Noted: 2019-05-07

## 2019-05-07 LAB
ANION GAP SERPL CALCULATED.3IONS-SCNC: 8 MMOL/L (ref 3–16)
APTT: 61.7 SEC (ref 26–36)
APTT: 73.2 SEC (ref 26–36)
BASE EXCESS ARTERIAL: 9 MMOL/L (ref -3–3)
BASOPHILS ABSOLUTE: 0.1 K/UL (ref 0–0.2)
BASOPHILS RELATIVE PERCENT: 0.9 %
BUN BLDV-MCNC: 15 MG/DL (ref 7–20)
CALCIUM SERPL-MCNC: 9.5 MG/DL (ref 8.3–10.6)
CARBOXYHEMOGLOBIN ARTERIAL: 1.2 % (ref 0–1.5)
CHLORIDE BLD-SCNC: 89 MMOL/L (ref 99–110)
CO2: 39 MMOL/L (ref 21–32)
CREAT SERPL-MCNC: <0.5 MG/DL (ref 0.6–1.1)
EOSINOPHILS ABSOLUTE: 0.1 K/UL (ref 0–0.6)
EOSINOPHILS RELATIVE PERCENT: 1.5 %
GFR AFRICAN AMERICAN: >60
GFR NON-AFRICAN AMERICAN: >60
GLUCOSE BLD-MCNC: 107 MG/DL (ref 70–99)
GLUCOSE BLD-MCNC: 113 MG/DL (ref 70–99)
GLUCOSE BLD-MCNC: 123 MG/DL (ref 70–99)
GLUCOSE BLD-MCNC: 146 MG/DL (ref 70–99)
GLUCOSE BLD-MCNC: 155 MG/DL (ref 70–99)
GLUCOSE BLD-MCNC: 98 MG/DL (ref 70–99)
HCO3 ARTERIAL: 37 MMOL/L (ref 21–29)
HCT VFR BLD CALC: 53.7 % (ref 36–48)
HEMOGLOBIN, ART, EXTENDED: 17.9 G/DL (ref 12–16)
HEMOGLOBIN: 17.5 G/DL (ref 12–16)
LYMPHOCYTES ABSOLUTE: 2.2 K/UL (ref 1–5.1)
LYMPHOCYTES RELATIVE PERCENT: 24.3 %
MCH RBC QN AUTO: 29.4 PG (ref 26–34)
MCHC RBC AUTO-ENTMCNC: 32.6 G/DL (ref 31–36)
MCV RBC AUTO: 90.3 FL (ref 80–100)
METHEMOGLOBIN ARTERIAL: 0.3 %
MONOCYTES ABSOLUTE: 0.9 K/UL (ref 0–1.3)
MONOCYTES RELATIVE PERCENT: 9.8 %
NEUTROPHILS ABSOLUTE: 5.8 K/UL (ref 1.7–7.7)
NEUTROPHILS RELATIVE PERCENT: 63.5 %
O2 CONTENT ARTERIAL: 25 ML/DL
O2 SAT, ARTERIAL: 97.4 %
O2 THERAPY: ABNORMAL
PCO2 ARTERIAL: 61.6 MMHG (ref 35–45)
PDW BLD-RTO: 16.4 % (ref 12.4–15.4)
PERFORMED ON: ABNORMAL
PERFORMED ON: NORMAL
PH ARTERIAL: 7.4 (ref 7.35–7.45)
PLATELET # BLD: 140 K/UL (ref 135–450)
PMV BLD AUTO: 7.7 FL (ref 5–10.5)
PO2 ARTERIAL: 101 MMHG (ref 75–108)
POTASSIUM SERPL-SCNC: 3.6 MMOL/L (ref 3.5–5.1)
RBC # BLD: 5.95 M/UL (ref 4–5.2)
SODIUM BLD-SCNC: 136 MMOL/L (ref 136–145)
TCO2 ARTERIAL: 38.9 MMOL/L
WBC # BLD: 9.1 K/UL (ref 4–11)

## 2019-05-07 PROCEDURE — 2580000003 HC RX 258: Performed by: INTERNAL MEDICINE

## 2019-05-07 PROCEDURE — 6360000002 HC RX W HCPCS: Performed by: INTERNAL MEDICINE

## 2019-05-07 PROCEDURE — 85730 THROMBOPLASTIN TIME PARTIAL: CPT

## 2019-05-07 PROCEDURE — 2700000000 HC OXYGEN THERAPY PER DAY

## 2019-05-07 PROCEDURE — 85025 COMPLETE CBC W/AUTO DIFF WBC: CPT

## 2019-05-07 PROCEDURE — 94761 N-INVAS EAR/PLS OXIMETRY MLT: CPT

## 2019-05-07 PROCEDURE — 99233 SBSQ HOSP IP/OBS HIGH 50: CPT | Performed by: INTERNAL MEDICINE

## 2019-05-07 PROCEDURE — 2500000003 HC RX 250 WO HCPCS: Performed by: INTERNAL MEDICINE

## 2019-05-07 PROCEDURE — 94750 HC PULMONARY COMPLIANCE STUDY: CPT

## 2019-05-07 PROCEDURE — 80048 BASIC METABOLIC PNL TOTAL CA: CPT

## 2019-05-07 PROCEDURE — 82803 BLOOD GASES ANY COMBINATION: CPT

## 2019-05-07 PROCEDURE — 6370000000 HC RX 637 (ALT 250 FOR IP): Performed by: INTERNAL MEDICINE

## 2019-05-07 PROCEDURE — 94003 VENT MGMT INPAT SUBQ DAY: CPT

## 2019-05-07 PROCEDURE — 99291 CRITICAL CARE FIRST HOUR: CPT | Performed by: INTERNAL MEDICINE

## 2019-05-07 PROCEDURE — 2000000000 HC ICU R&B

## 2019-05-07 PROCEDURE — 94640 AIRWAY INHALATION TREATMENT: CPT

## 2019-05-07 RX ADMIN — CARBOXYMETHYLCELLULOSE SODIUM 1 DROP: 10 GEL OPHTHALMIC at 03:22

## 2019-05-07 RX ADMIN — MAGNESIUM HYDROXIDE 30 ML: 400 SUSPENSION ORAL at 09:43

## 2019-05-07 RX ADMIN — PROPOFOL 50 MCG/KG/MIN: 10 INJECTION, EMULSION INTRAVENOUS at 00:32

## 2019-05-07 RX ADMIN — DICLOFENAC 2 G: 10 GEL TOPICAL at 07:49

## 2019-05-07 RX ADMIN — FAMOTIDINE 20 MG: 10 INJECTION, SOLUTION INTRAVENOUS at 21:16

## 2019-05-07 RX ADMIN — ALPRAZOLAM 0.25 MG: 0.25 TABLET ORAL at 14:30

## 2019-05-07 RX ADMIN — Medication 4 PUFF: at 11:18

## 2019-05-07 RX ADMIN — WHITE PETROLATUM 57.7 %-MINERAL OIL 31.9 % EYE OINTMENT: at 17:11

## 2019-05-07 RX ADMIN — PROPOFOL 50 MCG/KG/MIN: 10 INJECTION, EMULSION INTRAVENOUS at 06:49

## 2019-05-07 RX ADMIN — Medication 10 ML: at 07:35

## 2019-05-07 RX ADMIN — PROPOFOL 60 MCG/KG/MIN: 10 INJECTION, EMULSION INTRAVENOUS at 16:31

## 2019-05-07 RX ADMIN — Medication 4 PUFF: at 19:03

## 2019-05-07 RX ADMIN — WHITE PETROLATUM 57.7 %-MINERAL OIL 31.9 % EYE OINTMENT: at 12:32

## 2019-05-07 RX ADMIN — POLYETHYLENE GLYCOL (3350) 17 G: 17 POWDER, FOR SOLUTION ORAL at 09:43

## 2019-05-07 RX ADMIN — PROPOFOL 60 MCG/KG/MIN: 10 INJECTION, EMULSION INTRAVENOUS at 18:57

## 2019-05-07 RX ADMIN — CHLORHEXIDINE GLUCONATE 0.12% ORAL RINSE 15 ML: 1.2 LIQUID ORAL at 07:33

## 2019-05-07 RX ADMIN — PROPOFOL 65 MCG/KG/MIN: 10 INJECTION, EMULSION INTRAVENOUS at 12:30

## 2019-05-07 RX ADMIN — Medication 4 PUFF: at 15:05

## 2019-05-07 RX ADMIN — Medication 10 ML: at 21:16

## 2019-05-07 RX ADMIN — Medication 4 PUFF: at 07:35

## 2019-05-07 RX ADMIN — CHLORHEXIDINE GLUCONATE 0.12% ORAL RINSE 15 ML: 1.2 LIQUID ORAL at 21:15

## 2019-05-07 RX ADMIN — CITALOPRAM HYDROBROMIDE 20 MG: 20 TABLET ORAL at 07:33

## 2019-05-07 RX ADMIN — PROPOFOL 50 MCG/KG/MIN: 10 INJECTION, EMULSION INTRAVENOUS at 03:32

## 2019-05-07 RX ADMIN — PROPOFOL 60 MCG/KG/MIN: 10 INJECTION, EMULSION INTRAVENOUS at 14:16

## 2019-05-07 RX ADMIN — DICLOFENAC 2 G: 10 GEL TOPICAL at 21:17

## 2019-05-07 RX ADMIN — SENNOSIDES AND DOCUSATE SODIUM 2 TABLET: 8.6; 5 TABLET ORAL at 07:33

## 2019-05-07 RX ADMIN — METHYLPREDNISOLONE SODIUM SUCCINATE 40 MG: 40 INJECTION, POWDER, FOR SOLUTION INTRAMUSCULAR; INTRAVENOUS at 07:33

## 2019-05-07 RX ADMIN — PROPOFOL 60 MCG/KG/MIN: 10 INJECTION, EMULSION INTRAVENOUS at 21:34

## 2019-05-07 RX ADMIN — CARBOXYMETHYLCELLULOSE SODIUM 1 DROP: 10 GEL OPHTHALMIC at 15:12

## 2019-05-07 RX ADMIN — WHITE PETROLATUM 57.7 %-MINERAL OIL 31.9 % EYE OINTMENT: at 05:06

## 2019-05-07 RX ADMIN — Medication 4 PUFF: at 02:53

## 2019-05-07 RX ADMIN — Medication 1.5 G: at 12:57

## 2019-05-07 RX ADMIN — CARBOXYMETHYLCELLULOSE SODIUM 1 DROP: 10 GEL OPHTHALMIC at 23:09

## 2019-05-07 RX ADMIN — CARBOXYMETHYLCELLULOSE SODIUM 1 DROP: 10 GEL OPHTHALMIC at 00:30

## 2019-05-07 RX ADMIN — ALPRAZOLAM 0.25 MG: 0.25 TABLET ORAL at 21:16

## 2019-05-07 RX ADMIN — WHITE PETROLATUM 57.7 %-MINERAL OIL 31.9 % EYE OINTMENT: at 00:30

## 2019-05-07 RX ADMIN — FENTANYL CITRATE 125 MCG/HR: 50 INJECTION, SOLUTION INTRAMUSCULAR; INTRAVENOUS at 01:25

## 2019-05-07 RX ADMIN — SENNOSIDES AND DOCUSATE SODIUM 2 TABLET: 8.6; 5 TABLET ORAL at 21:16

## 2019-05-07 RX ADMIN — WHITE PETROLATUM 57.7 %-MINERAL OIL 31.9 % EYE OINTMENT: at 07:34

## 2019-05-07 RX ADMIN — POTASSIUM BICARBONATE 20 MEQ: 391 TABLET, EFFERVESCENT ORAL at 14:34

## 2019-05-07 RX ADMIN — HEPARIN SODIUM 12.5 ML/HR: 10000 INJECTION, SOLUTION INTRAVENOUS at 17:11

## 2019-05-07 RX ADMIN — PROPOFOL 60 MCG/KG/MIN: 10 INJECTION, EMULSION INTRAVENOUS at 09:51

## 2019-05-07 RX ADMIN — ALPRAZOLAM 0.25 MG: 0.25 TABLET ORAL at 09:43

## 2019-05-07 RX ADMIN — FUROSEMIDE 40 MG: 10 INJECTION, SOLUTION INTRAMUSCULAR; INTRAVENOUS at 07:33

## 2019-05-07 RX ADMIN — LEVOFLOXACIN 750 MG: 5 INJECTION, SOLUTION INTRAVENOUS at 07:33

## 2019-05-07 RX ADMIN — FENTANYL CITRATE 125 MCG/HR: 50 INJECTION, SOLUTION INTRAMUSCULAR; INTRAVENOUS at 11:38

## 2019-05-07 RX ADMIN — CARBOXYMETHYLCELLULOSE SODIUM 1 DROP: 10 GEL OPHTHALMIC at 20:29

## 2019-05-07 RX ADMIN — Medication 4 PUFF: at 23:23

## 2019-05-07 RX ADMIN — Medication 1.5 G: at 00:30

## 2019-05-07 RX ADMIN — FENTANYL CITRATE 125 MCG/HR: 50 INJECTION, SOLUTION INTRAMUSCULAR; INTRAVENOUS at 20:51

## 2019-05-07 RX ADMIN — FAMOTIDINE 20 MG: 10 INJECTION, SOLUTION INTRAVENOUS at 07:34

## 2019-05-07 RX ADMIN — CARBOXYMETHYLCELLULOSE SODIUM 1 DROP: 10 GEL OPHTHALMIC at 06:57

## 2019-05-07 RX ADMIN — WHITE PETROLATUM 57.7 %-MINERAL OIL 31.9 % EYE OINTMENT: at 21:15

## 2019-05-07 RX ADMIN — Medication 10 ML: at 07:33

## 2019-05-07 ASSESSMENT — PULMONARY FUNCTION TESTS
PIF_VALUE: 26
PIF_VALUE: 25
PIF_VALUE: 26
PIF_VALUE: 25
PIF_VALUE: 26
PIF_VALUE: 26
PIF_VALUE: 25
PIF_VALUE: 26
PIF_VALUE: 25
PIF_VALUE: 26
PIF_VALUE: 27
PIF_VALUE: 25
PIF_VALUE: 27
PIF_VALUE: 26
PIF_VALUE: 25
PIF_VALUE: 26
PIF_VALUE: 25
PIF_VALUE: 25
PIF_VALUE: 26
PIF_VALUE: 25
PIF_VALUE: 27
PIF_VALUE: 26
PIF_VALUE: 25
PIF_VALUE: 27
PIF_VALUE: 24
PIF_VALUE: 25

## 2019-05-07 NOTE — PROGRESS NOTES
No changes noted since previous assessment. TF at goal- pt tolerating w/ no residuals. PICC WNL Lopez in place for strict I/o WNL & patent. Pt has copious amounts of oral secretions. Will continue to closely monitor.   Melody Flynn RN, BSN

## 2019-05-07 NOTE — PROGRESS NOTES
Nutrition Assessment (Enteral Nutrition)    Type and Reason for Visit: Reassess    Nutrition Recommendations:   1. Continue Vital High-Protein at rate of 25 ml/hr x 20 hours. 2. Add water flushes of 60 ml every 6 hours or per MD guidance. 3. Monitor vent status, sedation type/amount, and check TG while patient is receiving propofol for sedation. 4. Monitor TF rate, intake, and tolerance + water flushes. 5. Monitor for additional in-patient weight changes, nutrition-related labs, and bowel function/regimen. Nutrition Assessment: patient's nutrition status remains unchanged at this time - she is receiving TF at low rate of 25 ml/hr x 20 hours d/t high propofol need/kcals from lipids and she has received < 75% of nutrition intake x 8 days admission; patient remains at risk for further compromise d/t ongoing intubation status, altered nutrition-related lab values, and weight loss during admission; will continue Vital High-Protein EN at 25 ml/hr x 20 hours and add water flushes of 60 ml every 6 hours or per MD guidance    Malnutrition Assessment:  · Malnutrition Status: Mild Malnutrition  · Context: Acute illness or injury  · Findings of the 6 clinical characteristics of malnutrition (Minimum of 2 out of 6 clinical characteristics is required to make the diagnosis of moderate or severe Protein Calorie Malnutrition based on AND/ASPEN Guidelines):  1. Energy Intake-Less than or equal to 75% of estimated energy requirement, Greater than or equal to 7 days    2. Weight Loss-10% loss or greater(- 32# or 11.6% weight loss ), in 1 week(x 9 days admission)  3. Fat Loss-No significant subcutaneous fat loss,    4. Muscle Loss-No significant muscle mass loss,    5. Fluid Accumulation-Moderate to severe fluid accumulation, Extremities(BUE/BLE + 2 pitting edema noted)  6.   Strength-Not measured    Nutrition Risk Level: High    Nutrition Needs:  · Estimated Daily Total Kcal: 984 - 1845 kcals based on 8-15 kcals/kg/CBW  · Estimated Daily Protein (g): 104 - 114 g protein based on 2.0-2.2 g/kg/IBW  · Estimated Daily Fluid (ml/day): 1000 - 1500 ml    Nutrition Diagnosis:   · Problem: Inadequate oral intake  · Etiology: related to Impaired respiratory function-inability to consume food, Insufficient energy/nutrient consumption     Signs and symptoms:  as evidenced by NPO status due to medical condition, Intubation, Diet history of poor intake, Weight loss greater than or equal to 2% in 1 week, Localized or generalized fluid accumulation, Lab values    Objective Information:  · Nutrition-Focused Physical Findings: patient remains intubated and sedated on 50 mcg propofol x 24 hours; TG were 273 mg/dl this am; no BM x 9 days admission - on bowel regimen; + bronch yesterday; abdomen is rounded, soft, and bowel sounds are hypoactive; skin feels warm and clammy  · Wound Type: None  · Current Nutrition Therapies:  · Oral Diet Orders: NPO   · Oral Diet intake: NPO  · Oral Nutrition Supplement (ONS) Orders: None  · ONS intake: NPO  · Tube Feeding (TF) Orders:   · Feeding Route: Orogastric  · Formula: Low Calorie, High Protein  · Rate (ml/hr):25 ml/hr     · Volume (ml/day): 500 ml   · Duration: Continuous  · Additives/Modulars: (none)  · Water Flushes: 60 ml every 6 hours or per MD guidance  · Current TF & Flush Orders Provides: TF is currently infusing at 25 ml/hr x 20 hours  · Goal TF & Flush Orders Provides: Vital High-Protein with a goal rate of 25 ml/hr x 20 hours = 500 ml TV, 500 kcals, 44 g protein, and 418 ml free water + water flushes of 60 ml every 6 hours or per MD guidance  · Additional Calories: propofol at 50 mcg x 24 hours = 871 kcals from lipids  · Anthropometric Measures:  · Ht: 5' 3\" (160 cm)   · Current Body Wt: 243 lb (110.2 kg)  · Admission Body Wt: 275 lb (124.7 kg)  · Usual Body Wt: (unable to obtain )  · Weight Change: - 32# or 11.6% weight loss (x 9 days admission)   · Ideal Body Wt: 115 lb (52.2 kg), % Ideal Body 211%  · BMI Classification: BMI > or equal to 40.0 Obese Class III(43.1)    Nutrition Interventions:   Continue NPO, Continue current Tube Feeding  Continued Inpatient Monitoring, Coordination of Care, Coordination of Community Care    Nutrition Evaluation:   · Evaluation: (goal is ongoing)   · Goals: patient will tolerate Vital High-Protein at goal rate of 25 ml/hr x 20 hours without GI distress, without s/s of aspiration, and without additional lab/fluid disturbances; weight will remain stable during remainder of admission   · Monitoring: TF Intake, TF Tolerance, Skin Integrity, Mental Status/Confusion, Weight, Pertinent Labs, Constipation, Monitor Bowel Function      Electronically signed by Autumn Carbajal RD, MONICA on 5/7/19 at 1:25 PM    Contact Number: [phone]

## 2019-05-07 NOTE — PROGRESS NOTES
AM assessment completed. VSS. Pt sedated on ventilator TV . See MAR for gtt rates. Lopez in place for strict I/o. Pt in bilateral soft wrist restraints for safety. AM labs reviewed. Awaiting care rounds.    Scott aLne RN, BSN

## 2019-05-07 NOTE — PROGRESS NOTES
's RR 30's Pt awake & agitated- not following commands. Spoke w/ Dr. Mirna Gill- will not attempt SBT at this time. Sedation resumed.    Nasreen Valdes RN, BSN

## 2019-05-07 NOTE — PROGRESS NOTES
Pharmacy - RE:  High-dose Heparin drip  Current rate = 12.5 ml/h  (1250 units/h)  aPTT drawn @ 0217 = 73.2 sec. Goal aPTT = 54 - 90 sec. Per protocol, continue same rate and obtain another aPTT 5/7 @ 0900.

## 2019-05-07 NOTE — PLAN OF CARE
Nutrition Problem: Inadequate oral intake  Intervention: Food and/or Nutrient Delivery: Continue NPO, Continue current Tube Feeding  Nutritional Goals: patient will tolerate Vital High-Protein at goal rate of 25 ml/hr x 20 hours without GI distress, without s/s of aspiration, and without additional lab/fluid disturbances; weight will remain stable during remainder of admission

## 2019-05-07 NOTE — PROGRESS NOTES
Hospitalist Progress Note        PCP: Dee Meade MD    Date of Admission: 4/28/2019    Subjective: cont to be on vent, anasarca +    5/6-  she is sedated on the ventilator. Responds to commands like squeezing. Appears sick Plans for bronchoscopy today. 5/7- Bronchoscopy was done. No endobronchial lesion seen. Mucosa appears normal. She had yellow and white secretions were thick.       Medications:  Reviewed    Infusion Medications    heparin (porcine) 1,247.4 Units/hr (05/06/19 1649)    fentaNYL (SUBLIMAZE) infusion 125 mcg/hr (05/07/19 0125)    propofol 60 mcg/kg/min (05/07/19 0942)     Scheduled Medications    potassium bicarb-citric acid  20 mEq Oral Once    ALPRAZolam  0.25 mg Oral TID    sennosides-docusate sodium  2 tablet Oral BID    vancomycin  1,500 mg Intravenous Q12H    levofloxacin  750 mg Intravenous Q24H    carboxymethylcellulose PF  1 drop Both Eyes Q4H    And    STYE   Both Eyes Q4H    chlorhexidine  15 mL Mouth/Throat BID    famotidine (PEPCID) injection  20 mg Intravenous BID    albuterol sulfate HFA  4 puff Inhalation Q4H    And    ipratropium  4 puff Inhalation Q4H    lidocaine 1 % injection  5 mL Intradermal Once    sodium chloride flush  10 mL Intravenous 2 times per day    methylPREDNISolone  40 mg Intravenous Daily    diclofenac sodium  2 g Topical BID    citalopram  20 mg Oral Daily    sodium chloride flush  10 mL Intravenous 2 times per day     PRN Meds: polyethylene glycol, heparin (porcine), heparin (porcine), fentanNYL, sodium chloride flush, perflutren lipid microspheres, ibuprofen, sodium chloride flush, magnesium hydroxide, ondansetron, albuterol, albuterol sulfate HFA      Intake/Output Summary (Last 24 hours) at 5/7/2019 1134  Last data filed at 5/7/2019 0949  Gross per 24 hour   Intake 2494.26 ml   Output 4775 ml   Net -2280.74 ml       Physical Exam Performed:    /72   Pulse 80   Temp 98.7 °F (37.1 °C) (Axillary)   Resp 16   Ht 5' 3\" (1.6 m) Wt 243 lb (110.2 kg)   SpO2 92%   BMI 43.05 kg/m²       General:  Sedated, intubated   Mucous Membranes:  Pink , anicteric  Neck: No JVD, no carotid bruit, no thyromegaly  Chest:  Clear to auscultation bilaterally, no added sounds  Cardiovascular:  RRR S1S2 heard, no murmurs or gallops  Abdomen:  Soft, undistended, non tender, no organomegaly, BS present  Extremities: No edema or cyanosis. Distal pulses well felt  Neurological : SERGE      Labs:   Recent Labs     05/05/19  0535 05/06/19  0559 05/07/19  0500   WBC 9.8 9.6 9.1   HGB 16.9* 16.7* 17.5*   HCT 52.8* 51.5* 53.7*    147 140     Recent Labs     05/05/19  0535 05/06/19  0559 05/07/19  0500    138 136   K 4.1 3.4* 3.6   CL 94* 93* 89*   CO2 36* 38* 39*   BUN 16 15 15   CREATININE <0.5* <0.5* <0.5*   CALCIUM 9.2 9.0 9.5     No results for input(s): AST, ALT, BILIDIR, BILITOT, ALKPHOS in the last 72 hours. No results for input(s): INR in the last 72 hours. No results for input(s): Clarance Oates in the last 72 hours. Urinalysis:      Lab Results   Component Value Date    NITRU Negative 04/28/2019    WBCUA 0-2 04/28/2019    BACTERIA 1+ 04/28/2019    RBCUA 0-2 04/28/2019    BLOODU TRACE-INTACT 04/28/2019    SPECGRAV <=1.005 04/28/2019    GLUCOSEU Negative 04/28/2019       Radiology:  XR CHEST PORTABLE   Final Result   Improving bilateral airspace disease and pleural effusions. XR CHEST PORTABLE   Final Result   Increasing right basilar and new bilateral upper lobe airspace disease could   represent edema or pneumonia. Left basilar pleuroparenchymal disease is   stable. XR CHEST PORTABLE   Final Result   1. No significant change. IR PICC WO SQ PORT/PUMP > 5 YEARS   Final Result   Successful placement of PICC line. XR CHEST PORTABLE   Final Result   Supportive devices are positioned appropriately.   There is increasing   pulmonary edema since prior exam.         VL Extremity Venous Bilateral   Final Result Lasix-->now on prn lasix.  Echocardiogram has been obtained.  Echocardiogram however shows normal LV function does not show diastolic heart failure. shows moderate pulm HTN. Likely acute PE. Venous doppler - DVT below knee. CTA was suboptimal( also severe hypoxia otherwise unexplained). Started anticoagulation with heparin gtt. Her DVT and PE were likely present at the time of her admission. Intubated 5/3 for worsening hypercarbia. She is on a PEEP of 10. Continue with ventilator support for now. Bronchoscopy was done on 5/7/19.     2.  Chronic hypercarbic respiratory failure likely obesity hypoventilation syndrome. mod pulm HTN on ECHO      3.  Acute bronchitis.  No fevers.  Has mildly elevated leukocytosis.  Pro-calcitonin however is negative. Vancomycin day 7/7 and Levaquin day 6/7.     4.  Morbid Obesity  - Body mass index is 43.05 kg/m². - Complicating assessment and treatment. Placing patient at risk for multiple co-morbidities as well as early death and contributing to the patient's presentation.        5. Anasarca - monitor I/O net -15L, ?nutritional, received lasix           Diet: DIET TUBE FEED CONTINUOUS/CYCLIC NPO; Low Calorie High Protein (vital high protein);  Orogastric; Continuous; 25; 25; 20 (hours)  Code Status: Full Code    PT/OT Eval Status: not indicated    Dispo - cont care in ICU    Radha Silva 5/7/2019 11:34 AM

## 2019-05-07 NOTE — PROGRESS NOTES
Pulmonary & Critical Care Medicine ICU Progress Note    CC: Acute hypoxic respiratory failure    Events of Last 24 hours: O2 weaned to 50% after bronch yesterday  I/O neg 16L    Propofol 50  fent 125    Invasive Lines: IV: piv    MV:  5/3  Vent Mode: AC/VC Rate Set: 18 bmp/Vt Ordered: 400 mL/ /FiO2 : 50 %  Recent Labs     05/06/19  0606 05/07/19  0500   PHART 7.403 7. 396   DYA4AFD 53.8* 61.6*   PO2ART 70.1* 101.0       IV:   heparin (porcine) 1,247.4 Units/hr (05/06/19 1649)    fentaNYL (SUBLIMAZE) infusion 125 mcg/hr (05/07/19 0125)    propofol 50 mcg/kg/min (05/07/19 0649)       Vitals:  /72   Pulse 78   Temp 99.8 °F (37.7 °C) (Oral)   Resp 18   Ht 5' 3\" (1.6 m)   Wt 243 lb (110.2 kg)   SpO2 95%   BMI 43.05 kg/m²   on vent    Intake/Output Summary (Last 24 hours) at 5/7/2019 0808  Last data filed at 5/7/2019 0748  Gross per 24 hour   Intake 2554.26 ml   Output 3875 ml   Net -1320.74 ml     EXAM:  Constitutional: ill appearing. Eyes: PERRL. No sclera icterus. ENT: Normal Nose. Normal Tongue. ETT in place  Neck:  Trachea is midline. No thyroid tenderness. Respiratory: No accessory muscle usage. decreased breath sounds. No wheezes. No rales. No Rhonchi. Cardiovascular: Normal S1S2. Jerryl Boyers No murmur. No digit cyanosis. No digit clubbing. No LE edema. GI: Non-tender. Non-distended. Normal bowel sounds. No masses. No umbilical hernia. Skin: No rash on the exposed extremities. No Nodules on exposed extremities. Neuro: Sedated. Does not Follows commands. Does not Moves all extremities. Psych:  No agitation, no anxiety.     Scheduled Meds:   ALPRAZolam  0.25 mg Oral TID    furosemide  40 mg Intravenous Daily    sennosides-docusate sodium  2 tablet Oral BID    vancomycin  1,500 mg Intravenous Q12H    levofloxacin  750 mg Intravenous Q24H    carboxymethylcellulose PF  1 drop Both Eyes Q4H    And    STYE   Both Eyes Q4H    chlorhexidine  15 mL Mouth/Throat BID    famotidine (PEPCID) injection 20 mg Intravenous BID    albuterol sulfate HFA  4 puff Inhalation Q4H    And    ipratropium  4 puff Inhalation Q4H    lidocaine 1 % injection  5 mL Intradermal Once    sodium chloride flush  10 mL Intravenous 2 times per day    methylPREDNISolone  40 mg Intravenous Daily    diclofenac sodium  2 g Topical BID    citalopram  20 mg Oral Daily    sodium chloride flush  10 mL Intravenous 2 times per day     PRN Meds:  polyethylene glycol, heparin (porcine), heparin (porcine), fentanNYL, sodium chloride flush, perflutren lipid microspheres, ibuprofen, sodium chloride flush, magnesium hydroxide, ondansetron, albuterol, albuterol sulfate HFA    Results:  CBC:   Recent Labs     05/05/19  0535 05/06/19  0559 05/07/19  0500   WBC 9.8 9.6 9.1   HGB 16.9* 16.7* 17.5*   HCT 52.8* 51.5* 53.7*   MCV 91.5 89.8 90.3    147 140     BMP:   Recent Labs     05/05/19  0535 05/06/19  0559 05/07/19  0500    138 136   K 4.1 3.4* 3.6   CL 94* 93* 89*   CO2 36* 38* 39*   BUN 16 15 15   CREATININE <0.5* <0.5* <0.5*     LIVER PROFILE: No results for input(s): AST, ALT, LIPASE, BILIDIR, BILITOT, ALKPHOS in the last 72 hours. Invalid input(s): AMYLASE,  ALB    Cultures:  Sputum 5/3 NRF  4/28 blood ngtd  5/6 BAL NGTD    Films:  CXR was reviewed by me and it showed 5/7  pending still    Assessment:  · Acute hypoxemic and hypercapnic respiratory failure  · Pneumonia  · Acute lower extremity DVT on left  · Acute PE-clinical diagnosis, CTPA suboptimal, unexplained hypoxemia and DVT  · Cor Pulmonale  · Chronic hypercapnic respiratory failure most likely secondary to obesity hypoventilation syndrome  · Erythrocytosis  · Morbid obesity     Plan:  · Mechanical ventilation per my orders. The ventilator was adjusted by me at the bedside for unstable, life threatening respiratory failure.    · Target plateau pressures <73 cm H2O  · HOB greater than 30 degrees at all times  · Daily SBT once FIO2<60% and PEEP =/< 8, patient arousable,

## 2019-05-07 NOTE — PROGRESS NOTES
High risk vesicant drug infusing:  ___no_______    Multiple incompatible medications infusing:  ______no___    CVP Monitoring:  ___no______    Extremely difficult IV access challenge:  __yes______    Continued need for central line access:  ______yes____    Addressed with physician:  __yes______    RIGHT PATIENT, RIGHT TIME, RIGHT LINE

## 2019-05-08 ENCOUNTER — APPOINTMENT (OUTPATIENT)
Dept: GENERAL RADIOLOGY | Age: 59
DRG: 207 | End: 2019-05-08
Payer: COMMERCIAL

## 2019-05-08 LAB
ANION GAP SERPL CALCULATED.3IONS-SCNC: 11 MMOL/L (ref 3–16)
APTT: 57.9 SEC (ref 26–36)
BASE EXCESS ARTERIAL: 11.5 MMOL/L (ref -3–3)
BASOPHILS ABSOLUTE: 0.1 K/UL (ref 0–0.2)
BASOPHILS RELATIVE PERCENT: 0.8 %
BUN BLDV-MCNC: 16 MG/DL (ref 7–20)
CALCIUM SERPL-MCNC: 9.7 MG/DL (ref 8.3–10.6)
CARBOXYHEMOGLOBIN ARTERIAL: 1.3 % (ref 0–1.5)
CHLORIDE BLD-SCNC: 88 MMOL/L (ref 99–110)
CO2: 38 MMOL/L (ref 21–32)
CREAT SERPL-MCNC: <0.5 MG/DL (ref 0.6–1.1)
CULTURE, RESPIRATORY: ABNORMAL
EOSINOPHILS ABSOLUTE: 0.2 K/UL (ref 0–0.6)
EOSINOPHILS RELATIVE PERCENT: 2 %
GFR AFRICAN AMERICAN: >60
GFR NON-AFRICAN AMERICAN: >60
GLUCOSE BLD-MCNC: 105 MG/DL (ref 70–99)
GLUCOSE BLD-MCNC: 114 MG/DL (ref 70–99)
GLUCOSE BLD-MCNC: 130 MG/DL (ref 70–99)
GLUCOSE BLD-MCNC: 132 MG/DL (ref 70–99)
GRAM STAIN RESULT: ABNORMAL
GRAM STAIN RESULT: ABNORMAL
HCO3 ARTERIAL: 41.9 MMOL/L (ref 21–29)
HCT VFR BLD CALC: 53.9 % (ref 36–48)
HEMOGLOBIN, ART, EXTENDED: 18.5 G/DL (ref 12–16)
HEMOGLOBIN: 17.3 G/DL (ref 12–16)
LYMPHOCYTES ABSOLUTE: 2.5 K/UL (ref 1–5.1)
LYMPHOCYTES RELATIVE PERCENT: 21.5 %
MCH RBC QN AUTO: 29.2 PG (ref 26–34)
MCHC RBC AUTO-ENTMCNC: 32 G/DL (ref 31–36)
MCV RBC AUTO: 91.1 FL (ref 80–100)
METHEMOGLOBIN ARTERIAL: 0.3 %
MONOCYTES ABSOLUTE: 1.2 K/UL (ref 0–1.3)
MONOCYTES RELATIVE PERCENT: 10.7 %
NEUTROPHILS ABSOLUTE: 7.4 K/UL (ref 1.7–7.7)
NEUTROPHILS RELATIVE PERCENT: 65 %
O2 CONTENT ARTERIAL: 24 ML/DL
O2 SAT, ARTERIAL: 92.7 %
O2 THERAPY: ABNORMAL
ORGANISM: ABNORMAL
ORGANISM: ABNORMAL
PCO2 ARTERIAL: 76.9 MMHG (ref 35–45)
PDW BLD-RTO: 16.4 % (ref 12.4–15.4)
PERFORMED ON: ABNORMAL
PH ARTERIAL: 7.35 (ref 7.35–7.45)
PLATELET # BLD: 150 K/UL (ref 135–450)
PMV BLD AUTO: 8.9 FL (ref 5–10.5)
PO2 ARTERIAL: 70.5 MMHG (ref 75–108)
POTASSIUM SERPL-SCNC: 3.5 MMOL/L (ref 3.5–5.1)
RBC # BLD: 5.91 M/UL (ref 4–5.2)
SODIUM BLD-SCNC: 137 MMOL/L (ref 136–145)
TCO2 ARTERIAL: 44.2 MMOL/L
TRIGL SERPL-MCNC: 284 MG/DL (ref 0–150)
WBC # BLD: 11.4 K/UL (ref 4–11)

## 2019-05-08 PROCEDURE — 2580000003 HC RX 258: Performed by: INTERNAL MEDICINE

## 2019-05-08 PROCEDURE — 82803 BLOOD GASES ANY COMBINATION: CPT

## 2019-05-08 PROCEDURE — 6360000002 HC RX W HCPCS: Performed by: INTERNAL MEDICINE

## 2019-05-08 PROCEDURE — 85730 THROMBOPLASTIN TIME PARTIAL: CPT

## 2019-05-08 PROCEDURE — 6370000000 HC RX 637 (ALT 250 FOR IP): Performed by: INTERNAL MEDICINE

## 2019-05-08 PROCEDURE — 94761 N-INVAS EAR/PLS OXIMETRY MLT: CPT

## 2019-05-08 PROCEDURE — 99291 CRITICAL CARE FIRST HOUR: CPT | Performed by: INTERNAL MEDICINE

## 2019-05-08 PROCEDURE — 84478 ASSAY OF TRIGLYCERIDES: CPT

## 2019-05-08 PROCEDURE — 94750 HC PULMONARY COMPLIANCE STUDY: CPT

## 2019-05-08 PROCEDURE — 2500000003 HC RX 250 WO HCPCS: Performed by: INTERNAL MEDICINE

## 2019-05-08 PROCEDURE — 94640 AIRWAY INHALATION TREATMENT: CPT

## 2019-05-08 PROCEDURE — 99232 SBSQ HOSP IP/OBS MODERATE 35: CPT | Performed by: INTERNAL MEDICINE

## 2019-05-08 PROCEDURE — 80048 BASIC METABOLIC PNL TOTAL CA: CPT

## 2019-05-08 PROCEDURE — 2000000000 HC ICU R&B

## 2019-05-08 PROCEDURE — 2700000000 HC OXYGEN THERAPY PER DAY

## 2019-05-08 PROCEDURE — 36415 COLL VENOUS BLD VENIPUNCTURE: CPT

## 2019-05-08 PROCEDURE — 85025 COMPLETE CBC W/AUTO DIFF WBC: CPT

## 2019-05-08 PROCEDURE — 94003 VENT MGMT INPAT SUBQ DAY: CPT

## 2019-05-08 PROCEDURE — 71045 X-RAY EXAM CHEST 1 VIEW: CPT

## 2019-05-08 PROCEDURE — 82306 VITAMIN D 25 HYDROXY: CPT

## 2019-05-08 RX ORDER — WHITE PETROLATUM 57.7 %-MINERAL OIL 31.9 % EYE OINTMENT
1 DAILY
Status: DISCONTINUED | OUTPATIENT
Start: 2019-05-08 | End: 2019-05-08 | Stop reason: CLARIF

## 2019-05-08 RX ORDER — POTASSIUM CHLORIDE 20MEQ/15ML
20 LIQUID (ML) ORAL ONCE
Status: COMPLETED | OUTPATIENT
Start: 2019-05-08 | End: 2019-05-08

## 2019-05-08 RX ORDER — POTASSIUM CHLORIDE 14.9 MG/ML
20 INJECTION INTRAVENOUS ONCE
Status: DISCONTINUED | OUTPATIENT
Start: 2019-05-08 | End: 2019-05-08

## 2019-05-08 RX ADMIN — Medication 4 PUFF: at 19:03

## 2019-05-08 RX ADMIN — WHITE PETROLATUM 57.7 %-MINERAL OIL 31.9 % EYE OINTMENT: at 15:03

## 2019-05-08 RX ADMIN — METHYLPREDNISOLONE SODIUM SUCCINATE 40 MG: 40 INJECTION, POWDER, FOR SOLUTION INTRAMUSCULAR; INTRAVENOUS at 07:37

## 2019-05-08 RX ADMIN — CARBOXYMETHYLCELLULOSE SODIUM 1 DROP: 10 GEL OPHTHALMIC at 04:54

## 2019-05-08 RX ADMIN — Medication 1.5 G: at 01:20

## 2019-05-08 RX ADMIN — FAMOTIDINE 20 MG: 10 INJECTION, SOLUTION INTRAVENOUS at 07:37

## 2019-05-08 RX ADMIN — Medication 4 PUFF: at 14:57

## 2019-05-08 RX ADMIN — Medication 4 PUFF: at 03:09

## 2019-05-08 RX ADMIN — WHITE PETROLATUM 57.7 %-MINERAL OIL 31.9 % EYE OINTMENT: at 05:12

## 2019-05-08 RX ADMIN — DICLOFENAC 2 G: 10 GEL TOPICAL at 07:38

## 2019-05-08 RX ADMIN — PROPOFOL 60 MCG/KG/MIN: 10 INJECTION, EMULSION INTRAVENOUS at 07:22

## 2019-05-08 RX ADMIN — FENTANYL CITRATE 125 MCG/HR: 50 INJECTION, SOLUTION INTRAMUSCULAR; INTRAVENOUS at 12:30

## 2019-05-08 RX ADMIN — Medication 4 PUFF: at 07:49

## 2019-05-08 RX ADMIN — Medication 10 ML: at 20:46

## 2019-05-08 RX ADMIN — Medication 4 PUFF: at 23:05

## 2019-05-08 RX ADMIN — PROPOFOL 30 MCG/KG/MIN: 10 INJECTION, EMULSION INTRAVENOUS at 20:52

## 2019-05-08 RX ADMIN — DICLOFENAC 2 G: 10 GEL TOPICAL at 20:52

## 2019-05-08 RX ADMIN — CARBOXYMETHYLCELLULOSE SODIUM 1 DROP: 10 GEL OPHTHALMIC at 07:23

## 2019-05-08 RX ADMIN — CITALOPRAM HYDROBROMIDE 20 MG: 20 TABLET ORAL at 07:37

## 2019-05-08 RX ADMIN — HEPARIN SODIUM 12.5 ML/HR: 10000 INJECTION, SOLUTION INTRAVENOUS at 17:08

## 2019-05-08 RX ADMIN — WHITE PETROLATUM 57.7 %-MINERAL OIL 31.9 % EYE OINTMENT: at 07:38

## 2019-05-08 RX ADMIN — PROPOFOL 45 MCG/KG/MIN: 10 INJECTION, EMULSION INTRAVENOUS at 11:06

## 2019-05-08 RX ADMIN — WHITE PETROLATUM 57.7 %-MINERAL OIL 31.9 % EYE OINTMENT: at 20:46

## 2019-05-08 RX ADMIN — ALPRAZOLAM 0.25 MG: 0.25 TABLET ORAL at 15:05

## 2019-05-08 RX ADMIN — PROPOFOL 60 MCG/KG/MIN: 10 INJECTION, EMULSION INTRAVENOUS at 02:24

## 2019-05-08 RX ADMIN — POTASSIUM CHLORIDE 20 MEQ: 20 SOLUTION ORAL at 11:43

## 2019-05-08 RX ADMIN — Medication 10 ML: at 07:37

## 2019-05-08 RX ADMIN — FENTANYL CITRATE 125 MCG/HR: 50 INJECTION, SOLUTION INTRAMUSCULAR; INTRAVENOUS at 21:51

## 2019-05-08 RX ADMIN — Medication 4 PUFF: at 07:50

## 2019-05-08 RX ADMIN — ALPRAZOLAM 0.25 MG: 0.25 TABLET ORAL at 09:23

## 2019-05-08 RX ADMIN — ALPRAZOLAM 0.25 MG: 0.25 TABLET ORAL at 20:39

## 2019-05-08 RX ADMIN — DEXMEDETOMIDINE HYDROCHLORIDE 0.8 MCG/KG/HR: 100 INJECTION, SOLUTION INTRAVENOUS at 14:13

## 2019-05-08 RX ADMIN — DEXMEDETOMIDINE HYDROCHLORIDE 1 MCG/KG/HR: 100 INJECTION, SOLUTION INTRAVENOUS at 23:54

## 2019-05-08 RX ADMIN — SENNOSIDES AND DOCUSATE SODIUM 2 TABLET: 8.6; 5 TABLET ORAL at 20:39

## 2019-05-08 RX ADMIN — CHLORHEXIDINE GLUCONATE 0.12% ORAL RINSE 15 ML: 1.2 LIQUID ORAL at 07:37

## 2019-05-08 RX ADMIN — Medication 1.5 G: at 11:45

## 2019-05-08 RX ADMIN — Medication 4 PUFF: at 10:45

## 2019-05-08 RX ADMIN — DEXMEDETOMIDINE HYDROCHLORIDE 0.9 MCG/KG/HR: 100 INJECTION, SOLUTION INTRAVENOUS at 19:42

## 2019-05-08 RX ADMIN — WHITE PETROLATUM 57.7 %-MINERAL OIL 31.9 % EYE OINTMENT: at 11:07

## 2019-05-08 RX ADMIN — PROPOFOL 60 MCG/KG/MIN: 10 INJECTION, EMULSION INTRAVENOUS at 05:10

## 2019-05-08 RX ADMIN — CHLORHEXIDINE GLUCONATE 0.12% ORAL RINSE 15 ML: 1.2 LIQUID ORAL at 20:46

## 2019-05-08 RX ADMIN — LEVOFLOXACIN 750 MG: 5 INJECTION, SOLUTION INTRAVENOUS at 07:37

## 2019-05-08 RX ADMIN — FAMOTIDINE 20 MG: 10 INJECTION, SOLUTION INTRAVENOUS at 20:38

## 2019-05-08 RX ADMIN — WHITE PETROLATUM 57.7 %-MINERAL OIL 31.9 % EYE OINTMENT: at 01:04

## 2019-05-08 RX ADMIN — SENNOSIDES AND DOCUSATE SODIUM 2 TABLET: 8.6; 5 TABLET ORAL at 07:36

## 2019-05-08 RX ADMIN — CARBOXYMETHYLCELLULOSE SODIUM 1 DROP: 10 GEL OPHTHALMIC at 23:54

## 2019-05-08 RX ADMIN — FENTANYL CITRATE 125 MCG/HR: 50 INJECTION, SOLUTION INTRAMUSCULAR; INTRAVENOUS at 04:54

## 2019-05-08 RX ADMIN — Medication 10 ML: at 20:39

## 2019-05-08 RX ADMIN — DEXMEDETOMIDINE HYDROCHLORIDE 0.2 MCG/KG/HR: 100 INJECTION, SOLUTION INTRAVENOUS at 09:23

## 2019-05-08 RX ADMIN — PROPOFOL 60 MCG/KG/MIN: 10 INJECTION, EMULSION INTRAVENOUS at 00:36

## 2019-05-08 RX ADMIN — PROPOFOL 40 MCG/KG/MIN: 10 INJECTION, EMULSION INTRAVENOUS at 18:06

## 2019-05-08 RX ADMIN — CARBOXYMETHYLCELLULOSE SODIUM 1 DROP: 10 GEL OPHTHALMIC at 20:35

## 2019-05-08 ASSESSMENT — PULMONARY FUNCTION TESTS
PIF_VALUE: 28
PIF_VALUE: 26
PIF_VALUE: 28
PIF_VALUE: 26
PIF_VALUE: 27
PIF_VALUE: 28
PIF_VALUE: 26
PIF_VALUE: 25
PIF_VALUE: 27
PIF_VALUE: 26
PIF_VALUE: 32
PIF_VALUE: 27
PIF_VALUE: 28
PIF_VALUE: 27
PIF_VALUE: 25
PIF_VALUE: 30
PIF_VALUE: 28
PIF_VALUE: 27
PIF_VALUE: 28
PIF_VALUE: 28
PIF_VALUE: 29
PIF_VALUE: 27
PIF_VALUE: 28
PIF_VALUE: 27
PIF_VALUE: 27
PIF_VALUE: 26
PIF_VALUE: 27

## 2019-05-08 ASSESSMENT — PAIN SCALES - GENERAL: PAINLEVEL_OUTOF10: 0

## 2019-05-08 NOTE — PROGRESS NOTES
Pt o2 sat 89% pt awake- not following commands and is agitated. Diprivan gtt increased back to 40 mcg.   Daxa Lerma RN, BSN

## 2019-05-08 NOTE — PROGRESS NOTES
05/08/19 1904   Vent Information   $Ventilation $Subsequent Day   Vent Type 840   Vent Mode AC/VC   Vt Ordered 400 mL   Rate Set 16 bmp   Peak Flow 60 L/min   Pressure Support 0 cmH20   FiO2  50 %   Sensitivity 3   PEEP/CPAP 8   I Time/ I Time % 0 s   Vent Patient Data   High Peep/I Pressure 0   Peak Inspiratory Pressure 26 cmH2O   Mean Airway Pressure 13 cmH20   Rate Measured 16 br/min   Vt Exhaled 500 mL   Minute Volume 6.9 Liters   I:E Ratio 1:4.20   Plateau Pressure 21 VOR98   Static Compliance 63 mL/cmH2O   Dynamic Compliance 28 mL/cmH2O   Cough/Sputum   Sputum How Obtained Suctioned;Endotracheal   Cough Non-productive   Sputum Amount None   Spontaneous Breathing Trial (SBT) RT Doc   Pulse 69   SpO2 96 %   Breath Sounds   Right Upper Lobe Diminished; Expiratory Wheezes   Right Middle Lobe Diminished; Expiratory Wheezes   Right Lower Lobe Diminished   Left Upper Lobe Diminished; Expiratory Wheezes   Left Lower Lobe Diminished   Additional Respiratory  Assessments   Resp 16   Position Semi-Nguyen's   Alarm Settings   High Pressure Alarm 45 cmH2O   Low Minute Volume Alarm 2.5 L/min   Apnea (secs) 20 secs   High Respiratory Rate 40 br/min   Low Exhaled Vt  255 mL   ETT (adult)   Placement Date/Time: 05/03/19 (c) 8886   Preoxygenation: Yes  Mask Ventilation: Ventilated by mask (1)  Technique: Video laryngoscopy  Tube Size: 7.5 mm  Laryngoscope: Mac  Blade Size: 3  Location: Oral  Grade View: Partial view of the glottis  Insert. ..    Secured at 24 cm   Measured From Lips   ET Placement Left   Secured By Commercial tube booker   Site Condition Dry

## 2019-05-08 NOTE — PROGRESS NOTES
Pharmacy - RE:  High-dose Heparin drip  Current rate = 12.5 ml/h  (1250 units/h)  aPTT drawn @ 0530 = 57.9 sec. Goal aPTT = 54 - 90 sec. Per protocol, continue same rate and daily aPTT draws. Obtain another aPTT 5/9 with AM labs.

## 2019-05-08 NOTE — CARE COORDINATION
INTERDISCIPLINARY PLAN OF CARE CONFERENCE    Date/Time: 5/8/2019 2:32 PM  Completed by: Kyle Snowden Case Management      Patient Name:  Elvis Hercules  YOB: 1960  Admitting Diagnosis: Acute respiratory failure ([facility] [address] [J96.00]     Admit Date/Time:  4/28/2019  1:37 PM    Chart reviewed. Interdisciplinary team met to discuss patient progress and discharge plans. Disciplines included Case Management, Nursing, and Dietitian. Current Status:Inpt status    PT/OT recommendation:none    Anticipated Discharge Date: TBD  Expected D/C Disposition:  TBD  Confirmed plan with patient and/or family Yes  Discharge Plan Comments: tbd--home verses SNF  Confirmed plan with patient and/or family Yes, pt's mother  Discharge Plan Comments: Reviewed chart. Role of discharge planner explained and patient unable verbalized understanding d/t being on a vent, but pt's mother was at bedside and verbalized understanding. CM received a CN from Dr. Nick Roy to call [facility] to [facility] to inquire about the cost of:  \"Please check coverage/cost to pt for BOTH of the below drugs and leave written progress note with answer. Dosing for direct oral anti-coagulants for pulmonary embolism:   Xarelto 15 mg po BID for 21 days then 20 mg po daily. Eliquis 10 mg po BID for 7 days then 5 mg po BID. Please plan to offer meds to beds as option if possible. \"     Per Verónica Fairchild (pharmacist) with [facility] to [facility], pt is able to use the copay assistance cards for $10 for either of them, however, witht he Xarelto, pt would have to sign up for the card, where with Eliquis, pt could just take the card to the pharmacy without signing up.         Home O2 in place on admit: No  Pt informed of need to bring portable home O2 tank on day of discharge for nursing to connect prior to leaving:  Not Indicated  Verbalized agreement/Understanding:  Not Indicated

## 2019-05-08 NOTE — PROGRESS NOTES
Pulmonary & Critical Care Medicine ICU Progress Note    CC: Acute hypoxic respiratory failure    Events of Last 24 hours: failed SBT with agitation    Propofol 50  fent 125    Invasive Lines: IV: piv    MV:  5/3  Vent Mode: AC/VC Rate Set: 16 bmp/Vt Ordered: 400 mL/ /FiO2 : 50 %  Recent Labs     05/07/19  0500 05/08/19  0530   PHART 7.396 7.354   MHJ2AVS 61.6* 76.9*   PO2ART 101.0 70.5*       IV:   dexmedetomidine (PRECEDEX) IV infusion      heparin (porcine) 12.5 mL/hr (05/07/19 1711)    fentaNYL (SUBLIMAZE) infusion 125 mcg/hr (05/08/19 4973)    propofol 40 mcg/kg/min (05/08/19 7678)       Vitals:  /72   Pulse 85   Temp 99.3 °F (37.4 °C) (Axillary)   Resp 16   Ht 5' 3\" (1.6 m)   Wt 234 lb 3.2 oz (106.2 kg)   SpO2 94%   BMI 41.49 kg/m²   on vent    Intake/Output Summary (Last 24 hours) at 5/8/2019 7546  Last data filed at 5/8/2019 0600  Gross per 24 hour   Intake 3505 ml   Output 2955 ml   Net 550 ml     EXAM:  Constitutional: ill appearing. Eyes: PERRL. No sclera icterus. ENT: Normal Nose. Normal Tongue. ETT in place  Neck:  Trachea is midline. No thyroid tenderness. Respiratory: No accessory muscle usage. decreased breath sounds. No wheezes. No rales. No Rhonchi. Cardiovascular: Normal S1S2. Judy Chars No murmur. No digit cyanosis. No digit clubbing. No LE edema. GI: Non-tender. Non-distended. Normal bowel sounds. No masses. No umbilical hernia. Skin: No rash on the exposed extremities. No Nodules on exposed extremities. Neuro: Sedated. Does not Follows commands. Does not Moves all extremities. Psych:  No agitation, no anxiety.     Scheduled Meds:   ALPRAZolam  0.25 mg Oral TID    sennosides-docusate sodium  2 tablet Oral BID    vancomycin  1,500 mg Intravenous Q12H    levofloxacin  750 mg Intravenous Q24H    carboxymethylcellulose PF  1 drop Both Eyes Q4H    And    STYE   Both Eyes Q4H    chlorhexidine  15 mL Mouth/Throat BID    famotidine (PEPCID) injection  20 mg Intravenous BID    albuterol sulfate HFA  4 puff Inhalation Q4H    And    ipratropium  4 puff Inhalation Q4H    lidocaine 1 % injection  5 mL Intradermal Once    sodium chloride flush  10 mL Intravenous 2 times per day    methylPREDNISolone  40 mg Intravenous Daily    diclofenac sodium  2 g Topical BID    citalopram  20 mg Oral Daily    sodium chloride flush  10 mL Intravenous 2 times per day     PRN Meds:  polyethylene glycol, heparin (porcine), heparin (porcine), fentanNYL, sodium chloride flush, perflutren lipid microspheres, ibuprofen, sodium chloride flush, magnesium hydroxide, ondansetron, albuterol, albuterol sulfate HFA    Results:  CBC:   Recent Labs     05/06/19  0559 05/07/19  0500 05/08/19  0530   WBC 9.6 9.1 11.4*   HGB 16.7* 17.5* 17.3*   HCT 51.5* 53.7* 53.9*   MCV 89.8 90.3 91.1    140 150     BMP:   Recent Labs     05/06/19  0559 05/07/19  0500 05/08/19  0530    136 137   K 3.4* 3.6 3.5   CL 93* 89* 88*   CO2 38* 39* 38*   BUN 15 15 16   CREATININE <0.5* <0.5* <0.5*     LIVER PROFILE: No results for input(s): AST, ALT, LIPASE, BILIDIR, BILITOT, ALKPHOS in the last 72 hours. Invalid input(s): AMYLASE,  ALB    Cultures:  Sputum 5/3 NRF  4/28 blood ngtd  5/6 BAL NGTD    Films:  CXR was reviewed by me and it showed 5/7  pending still    Assessment:  · Acute hypoxemic and hypercapnic respiratory failure  · Pneumonia  · Acute lower extremity DVT on left  · Acute PE-clinical diagnosis, CTPA suboptimal, unexplained hypoxemia and DVT  · Acute Cor Pulmonale  · Chronic hypercapnic respiratory failure most likely secondary to obesity hypoventilation syndrome  · Erythrocytosis  · Morbid obesity     Plan:  · Mechanical ventilation per my orders. The ventilator was adjusted by me at the bedside for unstable, life threatening respiratory failure.    · Target plateau pressures <89 cm H2O  · HOB greater than 30 degrees at all times  · Daily SBT once FIO2<60% and PEEP =/< 8, patient arousable, hemodynamically

## 2019-05-08 NOTE — PROGRESS NOTES
No changes noted since previous assessment. Pt remains on fentanyl, precedex & propofol. PICC WNL.    Angela Recinos RN, BSN

## 2019-05-08 NOTE — PROGRESS NOTES
Pt turned and repositioned Mouth care done. Pt opens eyes spontaneously but appears otherwise comfortable. Fentanyl gtt remains at 125 mcg/hr and Propofol at 60 mcg/kg/min.

## 2019-05-08 NOTE — PROGRESS NOTES
High risk vesicant drug infusing:  ___no_______    Multiple incompatible medications infusing:  ____no_____    CVP Monitoring:  ___no______    Extremely difficult IV access challenge:  ___yes_____    Continued need for central line access:  ______yes____    Addressed with physician:  ____yes____    RIGHT PATIENT, RIGHT TIME, RIGHT LINE

## 2019-05-08 NOTE — PROGRESS NOTES
05/08/2019        Hepatic Function Panel:   Lab Results   Component Value Date    ALKPHOS 95 04/28/2019     04/28/2019     04/28/2019    PROT 7.0 04/28/2019    PROT 6.8 06/18/2012    BILITOT 1.3 04/28/2019    LABALBU 3.7 04/28/2019      Carboxyhemoglbin 3.2 (H)  Epo 6    IMAGING:     Xr Chest Portable  Result Date: 4/28/2019  Cardiomegaly and mild pulmonary edema. Ct Chest Pulmonary Embolism W Contrast  Result Date: 4/28/2019  Suboptimal contrast timing although there is no evidence for large or central pulmonary embolism. The segmental and subsegmental branches are not well evaluated. Small left basilar effusion with adjacent consolidation and there are also infiltrates within the lingula and right lung base that may represent atelectasis versus pneumonia. Small amount of free fluid in the visualized upper abdomen. LE Doppler:  Summary        1. There is evidence of isolated acute deep venous thrombosis involving the    peroneal vein below the knee in the left lower extremity.    2. There is no other evidence of deep or superficial venous thrombosis    involving the lower extremities bilaterally. ASSESSMENT:     Problem List Items Addressed This Visit     New onset of congestive heart failure (HCC) - Primary    Relevant Medications    furosemide (LASIX) injection 40 mg (Completed)    acetaZOLAMIDE (DIAMOX) injection 250 mg (Completed)    acetaZOLAMIDE (DIAMOX) injection 250 mg (Completed)    heparin (porcine) injection 9,000 Units (Completed)    heparin (porcine) injection 9,000 Units    heparin (porcine) injection 4,500 Units    heparin 25,000 units in dextrose 5% 250 mL infusion    heparin (porcine) injection 4,500 Units (Completed)    furosemide (LASIX) injection 40 mg (Completed)    heparin (porcine) injection 4,500 Units (Completed)      Other Visit Diagnoses     Respiratory distress                    PLAN:     1.  Polycythemia/Erythrocytosis, secondary  - persistently mildly elevated Hgb/Hct since 2015 in 50-55 range  - WBC slightly high this admission, previously normal and platelets normal  - suspect most likely secondary to smoking/chronic hypoxia  - DOROTHY-2 kinase negative, making myeloproliferative disorder less likely  - checked Epo to assess for exogenous production - normal  - check carboxyhemoglobin - high - consistent with hypoxia  - consider phlebotomy if Hct increasing - hold for now since < 55  - monitor CBC    2. DVT  - on heparin  - transition to NOAC at d/c  - needs minimum of 6 months AC    Will follow peripherally. Please call back if CBC abnormalities worsening.     Nelli Segura MD

## 2019-05-08 NOTE — PROGRESS NOTES
Pt bathed, mouth care done Pt continues to open eyes spontaneously but appears otherwise comfortable.

## 2019-05-08 NOTE — PROGRESS NOTES
AM assessment completed. VSS. Pt sedated on ventilator AC 16  Fio2 50% & PEEP +8. See MAR for gtt rates. Lopez in place for strict I/o. TF at goal 25 cc/hr w/ free water flushes 60 q6H. Pt in bilateral soft wrist restraints for safety. AM labs reviewed. Awaiting care rounds.    Aria Arreola RN, BSN

## 2019-05-08 NOTE — PROGRESS NOTES
05/08/19 1048   Vent Information   Vent Type 840   Vent Mode AC/VC   Vt Ordered 400 mL   Rate Set 16 bmp   Peak Flow 60 L/min   Pressure Support 0 cmH20   FiO2  50 %   Sensitivity 3   PEEP/CPAP 8   I Time/ I Time % 0 s   Humidification Source Heated wire   Humidification Temp 37   Circuit Condensation Drained   Vent Patient Data   High Peep/I Pressure 0   Peak Inspiratory Pressure 30 cmH2O   Mean Airway Pressure 13 cmH20   Rate Measured 16 br/min   Vt Exhaled 413 mL   Minute Volume 7.1 Liters   I:E Ratio 1:4.20   Cough/Sputum   Sputum How Obtained Endotracheal   Cough Non-productive   Spontaneous Breathing Trial (SBT) RT Doc   Pulse 76   SpO2 91 %   Breath Sounds   Right Upper Lobe Expiratory Wheezes   Right Middle Lobe Expiratory Wheezes; Diminished   Right Lower Lobe Expiratory Wheezes; Diminished   Left Upper Lobe Expiratory Wheezes   Left Lower Lobe Expiratory Wheezes; Diminished   Additional Respiratory  Assessments   Resp 19   Position Semi-Nguyen's   Subglottic Suction Done? Yes   Alarm Settings   High Pressure Alarm 45 cmH2O   Low Minute Volume Alarm 2.5 L/min   High Respiratory Rate 40 br/min   Patient Observation   Observations #7.5 ETT @ 24 lip line   ETT (adult)   Placement Date/Time: 05/03/19 (c) 2437   Preoxygenation: Yes  Mask Ventilation: Ventilated by mask (1)  Technique: Video laryngoscopy  Tube Size: 7.5 mm  Laryngoscope: Mac  Blade Size: 3  Location: Oral  Grade View: Partial view of the glottis  Insert. ..    Secured at 24 cm   Measured From Lips   ET Placement Left   Secured By Commercial tube booker   Site Condition Dry

## 2019-05-08 NOTE — PLAN OF CARE
Pt is tolerating tube feeding well with 30 cc residuals. Pt continues with restraints for pt safety. Pt remains on ventilator. Peep down to 5 overnight. PCO2 is 76.9 this am with pH of 7.35.

## 2019-05-08 NOTE — PROGRESS NOTES
05/08/19 1458   Vent Information   Vent Type 840   Vent Mode AC/VC   Vt Ordered 400 mL   Rate Set 16 bmp   Peak Flow 60 L/min   Pressure Support 0 cmH20   FiO2  50 %   Sensitivity 3   PEEP/CPAP 8   I Time/ I Time % 0 s   Humidification Source Heated wire   Humidification Temp 36.9   Circuit Condensation Drained   Vent Patient Data   High Peep/I Pressure 0   Peak Inspiratory Pressure 28 cmH2O   Mean Airway Pressure 12 cmH20   Rate Measured 16 br/min   Vt Exhaled 439 mL   Minute Volume 7.02 Liters   I:E Ratio 1:4.20   Cough/Sputum   Sputum How Obtained Endotracheal   Cough Non-productive   Spontaneous Breathing Trial (SBT) RT Doc   Pulse 68   SpO2 94 %   Breath Sounds   Right Upper Lobe Expiratory Wheezes   Right Middle Lobe Expiratory Wheezes   Right Lower Lobe Expiratory Wheezes   Left Upper Lobe Expiratory Wheezes   Left Lower Lobe Expiratory Wheezes   Additional Respiratory  Assessments   Resp 16   Position Semi-Nguyen's   Subglottic Suction Done? Yes   Alarm Settings   High Pressure Alarm 45 cmH2O   Low Minute Volume Alarm 2.5 L/min   High Respiratory Rate 40 br/min   Patient Observation   Observations #7.5 ETT @ 24 lip line   ETT (adult)   Placement Date/Time: 05/03/19 (c) 1027   Preoxygenation: Yes  Mask Ventilation: Ventilated by mask (1)  Technique: Video laryngoscopy  Tube Size: 7.5 mm  Laryngoscope: Mac  Blade Size: 3  Location: Oral  Grade View: Partial view of the glottis  Insert. ..    Secured at 24 cm   Measured From Lips   ET Placement Left   Secured By Commercial tube booker   Site Condition Dry

## 2019-05-09 ENCOUNTER — APPOINTMENT (OUTPATIENT)
Dept: GENERAL RADIOLOGY | Age: 59
DRG: 207 | End: 2019-05-09
Payer: COMMERCIAL

## 2019-05-09 PROBLEM — E44.0 MODERATE PROTEIN-CALORIE MALNUTRITION (HCC): Status: ACTIVE | Noted: 2019-05-07

## 2019-05-09 LAB
ANION GAP SERPL CALCULATED.3IONS-SCNC: 8 MMOL/L (ref 3–16)
APPEARANCE BAL (LAVAGE): ABNORMAL
APTT: 61 SEC (ref 26–36)
BASE EXCESS ARTERIAL: 11.2 MMOL/L (ref -3–3)
BASOPHILS ABSOLUTE: 0.1 K/UL (ref 0–0.2)
BASOPHILS RELATIVE PERCENT: 1.2 %
BUN BLDV-MCNC: 18 MG/DL (ref 7–20)
CALCIUM SERPL-MCNC: 9.5 MG/DL (ref 8.3–10.6)
CARBOXYHEMOGLOBIN ARTERIAL: 1.4 % (ref 0–1.5)
CHLORIDE BLD-SCNC: 90 MMOL/L (ref 99–110)
CLOT EVALUATION BAL: ABNORMAL
CO2: 38 MMOL/L (ref 21–32)
COLOR LAVAGE: COLORLESS
CREAT SERPL-MCNC: <0.5 MG/DL (ref 0.6–1.1)
EOSINOPHILS ABSOLUTE: 0.2 K/UL (ref 0–0.6)
EOSINOPHILS RELATIVE PERCENT: 1.8 %
GFR AFRICAN AMERICAN: >60
GFR NON-AFRICAN AMERICAN: >60
GLUCOSE BLD-MCNC: 112 MG/DL (ref 70–99)
GLUCOSE BLD-MCNC: 113 MG/DL (ref 70–99)
GLUCOSE BLD-MCNC: 117 MG/DL (ref 70–99)
GLUCOSE BLD-MCNC: 150 MG/DL (ref 70–99)
GLUCOSE BLD-MCNC: 155 MG/DL (ref 70–99)
HCO3 ARTERIAL: 39.4 MMOL/L (ref 21–29)
HCT VFR BLD CALC: 54 % (ref 36–48)
HEMOGLOBIN, ART, EXTENDED: 18.3 G/DL (ref 12–16)
HEMOGLOBIN: 17.3 G/DL (ref 12–16)
LYMPHOCYTES ABSOLUTE: 2.5 K/UL (ref 1–5.1)
LYMPHOCYTES RELATIVE PERCENT: 21.2 %
LYMPHOCYTES, BAL: 3 % (ref 5–10)
MACROPHAGES, BAL: 3 % (ref 90–95)
MCH RBC QN AUTO: 29 PG (ref 26–34)
MCHC RBC AUTO-ENTMCNC: 32.1 G/DL (ref 31–36)
MCV RBC AUTO: 90.5 FL (ref 80–100)
METHEMOGLOBIN ARTERIAL: 0.4 %
MONOCYTES ABSOLUTE: 1.2 K/UL (ref 0–1.3)
MONOCYTES RELATIVE PERCENT: 10.5 %
NEUTROPHILS ABSOLUTE: 7.8 K/UL (ref 1.7–7.7)
NEUTROPHILS RELATIVE PERCENT: 65.3 %
NUMBER OF CELLS COUNTED BAL (LAVAGE): 200
O2 CONTENT ARTERIAL: 24 ML/DL
O2 SAT, ARTERIAL: 92.4 %
O2 THERAPY: ABNORMAL
PCO2 ARTERIAL: 62.8 MMHG (ref 35–45)
PDW BLD-RTO: 16.4 % (ref 12.4–15.4)
PERFORMED ON: ABNORMAL
PH ARTERIAL: 7.42 (ref 7.35–7.45)
PLATELET # BLD: 162 K/UL (ref 135–450)
PMV BLD AUTO: 8.1 FL (ref 5–10.5)
PO2 ARTERIAL: 64.7 MMHG (ref 75–108)
POTASSIUM SERPL-SCNC: 3.9 MMOL/L (ref 3.5–5.1)
RBC # BLD: 5.96 M/UL (ref 4–5.2)
RBC, BAL: 400 /CUMM
SEGMENTED NEUTROPHILS, BAL: 94 % (ref 5–10)
SODIUM BLD-SCNC: 136 MMOL/L (ref 136–145)
TCO2 ARTERIAL: 41.3 MMOL/L
WBC # BLD: 11.9 K/UL (ref 4–11)
WBC/EPI CELLS BAL: 882 /CUMM

## 2019-05-09 PROCEDURE — 2000000000 HC ICU R&B

## 2019-05-09 PROCEDURE — 6370000000 HC RX 637 (ALT 250 FOR IP): Performed by: INTERNAL MEDICINE

## 2019-05-09 PROCEDURE — 31622 DX BRONCHOSCOPE/WASH: CPT

## 2019-05-09 PROCEDURE — 94750 HC PULMONARY COMPLIANCE STUDY: CPT

## 2019-05-09 PROCEDURE — 80048 BASIC METABOLIC PNL TOTAL CA: CPT

## 2019-05-09 PROCEDURE — 87280 RESPIRATORY SYNCYTIAL AG IF: CPT

## 2019-05-09 PROCEDURE — 85025 COMPLETE CBC W/AUTO DIFF WBC: CPT

## 2019-05-09 PROCEDURE — 6360000002 HC RX W HCPCS: Performed by: INTERNAL MEDICINE

## 2019-05-09 PROCEDURE — 99291 CRITICAL CARE FIRST HOUR: CPT | Performed by: INTERNAL MEDICINE

## 2019-05-09 PROCEDURE — 87070 CULTURE OTHR SPECIMN AEROBIC: CPT

## 2019-05-09 PROCEDURE — 2700000000 HC OXYGEN THERAPY PER DAY

## 2019-05-09 PROCEDURE — 94640 AIRWAY INHALATION TREATMENT: CPT

## 2019-05-09 PROCEDURE — 6370000000 HC RX 637 (ALT 250 FOR IP)

## 2019-05-09 PROCEDURE — 88184 FLOWCYTOMETRY/ TC 1 MARKER: CPT

## 2019-05-09 PROCEDURE — 87081 CULTURE SCREEN ONLY: CPT

## 2019-05-09 PROCEDURE — 0B9D8ZX DRAINAGE OF RIGHT MIDDLE LUNG LOBE, VIA NATURAL OR ARTIFICIAL OPENING ENDOSCOPIC, DIAGNOSTIC: ICD-10-PCS | Performed by: INTERNAL MEDICINE

## 2019-05-09 PROCEDURE — 2580000003 HC RX 258: Performed by: INTERNAL MEDICINE

## 2019-05-09 PROCEDURE — 87254 VIRUS INOCULATION SHELL VIA: CPT

## 2019-05-09 PROCEDURE — 94003 VENT MGMT INPAT SUBQ DAY: CPT

## 2019-05-09 PROCEDURE — 87205 SMEAR GRAM STAIN: CPT

## 2019-05-09 PROCEDURE — 2500000003 HC RX 250 WO HCPCS: Performed by: INTERNAL MEDICINE

## 2019-05-09 PROCEDURE — 36415 COLL VENOUS BLD VENIPUNCTURE: CPT

## 2019-05-09 PROCEDURE — 87299 ANTIBODY DETECTION NOS IF: CPT

## 2019-05-09 PROCEDURE — 87276 INFLUENZA A AG IF: CPT

## 2019-05-09 PROCEDURE — 87206 SMEAR FLUORESCENT/ACID STAI: CPT

## 2019-05-09 PROCEDURE — 94761 N-INVAS EAR/PLS OXIMETRY MLT: CPT

## 2019-05-09 PROCEDURE — 87260 ADENOVIRUS AG IF: CPT

## 2019-05-09 PROCEDURE — 87279 PARAINFLUENZA AG IF: CPT

## 2019-05-09 PROCEDURE — 87015 SPECIMEN INFECT AGNT CONCNTJ: CPT

## 2019-05-09 PROCEDURE — 85730 THROMBOPLASTIN TIME PARTIAL: CPT

## 2019-05-09 PROCEDURE — 87086 URINE CULTURE/COLONY COUNT: CPT

## 2019-05-09 PROCEDURE — 99232 SBSQ HOSP IP/OBS MODERATE 35: CPT | Performed by: INTERNAL MEDICINE

## 2019-05-09 PROCEDURE — 31646 BRNCHSC W/THER ASPIR SBSQ: CPT | Performed by: INTERNAL MEDICINE

## 2019-05-09 PROCEDURE — 87102 FUNGUS ISOLATION CULTURE: CPT

## 2019-05-09 PROCEDURE — 87252 VIRUS INOCULATION TISSUE: CPT

## 2019-05-09 PROCEDURE — 31624 DX BRONCHOSCOPE/LAVAGE: CPT | Performed by: INTERNAL MEDICINE

## 2019-05-09 PROCEDURE — 71045 X-RAY EXAM CHEST 1 VIEW: CPT

## 2019-05-09 PROCEDURE — 6370000000 HC RX 637 (ALT 250 FOR IP): Performed by: HOSPITALIST

## 2019-05-09 PROCEDURE — 87116 MYCOBACTERIA CULTURE: CPT

## 2019-05-09 PROCEDURE — 88185 FLOWCYTOMETRY/TC ADD-ON: CPT

## 2019-05-09 PROCEDURE — 89051 BODY FLUID CELL COUNT: CPT

## 2019-05-09 PROCEDURE — 87275 INFLUENZA B AG IF: CPT

## 2019-05-09 PROCEDURE — 87040 BLOOD CULTURE FOR BACTERIA: CPT

## 2019-05-09 PROCEDURE — 82803 BLOOD GASES ANY COMBINATION: CPT

## 2019-05-09 RX ORDER — SODIUM CHLORIDE 0.9 % (FLUSH) 0.9 %
SYRINGE (ML) INJECTION
Status: DISPENSED
Start: 2019-05-09 | End: 2019-05-09

## 2019-05-09 RX ORDER — ACETAMINOPHEN 160 MG/5ML
650 SOLUTION ORAL EVERY 4 HOURS PRN
Status: DISCONTINUED | OUTPATIENT
Start: 2019-05-09 | End: 2019-05-20 | Stop reason: HOSPADM

## 2019-05-09 RX ORDER — ALBUTEROL SULFATE 90 UG/1
4 AEROSOL, METERED RESPIRATORY (INHALATION) EVERY 4 HOURS
Status: DISCONTINUED | OUTPATIENT
Start: 2019-05-09 | End: 2019-05-11

## 2019-05-09 RX ORDER — MIDAZOLAM HYDROCHLORIDE 1 MG/ML
2 INJECTION INTRAMUSCULAR; INTRAVENOUS
Status: DISCONTINUED | OUTPATIENT
Start: 2019-05-09 | End: 2019-05-11

## 2019-05-09 RX ORDER — LIDOCAINE HYDROCHLORIDE 40 MG/ML
SOLUTION TOPICAL
Status: COMPLETED
Start: 2019-05-09 | End: 2019-05-09

## 2019-05-09 RX ORDER — MIDAZOLAM HYDROCHLORIDE 1 MG/ML
4 INJECTION INTRAMUSCULAR; INTRAVENOUS ONCE
Status: COMPLETED | OUTPATIENT
Start: 2019-05-09 | End: 2019-05-09

## 2019-05-09 RX ORDER — WHITE PETROLATUM 57.7 %-MINERAL OIL 31.9 % EYE OINTMENT
0.5 DAILY
Status: DISCONTINUED | OUTPATIENT
Start: 2019-05-10 | End: 2019-05-15

## 2019-05-09 RX ADMIN — LIDOCAINE HYDROCHLORIDE: 40 SOLUTION TOPICAL at 10:40

## 2019-05-09 RX ADMIN — MIDAZOLAM HYDROCHLORIDE 2 MG: 2 INJECTION, SOLUTION INTRAMUSCULAR; INTRAVENOUS at 09:30

## 2019-05-09 RX ADMIN — ALPRAZOLAM 0.25 MG: 0.25 TABLET ORAL at 08:54

## 2019-05-09 RX ADMIN — ACETAMINOPHEN 650 MG: 650 SOLUTION ORAL at 07:05

## 2019-05-09 RX ADMIN — SENNOSIDES AND DOCUSATE SODIUM 2 TABLET: 8.6; 5 TABLET ORAL at 20:41

## 2019-05-09 RX ADMIN — MIDAZOLAM HYDROCHLORIDE 2 MG: 2 INJECTION, SOLUTION INTRAMUSCULAR; INTRAVENOUS at 13:32

## 2019-05-09 RX ADMIN — MIDAZOLAM HYDROCHLORIDE 2 MG: 2 INJECTION, SOLUTION INTRAMUSCULAR; INTRAVENOUS at 14:31

## 2019-05-09 RX ADMIN — Medication 4 PUFF: at 02:44

## 2019-05-09 RX ADMIN — FENTANYL CITRATE 125 MCG/HR: 50 INJECTION, SOLUTION INTRAMUSCULAR; INTRAVENOUS at 05:18

## 2019-05-09 RX ADMIN — WHITE PETROLATUM 57.7 %-MINERAL OIL 31.9 % EYE OINTMENT: at 08:26

## 2019-05-09 RX ADMIN — Medication 4 PUFF: at 06:53

## 2019-05-09 RX ADMIN — WHITE PETROLATUM 57.7 %-MINERAL OIL 31.9 % EYE OINTMENT: at 01:36

## 2019-05-09 RX ADMIN — CARBOXYMETHYLCELLULOSE SODIUM 1 DROP: 10 GEL OPHTHALMIC at 05:18

## 2019-05-09 RX ADMIN — DEXMEDETOMIDINE HYDROCHLORIDE 2.2 MCG/KG/HR: 100 INJECTION, SOLUTION INTRAVENOUS at 18:47

## 2019-05-09 RX ADMIN — DEXMEDETOMIDINE HYDROCHLORIDE 2.2 MCG/KG/HR: 100 INJECTION, SOLUTION INTRAVENOUS at 13:01

## 2019-05-09 RX ADMIN — DEXMEDETOMIDINE HYDROCHLORIDE 2 MCG/KG/HR: 100 INJECTION, SOLUTION INTRAVENOUS at 10:21

## 2019-05-09 RX ADMIN — CITALOPRAM HYDROBROMIDE 20 MG: 20 TABLET ORAL at 08:54

## 2019-05-09 RX ADMIN — DICLOFENAC 2 G: 10 GEL TOPICAL at 08:59

## 2019-05-09 RX ADMIN — ACETAMINOPHEN 650 MG: 650 SOLUTION ORAL at 17:50

## 2019-05-09 RX ADMIN — MIDAZOLAM HYDROCHLORIDE 4 MG: 2 INJECTION, SOLUTION INTRAMUSCULAR; INTRAVENOUS at 10:40

## 2019-05-09 RX ADMIN — Medication 10 ML: at 08:58

## 2019-05-09 RX ADMIN — HEPARIN SODIUM 11 UNITS/KG/HR: 10000 INJECTION, SOLUTION INTRAVENOUS at 13:02

## 2019-05-09 RX ADMIN — DICLOFENAC 2 G: 10 GEL TOPICAL at 20:41

## 2019-05-09 RX ADMIN — CARBOXYMETHYLCELLULOSE SODIUM 1 DROP: 10 GEL OPHTHALMIC at 07:36

## 2019-05-09 RX ADMIN — Medication 10 ML: at 08:54

## 2019-05-09 RX ADMIN — Medication 4 PUFF: at 19:34

## 2019-05-09 RX ADMIN — WHITE PETROLATUM 57.7 %-MINERAL OIL 31.9 % EYE OINTMENT: at 06:11

## 2019-05-09 RX ADMIN — DEXMEDETOMIDINE HYDROCHLORIDE 1.3 MCG/KG/HR: 100 INJECTION, SOLUTION INTRAVENOUS at 07:40

## 2019-05-09 RX ADMIN — DEXMEDETOMIDINE HYDROCHLORIDE 2.2 MCG/KG/HR: 100 INJECTION, SOLUTION INTRAVENOUS at 20:40

## 2019-05-09 RX ADMIN — Medication 2 PUFF: at 11:19

## 2019-05-09 RX ADMIN — Medication 10 ML: at 20:41

## 2019-05-09 RX ADMIN — DEXMEDETOMIDINE HYDROCHLORIDE 1.1 MCG/KG/HR: 100 INJECTION, SOLUTION INTRAVENOUS at 04:25

## 2019-05-09 RX ADMIN — PROPOFOL 15 MCG/KG/MIN: 10 INJECTION, EMULSION INTRAVENOUS at 03:23

## 2019-05-09 RX ADMIN — DEXMEDETOMIDINE HYDROCHLORIDE 2.2 MCG/KG/HR: 100 INJECTION, SOLUTION INTRAVENOUS at 14:30

## 2019-05-09 RX ADMIN — Medication 2 PUFF: at 15:10

## 2019-05-09 RX ADMIN — MIDAZOLAM HYDROCHLORIDE 2 MG: 2 INJECTION, SOLUTION INTRAMUSCULAR; INTRAVENOUS at 16:49

## 2019-05-09 RX ADMIN — SENNOSIDES AND DOCUSATE SODIUM 2 TABLET: 8.6; 5 TABLET ORAL at 08:54

## 2019-05-09 RX ADMIN — Medication 4 PUFF: at 23:32

## 2019-05-09 RX ADMIN — ALPRAZOLAM 0.25 MG: 0.25 TABLET ORAL at 14:23

## 2019-05-09 RX ADMIN — DEXMEDETOMIDINE HYDROCHLORIDE 2.2 MCG/KG/HR: 100 INJECTION, SOLUTION INTRAVENOUS at 16:47

## 2019-05-09 RX ADMIN — ALPRAZOLAM 0.25 MG: 0.25 TABLET ORAL at 20:41

## 2019-05-09 RX ADMIN — METHYLPREDNISOLONE SODIUM SUCCINATE 40 MG: 40 INJECTION, POWDER, FOR SOLUTION INTRAMUSCULAR; INTRAVENOUS at 08:54

## 2019-05-09 RX ADMIN — FENTANYL CITRATE 125 MCG/HR: 50 INJECTION, SOLUTION INTRAMUSCULAR; INTRAVENOUS at 14:50

## 2019-05-09 RX ADMIN — DEXMEDETOMIDINE HYDROCHLORIDE 2.2 MCG/KG/HR: 100 INJECTION, SOLUTION INTRAVENOUS at 22:27

## 2019-05-09 ASSESSMENT — PULMONARY FUNCTION TESTS
PIF_VALUE: 27
PIF_VALUE: 26
PIF_VALUE: 30
PIF_VALUE: 23
PIF_VALUE: 26
PIF_VALUE: 24
PIF_VALUE: 27
PIF_VALUE: 26
PIF_VALUE: 26
PIF_VALUE: 27
PIF_VALUE: 27
PIF_VALUE: 28

## 2019-05-09 ASSESSMENT — PAIN SCALES - GENERAL
PAINLEVEL_OUTOF10: 3
PAINLEVEL_OUTOF10: 3
PAINLEVEL_OUTOF10: 0

## 2019-05-09 NOTE — PROCEDURES
See History and Physical or progress note for additional findings. Pertinent changes recorded below if present. Allergies and medications have been reviewed    HENT: Airway patent and reviewed. ETT in place. Cardiovascular: Normal rate, regular rhythm, normal heart sounds. Pulmonary/Chest: No wheezes. No rhonchi. No rales. Abdominal: Soft. Bowel sounds are normal. No distension. ASA: Class 4 - A patient with an incapacitating systemic disease that is a constant threat to life  Level of Sedation Plan: Continue ICU sedation    Post Procedure Plan   Continue ICU care.   ______________________     The risks and benefits as well as alternatives to the procedure have been discussed with the POA. The  POA understands and agrees to proceed. Signed Consent in chart. PROCEDURE:  BRONCHOSCOPY      The risks and benefits as well as alternatives to the procedure have been discussed with the patient or POA. The patient or POA understands and agrees to proceed. Consent signed. DESCRIPTION OF PROCEDURE: A time out was taken. ICU sedation continued. The scope was passed with ease via the ETT. A complete airway inspection was performed. Normal anatomy. No endobronchial lesion was identified. Mucosa appeared normal. There were thick tenacious white secretions. Hypertonic saline used to thin secretions. Therapeutic aspiration completed in the trachea. Washings were obtained throughout the airways. A Bronchoalveolar lavage was obtained from the RML with good return. The patient tolerated the procedure well. EBL none. Recovery will be per endoscopy protocol. Will discharge home or return to same level of care per recovery protocol. FOLLOW UP:  Cultures and cytology in three to five days.

## 2019-05-09 NOTE — PLAN OF CARE
Pt free of injury last night. Precidex increased through the night and able to titrate Propofol down to 15. Pt more awake this am. Pt able to tolerate PeEp down to 5 overnight. Temperature 101.4 this am. Will medicate with new Tylenol order.

## 2019-05-09 NOTE — PROGRESS NOTES
Precidex increased to 0.9 mcg/kg/hr. Propofol decreased to 30 mcg/kg/min. Pt resting quietly. Opens eyes spontaneously but does not appear uncomfortable.

## 2019-05-09 NOTE — PROGRESS NOTES
05/09/19 0245   Vent Information   Vent Type 840   Vent Mode AC/VC   Vt Ordered 400 mL   Rate Set 16 bmp   Peak Flow 60 L/min   Pressure Support 0 cmH20   FiO2  50 %   Sensitivity 3   PEEP/CPAP 5   I Time/ I Time % 0 s   Vent Patient Data   High Peep/I Pressure 0   Peak Inspiratory Pressure 27 cmH2O   Mean Airway Pressure 11 cmH20   Rate Measured 21 br/min   Vt Exhaled 385 mL   Minute Volume 8.06 Liters   I:E Ratio 1:2.30   Cough/Sputum   Sputum How Obtained Suctioned;Endotracheal   Cough Productive   Sputum Amount Small   Sputum Color Cloudy   Tenacity Thick   Spontaneous Breathing Trial (SBT) RT Doc   Pulse 73   SpO2 94 %   Breath Sounds   Right Upper Lobe Diminished   Right Middle Lobe Diminished   Right Lower Lobe Diminished   Left Upper Lobe Diminished   Left Lower Lobe Diminished   Additional Respiratory  Assessments   Resp 16   Position Semi-Nguyen's   Alarm Settings   High Pressure Alarm 45 cmH2O   Low Minute Volume Alarm 2.5 L/min   Apnea (secs) 20 secs   High Respiratory Rate 40 br/min   Low Exhaled Vt  255 mL   ETT (adult)   Placement Date/Time: 05/03/19 (c) 2753   Preoxygenation: Yes  Mask Ventilation: Ventilated by mask (1)  Technique: Video laryngoscopy  Tube Size: 7.5 mm  Laryngoscope: Mac  Blade Size: 3  Location: Oral  Grade View: Partial view of the glottis  Insert. ..    Secured at 24 cm   Measured From Lips   ET Placement Left   Secured By Commercial tube booker   Site Condition Dry

## 2019-05-09 NOTE — PROGRESS NOTES
05/09/19 1120   Vent Information   Vent Type 840   Vent Mode AC/VC   Vt Ordered 400 mL   Rate Set 16 bmp   Peak Flow 60 L/min   Pressure Support 0 cmH20   FiO2  50 %   Sensitivity 3   PEEP/CPAP 8   I Time/ I Time % 0 s   Humidification Source Heated wire   Humidification Temp 37   Circuit Condensation Drained   Vent Patient Data   High Peep/I Pressure 0   Peak Inspiratory Pressure 27 cmH2O   Mean Airway Pressure 12 cmH20   Rate Measured 17 br/min   Vt Exhaled 454 mL   Minute Volume 8.12 Liters   I:E Ratio 1:3.10   Cough/Sputum   Sputum How Obtained Endotracheal   Cough Productive   Sputum Amount Moderate   Sputum Color Tan   Tenacity Thick   Spontaneous Breathing Trial (SBT) RT Doc   Pulse 78   SpO2 92 %   Breath Sounds   Right Upper Lobe Diminished   Right Middle Lobe Diminished   Right Lower Lobe Diminished   Left Upper Lobe Diminished   Left Lower Lobe Diminished   Additional Respiratory  Assessments   Resp 17   Position Semi-Nguyen's   Subglottic Suction Done? Yes   Alarm Settings   High Pressure Alarm 45 cmH2O   Low Minute Volume Alarm 2.5 L/min   High Respiratory Rate 40 br/min   Patient Observation   Observations #7.5 ETT @ 24 lip line   ETT (adult)   Placement Date/Time: 05/03/19 c) 3202   Preoxygenation: Yes  Mask Ventilation: Ventilated by mask (1)  Technique: Video laryngoscopy  Tube Size: 7.5 mm  Laryngoscope: Mac  Blade Size: 3  Location: Oral  Grade View: Partial view of the glottis  Insert. ..    Secured at 24 cm   Measured From Lips   ET Placement Left   Secured By Commercial tube booker   Site Condition Dry

## 2019-05-09 NOTE — PROGRESS NOTES
05/08/19 2306   Vent Information   Vent Type 840   Vent Mode AC/VC   Vt Ordered 400 mL   Rate Set 16 bmp   Peak Flow 60 L/min   Pressure Support 0 cmH20   FiO2  50 %   Sensitivity 3   PEEP/CPAP 5   I Time/ I Time % 0 s   Vent Patient Data   High Peep/I Pressure 0   Peak Inspiratory Pressure 27 cmH2O   Mean Airway Pressure 9 cmH20   Rate Measured 16 br/min   Vt Exhaled 443 mL   Minute Volume 7.11 Liters   I:E Ratio 1:4.20   Cough/Sputum   Sputum How Obtained Suctioned;Endotracheal   Cough Productive   Sputum Amount Small   Sputum Color Cloudy   Tenacity Thick   Spontaneous Breathing Trial (SBT) RT Doc   Pulse 70   SpO2 94 %   Breath Sounds   Right Upper Lobe Diminished   Right Middle Lobe Diminished   Right Lower Lobe Diminished   Left Upper Lobe Diminished   Left Lower Lobe Diminished   Additional Respiratory  Assessments   Resp 16   Position Semi-Nguyen's   Alarm Settings   High Pressure Alarm 45 cmH2O   Low Minute Volume Alarm 2.5 L/min   Apnea (secs) 20 secs   High Respiratory Rate 40 br/min   Low Exhaled Vt  255 mL   ETT (adult)   Placement Date/Time: 05/03/19 c) 8802   Preoxygenation: Yes  Mask Ventilation: Ventilated by mask (1)  Technique: Video laryngoscopy  Tube Size: 7.5 mm  Laryngoscope: Mac  Blade Size: 3  Location: Oral  Grade View: Partial view of the glottis  Insert. ..    Secured at 24 cm   Measured From Lips   ET Placement Left   Secured By Commercial tube booker   Site Condition Dry

## 2019-05-09 NOTE — PROGRESS NOTES
Hospitalist Progress Note        PCP: Charlie Acuña MD    Date of Admission: 4/28/2019    Subjective: cont to be on vent, anasarca +    5/6-  she is sedated on the ventilator. Responds to commands like squeezing. Appears sick Plans for bronchoscopy today. 5/7- Bronchoscopy was done. No endobronchial lesion seen. Mucosa appears normal. She had yellow and white secretions were thick. 5/8- still on the vent. SBT with agitation. 5/9- she is on Precedex. Dose increased. Opens eyes.       Medications:  Reviewed    Infusion Medications    dexmedetomidine (PRECEDEX) IV infusion 1.1 mcg/kg/hr (05/09/19 2605)    heparin (porcine) 12.5 mL/hr (05/08/19 4638)    fentaNYL (SUBLIMAZE) infusion 125 mcg/hr (05/09/19 0518)    propofol 15 mcg/kg/min (05/09/19 0203)     Scheduled Medications    [START ON 5/10/2019] pneumococcal 13-valent conjugate  0.5 mL Intramuscular Once    ALPRAZolam  0.25 mg Oral TID    sennosides-docusate sodium  2 tablet Oral BID    carboxymethylcellulose PF  1 drop Both Eyes Q4H    And    STYE   Both Eyes Q4H    chlorhexidine  15 mL Mouth/Throat BID    famotidine (PEPCID) injection  20 mg Intravenous BID    albuterol sulfate HFA  4 puff Inhalation Q4H    And    ipratropium  4 puff Inhalation Q4H    lidocaine 1 % injection  5 mL Intradermal Once    sodium chloride flush  10 mL Intravenous 2 times per day    methylPREDNISolone  40 mg Intravenous Daily    diclofenac sodium  2 g Topical BID    citalopram  20 mg Oral Daily    sodium chloride flush  10 mL Intravenous 2 times per day     PRN Meds: acetaminophen, polyethylene glycol, heparin (porcine), heparin (porcine), fentanNYL, sodium chloride flush, perflutren lipid microspheres, ibuprofen, sodium chloride flush, magnesium hydroxide, ondansetron, albuterol, albuterol sulfate HFA      Intake/Output Summary (Last 24 hours) at 5/9/2019 0706  Last data filed at 5/9/2019 0600  Gross per 24 hour   Intake 2806 ml   Output 2030 ml   Net 776 ml       Physical Exam Performed:    BP (!) 108/59   Pulse 84   Temp 101.1 °F (38.4 °C)   Resp 20   Ht 5' 3\" (1.6 m)   Wt 234 lb 3.2 oz (106.2 kg)   SpO2 92%   BMI 41.49 kg/m²       General: appears sick. On Precedex. Eyes open. Failed SBT  Mucous Membranes:  Pink , anicteric  Neck: No JVD, no carotid bruit, no thyromegaly  Chest: no wheezes/coarse BS  Cardiovascular:  RRR S1S2 heard, no murmurs or gallops  Abdomen:  Soft, undistended, non tender, no organomegaly, BS present  Extremities: No edema or cyanosis. Distal pulses well felt  Neurological : SERGE      Labs:   Recent Labs     05/07/19  0500 05/08/19  0530 05/09/19  0530   WBC 9.1 11.4* 11.9*   HGB 17.5* 17.3* 17.3*   HCT 53.7* 53.9* 54.0*    150 162     Recent Labs     05/07/19  0500 05/08/19  0530 05/09/19  0530    137 136   K 3.6 3.5 3.9   CL 89* 88* 90*   CO2 39* 38* 38*   BUN 15 16 18   CREATININE <0.5* <0.5* <0.5*   CALCIUM 9.5 9.7 9.5     No results for input(s): AST, ALT, BILIDIR, BILITOT, ALKPHOS in the last 72 hours. No results for input(s): INR in the last 72 hours. No results for input(s): Erika Jubilee in the last 72 hours. Urinalysis:      Lab Results   Component Value Date    NITRU Negative 04/28/2019    WBCUA 0-2 04/28/2019    BACTERIA 1+ 04/28/2019    RBCUA 0-2 04/28/2019    BLOODU TRACE-INTACT 04/28/2019    SPECGRAV <=1.005 04/28/2019    GLUCOSEU Negative 04/28/2019       Radiology:  XR CHEST PORTABLE   Final Result   1. Pulmonary edema. 2. Improving right basilar and grossly stable left basilar atelectasis and/or   pneumonia. XR CHEST PORTABLE   Final Result   Worsening bibasilar atelectasis and/or pneumonia. XR CHEST PORTABLE   Final Result   Improving bilateral airspace disease and pleural effusions. XR CHEST PORTABLE   Final Result   Increasing right basilar and new bilateral upper lobe airspace disease could   represent edema or pneumonia.   Left basilar pleuroparenchymal disease is stable. XR CHEST PORTABLE   Final Result   1. No significant change. IR PICC WO SQ PORT/PUMP > 5 YEARS   Final Result   Successful placement of PICC line. XR CHEST PORTABLE   Final Result   Supportive devices are positioned appropriately. There is increasing   pulmonary edema since prior exam.         VL Extremity Venous Bilateral   Final Result      CT Chest WO Contrast   Final Result   1. Stable small left pleural effusion with a new small right pleural effusion. 2. Interval worsening of dependent consolidative opacity within bilateral   lower lobe since the study of 04/28/2019. In combination with worsening   bibasilar predominant mucous plugging, this likely represents either   atelectasis or aspiration. Clinical correlation is advised. 3. Slight interval improvement in appearance of lingular airspace   consolidation, likely representing resolving pneumonia. XR CHEST PORTABLE   Final Result   Mild cardiomegaly and chronic pulmonary change without definite acute   pulmonary process. CT Chest Pulmonary Embolism W Contrast   Final Result   Suboptimal contrast timing although there is no evidence for large or central   pulmonary embolism. The segmental and subsegmental branches are not well   evaluated. Small left basilar effusion with adjacent consolidation and there are also   infiltrates within the lingula and right lung base that may represent   atelectasis versus pneumonia. Small amount of free fluid in the visualized upper abdomen. XR CHEST PORTABLE   Final Result   Cardiomegaly and mild pulmonary edema.                  Assessment/Plan:    Principal Problem:    Acute respiratory failure with hypoxia (HCC)  Active Problems:    Rheumatoid arthritis (HCC)    Tobacco abuse    Anxiety state    SOB (shortness of breath)    Moderate persistent asthma with acute exacerbation    New onset of congestive heart failure ([address]    Morbid obesity ([address] Obesity hypoventilation syndrome (HCC)    Erythrocytosis    Acute deep vein thrombosis (DVT) of left peroneal vein (HCC)    Pulmonary embolus (HCC)    Mild protein-calorie malnutrition (HCC)  Resolved Problems:    * No resolved hospital problems. *         1. Acute hypoxic resp failure. possibly secondary to acute pulmonary edema and acute congestive heart failure. was on IV Lasix-->now on prn lasix.  Echocardiogram has been obtained.  Echocardiogram however shows normal LV function does not show diastolic heart failure. shows moderate pulm HTN. Likely acute PE. Venous doppler - DVT below knee. CTA was suboptimal( also severe hypoxia otherwise unexplained). Started anticoagulation with heparin gtt. Her DVT and PE were likely present at the time of her admission. Intubated 5/3 for worsening hypercarbia. She is on a PEEP of 10. Continue with ventilator support for now. Bronchoscopy was done on 5/7/19. Precedex started. Failed SBT     2.  Chronic hypercarbic respiratory failure likely obesity hypoventilation syndrome. mod pulm HTN on ECHO      3.  Acute bronchitis.  No fevers.  Has mildly elevated leukocytosis.  Pro-calcitonin however is negative. Vancomycin day 7/7 and Levaquin day 7/7.     4.  Morbid Obesity  - Body mass index is 41.49 kg/m². - Complicating assessment and treatment. Placing patient at risk for multiple co-morbidities as well as early death and contributing to the patient's presentation.        5. Anasarca - monitor I/O net -15L, ?nutritional, received lasix         Diet: DIET TUBE FEED CONTINUOUS/CYCLIC NPO; Low Calorie High Protein (vital high protein);  Orogastric; Continuous; 25; 25; 20 (hours)  Code Status: Full Code    PT/OT Eval Status: not indicated    Dispo - cont care in ICU    Cynthia Kawasaki 5/9/2019 7:06 AM

## 2019-05-09 NOTE — PLAN OF CARE
Nutrition Problem:  Moderate malnutrition  Intervention: Food and/or Nutrient Delivery: Continue NPO, Continue current Tube Feeding  Nutritional Goals: patient will tolerate Vital High-Protein at goal rate of 60 ml/hr x 20 hours without GI distress, without s/s of aspiration, and without additional lab/fluid disturbances; weight will remain stable during remainder of admission

## 2019-05-09 NOTE — PROGRESS NOTES
Dr Vera rounding, propofol IV drip stopped. Precedex IV drip increased to 2 mcg/kg/hr per verbal order. Versed 2 mg IVP. RASS +3, grabbing at [facility].  02 sat decreaseing, HR 130s, ST.

## 2019-05-09 NOTE — CONSULTS
5-9-19 (0530) aptt 61. Please continue heparin drip at 12.5 ml/hr. Next aptt 0600 on 5-10-19. [facility]. .  5/9/2019 7:29 AM

## 2019-05-09 NOTE — PROGRESS NOTES
Pulmonary & Critical Care Medicine ICU Progress Note    CC: Acute hypoxic respiratory failure    Events of Last 24 hours:   Darker secretions  Still agitated with lighter sedation  Tm 101.4  Precedex   fent 125    Invasive Lines: IV: piv    MV:  5/3  Vent Mode: AC/VC Rate Set: 16 bmp/Vt Ordered: 400 mL/ /FiO2 : 50 %  Recent Labs     05/08/19  0530 05/09/19  0530   PHART 7.354 7.415   [id number] 76.9* 62.8*   PO2ART 70.5* 64.7*       IV:   dexmedetomidine (PRECEDEX) IV infusion 1.4 mcg/kg/hr (05/09/19 0815)    heparin (porcine) 12.5 mL/hr (05/08/19 1708)    fentaNYL (SUBLIMAZE) infusion 125 mcg/hr (05/09/19 0518)    propofol 15 mcg/kg/min (05/09/19 0323)       Vitals:  BP (!) 106/55   Pulse 75   Temp 101.4 °F (38.6 °C) (Oral)   Resp 17   Ht 5' 3\" (1.6 m)   Wt 234 lb 3.2 oz (106.2 kg)   SpO2 92%   BMI 41.49 kg/m²   on vent    Intake/Output Summary (Last 24 hours) at 5/9/2019 0829  Last data filed at 5/9/2019 0600  Gross per 24 hour   Intake 2806 ml   Output 2030 ml   Net 776 ml     EXAM:  Constitutional: ill appearing. Eyes: PERRL. No sclera icterus. ENT: Normal Nose. Normal Tongue. ETT in place  Neck:  Trachea is midline. No thyroid tenderness. Respiratory: No accessory muscle usage. decreased breath sounds. No wheezes. No rales. No Rhonchi. Cardiovascular: Normal S1S2. Kapil Damien No murmur. No digit cyanosis. No digit clubbing. No LE edema. GI: Non-tender. Non-distended. Normal bowel sounds. No masses. No umbilical hernia. Skin: No rash on the exposed extremities. No Nodules on exposed extremities. Neuro: Sedated. Does not Follows commands. Does not Moves all extremities. Psych:  No agitation, no anxiety.     Scheduled Meds:   [START ON 5/10/2019] pneumococcal 13-valent conjugate  0.5 mL Intramuscular Once    ALPRAZolam  0.25 mg Oral TID    sennosides-docusate sodium  2 tablet Oral BID    carboxymethylcellulose PF  1 drop Both Eyes Q4H    And    STYE   Both Eyes Q4H    chlorhexidine  15 mL Mouth/Throat BID    famotidine (PEPCID) injection  20 mg Intravenous BID    albuterol sulfate HFA  4 puff Inhalation Q4H    And    ipratropium  4 puff Inhalation Q4H    lidocaine 1 % injection  5 mL Intradermal Once    sodium chloride flush  10 mL Intravenous 2 times per day    methylPREDNISolone  40 mg Intravenous Daily    diclofenac sodium  2 g Topical BID    citalopram  20 mg Oral Daily    sodium chloride flush  10 mL Intravenous 2 times per day     PRN Meds:  acetaminophen, polyethylene glycol, heparin (porcine), heparin (porcine), fentanNYL, sodium chloride flush, perflutren lipid microspheres, ibuprofen, sodium chloride flush, magnesium hydroxide, ondansetron, albuterol, albuterol sulfate HFA    Results:  CBC:   Recent Labs     05/07/19  0500 05/08/19  0530 05/09/19  0530   WBC 9.1 11.4* 11.9*   HGB 17.5* 17.3* 17.3*   HCT 53.7* 53.9* 54.0*   MCV 90.3 91.1 90.5    150 162     BMP:   Recent Labs     05/07/19  0500 05/08/19  0530 05/09/19  0530    137 136   K 3.6 3.5 3.9   CL 89* 88* 90*   CO2 39* 38* 38*   BUN 15 16 18   CREATININE <0.5* <0.5* <0.5*     LIVER PROFILE: No results for input(s): AST, ALT, LIPASE, BILIDIR, BILITOT, ALKPHOS in the last 72 hours. Invalid input(s): AMYLASE,  ALB    Cultures:  Sputum 5/3 NRF  4/28 blood ngtd  5/6 BAL candida    Films:  CXR was reviewed by me and it showed 5/9   1. Pulmonary edema. 2. Improving right basilar and grossly stable left basilar atelectasis and/or   pneumonia. Assessment:  · New fever  · Acute hypoxemic and hypercapnic respiratory failure  · Pneumonia  · Acute lower extremity DVT on left  · Acute PE-clinical diagnosis, CTPA suboptimal, unexplained hypoxemia and DVT  · Acute Cor Pulmonale  · Chronic hypercapnic respiratory failure most likely secondary to obesity hypoventilation syndrome  · Erythrocytosis  · Morbid obesity     Plan:  · Mechanical ventilation per my orders.  The ventilator was adjusted by me at the bedside for unstable, life threatening respiratory failure. · Target plateau pressures <21 cm H2O  · HOB greater than 30 degrees at all times  · Daily SBT once FIO2<60% and PEEP =/< 8, patient arousable, hemodynamically stable  · IV Sedation with Precedex and fentanyl targeted RASS -1  · Start precedex and wean propofol and fent as able  · Stop Antibiotics: Completed Vancomycin day #7/7 and levaquin D7/7  · Repeat BAL/blood cx/Urine cx for new fever  · Heparin gtt  · TFs   · bowel regimen  · Home xanax  · ICU care: bactroban  ·  Total critical care time caring for this patient with life threatening, unstable organ failure, including direct patient contact, management of life support systems, review of data including imaging and labs, discussions with other team members and physicians at least 32 minutes so far today, excluding procedures.

## 2019-05-09 NOTE — PROGRESS NOTES
SAT trial at this time. Precedex IV drip remained on. Agitated, trying to grab ETtube. Propofol , Fentanyl IV drips resumed after pause. RT informed of SAT trial failure.     T 101.4

## 2019-05-09 NOTE — PROGRESS NOTES
RESPIRATORY THERAPY ASSESSMENT    Name:  Magalie [facility] Record Number:  [id number]  Age: 64 y.o. Gender: female  : 1960  Today's Date:  2019  Room:  [facility]-    Assessment     Is the patient being admitted for a COPD or Asthma exacerbation? No   (If yes the patient will be seen every 4 hours for the first 24 hours and then reassessed)    Patient Admission Diagnosis      Allergies  No Known Allergies    Minimum Predicted Vital Capacity:     On Vent          Actual Vital Capacity:      On Vent              Pulmonary History:No history  Home Oxygen Therapy:  room air  Home Respiratory Therapy:Albuterol MDI Q4hprn  Current Respiratory Therapy:  Alb/Atrov mdi Q4H  Treatment Type: MDI  Medications: Albuterol    Respiratory Severity Index(RSI)   Patients with orders for inhalation medications, oxygen, or any therapeutic treatment modality will be placed on Respiratory Protocol. They will be assessed with the first treatment and at least every 72 hours thereafter. The following severity scale will be used to determine frequency of treatment intervention.     Smoking History: Smoking History Less than 1ppd or less than 15 pack year = 1    Social History  Social History     Tobacco Use    Smoking status: Current Every Day Smoker     Packs/day: 0.10     Years: 30.00     Pack years: 3.00     Types: Cigarettes    Smokeless tobacco: Never Used   Substance Use Topics    Alcohol use: No     Alcohol/week: 0.0 oz    Drug use: No       Recent Surgical History: None = 0  Past Surgical History  Past Surgical History:   Procedure Laterality Date    BRONCHOSCOPY N/A 2019    BRONCHOSCOPY ALVEOLAR LAVAGE performed by Destinee Locke MD at [address] [place] [address]     LUMBAR SPINE SURGERY         Level of Consciousness: Comatose = 4    Level of Activity: Bedridden, unresponsive or quadriplegic = 4    Respiratory Pattern: Increased; RR 21-30 = 1    Breath Sounds: Absent bilaterally and/or with wheezes = 3    Sputum  Sputum Color: Tan, Tenacity: Thick, Sputum How Obtained: Endotracheal  Cough: Weak, productive = 2    Vital Signs   BP (!) 148/73   Pulse 105   Temp 101.5 °F (38.6 °C) (Axillary)   Resp 20   Ht 5' 3\" (1.6 m)   Wt 234 lb 3.2 oz (106.2 kg)   SpO2 93%   BMI 41.49 kg/m²   SPO2 (COPD values may differ): 86-87% on room air or greater than 92% on FiO2 35- 50% = 3    Peak Flow (asthma only): not applicable = 0    RSI: 00-07 = Q4 (every four hours)        Plan       Goals: medication delivery    Patient/caregiver was educated on the proper method of use for Respiratory Care Devices:  Yes      Level of patient/caregiver understanding able to:   ? Verbalize understanding   ? Demonstrate understanding       ? Teach back        ? Needs reinforcement       ? No available caregiver               ? Other:     Response to education:  Excellent     Is patient being placed on Home Treatment Regimen? No     Does the patient have everything they need prior to discharge? NA     Comments: Chart and home meds reviewed    Plan of Care: Cont the patient on the current therapy    Electronically signed by Michael Machado RCP on 5/9/2019 at 4:05 PM    Respiratory Protocol Guidelines     1. Assessment and treatment by Respiratory Therapy will be initiated for medication and therapeutic interventions upon initiation of aerosolized medication. 2. Physician will be contacted for respiratory rate (RR) greater than 35 breaths per minute. Therapy will be held for heart rate (HR) greater than 140 beats per minute, pending direction from physician. 3. Bronchodilators will be administered via Metered Dose Inhaler (MDI) with spacer when the following criteria are met:  a. Alert and cooperative     b. HR < 140 bpm  c. RR < 30 bpm                d. Can demonstrate a 2-3 second inspiratory hold  4.  Bronchodilators will be administered via Hand Held Nebulizer [facility]) to patients when ANY

## 2019-05-09 NOTE — PROGRESS NOTES
05/09/19 1935   Vent Information   $Ventilation $Subsequent Day   Vent Type 840   Vent Mode AC/VC   Vt Ordered 400 mL   Rate Set 16 bmp   Peak Flow 60 L/min   Pressure Support 0 cmH20   FiO2  50 %   Sensitivity 3   PEEP/CPAP 8   I Time/ I Time % 0 s   Vent Patient Data   High Peep/I Pressure 0   Peak Inspiratory Pressure 27 cmH2O   Mean Airway Pressure 13 cmH20   Rate Measured 19 br/min   Vt Exhaled 436 mL   Minute Volume 8.46 Liters   I:E Ratio 1:3.40   Plateau Pressure 20 [id number]   Static Compliance 36 mL/cmH2O   Dynamic Compliance 23 mL/cmH2O   Cough/Sputum   Sputum How Obtained Suctioned;Endotracheal   Cough Productive   Sputum Amount Small   Sputum Color Tan   Tenacity Thick   Spontaneous Breathing Trial (SBT) RT Doc   Pulse 76   SpO2 93 %   Breath Sounds   Right Upper Lobe Diminished   Right Middle Lobe Diminished   Right Lower Lobe Diminished   Left Upper Lobe Diminished   Left Lower Lobe Diminished   Additional Respiratory  Assessments   Resp 18   Position Semi-Nguyen's   Alarm Settings   High Pressure Alarm 45 cmH2O   Low Minute Volume Alarm 2.5 L/min   Apnea (secs) 20 secs   High Respiratory Rate 40 br/min   Low Exhaled Vt  255 mL   ETT (adult)   Placement Date/Time: 05/03/19 (c) 7727   Preoxygenation: Yes  Mask Ventilation: Ventilated by mask (1)  Technique: Video laryngoscopy  Tube Size: 7.5 mm  Laryngoscope: Mac  Blade Size: 3  Location: Oral  Grade View: Partial view of the glottis  Insert. ..    Secured at 24 cm   Measured From Lips   ET Placement Left   Secured By Commercial tube booker   Site Condition Dry

## 2019-05-09 NOTE — PROGRESS NOTES
5. Fluid Accumulation-No significant fluid accumulation, Extremities(BUE/BLE + 2 pitting edema noted)  6.  Strength-Not measured    Nutrition Risk Level: High    Nutrition Needs:  · Estimated Daily Total Kcal: 984 - 1845 kcals based on 8-15 kcals/kg/CBW  · Estimated Daily Protein (g): 104 - 114 g protein based on 2.0-2.2 g/kg/IBW  · Estimated Daily Fluid (ml/day): 1000 - 1500 ml    Nutrition Diagnosis:   · Problem:  Moderate malnutrition  · Etiology: related to Insufficient energy/nutrient consumption, Impaired respiratory function-inability to consume food, Psychological cause/life stress, Nutrition support - EN     Signs and symptoms:  as evidenced by NPO status due to medical condition, Intubation, Nutrition support - EN, Diet history of poor intake    Objective Information:  · Nutrition-Focused Physical Findings: patient has not been able to pass her SBT all week; + bronch with BAL today by Dr. Zayda Galloway; skin color is gray/dusky; + anasarca present; TF was off this am and propofol was infusing at 10 mcg during rounds; abdomen is round, soft, and non-tender + bowel sounds are active; no BM documented x 11 days admission  · Wound Type: None  · Current Nutrition Therapies:  · Oral Diet Orders: NPO   · Oral Diet intake: NPO  · Oral Nutrition Supplement (ONS) Orders: None  · ONS intake: NPO  · Tube Feeding (TF) Orders:   · Feeding Route: Orogastric  · Formula: Low Calorie, High Protein  · Rate (ml/hr):60 ml/hr     · Volume (ml/day): 1200 ml   · Duration: Continuous  · Additives/Modulars: (none)  · Water Flushes: 60 ml every 6 hours or per MD guidance  · Current TF & Flush Orders Provides: Vital High-Protein with goal rate of 25 ml/hr x 20 hours = 500 ml TV, 500 kcals, 44 g protein, and 418 ml free water   · Goal TF & Flush Orders Provides: Vital High-Protein with a goal rate of 60 ml/hr x 20 hours = 1200 ml TV, 1200 kcals, 105 g protein, and 1003 ml free water + water flushes of 60 ml every 6 hours or per MD guidance + 168 kcals from lipids with propofol at 10 mcg x 24 hours  · Additional Calories: propofol at 10 mcg x 24 hours = 168 kcals from lipids  · Anthropometric Measures:  · Ht: 5' 3\" (160 cm)   · Current Body Wt: 234 lb 3 oz (106.2 kg)  · Admission Body Wt: 275 lb (124.7 kg)  · Usual Body Wt: (unable to obtain )  · Weight Change: - 41# or 14.9% weight loss (using admission weight and CBW)   · Ideal Body Wt: 115 lb (52.2 kg), % Ideal Body 203%  · BMI Classification: BMI > or equal to 40.0 Obese Class III(41.6)    Nutrition Interventions:   Continue NPO, Continue current Tube Feeding  Continued Inpatient Monitoring, Coordination of Care, Coordination of Community Care    Nutrition Evaluation:   · Evaluation: Goal achieved   · Goals: patient will tolerate Vital High-Protein at goal rate of 60 ml/hr x 20 hours without GI distress, without s/s of aspiration, and without additional lab/fluid disturbances; weight will remain stable during remainder of admission   · Monitoring: TF Intake, TF Tolerance, Skin Integrity, Pertinent Labs, Weight, Mental Status/Confusion, Monitor Bowel Function, Constipation      Electronically signed by Fani Stone RD, LD on 5/9/19 at 12:03 PM    Contact Number: [phone]

## 2019-05-10 ENCOUNTER — APPOINTMENT (OUTPATIENT)
Dept: GENERAL RADIOLOGY | Age: 59
DRG: 207 | End: 2019-05-10
Payer: COMMERCIAL

## 2019-05-10 LAB
ANION GAP SERPL CALCULATED.3IONS-SCNC: 9 MMOL/L (ref 3–16)
APTT: 66 SEC (ref 26–36)
BASE EXCESS ARTERIAL: 6.8 MMOL/L (ref -3–3)
BASOPHILS ABSOLUTE: 0.1 K/UL (ref 0–0.2)
BASOPHILS RELATIVE PERCENT: 1.2 %
BUN BLDV-MCNC: 16 MG/DL (ref 7–20)
CALCIUM SERPL-MCNC: 8.9 MG/DL (ref 8.3–10.6)
CARBOXYHEMOGLOBIN ARTERIAL: 1.5 % (ref 0–1.5)
CHLORIDE BLD-SCNC: 93 MMOL/L (ref 99–110)
CO2: 34 MMOL/L (ref 21–32)
CREAT SERPL-MCNC: <0.5 MG/DL (ref 0.6–1.1)
EOSINOPHILS ABSOLUTE: 0.2 K/UL (ref 0–0.6)
EOSINOPHILS RELATIVE PERCENT: 1.4 %
GFR AFRICAN AMERICAN: >60
GFR NON-AFRICAN AMERICAN: >60
GLUCOSE BLD-MCNC: 123 MG/DL (ref 70–99)
GLUCOSE BLD-MCNC: 139 MG/DL (ref 70–99)
GLUCOSE BLD-MCNC: 143 MG/DL (ref 70–99)
GLUCOSE BLD-MCNC: 143 MG/DL (ref 70–99)
GLUCOSE BLD-MCNC: 149 MG/DL (ref 70–99)
GLUCOSE BLD-MCNC: 152 MG/DL (ref 70–99)
GLUCOSE BLD-MCNC: 153 MG/DL (ref 70–99)
HCO3 ARTERIAL: 33.1 MMOL/L (ref 21–29)
HCT VFR BLD CALC: 52.5 % (ref 36–48)
HEMOGLOBIN, ART, EXTENDED: 18 G/DL (ref 12–16)
HEMOGLOBIN: 17.1 G/DL (ref 12–16)
LYMPHOCYTES ABSOLUTE: 2 K/UL (ref 1–5.1)
LYMPHOCYTES RELATIVE PERCENT: 17.6 %
MCH RBC QN AUTO: 28.8 PG (ref 26–34)
MCHC RBC AUTO-ENTMCNC: 32.6 G/DL (ref 31–36)
MCV RBC AUTO: 88.4 FL (ref 80–100)
METHEMOGLOBIN ARTERIAL: 0.4 %
MONOCYTES ABSOLUTE: 1.2 K/UL (ref 0–1.3)
MONOCYTES RELATIVE PERCENT: 10.6 %
NEUTROPHILS ABSOLUTE: 7.8 K/UL (ref 1.7–7.7)
NEUTROPHILS RELATIVE PERCENT: 69.2 %
O2 CONTENT ARTERIAL: 24 ML/DL
O2 SAT, ARTERIAL: 93.3 %
O2 THERAPY: ABNORMAL
PCO2 ARTERIAL: 51.7 MMHG (ref 35–45)
PDW BLD-RTO: 16.8 % (ref 12.4–15.4)
PERFORMED ON: ABNORMAL
PH ARTERIAL: 7.42 (ref 7.35–7.45)
PLATELET # BLD: 174 K/UL (ref 135–450)
PMV BLD AUTO: 8.5 FL (ref 5–10.5)
PO2 ARTERIAL: 66.3 MMHG (ref 75–108)
POTASSIUM SERPL-SCNC: 3.1 MMOL/L (ref 3.5–5.1)
RBC # BLD: 5.94 M/UL (ref 4–5.2)
SODIUM BLD-SCNC: 136 MMOL/L (ref 136–145)
TCO2 ARTERIAL: 34.7 MMOL/L
WBC # BLD: 11.3 K/UL (ref 4–11)

## 2019-05-10 PROCEDURE — 6370000000 HC RX 637 (ALT 250 FOR IP): Performed by: INTERNAL MEDICINE

## 2019-05-10 PROCEDURE — 2000000000 HC ICU R&B

## 2019-05-10 PROCEDURE — 6370000000 HC RX 637 (ALT 250 FOR IP): Performed by: HOSPITALIST

## 2019-05-10 PROCEDURE — 6360000002 HC RX W HCPCS: Performed by: INTERNAL MEDICINE

## 2019-05-10 PROCEDURE — 71045 X-RAY EXAM CHEST 1 VIEW: CPT

## 2019-05-10 PROCEDURE — 2500000003 HC RX 250 WO HCPCS: Performed by: INTERNAL MEDICINE

## 2019-05-10 PROCEDURE — 2700000000 HC OXYGEN THERAPY PER DAY

## 2019-05-10 PROCEDURE — 94750 HC PULMONARY COMPLIANCE STUDY: CPT

## 2019-05-10 PROCEDURE — 94003 VENT MGMT INPAT SUBQ DAY: CPT

## 2019-05-10 PROCEDURE — 2580000003 HC RX 258: Performed by: INTERNAL MEDICINE

## 2019-05-10 PROCEDURE — 99232 SBSQ HOSP IP/OBS MODERATE 35: CPT | Performed by: INTERNAL MEDICINE

## 2019-05-10 PROCEDURE — 85730 THROMBOPLASTIN TIME PARTIAL: CPT

## 2019-05-10 PROCEDURE — 99291 CRITICAL CARE FIRST HOUR: CPT | Performed by: INTERNAL MEDICINE

## 2019-05-10 PROCEDURE — 85025 COMPLETE CBC W/AUTO DIFF WBC: CPT

## 2019-05-10 PROCEDURE — 82803 BLOOD GASES ANY COMBINATION: CPT

## 2019-05-10 PROCEDURE — 80048 BASIC METABOLIC PNL TOTAL CA: CPT

## 2019-05-10 PROCEDURE — 94640 AIRWAY INHALATION TREATMENT: CPT

## 2019-05-10 PROCEDURE — 94761 N-INVAS EAR/PLS OXIMETRY MLT: CPT

## 2019-05-10 RX ORDER — POTASSIUM CHLORIDE 20MEQ/15ML
40 LIQUID (ML) ORAL ONCE
Status: COMPLETED | OUTPATIENT
Start: 2019-05-10 | End: 2019-05-10

## 2019-05-10 RX ORDER — POTASSIUM CHLORIDE 29.8 MG/ML
20 INJECTION INTRAVENOUS
Status: COMPLETED | OUTPATIENT
Start: 2019-05-10 | End: 2019-05-10

## 2019-05-10 RX ORDER — FUROSEMIDE 10 MG/ML
40 INJECTION INTRAMUSCULAR; INTRAVENOUS ONCE
Status: COMPLETED | OUTPATIENT
Start: 2019-05-10 | End: 2019-05-10

## 2019-05-10 RX ORDER — POLYVINYL ALCOHOL 14 MG/ML
1 SOLUTION/ DROPS OPHTHALMIC PRN
Status: DISCONTINUED | OUTPATIENT
Start: 2019-05-10 | End: 2019-05-10 | Stop reason: CLARIF

## 2019-05-10 RX ORDER — CARBOXYMETHYLCELLULOSE SODIUM 10 MG/ML
1 GEL OPHTHALMIC PRN
Status: DISCONTINUED | OUTPATIENT
Start: 2019-05-10 | End: 2019-05-20 | Stop reason: HOSPADM

## 2019-05-10 RX ORDER — DIAZEPAM 5 MG/1
5 TABLET ORAL EVERY 8 HOURS
Status: DISCONTINUED | OUTPATIENT
Start: 2019-05-10 | End: 2019-05-11

## 2019-05-10 RX ORDER — POLYETHYLENE GLYCOL 3350 17 G/17G
17 POWDER, FOR SOLUTION ORAL DAILY
Status: DISCONTINUED | OUTPATIENT
Start: 2019-05-11 | End: 2019-05-15

## 2019-05-10 RX ADMIN — FENTANYL CITRATE 50 MCG: 50 INJECTION INTRAMUSCULAR; INTRAVENOUS at 17:05

## 2019-05-10 RX ADMIN — Medication 10 ML: at 20:34

## 2019-05-10 RX ADMIN — Medication 4 PUFF: at 03:25

## 2019-05-10 RX ADMIN — MIDAZOLAM HYDROCHLORIDE 2 MG: 2 INJECTION, SOLUTION INTRAMUSCULAR; INTRAVENOUS at 01:09

## 2019-05-10 RX ADMIN — DEXMEDETOMIDINE HYDROCHLORIDE 2.4 MCG/KG/HR: 100 INJECTION, SOLUTION INTRAVENOUS at 23:56

## 2019-05-10 RX ADMIN — MIDAZOLAM HYDROCHLORIDE 2 MG: 2 INJECTION, SOLUTION INTRAMUSCULAR; INTRAVENOUS at 05:40

## 2019-05-10 RX ADMIN — POTASSIUM CHLORIDE 20 MEQ: 400 INJECTION, SOLUTION INTRAVENOUS at 11:12

## 2019-05-10 RX ADMIN — MIDAZOLAM HYDROCHLORIDE 2 MG: 2 INJECTION, SOLUTION INTRAMUSCULAR; INTRAVENOUS at 20:33

## 2019-05-10 RX ADMIN — MIDAZOLAM HYDROCHLORIDE 2 MG: 2 INJECTION, SOLUTION INTRAMUSCULAR; INTRAVENOUS at 07:34

## 2019-05-10 RX ADMIN — DEXMEDETOMIDINE HYDROCHLORIDE 2.2 MCG/KG/HR: 100 INJECTION, SOLUTION INTRAVENOUS at 04:13

## 2019-05-10 RX ADMIN — METHYLPREDNISOLONE SODIUM SUCCINATE 40 MG: 40 INJECTION, POWDER, FOR SOLUTION INTRAMUSCULAR; INTRAVENOUS at 08:11

## 2019-05-10 RX ADMIN — Medication 4 PUFF: at 06:55

## 2019-05-10 RX ADMIN — MIDAZOLAM HYDROCHLORIDE 2 MG: 2 INJECTION, SOLUTION INTRAMUSCULAR; INTRAVENOUS at 13:56

## 2019-05-10 RX ADMIN — FENTANYL CITRATE 200 MCG/HR: 50 INJECTION, SOLUTION INTRAMUSCULAR; INTRAVENOUS at 22:56

## 2019-05-10 RX ADMIN — FENTANYL CITRATE 125 MCG/HR: 50 INJECTION, SOLUTION INTRAMUSCULAR; INTRAVENOUS at 00:51

## 2019-05-10 RX ADMIN — DICLOFENAC 2 G: 10 GEL TOPICAL at 08:12

## 2019-05-10 RX ADMIN — POTASSIUM CHLORIDE 20 MEQ: 400 INJECTION, SOLUTION INTRAVENOUS at 09:44

## 2019-05-10 RX ADMIN — MIDAZOLAM HYDROCHLORIDE 2 MG: 2 INJECTION, SOLUTION INTRAMUSCULAR; INTRAVENOUS at 19:30

## 2019-05-10 RX ADMIN — FENTANYL CITRATE 50 MCG: 50 INJECTION INTRAMUSCULAR; INTRAVENOUS at 07:34

## 2019-05-10 RX ADMIN — ALPRAZOLAM 0.25 MG: 0.25 TABLET ORAL at 12:59

## 2019-05-10 RX ADMIN — Medication 10 ML: at 08:12

## 2019-05-10 RX ADMIN — CARBOXYMETHYLCELLULOSE SODIUM 1 DROP: 10 GEL OPHTHALMIC at 12:59

## 2019-05-10 RX ADMIN — DEXMEDETOMIDINE HYDROCHLORIDE 2.4 MCG/KG/HR: 100 INJECTION, SOLUTION INTRAVENOUS at 20:32

## 2019-05-10 RX ADMIN — CITALOPRAM HYDROBROMIDE 20 MG: 20 TABLET ORAL at 08:11

## 2019-05-10 RX ADMIN — Medication 4 PUFF: at 11:23

## 2019-05-10 RX ADMIN — MIDAZOLAM HYDROCHLORIDE 2 MG: 2 INJECTION, SOLUTION INTRAMUSCULAR; INTRAVENOUS at 08:43

## 2019-05-10 RX ADMIN — ALPRAZOLAM 0.25 MG: 0.25 TABLET ORAL at 08:11

## 2019-05-10 RX ADMIN — DEXMEDETOMIDINE HYDROCHLORIDE 2.4 MCG/KG/HR: 100 INJECTION, SOLUTION INTRAVENOUS at 15:12

## 2019-05-10 RX ADMIN — HEPARIN SODIUM 12.5 ML/HR: 10000 INJECTION, SOLUTION INTRAVENOUS at 18:02

## 2019-05-10 RX ADMIN — ACETAMINOPHEN 650 MG: 650 SOLUTION ORAL at 08:11

## 2019-05-10 RX ADMIN — DEXMEDETOMIDINE HYDROCHLORIDE 2.4 MCG/KG/HR: 100 INJECTION, SOLUTION INTRAVENOUS at 22:06

## 2019-05-10 RX ADMIN — DIAZEPAM 5 MG: 5 TABLET ORAL at 15:17

## 2019-05-10 RX ADMIN — DEXMEDETOMIDINE HYDROCHLORIDE 2.2 MCG/KG/HR: 100 INJECTION, SOLUTION INTRAVENOUS at 02:17

## 2019-05-10 RX ADMIN — DEXMEDETOMIDINE HYDROCHLORIDE 2.4 MCG/KG/HR: 100 INJECTION, SOLUTION INTRAVENOUS at 12:04

## 2019-05-10 RX ADMIN — HEPARIN SODIUM 11 UNITS/KG/HR: 10000 INJECTION, SOLUTION INTRAVENOUS at 06:54

## 2019-05-10 RX ADMIN — DEXMEDETOMIDINE HYDROCHLORIDE 2.4 MCG/KG/HR: 100 INJECTION, SOLUTION INTRAVENOUS at 17:00

## 2019-05-10 RX ADMIN — FENTANYL CITRATE 150 MCG/HR: 50 INJECTION, SOLUTION INTRAMUSCULAR; INTRAVENOUS at 11:35

## 2019-05-10 RX ADMIN — MIDAZOLAM HYDROCHLORIDE 2 MG: 2 INJECTION, SOLUTION INTRAMUSCULAR; INTRAVENOUS at 15:17

## 2019-05-10 RX ADMIN — FENTANYL CITRATE 50 MCG: 50 INJECTION INTRAMUSCULAR; INTRAVENOUS at 04:09

## 2019-05-10 RX ADMIN — DEXMEDETOMIDINE HYDROCHLORIDE 2.2 MCG/KG/HR: 100 INJECTION, SOLUTION INTRAVENOUS at 06:23

## 2019-05-10 RX ADMIN — MIDAZOLAM HYDROCHLORIDE 2 MG: 2 INJECTION, SOLUTION INTRAMUSCULAR; INTRAVENOUS at 12:14

## 2019-05-10 RX ADMIN — MIDAZOLAM HYDROCHLORIDE 2 MG: 2 INJECTION, SOLUTION INTRAMUSCULAR; INTRAVENOUS at 22:10

## 2019-05-10 RX ADMIN — MIDAZOLAM HYDROCHLORIDE 2 MG: 2 INJECTION, SOLUTION INTRAMUSCULAR; INTRAVENOUS at 18:02

## 2019-05-10 RX ADMIN — DEXMEDETOMIDINE HYDROCHLORIDE 2.2 MCG/KG/HR: 100 INJECTION, SOLUTION INTRAVENOUS at 00:22

## 2019-05-10 RX ADMIN — CARBOXYMETHYLCELLULOSE SODIUM 1 DROP: 10 GEL OPHTHALMIC at 18:46

## 2019-05-10 RX ADMIN — FENTANYL CITRATE 50 MCG: 50 INJECTION INTRAMUSCULAR; INTRAVENOUS at 14:16

## 2019-05-10 RX ADMIN — MIDAZOLAM HYDROCHLORIDE 2 MG: 2 INJECTION, SOLUTION INTRAMUSCULAR; INTRAVENOUS at 11:09

## 2019-05-10 RX ADMIN — POTASSIUM CHLORIDE 40 MEQ: 20 SOLUTION ORAL at 11:12

## 2019-05-10 RX ADMIN — ALPRAZOLAM 0.25 MG: 0.25 TABLET ORAL at 20:33

## 2019-05-10 RX ADMIN — DEXMEDETOMIDINE HYDROCHLORIDE 2.4 MCG/KG/HR: 100 INJECTION, SOLUTION INTRAVENOUS at 18:43

## 2019-05-10 RX ADMIN — WHITE PETROLATUM 57.7 %-MINERAL OIL 31.9 % EYE OINTMENT 0.5 INCH: at 08:11

## 2019-05-10 RX ADMIN — DEXMEDETOMIDINE HYDROCHLORIDE 2.2 MCG/KG/HR: 100 INJECTION, SOLUTION INTRAVENOUS at 10:10

## 2019-05-10 RX ADMIN — Medication 4 PUFF: at 15:00

## 2019-05-10 RX ADMIN — Medication 10 ML: at 19:31

## 2019-05-10 RX ADMIN — FENTANYL CITRATE 200 MCG/HR: 50 INJECTION, SOLUTION INTRAMUSCULAR; INTRAVENOUS at 18:02

## 2019-05-10 RX ADMIN — Medication 4 PUFF: at 19:06

## 2019-05-10 RX ADMIN — FENTANYL CITRATE 50 MCG: 50 INJECTION INTRAMUSCULAR; INTRAVENOUS at 01:09

## 2019-05-10 RX ADMIN — DEXMEDETOMIDINE HYDROCHLORIDE 2.2 MCG/KG/HR: 100 INJECTION, SOLUTION INTRAVENOUS at 08:30

## 2019-05-10 RX ADMIN — FUROSEMIDE 40 MG: 10 INJECTION, SOLUTION INTRAMUSCULAR; INTRAVENOUS at 09:44

## 2019-05-10 RX ADMIN — MIDAZOLAM HYDROCHLORIDE 2 MG: 2 INJECTION, SOLUTION INTRAMUSCULAR; INTRAVENOUS at 04:09

## 2019-05-10 RX ADMIN — SENNOSIDES AND DOCUSATE SODIUM 2 TABLET: 8.6; 5 TABLET ORAL at 20:33

## 2019-05-10 RX ADMIN — Medication 4 PUFF: at 23:01

## 2019-05-10 RX ADMIN — SENNOSIDES AND DOCUSATE SODIUM 2 TABLET: 8.6; 5 TABLET ORAL at 08:11

## 2019-05-10 RX ADMIN — DEXMEDETOMIDINE HYDROCHLORIDE 2.4 MCG/KG/HR: 100 INJECTION, SOLUTION INTRAVENOUS at 13:33

## 2019-05-10 RX ADMIN — MIDAZOLAM HYDROCHLORIDE 2 MG: 2 INJECTION, SOLUTION INTRAMUSCULAR; INTRAVENOUS at 16:20

## 2019-05-10 ASSESSMENT — PAIN SCALES - GENERAL
PAINLEVEL_OUTOF10: 0
PAINLEVEL_OUTOF10: 5
PAINLEVEL_OUTOF10: 6
PAINLEVEL_OUTOF10: 4
PAINLEVEL_OUTOF10: 0
PAINLEVEL_OUTOF10: 4
PAINLEVEL_OUTOF10: 5

## 2019-05-10 ASSESSMENT — PAIN DESCRIPTION - PROGRESSION
CLINICAL_PROGRESSION: NOT CHANGED

## 2019-05-10 ASSESSMENT — PULMONARY FUNCTION TESTS
PIF_VALUE: 16
PIF_VALUE: 23
PIF_VALUE: 24
PIF_VALUE: 25
PIF_VALUE: 26
PIF_VALUE: 30
PIF_VALUE: 27
PIF_VALUE: 25
PIF_VALUE: 27
PIF_VALUE: 20
PIF_VALUE: 26
PIF_VALUE: 24
PIF_VALUE: 24

## 2019-05-10 NOTE — PROGRESS NOTES
05/10/19 0656   Vent Information   Vt Ordered 400 mL   Rate Set 16 bmp   Peak Flow 60 L/min   FiO2  50 %   PEEP/CPAP 8   Vent Patient Data   Peak Inspiratory Pressure 27 cmH2O   Mean Airway Pressure 13 cmH20   Rate Measured 19 br/min   Vt Exhaled 367 mL   Minute Volume 8.3 Liters   I:E Ratio 1:3.40   Plateau Pressure 19 [id number]   Static Compliance 33 mL/cmH2O   Dynamic Compliance 19 mL/cmH2O   Cough/Sputum   Cough Productive   Sputum Amount Small   Sputum Color Yellow; Tan   Tenacity Thick   Spontaneous Breathing Trial (SBT) RT Doc   Pulse 87   SpO2 92 %

## 2019-05-10 NOTE — PROGRESS NOTES
Patient's heart rate and respiratory rate increased. Patient pulling against restraints. Patient de-satting to 89%. Precedex gtt increased. RT notified. Dr Vera notified, per MD okay to end SBT.

## 2019-05-10 NOTE — PROGRESS NOTES
Pt moving kicking thrashing about. Medicated with Fentanyl 50 mcg and Versed 2 mg IVP. Reassessment complete. No other changes noted.  Will continue to monitor

## 2019-05-10 NOTE — PROGRESS NOTES
This note also relates to the following rows which could not be included:  FiO2  - Cannot attach notes to unvalidated device data    @FLONOTEDATA(<SUBSCRIPT> error)@

## 2019-05-10 NOTE — PROGRESS NOTES
40 mg Intravenous Daily    diclofenac sodium  2 g Topical BID    citalopram  20 mg Oral Daily    sodium chloride flush  10 mL Intravenous 2 times per day     PRN Meds:  acetaminophen, midazolam, polyethylene glycol, heparin (porcine), heparin (porcine), fentanNYL, sodium chloride flush, perflutren lipid microspheres, ibuprofen, sodium chloride flush, magnesium hydroxide, ondansetron, albuterol    Results:  CBC:   Recent Labs     05/08/19  0530 05/09/19  0530 05/10/19  0520   WBC 11.4* 11.9* 11.3*   HGB 17.3* 17.3* 17.1*   HCT 53.9* 54.0* 52.5*   MCV 91.1 90.5 88.4    162 174     BMP:   Recent Labs     05/08/19  0530 05/09/19  0530 05/10/19  0520    136 136   K 3.5 3.9 3.1*   CL 88* 90* 93*   CO2 38* 38* 34*   BUN 16 18 16   CREATININE <0.5* <0.5* <0.5*     Cultures:  Sputum 5/3 NRF  4/28 blood ngtd  5/6 BAL candida  5/9/19 BAL    Films:  CXR was reviewed by me and it showed 5/9   Improved pleural fluid and aeration in the left lung with worsening fluid and   aeration on the right.  Pattern would favor pulmonary edema/ARDS. Assessment:  · New fever  · Acute hypoxemic and hypercapnic respiratory failure  · Pneumonia  · Acute lower extremity DVT on left  · Acute PE-clinical diagnosis, CTPA suboptimal, unexplained hypoxemia and DVT  · Acute Cor Pulmonale  · Chronic hypercapnic respiratory failure most likely secondary to obesity hypoventilation syndrome  · Erythrocytosis  · Morbid obesity     Plan:  · Mechanical ventilation per my orders. The ventilator was adjusted by me at the bedside for unstable, life threatening respiratory failure.    · Target plateau pressures <94 cm H2O  · HOB greater than 30 degrees at all times  · Daily SBT once FIO2<60% and PEEP =/< 8, patient arousable, hemodynamically stable  · IV Sedation with Precedex and fentanyl targeted RASS -1  · Start precedex and wean propofol and fent as able  · Stop Antibiotics: Completed Vancomycin day #7/7 and levaquin D7/7  · Follow up repeat BAL/blood cx/Urine cx for new fever  · Observe off of abx  · Heparin gtt  · Replace K  · Lasix 40mg IV x 1  · TFs   · bowel regimen  · Home xanax  · ICU care: bactroban  ·  Total critical care time caring for this patient with life threatening, unstable organ failure, including direct patient contact, management of life support systems, review of data including imaging and labs, discussions with other team members and physicians at least 32 minutes so far today, excluding procedures.

## 2019-05-10 NOTE — PROGRESS NOTES
Patient restless in bed while trying to obtain POCT glucose fingerstick. Gave versed 2mg at 1930hrs, at this time patient is awake and pulling wrist restraints, trying to talk. HR increased to 140's.  If continues will notify nocturnist.

## 2019-05-10 NOTE — CONSULTS
5-10-19 (0520) aptt 66. Please continue heparin drip at 12.5 ml/hr. Next aptt 0600 on 5-11-19. [facility]. .  5/10/2019 7:28 AM

## 2019-05-10 NOTE — PROGRESS NOTES
Pulmonary & Critical Care Medicine ICU Progress Note    CC: Acute hypoxic respiratory failure    Events of Last 24 hours:   Tm 100.4    Precedex 2.4  fent 125  Heparin gtt    Invasive Lines: IV: piv    MV:  5/3  Vent Mode: PS Rate Set: 0 bmp/Vt Ordered: 400 mL/ /FiO2 : 50 %  Recent Labs     05/09/19  0530 05/10/19  0530   PHART 7.415 7.424   [id number] 62.8* 51.7*   PO2ART 64.7* 66.3*       IV:   dexmedetomidine (PRECEDEX) IV infusion 2.4 mcg/kg/hr (05/10/19 1059)    heparin (porcine) 12.5 mL/hr (05/10/19 0741)    fentaNYL (SUBLIMAZE) infusion Stopped (05/10/19 0927)       Vitals:  BP (!) 155/82   Pulse 81   Temp 100.2 °F (37.9 °C) (Oral)   Resp (!) 31   Ht 5' 3\" (1.6 m)   Wt 236 lb 12.8 oz (107.4 kg)   SpO2 92%   BMI 41.95 kg/m²   on vent    Intake/Output Summary (Last 24 hours) at 5/10/2019 1103  Last data filed at 5/10/2019 1058  Gross per 24 hour   Intake 2620 ml   Output 3025 ml   Net -405 ml     EXAM:  Constitutional: ill appearing. Eyes: PERRL. No sclera icterus. ENT: Normal Nose. Normal Tongue. ETT in place  Neck:  Trachea is midline. No thyroid tenderness. Respiratory: No accessory muscle usage. decreased breath sounds. No wheezes. No rales. No Rhonchi. Cardiovascular: Normal S1S2. [place] No murmur. No digit cyanosis. No digit clubbing. No LE edema. GI: Non-tender. Non-distended. Normal bowel sounds. No masses. No umbilical hernia. Skin: No rash on the exposed extremities. No Nodules on exposed extremities. Neuro: Sedated. Does not Follows commands. Does not Moves all extremities. Psych:  No agitation, no anxiety.     Scheduled Meds:   [START ON 5/11/2019] polyethylene glycol  17 g Oral Daily    potassium chloride  40 mEq Oral Once    potassium chloride  20 mEq Intravenous Q1H    pneumococcal 13-valent conjugate  0.5 mL Intramuscular Once    STYE  0.5 inch Ophthalmic Daily    albuterol sulfate HFA  4 puff Inhalation Q4H    ALPRAZolam  0.25 mg Oral TID    sennosides-docusate sodium  2 fentanyl targeted RASS -1  · Start precedex and wean propofol and fent as able  · Stop Antibiotics: Completed Vancomycin day #7/7 and levaquin D7/7  · Follow up repeat BAL/blood cx/Urine cx for new fever  · Observe off of abx  · Heparin gtt  · Replace K  · Lasix 40mg IV x 1  · TFs   · bowel regimen  · Home xanax  · ICU care: bactroban  ·  Total critical care time caring for this patient with life threatening, unstable organ failure, including direct patient contact, management of life support systems, review of data including imaging and labs, discussions with other team members and physicians at least 32 minutes so far today, excluding procedures.

## 2019-05-10 NOTE — PROGRESS NOTES
05/10/19 1907   Vent Information   $Ventilation $Subsequent Day   Vent Type 840   Vent Mode AC/VC   Vt Ordered 400 mL   Rate Set 16 bmp   Peak Flow 60 L/min   Pressure Support 0 cmH20   FiO2  50 %   Sensitivity 3   PEEP/CPAP 5   I Time/ I Time % 0 s   Humidification Source Heated wire   Humidification Temp Measured 36.9   Circuit Condensation Drained   Vent Patient Data   High Peep/I Pressure 0   Peak Inspiratory Pressure 26 cmH2O   Mean Airway Pressure 11 cmH20   Rate Measured 19 br/min   Vt Exhaled 340 mL   Minute Volume 7.87 Liters   I:E Ratio 1:3.40   Plateau Pressure 19 FOC29   Static Compliance 30 mL/cmH2O   Dynamic Compliance 21 mL/cmH2O   Cough/Sputum   Sputum How Obtained Endotracheal   Cough Non-productive   Sputum Amount None   Spontaneous Breathing Trial (SBT) RT Doc   Pulse 80   SpO2 93 %   Breath Sounds   Right Upper Lobe Diminished   Right Middle Lobe Diminished   Right Lower Lobe Diminished   Left Upper Lobe Diminished   Left Lower Lobe Diminished   Additional Respiratory  Assessments   Resp 19   Alarm Settings   High Pressure Alarm 45 cmH2O   Low Minute Volume Alarm 2.5 L/min   Apnea (secs) 20 secs   High Respiratory Rate 40 br/min   Low Exhaled Vt  255 mL   Patient Observation   Observations 7.5 ett 24 at lip

## 2019-05-10 NOTE — PROGRESS NOTES
Shift assessment complete. Pt intubated vent settings 16/400/50/5. Sedation Fentanyl 125 mcg/hr and Precedex 2.2 mcg/kg/hr. Restrained to prevent from pulling lines and tubes. JONATHAN picc patent and intact. Heparin infusion, daily ptt ordered. Lopez catheter in place draining clear yellow urine. VSS. No signso f distress noted. Will continue to monitor.

## 2019-05-10 NOTE — PROGRESS NOTES
Dr Vera notified that patient is maxed out on sedation gtts, receiving frequent PRNs and that when this nurse asked the patient, Aram Octave you uncomfortable? \". The patient shook her head, \"yes\". Dr Andrew Stubbs ordered 5 valium PO every 8 hours scheduled.

## 2019-05-10 NOTE — PROGRESS NOTES
5' 3\" (1.6 m)   Wt 236 lb 12.8 oz (107.4 kg)   SpO2 95%   BMI 41.95 kg/m²       General: appears sick. On Precedex. Eyes open. Failed SBT  Mucous Membranes:  Pink , anicteric  Neck: No JVD, no carotid bruit, no thyromegaly  Chest: no wheezes/coarse BS  Cardiovascular:  RRR S1S2 heard, no murmurs or gallops  Abdomen:  Soft, undistended, non tender, no organomegaly, BS present  Extremities: No edema or cyanosis. Distal pulses well felt  Neurological : SERGE      Labs:   Recent Labs     05/08/19  0530 05/09/19  0530 05/10/19  0520   WBC 11.4* 11.9* 11.3*   HGB 17.3* 17.3* 17.1*   HCT 53.9* 54.0* 52.5*    162 174     Recent Labs     05/08/19 0530 05/09/19  0530 05/10/19  0520    136 136   K 3.5 3.9 3.1*   CL 88* 90* 93*   CO2 38* 38* 34*   BUN 16 18 16   CREATININE <0.5* <0.5* <0.5*   CALCIUM 9.7 9.5 8.9     No results for input(s): AST, ALT, BILIDIR, BILITOT, ALKPHOS in the last 72 hours. No results for input(s): INR in the last 72 hours. No results for input(s): Eyvonne Ape in the last 72 hours. Urinalysis:      Lab Results   Component Value Date    NITRU Negative 04/28/2019    WBCUA 0-2 04/28/2019    BACTERIA 1+ 04/28/2019    RBCUA 0-2 04/28/2019    BLOODU TRACE-INTACT 04/28/2019    SPECGRAV <=1.005 04/28/2019    GLUCOSEU Negative 04/28/2019       Radiology:  XR CHEST PORTABLE   Final Result   Improved pleural fluid and aeration in the left lung with worsening fluid and   aeration on the right. Pattern would favor pulmonary edema/ARDS. XR CHEST PORTABLE   Final Result   1. Pulmonary edema. 2. Improving right basilar and grossly stable left basilar atelectasis and/or   pneumonia. XR CHEST PORTABLE   Final Result   Worsening bibasilar atelectasis and/or pneumonia. XR CHEST PORTABLE   Final Result   Improving bilateral airspace disease and pleural effusions.          XR CHEST PORTABLE   Final Result   Increasing right basilar and new bilateral upper lobe airspace disease could   represent edema or pneumonia. Left basilar pleuroparenchymal disease is   stable. XR CHEST PORTABLE   Final Result   1. No significant change. IR PICC WO SQ PORT/PUMP > 5 YEARS   Final Result   Successful placement of PICC line. XR CHEST PORTABLE   Final Result   Supportive devices are positioned appropriately. There is increasing   pulmonary edema since prior exam.         VL Extremity Venous Bilateral   Final Result      CT Chest WO Contrast   Final Result   1. Stable small left pleural effusion with a new small right pleural effusion. 2. Interval worsening of dependent consolidative opacity within bilateral   lower lobe since the study of 04/28/2019. In combination with worsening   bibasilar predominant mucous plugging, this likely represents either   atelectasis or aspiration. Clinical correlation is advised. 3. Slight interval improvement in appearance of lingular airspace   consolidation, likely representing resolving pneumonia. XR CHEST PORTABLE   Final Result   Mild cardiomegaly and chronic pulmonary change without definite acute   pulmonary process. CT Chest Pulmonary Embolism W Contrast   Final Result   Suboptimal contrast timing although there is no evidence for large or central   pulmonary embolism. The segmental and subsegmental branches are not well   evaluated. Small left basilar effusion with adjacent consolidation and there are also   infiltrates within the lingula and right lung base that may represent   atelectasis versus pneumonia. Small amount of free fluid in the visualized upper abdomen. XR CHEST PORTABLE   Final Result   Cardiomegaly and mild pulmonary edema.                  Assessment/Plan:    Principal Problem:    Acute respiratory failure with hypoxia (HCC)  Active Problems:    Rheumatoid arthritis (HCC)    Tobacco abuse    Anxiety state    SOB (shortness of breath)    Moderate persistent asthma with acute exacerbation    New onset of congestive heart failure (HCC)    Morbid obesity ([address]    Obesity hypoventilation syndrome (HCC)    Erythrocytosis    Acute deep vein thrombosis (DVT) of left peroneal vein (HCC)    Pulmonary embolus (HCC)    Moderate protein-calorie malnutrition (HCC)  Resolved Problems:    * No resolved hospital problems. *         1. Acute hypoxic resp failure. possibly secondary to acute pulmonary edema and acute congestive heart failure. was on IV Lasix-->now on prn lasix.  Echocardiogram has been obtained.  Echocardiogram however shows normal LV function does not show diastolic heart failure. shows moderate pulm HTN. Likely acute PE. Venous doppler - DVT below knee. CTA was suboptimal( also severe hypoxia otherwise unexplained). Started anticoagulation with heparin gtt. Her DVT and PE were likely present at the time of her admission. Intubated 5/3 for worsening hypercarbia. She is on a PEEP of 10. Continue with ventilator support for now. Bronchoscopy was done on 5/7/19. Precedex started. Failed SBT. Await bronchoscopy cultures     2.  Chronic hypercarbic respiratory failure likely obesity hypoventilation syndrome. mod pulm HTN on ECHO      3.  Acute bronchitis.  No fevers.  Has mildly elevated leukocytosis.  Pro-calcitonin however is negative. Vancomycin day 7/7 and Levaquin day 7/7.     4.  Morbid Obesity  - Body mass index is 41.95 kg/m². - Complicating assessment and treatment. Placing patient at risk for multiple co-morbidities as well as early death and contributing to the patient's presentation.        5. Anasarca - monitor I/O net -15L, ?nutritional, received lasix         Diet: DIET TUBE FEED CONTINUOUS/CYCLIC NPO; Low Calorie High Protein (vital high protein);  Orogastric; Continuous; 25; 60; 20 (hours)  Code Status: Full Code    PT/OT Eval Status: not indicated    Dispo - cont care in ICU    Pipe Blood 5/10/2019 11:50 AM

## 2019-05-10 NOTE — PROGRESS NOTES
05/10/19 0326   Vent Information   Vent Type 840   Vent Mode AC/VC   Vt Ordered 400 mL   Rate Set 16 bmp   Peak Flow 60 L/min   Pressure Support 0 cmH20   FiO2  50 %   Sensitivity 3   PEEP/CPAP 8   I Time/ I Time % 0 s   Vent Patient Data   High Peep/I Pressure 0   Peak Inspiratory Pressure 26 cmH2O   Mean Airway Pressure 14 cmH20   Rate Measured 26 br/min   Vt Exhaled 423 mL   Minute Volume 11.1 Liters   I:E Ratio 1:2.00   Cough/Sputum   Sputum How Obtained Endotracheal;Suctioned   Cough Productive   Sputum Amount Small   Sputum Color Tan   Tenacity Thick   Spontaneous Breathing Trial (SBT) RT Doc   Pulse 94   SpO2 98 %   Breath Sounds   Right Upper Lobe Diminished   Right Middle Lobe Diminished   Right Lower Lobe Diminished   Left Upper Lobe Diminished   Left Lower Lobe Diminished   Additional Respiratory  Assessments   Resp 24   Position Semi-Nguyen's   Alarm Settings   High Pressure Alarm 45 cmH2O   Low Minute Volume Alarm 2.5 L/min   Apnea (secs) 20 secs   High Respiratory Rate 40 br/min   Low Exhaled Vt  255 mL   ETT (adult)   Placement Date/Time: 05/03/19 (c) 6668   Preoxygenation: Yes  Mask Ventilation: Ventilated by mask (1)  Technique: Video laryngoscopy  Tube Size: 7.5 mm  Laryngoscope: Mac  Blade Size: 3  Location: Oral  Grade View: Partial view of the glottis  Insert. ..    Secured at 24 cm   Measured From Lips   ET Placement Left   Secured By Commercial tube booker   Site Condition Dry

## 2019-05-10 NOTE — PROGRESS NOTES
Shift assessment was completed (see flow sheet). Pt opens Eyes to speech, unable to determine orientation. Restless at times. Pt is currently on 2L NC, SpO2 at 94%. Lopez in place with STAT lock. Scheduled medications administered. Will continue to monitor. SBT started at 930 AM. Patient is following commands. Fentanyl gtt turned off per MD.    Dr Chen Delaney notified this AM re: low potassium, restlessness, and fevers. Also notified patient is receiving frequent PRNs of Versed.

## 2019-05-11 ENCOUNTER — APPOINTMENT (OUTPATIENT)
Dept: GENERAL RADIOLOGY | Age: 59
DRG: 207 | End: 2019-05-11
Payer: COMMERCIAL

## 2019-05-11 LAB
ADENOVIRUS, DFA: NEGATIVE
ADENOVIRUS, DFA: NEGATIVE
ANION GAP SERPL CALCULATED.3IONS-SCNC: 8 MMOL/L (ref 3–16)
APTT: 63 SEC (ref 26–36)
BASE EXCESS ARTERIAL: 5.1 MMOL/L (ref -3–3)
BASE EXCESS ARTERIAL: 7 MMOL/L (ref -3–3)
BASOPHILS ABSOLUTE: 0.1 K/UL (ref 0–0.2)
BASOPHILS RELATIVE PERCENT: 0.8 %
BUN BLDV-MCNC: 20 MG/DL (ref 7–20)
CALCIUM SERPL-MCNC: 9.3 MG/DL (ref 8.3–10.6)
CARBOXYHEMOGLOBIN ARTERIAL: 0.9 % (ref 0–1.5)
CARBOXYHEMOGLOBIN ARTERIAL: 1.6 % (ref 0–1.5)
CHLORIDE BLD-SCNC: 95 MMOL/L (ref 99–110)
CO2: 34 MMOL/L (ref 21–32)
CREAT SERPL-MCNC: <0.5 MG/DL (ref 0.6–1.1)
CULTURE, RESPIRATORY: ABNORMAL
CULTURE, RESPIRATORY: ABNORMAL
CULTURE, RESPIRATORY: NORMAL
EOSINOPHILS ABSOLUTE: 0.3 K/UL (ref 0–0.6)
EOSINOPHILS RELATIVE PERCENT: 2.3 %
GFR AFRICAN AMERICAN: >60
GFR NON-AFRICAN AMERICAN: >60
GLUCOSE BLD-MCNC: 111 MG/DL (ref 70–99)
GLUCOSE BLD-MCNC: 129 MG/DL (ref 70–99)
GLUCOSE BLD-MCNC: 148 MG/DL (ref 70–99)
GLUCOSE BLD-MCNC: 183 MG/DL (ref 70–99)
GLUCOSE BLD-MCNC: 82 MG/DL (ref 70–99)
GRAM STAIN RESULT: ABNORMAL
GRAM STAIN RESULT: NORMAL
HCO3 ARTERIAL: 33.1 MMOL/L (ref 21–29)
HCO3 ARTERIAL: 34.4 MMOL/L (ref 21–29)
HCT VFR BLD CALC: 51 % (ref 36–48)
HEMOGLOBIN, ART, EXTENDED: 17.3 G/DL (ref 12–16)
HEMOGLOBIN, ART, EXTENDED: 18.1 G/DL (ref 12–16)
HEMOGLOBIN: 16.5 G/DL (ref 12–16)
INFLUENZA A,DFA: NEGATIVE
INFLUENZA A,DFA: NEGATIVE
INFLUENZA B,DFA: NEGATIVE
INFLUENZA B,DFA: NEGATIVE
LYMPHOCYTES ABSOLUTE: 2.5 K/UL (ref 1–5.1)
LYMPHOCYTES RELATIVE PERCENT: 20.3 %
MCH RBC QN AUTO: 29.2 PG (ref 26–34)
MCHC RBC AUTO-ENTMCNC: 32.4 G/DL (ref 31–36)
MCV RBC AUTO: 90.3 FL (ref 80–100)
METAPNEUMOVIRUS, DFA: NEGATIVE
METAPNEUMOVIRUS, DFA: NEGATIVE
METHEMOGLOBIN ARTERIAL: 0.3 %
METHEMOGLOBIN ARTERIAL: 0.4 %
MONOCYTES ABSOLUTE: 1.2 K/UL (ref 0–1.3)
MONOCYTES RELATIVE PERCENT: 10.1 %
NEUTROPHILS ABSOLUTE: 8.2 K/UL (ref 1.7–7.7)
NEUTROPHILS RELATIVE PERCENT: 66.5 %
O2 CONTENT ARTERIAL: 23 ML/DL
O2 CONTENT ARTERIAL: 24 ML/DL
O2 SAT, ARTERIAL: 93.9 %
O2 SAT, ARTERIAL: 94 %
O2 THERAPY: ABNORMAL
O2 THERAPY: ABNORMAL
ORGANISM: ABNORMAL
PARAINFLUENZA 1 DFA STAIN: NEGATIVE
PARAINFLUENZA 1 DFA STAIN: NEGATIVE
PARAINFLUENZA 2 DFA STAIN: NEGATIVE
PARAINFLUENZA 2 DFA STAIN: NEGATIVE
PARAINFLUENZA 3: NEGATIVE
PARAINFLUENZA 3: NEGATIVE
PCO2 ARTERIAL: 58.2 MMHG (ref 35–45)
PCO2 ARTERIAL: 60 MMHG (ref 35–45)
PDW BLD-RTO: 16.6 % (ref 12.4–15.4)
PERFORMED ON: ABNORMAL
PERFORMED ON: NORMAL
PH ARTERIAL: 7.36 (ref 7.35–7.45)
PH ARTERIAL: 7.39 (ref 7.35–7.45)
PLATELET # BLD: 200 K/UL (ref 135–450)
PMV BLD AUTO: 8.1 FL (ref 5–10.5)
PO2 ARTERIAL: 71.3 MMHG (ref 75–108)
PO2 ARTERIAL: 73.9 MMHG (ref 75–108)
POTASSIUM SERPL-SCNC: 3.6 MMOL/L (ref 3.5–5.1)
RBC # BLD: 5.64 M/UL (ref 4–5.2)
RESPIRATORY SYNCYTIAL VIRUS  (RSV) DFA: NEGATIVE
RESPIRATORY SYNCYTIAL VIRUS  (RSV) DFA: NEGATIVE
RSPFA SOURCE: NORMAL
RSPFA SOURCE: NORMAL
SODIUM BLD-SCNC: 137 MMOL/L (ref 136–145)
TCO2 ARTERIAL: 34.9 MMOL/L
TCO2 ARTERIAL: 36.1 MMOL/L
VITAMIN D 25-HYDROXY: <5 NG/ML
WBC # BLD: 12.3 K/UL (ref 4–11)

## 2019-05-11 PROCEDURE — 2500000003 HC RX 250 WO HCPCS: Performed by: INTERNAL MEDICINE

## 2019-05-11 PROCEDURE — 99232 SBSQ HOSP IP/OBS MODERATE 35: CPT | Performed by: INTERNAL MEDICINE

## 2019-05-11 PROCEDURE — 99291 CRITICAL CARE FIRST HOUR: CPT | Performed by: INTERNAL MEDICINE

## 2019-05-11 PROCEDURE — 6360000002 HC RX W HCPCS: Performed by: INTERNAL MEDICINE

## 2019-05-11 PROCEDURE — 89220 SPUTUM SPECIMEN COLLECTION: CPT

## 2019-05-11 PROCEDURE — 2580000003 HC RX 258: Performed by: INTERNAL MEDICINE

## 2019-05-11 PROCEDURE — 85025 COMPLETE CBC W/AUTO DIFF WBC: CPT

## 2019-05-11 PROCEDURE — 94640 AIRWAY INHALATION TREATMENT: CPT

## 2019-05-11 PROCEDURE — 6370000000 HC RX 637 (ALT 250 FOR IP): Performed by: INTERNAL MEDICINE

## 2019-05-11 PROCEDURE — 82803 BLOOD GASES ANY COMBINATION: CPT

## 2019-05-11 PROCEDURE — 71045 X-RAY EXAM CHEST 1 VIEW: CPT

## 2019-05-11 PROCEDURE — 94660 CPAP INITIATION&MGMT: CPT

## 2019-05-11 PROCEDURE — 85730 THROMBOPLASTIN TIME PARTIAL: CPT

## 2019-05-11 PROCEDURE — 80048 BASIC METABOLIC PNL TOTAL CA: CPT

## 2019-05-11 PROCEDURE — 2700000000 HC OXYGEN THERAPY PER DAY

## 2019-05-11 PROCEDURE — 94761 N-INVAS EAR/PLS OXIMETRY MLT: CPT

## 2019-05-11 PROCEDURE — 2000000000 HC ICU R&B

## 2019-05-11 RX ORDER — POTASSIUM CHLORIDE 20MEQ/15ML
40 LIQUID (ML) ORAL ONCE
Status: COMPLETED | OUTPATIENT
Start: 2019-05-11 | End: 2019-05-11

## 2019-05-11 RX ORDER — DEXTROSE MONOHYDRATE 50 MG/ML
100 INJECTION, SOLUTION INTRAVENOUS PRN
Status: DISCONTINUED | OUTPATIENT
Start: 2019-05-11 | End: 2019-05-20 | Stop reason: HOSPADM

## 2019-05-11 RX ORDER — DEXTROSE MONOHYDRATE 25 G/50ML
12.5 INJECTION, SOLUTION INTRAVENOUS PRN
Status: DISCONTINUED | OUTPATIENT
Start: 2019-05-11 | End: 2019-05-20 | Stop reason: HOSPADM

## 2019-05-11 RX ORDER — ALBUTEROL SULFATE 2.5 MG/3ML
2.5 SOLUTION RESPIRATORY (INHALATION)
Status: DISCONTINUED | OUTPATIENT
Start: 2019-05-11 | End: 2019-05-20 | Stop reason: HOSPADM

## 2019-05-11 RX ORDER — NICOTINE POLACRILEX 4 MG
15 LOZENGE BUCCAL PRN
Status: DISCONTINUED | OUTPATIENT
Start: 2019-05-11 | End: 2019-05-20 | Stop reason: HOSPADM

## 2019-05-11 RX ADMIN — POLYETHYLENE GLYCOL (3350) 17 G: 17 POWDER, FOR SOLUTION ORAL at 10:11

## 2019-05-11 RX ADMIN — DEXMEDETOMIDINE HYDROCHLORIDE 2.4 MCG/KG/HR: 100 INJECTION, SOLUTION INTRAVENOUS at 01:42

## 2019-05-11 RX ADMIN — DIAZEPAM 5 MG: 5 TABLET ORAL at 00:38

## 2019-05-11 RX ADMIN — FENTANYL CITRATE 200 MCG/HR: 50 INJECTION, SOLUTION INTRAMUSCULAR; INTRAVENOUS at 03:22

## 2019-05-11 RX ADMIN — ALBUTEROL SULFATE 2.5 MG: 2.5 SOLUTION RESPIRATORY (INHALATION) at 22:49

## 2019-05-11 RX ADMIN — MIDAZOLAM HYDROCHLORIDE 2 MG: 2 INJECTION, SOLUTION INTRAMUSCULAR; INTRAVENOUS at 04:57

## 2019-05-11 RX ADMIN — CARBOXYMETHYLCELLULOSE SODIUM 1 DROP: 10 GEL OPHTHALMIC at 09:04

## 2019-05-11 RX ADMIN — Medication 4 PUFF: at 07:07

## 2019-05-11 RX ADMIN — DEXMEDETOMIDINE HYDROCHLORIDE 2.4 MCG/KG/HR: 100 INJECTION, SOLUTION INTRAVENOUS at 05:27

## 2019-05-11 RX ADMIN — Medication 10 ML: at 21:00

## 2019-05-11 RX ADMIN — Medication 4 PUFF: at 10:54

## 2019-05-11 RX ADMIN — HEPARIN SODIUM 12.5 ML/HR: 10000 INJECTION, SOLUTION INTRAVENOUS at 12:36

## 2019-05-11 RX ADMIN — WHITE PETROLATUM 57.7 %-MINERAL OIL 31.9 % EYE OINTMENT 0.5 INCH: at 07:43

## 2019-05-11 RX ADMIN — DEXMEDETOMIDINE HYDROCHLORIDE 2.4 MCG/KG/HR: 100 INJECTION, SOLUTION INTRAVENOUS at 07:03

## 2019-05-11 RX ADMIN — METHYLPREDNISOLONE SODIUM SUCCINATE 40 MG: 40 INJECTION, POWDER, FOR SOLUTION INTRAMUSCULAR; INTRAVENOUS at 07:33

## 2019-05-11 RX ADMIN — Medication 4 PUFF: at 03:02

## 2019-05-11 RX ADMIN — DEXMEDETOMIDINE HYDROCHLORIDE 2.4 MCG/KG/HR: 100 INJECTION, SOLUTION INTRAVENOUS at 03:22

## 2019-05-11 RX ADMIN — DICLOFENAC 2 G: 10 GEL TOPICAL at 07:37

## 2019-05-11 RX ADMIN — DEXMEDETOMIDINE HYDROCHLORIDE 2.3 MCG/KG/HR: 100 INJECTION, SOLUTION INTRAVENOUS at 08:58

## 2019-05-11 RX ADMIN — POTASSIUM CHLORIDE 40 MEQ: 20 SOLUTION ORAL at 10:10

## 2019-05-11 RX ADMIN — SENNOSIDES AND DOCUSATE SODIUM 2 TABLET: 8.6; 5 TABLET ORAL at 10:11

## 2019-05-11 RX ADMIN — Medication 10 ML: at 07:33

## 2019-05-11 RX ADMIN — INSULIN LISPRO 1 UNITS: 100 INJECTION, SOLUTION INTRAVENOUS; SUBCUTANEOUS at 13:11

## 2019-05-11 RX ADMIN — ALBUTEROL SULFATE 2.5 MG: 2.5 SOLUTION RESPIRATORY (INHALATION) at 19:24

## 2019-05-11 RX ADMIN — DEXMEDETOMIDINE HYDROCHLORIDE 2.3 MCG/KG/HR: 100 INJECTION, SOLUTION INTRAVENOUS at 10:47

## 2019-05-11 RX ADMIN — CARBOXYMETHYLCELLULOSE SODIUM 1 DROP: 10 GEL OPHTHALMIC at 07:33

## 2019-05-11 RX ADMIN — DIAZEPAM 5 MG: 5 TABLET ORAL at 10:10

## 2019-05-11 RX ADMIN — PREDNISONE 30 MG: 20 TABLET ORAL at 15:07

## 2019-05-11 RX ADMIN — ALPRAZOLAM 0.25 MG: 0.25 TABLET ORAL at 10:10

## 2019-05-11 RX ADMIN — ALBUTEROL SULFATE 2.5 MG: 2.5 SOLUTION RESPIRATORY (INHALATION) at 15:01

## 2019-05-11 ASSESSMENT — PULMONARY FUNCTION TESTS
PIF_VALUE: 11
PIF_VALUE: 27
PIF_VALUE: 12
PIF_VALUE: 26
PIF_VALUE: 23
PIF_VALUE: 15
PIF_VALUE: 11
PIF_VALUE: 26

## 2019-05-11 ASSESSMENT — PAIN DESCRIPTION - PROGRESSION
CLINICAL_PROGRESSION: NOT CHANGED

## 2019-05-11 NOTE — PROGRESS NOTES
Pulmonary & Critical Care Medicine ICU Progress Note    CC: Acute hypoxic respiratory failure    Events of Last 24 hours:  More alert on precedex  Tm 100.2    Precedex 2.4  fent 125  Heparin gtt    Invasive Lines: IV: piv    MV:  5/3  Vent Mode: AC/VC Rate Set: 0 bmp/Vt Ordered: 400 mL/ /FiO2 : 50 %  Recent Labs     05/10/19  0530 05/11/19  0515   PHART 7.424 7.389   [id number] 51.7* 58.2*   PO2ART 66.3* 71.3*       IV:   dexmedetomidine (PRECEDEX) IV infusion 2.3 mcg/kg/hr (05/11/19 0735)    heparin (porcine) 12.5 mL/hr (05/10/19 1802)    fentaNYL (SUBLIMAZE) infusion Stopped (05/11/19 0724)       Vitals:  /65   Pulse 76   Temp 99.3 °F (37.4 °C) (Oral)   Resp (!) 35   Ht 5' 3\" (1.6 m)   Wt 242 lb 3.2 oz (109.9 kg)   SpO2 (!) 88%   BMI 42.90 kg/m²   on vent    Intake/Output Summary (Last 24 hours) at 5/11/2019 0843  Last data filed at 5/11/2019 0500  Gross per 24 hour   Intake 3044 ml   Output 2425 ml   Net 619 ml     EXAM:  Constitutional: ill appearing. Eyes: PERRL. No sclera icterus. ENT: Normal Nose. Normal Tongue. ETT in place  Neck:  Trachea is midline. No thyroid tenderness. Respiratory: No accessory muscle usage. decreased breath sounds. No wheezes. No rales. No Rhonchi. Cardiovascular: Normal S1S2. Audrey Hobby No murmur. No digit cyanosis. No digit clubbing. No LE edema. GI: Non-tender. Non-distended. Normal bowel sounds. No masses. No umbilical hernia. Skin: No rash on the exposed extremities. No Nodules on exposed extremities. Neuro: Sedated. Does not Follows commands. Does not Moves all extremities. Psych:  No agitation, no anxiety.     Scheduled Meds:   polyethylene glycol  17 g Oral Daily    diazepam  5 mg Oral Q8H    pneumococcal 13-valent conjugate  0.5 mL Intramuscular Once    STYE  0.5 inch Ophthalmic Daily    albuterol sulfate HFA  4 puff Inhalation Q4H    ALPRAZolam  0.25 mg Oral TID    sennosides-docusate sodium  2 tablet Oral BID    methylPREDNISolone  40 mg Intravenous Daily    diclofenac sodium  2 g Topical BID    citalopram  20 mg Oral Daily    sodium chloride flush  10 mL Intravenous 2 times per day     PRN Meds:  carboxymethylcellulose PF, acetaminophen, midazolam, heparin (porcine), heparin (porcine), fentanNYL, perflutren lipid microspheres, ibuprofen, sodium chloride flush, magnesium hydroxide, ondansetron, albuterol    Results:  CBC:   Recent Labs     05/09/19  0530 05/10/19  0520 05/11/19  0518   WBC 11.9* 11.3* 12.3*   HGB 17.3* 17.1* 16.5*   HCT 54.0* 52.5* 51.0*   MCV 90.5 88.4 90.3    174 200     BMP:   Recent Labs     05/09/19  0530 05/10/19  0520 05/11/19  0518    136 137   K 3.9 3.1* 3.6   CL 90* 93* 95*   CO2 38* 34* 34*   BUN 18 16 20   CREATININE <0.5* <0.5* <0.5*     Cultures:  Sputum 5/3 NRF  4/28 blood ngtd  5/6 BAL candida  5/9/19 BAL    Films:  CXR was reviewed by me and it showed 5/9   Persistent layering bilateral effusions and basilar opacities.  Slightly   improved right lung expansion. Assessment:  · Acute hypoxemic and hypercapnic respiratory failure  · Bacterial Pneumonia  · Acute lower extremity DVT on left  · Acute PE-clinical diagnosis, CTPA suboptimal, unexplained hypoxemia and DVT  · Acute Cor Pulmonale  · Chronic hypercapnic respiratory failure most likely secondary to obesity hypoventilation syndrome  · Erythrocytosis  · Morbid obesity  · Fever - repeat cx ngtd, has picc line     Plan:  · Mechanical ventilation per my orders. The ventilator was adjusted by me at the bedside for unstable, life threatening respiratory failure.    · Target plateau pressures <57 cm H2O  · HOB greater than 30 degrees at all times  · Daily SBT once FIO2<60% and PEEP =/< 8, patient arousable, hemodynamically stable  · IV Sedation with Precedex and fentanyl targeted RASS -1  · Start precedex and wean propofol and fent as able  · Stop Antibiotics: Completed Vancomycin day #7/7 and levaquin D7/7  · Follow up repeat BAL/blood cx/Urine cx for new

## 2019-05-11 NOTE — PROGRESS NOTES
05/11/19 0708   Vent Information   Vt Ordered 400 mL   Rate Set 16 bmp   Peak Flow 60 L/min   Pressure Support 0 cmH20   FiO2  50 %   Sensitivity 3   PEEP/CPAP 5   I Time/ I Time % 0 s   Humidification Source Heated wire   Humidification Temp 36.6   Vent Patient Data   High Peep/I Pressure 0   Peak Inspiratory Pressure 27 cmH2O   Mean Airway Pressure 9.5 cmH20   Rate Measured 18 br/min   Vt Exhaled 410 mL   Minute Volume 7.88 Liters   I:E Ratio 1:4.20   Cough/Sputum   Sputum How Obtained Endotracheal   $Obtained Sample $Induced Sputum   Cough Productive   Sputum Amount Large   Sputum Color Creamy   Tenacity Thick   Spontaneous Breathing Trial (SBT) RT Doc   Pulse 73   SpO2 91 %   Breath Sounds   Right Upper Lobe Diminished   Right Middle Lobe Diminished   Right Lower Lobe Diminished   Left Upper Lobe Diminished;Rhonchi   Left Lower Lobe Diminished   Additional Respiratory  Assessments   Resp 19   Position Semi-Nguyen's   Oral Care Completed? Yes   Oral Care Mouth suctioned   Subglottic Suction Done? Yes   Alarm Settings   High Pressure Alarm 45 cmH2O   Low Minute Volume Alarm 2.5 L/min   High Respiratory Rate 40 br/min   ETT (adult)   Placement Date/Time: 05/03/19 c) 5218   Preoxygenation: Yes  Mask Ventilation: Ventilated by mask (1)  Technique: Video laryngoscopy  Tube Size: 7.5 mm  Laryngoscope: Mac  Blade Size: 3  Location: Oral  Grade View: Partial view of the glottis  Insert. ..    Secured at 24 cm   Measured From [address] [place] [address],[address] By Commercial tube booker   Site Condition Dry

## 2019-05-11 NOTE — CONSULTS
5-11-19 (0523) aptt 63. Please continue heparin drip at 12.5 ml/hr. Next aptt 0600 on 5-12-19. [facility]. .  5/11/2019 7:08 AM

## 2019-05-11 NOTE — PROGRESS NOTES
PS increased to 10, SpVt<300ml  SpO2 88%       05/11/19 0724   Vent Information   Vt Ordered 400 mL   Rate Set 0 bmp   Peak Flow 60 L/min   Pressure Support 10 cmH20   FiO2  50 %   Sensitivity 2   PEEP/CPAP 5   I Time/ I Time % 0 s   Vent Patient Data   High Peep/I Pressure 0   Peak Inspiratory Pressure 11 cmH2O   Mean Airway Pressure 8.1 cmH20   Rate Measured 27 br/min   Vt Exhaled 345 mL   Minute Volume 8.9 Liters   I:E Ratio 1:1.50   Spontaneous Breathing Trial (SBT) RT Doc   Pulse 73   SpO2 (!) 88 %   Additional Respiratory  Assessments   Resp 23   Alarm Settings   High Pressure Alarm 65 cmH2O   Low Minute Volume Alarm 2.5 L/min   High Respiratory Rate 40 br/min

## 2019-05-11 NOTE — PROGRESS NOTES
Pt placed on SBT 5/5, 50%  RSBI=88  HR 73, RR 24       05/11/19 0720   Vent Information   Vt Ordered 400 mL   Rate Set 0 bmp   Peak Flow 60 L/min   Pressure Support 5 cmH20   FiO2  50 %   Sensitivity 3   PEEP/CPAP 5   I Time/ I Time % 0 s   Vent Patient Data   High Peep/I Pressure 0   Peak Inspiratory Pressure 23 cmH2O   Mean Airway Pressure 12 cmH20   Rate Measured 31 br/min   Vt Exhaled 452 mL   Minute Volume 12.8 Liters   I:E Ratio 1:1.40   Spontaneous Breathing Trial (SBT) RT Doc   Pulse 76   SpO2 (!) 88 %   Additional Respiratory  Assessments   Resp 17   Alarm Settings   High Pressure Alarm 65 cmH2O   Low Minute Volume Alarm 2.5 L/min   High Respiratory Rate 40 br/min

## 2019-05-11 NOTE — PROGRESS NOTES
Pt extubated to 8lpm [facility]       05/11/19 1109   Oxygen Therapy/Pulse Ox   O2 Therapy Oxygen humidified   O2 Device High flow nasal cannula   O2 Flow Rate (L/min) 8 L/min   Resp 29   SpO2 90 %

## 2019-05-11 NOTE — PROGRESS NOTES
05/10/19 2302   Vent Information   Vent Type 840   Vent Mode AC/VC   Vt Ordered 400 mL   Rate Set 16 bmp   Peak Flow 60 L/min   Pressure Support 0 cmH20   FiO2  50 %   Sensitivity 3   PEEP/CPAP 5   I Time/ I Time % 0 s   Humidification Source Heated wire   Humidification Temp Measured 36.9   Circuit Condensation Drained   Vent Patient Data   High Peep/I Pressure 0   Peak Inspiratory Pressure 24 cmH2O   Mean Airway Pressure 10 cmH20   Rate Measured 20 br/min   Vt Exhaled 493 mL   Minute Volume 8.44 Liters   I:E Ratio 1:2.70   Cough/Sputum   Sputum How Obtained Endotracheal   Cough Productive   Sputum Amount Small   Sputum Color Creamy   Tenacity Thick   Spontaneous Breathing Trial (SBT) RT Doc   Pulse 76   SpO2 94 %   Breath Sounds   Right Upper Lobe Diminished   Right Middle Lobe Diminished   Right Lower Lobe Diminished   Left Upper Lobe Diminished   Left Lower Lobe Diminished   Additional Respiratory  Assessments   Resp 18   Alarm Settings   High Pressure Alarm 45 cmH2O   Low Minute Volume Alarm 2.5 L/min   Apnea (secs) 20 secs   High Respiratory Rate 40 br/min   Low Exhaled Vt  255 mL   Patient Observation   Observations 7.5 ett 24 at lip

## 2019-05-11 NOTE — PROGRESS NOTES
Dr Vera updated on SBT results. Per Dr Vera, check ABG. Okay to give some of patients PO meds prior to extubation and hold the rest,administer later today after patient passes a swallow eval.       ABG drawn right radial. Site WNL. No hematoma noted. Modified allens test positive. Pressure held for 2 mins for bleeding control. Pressure dressing applied. Patient tolerated well.

## 2019-05-11 NOTE — PROGRESS NOTES
Patient given Miralax and 2 senokot. No coughing noted but patient began de-satting. Patient down to 86&. Oxygen increased to 12L. Will continue to monitor. RT notified.

## 2019-05-11 NOTE — PROGRESS NOTES
SBT started at approximately 720 AM. Fentanyl gtt stopped. Patient is following commands. Patient de-satting down to 87% at times. Other V/S stable. Will continue to monitor.

## 2019-05-11 NOTE — PROGRESS NOTES
Hospitalist Progress Note        PCP: Charlie Acuña MD    Date of Admission: 4/28/2019    Subjective: cont to be on vent, anasarca +    5/6-  she is sedated on the ventilator. Responds to commands like squeezing. Appears sick Plans for bronchoscopy today. 5/7- Bronchoscopy was done. No endobronchial lesion seen. Mucosa appears normal. She had yellow and white secretions were thick. 5/8- still on the vent. SBT with agitation. 5/9- she is on Precedex. Dose increased. Opens eyes. 5/10- was on SBT for over a hour. Opens eyes. Has a low grade fever. Not on antibiotics. 5/11- extubated on 5/11/19. Awake and alert. On 10 L of HF oxygen.     Medications:  Reviewed    Infusion Medications    dextrose      dexmedetomidine (PRECEDEX) IV infusion 1.1 mcg/kg/hr (05/11/19 1052)    heparin (porcine) 12.5 mL/hr (05/10/19 1802)    fentaNYL (SUBLIMAZE) infusion Stopped (05/11/19 0724)     Scheduled Medications    insulin lispro  0-18 Units Subcutaneous TID WC    insulin lispro  0-9 Units Subcutaneous Nightly    polyethylene glycol  17 g Oral Daily    diazepam  5 mg Oral Q8H    pneumococcal 13-valent conjugate  0.5 mL Intramuscular Once    STYE  0.5 inch Ophthalmic Daily    albuterol sulfate HFA  4 puff Inhalation Q4H    ALPRAZolam  0.25 mg Oral TID    sennosides-docusate sodium  2 tablet Oral BID    methylPREDNISolone  40 mg Intravenous Daily    diclofenac sodium  2 g Topical BID    citalopram  20 mg Oral Daily    sodium chloride flush  10 mL Intravenous 2 times per day     PRN Meds: glucose, dextrose, glucagon (rDNA), dextrose, carboxymethylcellulose PF, acetaminophen, midazolam, heparin (porcine), heparin (porcine), fentanNYL, perflutren lipid microspheres, ibuprofen, sodium chloride flush, magnesium hydroxide, ondansetron, albuterol      Intake/Output Summary (Last 24 hours) at 5/11/2019 1135  Last data filed at 5/11/2019 1132  Gross per 24 hour   Intake 3044 ml   Output 1750 ml   Net 1294 ml Physical Exam Performed:    /74   Pulse 77   Temp 99.3 °F (37.4 °C) (Oral)   Resp 29   Ht 5' 3\" (1.6 m)   Wt 242 lb 3.2 oz (109.9 kg)   SpO2 90%   BMI 42.90 kg/m²       General: extubated. Mucous Membranes:  Pink , anicteric  Neck: No JVD, no carotid bruit, no thyromegaly  Chest: no wheezes/coarse BS  Cardiovascular:  RRR S1S2 heard, no murmurs or gallops  Abdomen:  Soft, undistended, non tender, no organomegaly, BS present  Extremities: No edema or cyanosis. Distal pulses well felt  Neurological : SERGE      Labs:   Recent Labs     05/09/19  0530 05/10/19  0520 05/11/19  0518   WBC 11.9* 11.3* 12.3*   HGB 17.3* 17.1* 16.5*   HCT 54.0* 52.5* 51.0*    174 200     Recent Labs     05/09/19  0530 05/10/19  0520 05/11/19  0518    136 137   K 3.9 3.1* 3.6   CL 90* 93* 95*   CO2 38* 34* 34*   BUN 18 16 20   CREATININE <0.5* <0.5* <0.5*   CALCIUM 9.5 8.9 9.3     No results for input(s): AST, ALT, BILIDIR, BILITOT, ALKPHOS in the last 72 hours. No results for input(s): INR in the last 72 hours. No results for input(s): Milagro Carroll in the last 72 hours. Urinalysis:      Lab Results   Component Value Date    NITRU Negative 04/28/2019    WBCUA 0-2 04/28/2019    BACTERIA 1+ 04/28/2019    RBCUA 0-2 04/28/2019    BLOODU TRACE-INTACT 04/28/2019    SPECGRAV <=1.005 04/28/2019    GLUCOSEU Negative 04/28/2019       Radiology:  XR CHEST PORTABLE   Final Result   Stable life support device positioning. Persistent layering bilateral effusions and basilar opacities. Slightly   improved right lung expansion. XR CHEST PORTABLE   Final Result   Improved pleural fluid and aeration in the left lung with worsening fluid and   aeration on the right. Pattern would favor pulmonary edema/ARDS. XR CHEST PORTABLE   Final Result   1. Pulmonary edema. 2. Improving right basilar and grossly stable left basilar atelectasis and/or   pneumonia.          XR CHEST PORTABLE   Final Result Worsening bibasilar atelectasis and/or pneumonia. XR CHEST PORTABLE   Final Result   Improving bilateral airspace disease and pleural effusions. XR CHEST PORTABLE   Final Result   Increasing right basilar and new bilateral upper lobe airspace disease could   represent edema or pneumonia. Left basilar pleuroparenchymal disease is   stable. XR CHEST PORTABLE   Final Result   1. No significant change. IR PICC WO SQ PORT/PUMP > 5 YEARS   Final Result   Successful placement of PICC line. XR CHEST PORTABLE   Final Result   Supportive devices are positioned appropriately. There is increasing   pulmonary edema since prior exam.         VL Extremity Venous Bilateral   Final Result      CT Chest WO Contrast   Final Result   1. Stable small left pleural effusion with a new small right pleural effusion. 2. Interval worsening of dependent consolidative opacity within bilateral   lower lobe since the study of 04/28/2019. In combination with worsening   bibasilar predominant mucous plugging, this likely represents either   atelectasis or aspiration. Clinical correlation is advised. 3. Slight interval improvement in appearance of lingular airspace   consolidation, likely representing resolving pneumonia. XR CHEST PORTABLE   Final Result   Mild cardiomegaly and chronic pulmonary change without definite acute   pulmonary process. CT Chest Pulmonary Embolism W Contrast   Final Result   Suboptimal contrast timing although there is no evidence for large or central   pulmonary embolism. The segmental and subsegmental branches are not well   evaluated. Small left basilar effusion with adjacent consolidation and there are also   infiltrates within the lingula and right lung base that may represent   atelectasis versus pneumonia. Small amount of free fluid in the visualized upper abdomen.          XR CHEST PORTABLE   Final Result   Cardiomegaly and mild pulmonary edema. Assessment/Plan:    Principal Problem:    Acute respiratory failure with hypoxia (HCC)  Active Problems:    Rheumatoid arthritis (HCC)    Tobacco abuse    Anxiety state    SOB (shortness of breath)    Moderate persistent asthma with acute exacerbation    New onset of congestive heart failure (HCC)    Morbid obesity (HCC)    Obesity hypoventilation syndrome (HCC)    Erythrocytosis    Acute deep vein thrombosis (DVT) of left peroneal vein (HCC)    Pulmonary embolus (HCC)    Moderate protein-calorie malnutrition (HCC)  Resolved Problems:    * No resolved hospital problems. *         1. Acute hypoxic resp failure. possibly secondary to acute pulmonary edema and acute congestive heart failure. was on IV Lasix-->now on prn lasix.  Echocardiogram has been obtained.  Echocardiogram however shows normal LV function does not show diastolic heart failure. shows moderate pulm HTN. Likely acute PE. Venous doppler - DVT below knee. CTA was suboptimal( also severe hypoxia otherwise unexplained). Started anticoagulation with heparin gtt. Her DVT and PE were likely present at the time of her admission. Intubated 5/3 for worsening hypercarbia. She is on a PEEP of 10. Continue with ventilator support for now. Bronchoscopy was done on 5/7/19. Extubated on 5/11/19.     2.  Chronic hypercarbic respiratory failure likely obesity hypoventilation syndrome. mod pulm HTN on ECHO.     3.  Acute bronchitis.  No fevers.  Has mildly elevated leukocytosis.  Pro-calcitonin however is negative. Vancomycin day 7/7 and Levaquin day 7/7.     4.  Morbid Obesity  - Body mass index is 42.9 kg/m². - Complicating assessment and treatment. Placing patient at risk for multiple co-morbidities as well as early death and contributing to the patient's presentation.        5. Anasarca - monitor I/O net -15L, ?nutritional, received lasix       Diet: DIET TUBE FEED CONTINUOUS/CYCLIC NPO; Low Calorie High Protein (vital high protein);  Orogastric; Continuous; 25; 60; 20 (hours)  Code Status: Full Code    PT/OT Eval Status: not indicated    Dispo - cont care in ICU    [facility] 5/11/2019 11:35 AM

## 2019-05-11 NOTE — PROGRESS NOTES
05/11/19 1926   Oxygen Therapy/Pulse Ox   $Oxygen $Daily Charge   Resp 18   SpO2 91 %   $Pulse Oximeter $Spot check (multiple/continuous)   Treatment   Treatment Type HHN   $Treatment Type $Inhaled Therapy/Meds   Medications Albuterol   Pre-Tx Pulse 96   Pre-Tx Resps 28   Breath Sounds Pre-Tx Left Diminished   Breath Sounds Pre-Tx Right Diminished   Breath Sounds Post-Tx Left Diminished   Breath Sounds Post-Tx Right Diminished   Delivery Source Air   Position Semi-Nguyen's   Tx Tolerance Well   Patient Observation   Observations 7.5 ett 24 at lip

## 2019-05-12 LAB
ALBUMIN SERPL-MCNC: 3.1 G/DL (ref 3.4–5)
ANION GAP SERPL CALCULATED.3IONS-SCNC: 7 MMOL/L (ref 3–16)
APTT: 69 SEC (ref 26–36)
BASOPHILS ABSOLUTE: 0.1 K/UL (ref 0–0.2)
BASOPHILS RELATIVE PERCENT: 1 %
BUN BLDV-MCNC: 15 MG/DL (ref 7–20)
CALCIUM SERPL-MCNC: 9.6 MG/DL (ref 8.3–10.6)
CHLORIDE BLD-SCNC: 96 MMOL/L (ref 99–110)
CO2: 35 MMOL/L (ref 21–32)
CREAT SERPL-MCNC: <0.5 MG/DL (ref 0.6–1.1)
EOSINOPHILS ABSOLUTE: 0.3 K/UL (ref 0–0.6)
EOSINOPHILS RELATIVE PERCENT: 2.2 %
GFR AFRICAN AMERICAN: >60
GFR NON-AFRICAN AMERICAN: >60
GLUCOSE BLD-MCNC: 101 MG/DL (ref 70–99)
GLUCOSE BLD-MCNC: 79 MG/DL (ref 70–99)
GLUCOSE BLD-MCNC: 89 MG/DL (ref 70–99)
GLUCOSE BLD-MCNC: 96 MG/DL (ref 70–99)
HCT VFR BLD CALC: 51.7 % (ref 36–48)
HEMOGLOBIN: 16.7 G/DL (ref 12–16)
INR BLD: 1.25 (ref 0.86–1.14)
LYMPHOCYTES ABSOLUTE: 1.6 K/UL (ref 1–5.1)
LYMPHOCYTES RELATIVE PERCENT: 12.9 %
MCH RBC QN AUTO: 29 PG (ref 26–34)
MCHC RBC AUTO-ENTMCNC: 32.2 G/DL (ref 31–36)
MCV RBC AUTO: 89.9 FL (ref 80–100)
MONOCYTES ABSOLUTE: 1.4 K/UL (ref 0–1.3)
MONOCYTES RELATIVE PERCENT: 11.1 %
NEUTROPHILS ABSOLUTE: 9.2 K/UL (ref 1.7–7.7)
NEUTROPHILS RELATIVE PERCENT: 72.8 %
PDW BLD-RTO: 16.3 % (ref 12.4–15.4)
PERFORMED ON: NORMAL
PHOSPHORUS: 4.4 MG/DL (ref 2.5–4.9)
PLATELET # BLD: 227 K/UL (ref 135–450)
PMV BLD AUTO: 8.3 FL (ref 5–10.5)
POTASSIUM SERPL-SCNC: 3.8 MMOL/L (ref 3.5–5.1)
PROTHROMBIN TIME: 14.3 SEC (ref 9.8–13)
RBC # BLD: 5.75 M/UL (ref 4–5.2)
SODIUM BLD-SCNC: 138 MMOL/L (ref 136–145)
URINE CULTURE, ROUTINE: NORMAL
WBC # BLD: 12.6 K/UL (ref 4–11)

## 2019-05-12 PROCEDURE — 97530 THERAPEUTIC ACTIVITIES: CPT

## 2019-05-12 PROCEDURE — 97168 OT RE-EVAL EST PLAN CARE: CPT

## 2019-05-12 PROCEDURE — 80069 RENAL FUNCTION PANEL: CPT

## 2019-05-12 PROCEDURE — 2580000003 HC RX 258: Performed by: INTERNAL MEDICINE

## 2019-05-12 PROCEDURE — 85730 THROMBOPLASTIN TIME PARTIAL: CPT

## 2019-05-12 PROCEDURE — 99232 SBSQ HOSP IP/OBS MODERATE 35: CPT | Performed by: INTERNAL MEDICINE

## 2019-05-12 PROCEDURE — 6360000002 HC RX W HCPCS: Performed by: INTERNAL MEDICINE

## 2019-05-12 PROCEDURE — 6370000000 HC RX 637 (ALT 250 FOR IP): Performed by: INTERNAL MEDICINE

## 2019-05-12 PROCEDURE — 85025 COMPLETE CBC W/AUTO DIFF WBC: CPT

## 2019-05-12 PROCEDURE — 97164 PT RE-EVAL EST PLAN CARE: CPT

## 2019-05-12 PROCEDURE — 94640 AIRWAY INHALATION TREATMENT: CPT

## 2019-05-12 PROCEDURE — 97110 THERAPEUTIC EXERCISES: CPT

## 2019-05-12 PROCEDURE — 85610 PROTHROMBIN TIME: CPT

## 2019-05-12 PROCEDURE — 94660 CPAP INITIATION&MGMT: CPT

## 2019-05-12 PROCEDURE — 94761 N-INVAS EAR/PLS OXIMETRY MLT: CPT

## 2019-05-12 PROCEDURE — 99291 CRITICAL CARE FIRST HOUR: CPT | Performed by: INTERNAL MEDICINE

## 2019-05-12 PROCEDURE — 2000000000 HC ICU R&B

## 2019-05-12 PROCEDURE — 2700000000 HC OXYGEN THERAPY PER DAY

## 2019-05-12 RX ORDER — WARFARIN SODIUM 5 MG/1
5 TABLET ORAL
Status: ACTIVE | OUTPATIENT
Start: 2019-05-12 | End: 2019-05-13

## 2019-05-12 RX ADMIN — ALBUTEROL SULFATE 2.5 MG: 2.5 SOLUTION RESPIRATORY (INHALATION) at 07:01

## 2019-05-12 RX ADMIN — ALBUTEROL SULFATE 2.5 MG: 2.5 SOLUTION RESPIRATORY (INHALATION) at 12:17

## 2019-05-12 RX ADMIN — ALBUTEROL SULFATE 2.5 MG: 2.5 SOLUTION RESPIRATORY (INHALATION) at 07:14

## 2019-05-12 RX ADMIN — WHITE PETROLATUM 57.7 %-MINERAL OIL 31.9 % EYE OINTMENT 0.5 INCH: at 09:00

## 2019-05-12 RX ADMIN — ALBUTEROL SULFATE 2.5 MG: 2.5 SOLUTION RESPIRATORY (INHALATION) at 15:00

## 2019-05-12 RX ADMIN — Medication 10 ML: at 09:00

## 2019-05-12 RX ADMIN — ALBUTEROL SULFATE 2.5 MG: 2.5 SOLUTION RESPIRATORY (INHALATION) at 19:13

## 2019-05-12 RX ADMIN — DICLOFENAC 2 G: 10 GEL TOPICAL at 09:00

## 2019-05-12 RX ADMIN — HEPARIN SODIUM 11 UNITS/KG/HR: 10000 INJECTION, SOLUTION INTRAVENOUS at 09:53

## 2019-05-12 RX ADMIN — ALBUTEROL SULFATE 2.5 MG: 2.5 SOLUTION RESPIRATORY (INHALATION) at 23:30

## 2019-05-12 RX ADMIN — CARBOXYMETHYLCELLULOSE SODIUM 1 DROP: 10 GEL OPHTHALMIC at 13:01

## 2019-05-12 ASSESSMENT — PAIN DESCRIPTION - PROGRESSION
CLINICAL_PROGRESSION: NOT CHANGED

## 2019-05-12 ASSESSMENT — PAIN SCALES - GENERAL: PAINLEVEL_OUTOF10: 0

## 2019-05-12 NOTE — PROGRESS NOTES
O2 decreased to 10lpm         05/12/19 1221   Oxygen Therapy/Pulse Ox   O2 Device High flow nasal cannula   O2 Flow Rate (L/min) 10 L/min   Resp 25   SpO2 96 %

## 2019-05-12 NOTE — PROGRESS NOTES
RESPIRATORY THERAPY ASSESSMENT    Name:  Magalie [facility] Record Number:  [id number]  Age: 64 y.o. Gender: female  : 1960  Today's Date:  2019  Room:  [facility] [place]-    Assessment     Is the patient being admitted for a COPD or Asthma exacerbation? Yes   (If yes the patient will be seen every 4 hours for the first 24 hours and then reassessed)    Patient Admission Diagnosis      Allergies  No Known Allergies    Minimum Predicted Vital Capacity:     782          Actual Vital Capacity:                    Pulmonary History:COPD  Home Oxygen Therapy:  room air  Home Respiratory Therapy:Albuterol Q4prn  Current Respiratory Therapy:  Albuterol HHNQ4w/a and Q4prn  Treatment Type: HHN  Medications: Albuterol/Ipratropium    Respiratory Severity Index(RSI)   Patients with orders for inhalation medications, oxygen, or any therapeutic treatment modality will be placed on Respiratory Protocol. They will be assessed with the first treatment and at least every 72 hours thereafter. The following severity scale will be used to determine frequency of treatment intervention.     Smoking History: Severe Exacerbation = 4    Social History  Social History     Tobacco Use    Smoking status: Current Every Day Smoker     Packs/day: 0.10     Years: 30.00     Pack years: 3.00     Types: Cigarettes    Smokeless tobacco: Never Used   Substance Use Topics    Alcohol use: No     Alcohol/week: 0.0 oz    Drug use: No       Recent Surgical History: None = 0  Past Surgical History  Past Surgical History:   Procedure Laterality Date    BRONCHOSCOPY N/A 2019    BRONCHOSCOPY ALVEOLAR LAVAGE performed by Maddie Mendoza MD at [address] [place] [address]      both hands     LUMBAR SPINE SURGERY         Level of Consciousness: {LEVEL OF CONSCIOUSNESS:49291}    Level of Activity: {LEVEL OF ACTIVITY:71828}    Respiratory Pattern: {RESPIRATORY PATTERN:}    Breath Sounds: {BREATH SOUNDS:20082}    Sputum  Sputum Color: Creamy, Tenacity: Thick, Sputum How Obtained: Spontaneous cough  Cough: {COUGH:20083}    Vital Signs   BP (!) 114/54   Pulse 96   Temp 98.5 °F (36.9 °C)   Resp 27   Ht 5' 3\" (1.6 m)   Wt 246 lb 12.8 oz (111.9 kg)   SpO2 95%   BMI 43.72 kg/m²   SPO2 (COPD values may differ): {SPO2:20084}    Peak Flow (asthma only): {PEAK FLOW:20085}    RSI: {RSI:20086}        Plan       Goals: {Blank multiple:28563::\"medication delivery\",\"mobilize retained secretions\",\"volume expansion\",\"improve oxygenation\"}    Patient/caregiver was educated on the proper method of use for Respiratory Care Devices:  {yes no:050984::\"Yes\"}      Level of patient/caregiver understanding able to:   ? Verbalize understanding   ? Demonstrate understanding       ? Teach back        ? Needs reinforcement       ? No available caregiver               ? Other:     Response to education:  {Responses; Excellent - Poor:765526499}     Is patient being placed on Home Treatment Regimen? {YES/NO:19732}     Does the patient have everything they need prior to discharge? {YES/NO:19732}     Comments: ***    Plan of Care: ***    Electronically signed by Neil Freeman RCP on 5/12/2019 at 4:33 PM    Respiratory Protocol Guidelines     1. Assessment and treatment by Respiratory Therapy will be initiated for medication and therapeutic interventions upon initiation of aerosolized medication. 2. Physician will be contacted for respiratory rate (RR) greater than 35 breaths per minute. Therapy will be held for heart rate (HR) greater than 140 beats per minute, pending direction from physician. 3. Bronchodilators will be administered via Metered Dose Inhaler (MDI) with spacer when the following criteria are met:  a. Alert and cooperative     b. HR < 140 bpm  c. RR < 30 bpm                d. Can demonstrate a 2-3 second inspiratory hold  4.  Bronchodilators will be administered via Hand Held Nebulizer [facility]) to patients when ANY of the following criteria are met  a. Incognizant or uncooperative          b. Patients treated with HHN at Home        c. Unable to demonstrate proper use of MDI with spacer     d. RR > 30 bpm   5. Bronchodilators will be delivered via Metered Dose Inhaler (MDI), HHN, Aerogen to intubated patients on mechanical ventilation. 6. Inhalation medication orders will be delivered and/or substituted as outlined below. Aerosolized Medications Ordering and Administration Guidelines:    1. All Medications will be ordered by a physician, and their frequency and/or modality will be adjusted as defined by the patients Respiratory Severity Index (RSI) score. 2. If the patient does not have documented COPD, consider discontinuing anticholinergics when RSI is less than 9.  3. If the bronchospasm worsens (increased RSI), then the bronchodilator frequency can be increased to a maximum of every 4 hours. If greater than every 4 hours is required, the physician will be contacted. 4. If the bronchospasm improves, the frequency of the bronchodilator can be decreased, based on the patient's RSI, but not less than home treatment regimen frequency. 5. Bronchodilator(s) will be discontinued if patient has a RSI less than 9 and has received no scheduled or as needed treatment for 72  Hrs. Patients Ordered on a Mucolytic Agent:    1. Must always be administered with a bronchodilator. 2. Discontinue if patient experiences worsened bronchospasm, or secretions have lessened to the point that the patient is able to clear them with a cough. Anti-inflammatory and Combination Medications:    1. If the patient lacks prior history of lung disease, is not using inhaled anti-inflammatory medication at home, and lacks wheezing by examination or by history for at least 24 hours, contact physician for possible discontinuation.

## 2019-05-12 NOTE — CONSULTS
5-12-19 (0600) aptt 69. Please continue heparin drip at 12.5 ml/hr. Next aptt 0600 on 5-13-19. [facility] [place] .  5/12/2019 6:48 AM

## 2019-05-12 NOTE — PROGRESS NOTES
O2 decreased to 8lpm         05/12/19 1503   Oxygen Therapy/Pulse Ox   O2 Device High flow nasal cannula   O2 Flow Rate (L/min) 8 L/min   Resp 27   SpO2 95 %

## 2019-05-12 NOTE — PROGRESS NOTES
Pulmonary & Critical Care Medicine ICU Progress Note    CC: Acute hypoxic respiratory failure    Events of Last 24 hours: had trouble swallowing her meds yesterday. Increased hypoxia overnight. No fever    Heparin gtt    Invasive Lines: IV: piv    MV:  5/3 -5/11  Vent Mode: AC/VC Rate Set: 0 bmp/Vt Ordered: 400 mL/ /FiO2 : 50 %  Recent Labs     05/11/19  0515 05/11/19  1023   PHART 7.389 7.359   [id number] 58.2* 60.0*   PO2ART 71.3* 73.9*       IV:   dextrose      dexmedetomidine (PRECEDEX) IV infusion Stopped (05/11/19 1344)    heparin (porcine) 12.5 mL/hr (05/11/19 1236)       Vitals:  BP (!) 121/54   Pulse 98   Temp 98.9 °F (37.2 °C)   Resp 24   Ht 5' 3\" (1.6 m)   Wt 246 lb 12.8 oz (111.9 kg)   SpO2 91%   BMI 43.72 kg/m²   on vent    Intake/Output Summary (Last 24 hours) at 5/12/2019 0818  Last data filed at 5/12/2019 6250  Gross per 24 hour   Intake 693 ml   Output 1450 ml   Net -757 ml     EXAM:  Constitutional: ill appearing. Eyes: PERRL. No sclera icterus. ENT: Normal Nose. Normal Tongue. Neck:  Trachea is midline. No thyroid tenderness. Respiratory: No accessory muscle usage. decreased breath sounds. No wheezes. No rales. No Rhonchi. Cardiovascular: Normal S1S2. Allayne Marysol No murmur. No digit cyanosis. No digit clubbing. No LE edema. GI: Non-tender. Non-distended. Normal bowel sounds. No masses. No umbilical hernia. Skin: No rash on the exposed extremities. No Nodules on exposed extremities. Neuro: Alert and oriented x 3. Follows commands. Does not Moves all extremities. Psych:  No agitation, no anxiety.     Scheduled Meds:   insulin lispro  0-6 Units Subcutaneous TID WC    insulin lispro  0-3 Units Subcutaneous Nightly    predniSONE  30 mg Oral Daily    albuterol  2.5 mg Nebulization Q4H WA    polyethylene glycol  17 g Oral Daily    pneumococcal 13-valent conjugate  0.5 mL Intramuscular Once    STYE  0.5 inch Ophthalmic Daily    ALPRAZolam  0.25 mg Oral TID    sennosides-docusate and physicians at least 31 minutes so far today, excluding procedures.

## 2019-05-12 NOTE — PROGRESS NOTES
Inpatient Occupational Therapy  Re-Evaluation and Treatment    Unit: ICU  Date:  5/12/2019  Patient Name:    Army Boyer  [facility]   Admitting diagnosis:  Acute respiratory failure ([address] [J96.00]  Admit Date:  4/28/2019  Precautions/Restrictions/WB Status/ Lines/ Wounds/ Oxygen: fall risk, IV, bed/chair alarm, andujar catheter , supplemental o2 (11L) and ICU monitoring    PMHx: anxiety, impacted cerumen, RA     Admit 4/28/19  Doppler +for LLE DVT on 5/1/19, lovenox ordered   OT/PT evaluation on 5/2/19  On and off BiPAP and Vapotherm until intubated on 5/3/19  Discontinue OT/PT on 5/3/19 2/2 intubation with mechanical ventilation   Bronchoscopy on 5/6/19  Failed SBT with agitation 5/8/19  Failed SBT with agitation 5/9/19  On SBT for over an hour 5/10/19  Extubated 5/11/19  NPO 2/2 need for swallow eval 5/12/19  OT/PT reordered 5/12/19     Treatment Time:  7124-8226  Treatment Number: 1   Billable Treatment Time: 45 minutes   Total Treatment Time:   45   minutes    Patient Goals for Therapy:  \" to walk again \"      Discharge Recommendations: Acute Rehab (ARU)/ Inpatient [address] ([facility])  DME needs for discharge: defer to facility       Therapy recommendations for staff:   Assist pt to complete UE exercises. Handout provided to patient this date with detailed instructions. Home Health S4 Level Recommendation:  NA  AM-PAC Score: 13    Information copied over from evaluation during this admission on 5/2/19  Preadmission Environment    Pt. Lives Alone  Home environment:    two story home  Steps to enter first floor: 2-3 Steps to enter  Steps to second floor: Partial flight and One Hand Rail (3 + 7)  Bathroom: Bath Tub Shower and Standard height toilet  Equipment owned: none      If needed increased assist, could stay with parents. One story home with 3-4 small steps to enter with HR. Tub shower with shower seat.  Equipment: Shower chair, wheelchair, RW, rollator, raised commode with grab bars     Preadmission Status:  Pt. Able to drive: Yes  Pt Fully independent with ADLs: Yes  Pt. Required assistance from family for: Independent PTA  Pt. Fully independent for transfers and gait and walked with No Device  History of falls No   FT at [facility] ([address] [place] [address])    Pain  No  Pt stated she feels stiff. Cognition    A&O Person , Place  and Time    Able to follow 2 step commands    Subjective  Patient lying supine in bed with family present. Mom, dad, and sister left during the assessment/treatment. Pt agreeable to this OT eval & tx. Upper Extremity ROM:    WFL     Upper Extremity Strength:    BUE strength impaired but not formally assessed w/ MMT  (difficulty completing MMT 2/2 positioning)     Upper Extremity Sensation    WFL    Upper Extremity Proprioception:   NT    Coordination and Tone  WFL    Balance  Functional Sitting Balance:  NT  Functional Standing Balance:NT    Bed mobility:  RN cleared pt for exercises only this date. Continue to watch O2. Supine to sit:   Not Tested  Sit to supine:   Not Tested  Scooting to head of bed:   Not Tested  Scooting in sitting:  Not Tested  Rolling:   Not Tested  Bridging:   Not Tested    Transfers:    Sit to stand:  Not Tested  Stand to sit:  Not Tested  Bed to [facility]:  Not Tested  Bed to chair:   Not Tested  Standard toilet: Not Tested    Activity Tolerance   Pt completed therapy session with No nausea, dizziness, pain or SOB noted w/activity  SpO2: remained above 90% through entire session     Pt reports needs are met at this time. Dressing:      UE:   Not Tested  LE:    Not Tested    Bathing:    UE:  Not Tested  LE:  Not Tested    Eating:   Not Tested  --  Pt NPO and waiting for SLP evaluation. Toileting:  Not Tested    Positioning Needs: In bed, call light and needs in reach.       Exercise / Activities Initiated:   Hand flex/ext:  x5  Wrist flex/ext:  x5  Forearm sup/pronation:  x5  Shoulder flex/ext:  x5  Shoulder abd/add:

## 2019-05-12 NOTE — PLAN OF CARE
Patient's EF (Ejection Fraction) is greater than 40%    Patient has a past medical history of Anxiety state, unspecified, Impacted cerumen, and Rheumatoid arthritis(714.0). Comorbidities reviewed and education provided as appropriate. Patient and/or family's stated goal of care: reduce shortness of breath prior to discharge, increase activity tolerance prior to discharge, better understand heart failure and disease management prior to discharge and reduce lower extremity edema prior to discharge    Pt is currently on 12 L O2. Pt with nonpitting lower extremity edema. Patient's weights and intake/output reviewed:    Patient Vitals for the past 96 hrs (Last 3 readings):   Weight   05/11/19 0536 242 lb 3.2 oz (109.9 kg)   05/10/19 0550 236 lb 12.8 oz (107.4 kg)   05/08/19 0000 234 lb 3.2 oz (106.2 kg)       Intake/Output Summary (Last 24 hours) at 5/11/2019 2220  Last data filed at 5/11/2019 1808  Gross per 24 hour   Intake 2448 ml   Output 1350 ml   Net 1098 ml         Patient's current functional capacity:  Marked limitation of physical activity. Comfortable at rest. Less than ordinary activity causes fatigue, palpitation, or dyspnea.      >> For CHF and Comorbidity Education Time and Topics, please see Education Tab.

## 2019-05-12 NOTE — PROGRESS NOTES
Pharmacy dosing warfarin per . New start warfarin. Give warfarin 5mg x 1 today at 1800. Pt currently on Heparin Drip. Pt has a DVT of lower left leg. Goal INR 2-3     Pharmacy will continue to monitor labs daily and adjust dose accordingly.     Anitha Reeves, [facility]

## 2019-05-12 NOTE — PROGRESS NOTES
Inpatient Physical Therapy Re-eval and  Daily Treatment Note    Unit: ICU  Date:  5/12/2019  Patient Name:    Pilar Childers  Admitting diagnosis:  Acute respiratory failure ([facility] [address] [J96.00]  Admit Date:  4/28/2019  Precautions/Restrictions:  fall risk, IV, andujar catheter , supplemental o2 (11 L high flowL), ICU monitoring and continuous pulse ox, L foot drop    PMHx: anxiety, RA      Admit 4/28/19  Doppler +for LLE DVT  In peroneal vein on 5/1/19, lovenox ordered   OT/PT evaluation on 5/2/19  On and off BiPAP and Vapotherm until intubated on 5/3/19  Discontinue OT/PT on 5/3/19 2/2 intubation with mechanical ventilation   Bronchoscopy on 5/6/19  Failed SBT with agitation 5/8/19  Failed SBT with agitation 5/9/19  On SBT for over an hour 5/10/19  Extubated 5/11/19  NPO 2/2 need for swallow eval 5/12/19  OT/PT reordered 5/12/19            Discharge Recommendations: Acute Rehab (ARU)/ Inpatient Rehab Facility (IRF)  DME needs for discharge: defer to facility       Therapy recommendations for staff:   Provided written  [address] S4 Level Recommendation: NA  AM-PAC Mobility Score   AM-PAC Inpatient Mobility Raw Score : 7       Treatment Time:  11:15-11:55  Treatment number: 1  Timed Code Treatment Minutes: 40 minutes  Total Treatment Minutes:  40  Minutes    Information copied over from evaluation during this admission on 5/2/19  Preadmission Environment    Pt. [address] [place] [address] environment:    two story home  Steps to enter first floor: 2-3 Steps to enter  Steps to second floor: Partial flight and One Hand Rail (3 + 7)  Bathroom: Bath Tub Shower and Standard height toilet  Equipment owned: none      If needed increased assist, could stay with parents. One story home with 3-4 small steps to enter with HR. Tub shower with shower seat. Equipment: Shower chair, wheelchair, RW, rollator, raised commode with grab bars     Preadmission Status:  Pt. Able to drive: Yes  Pt Fully independent with ADLs: Yes  Pt.

## 2019-05-12 NOTE — PROGRESS NOTES
Shift assessment completed, see flowsheet. Pt A/O X 3. Lung sounds cta but diminished in bases, Heart Sounds s1s2, NSR with occ PAC on the monitor, Bowel Sounds +x4 , Pulses +x4 , Lopez patent to bsd. C/O pain in throat. IV's infusing per md order . Call light within reach, bed in lowest position, side rails x 3 . Will continue to monitor.

## 2019-05-12 NOTE — PROGRESS NOTES
General: extubated. On NC oxygen. Mucous Membranes:  Pink , anicteric  Neck: No JVD, no carotid bruit, no thyromegaly  Chest: no wheezes/coarse BS  Cardiovascular:  RRR S1S2 heard, no murmurs or gallops  Abdomen:  Soft, undistended, non tender, no organomegaly, BS present  Extremities: No edema or cyanosis. Distal pulses well felt  Neurological : SERGE      Labs:   Recent Labs     05/10/19  0520 05/11/19  0518 05/12/19  0600   WBC 11.3* 12.3* 12.6*   HGB 17.1* 16.5* 16.7*   HCT 52.5* 51.0* 51.7*    200 227     Recent Labs     05/10/19  0520 05/11/19  0518 05/12/19  0600    137 138   K 3.1* 3.6 3.8   CL 93* 95* 96*   CO2 34* 34* 35*   BUN 16 20 15   CREATININE <0.5* <0.5* <0.5*   CALCIUM 8.9 9.3 9.6   PHOS  --   --  4.4     No results for input(s): AST, ALT, BILIDIR, BILITOT, ALKPHOS in the last 72 hours. No results for input(s): INR in the last 72 hours. No results for input(s): Mily Halon in the last 72 hours. Urinalysis:      Lab Results   Component Value Date    NITRU Negative 04/28/2019    WBCUA 0-2 04/28/2019    BACTERIA 1+ 04/28/2019    RBCUA 0-2 04/28/2019    BLOODU TRACE-INTACT 04/28/2019    SPECGRAV <=1.005 04/28/2019    GLUCOSEU Negative 04/28/2019       Radiology:  XR CHEST PORTABLE   Final Result   Stable life support device positioning. Persistent layering bilateral effusions and basilar opacities. Slightly   improved right lung expansion. XR CHEST PORTABLE   Final Result   Improved pleural fluid and aeration in the left lung with worsening fluid and   aeration on the right. Pattern would favor pulmonary edema/ARDS. XR CHEST PORTABLE   Final Result   1. Pulmonary edema. 2. Improving right basilar and grossly stable left basilar atelectasis and/or   pneumonia. XR CHEST PORTABLE   Final Result   Worsening bibasilar atelectasis and/or pneumonia.          XR CHEST PORTABLE   Final Result   Improving bilateral airspace disease and pleural effusions. XR CHEST PORTABLE   Final Result   Increasing right basilar and new bilateral upper lobe airspace disease could   represent edema or pneumonia. Left basilar pleuroparenchymal disease is   stable. XR CHEST PORTABLE   Final Result   1. No significant change. IR PICC WO SQ PORT/PUMP > 5 YEARS   Final Result   Successful placement of PICC line. XR CHEST PORTABLE   Final Result   Supportive devices are positioned appropriately. There is increasing   pulmonary edema since prior exam.         VL Extremity Venous Bilateral   Final Result      CT Chest WO Contrast   Final Result   1. Stable small left pleural effusion with a new small right pleural effusion. 2. Interval worsening of dependent consolidative opacity within bilateral   lower lobe since the study of 04/28/2019. In combination with worsening   bibasilar predominant mucous plugging, this likely represents either   atelectasis or aspiration. Clinical correlation is advised. 3. Slight interval improvement in appearance of lingular airspace   consolidation, likely representing resolving pneumonia. XR CHEST PORTABLE   Final Result   Mild cardiomegaly and chronic pulmonary change without definite acute   pulmonary process. CT Chest Pulmonary Embolism W Contrast   Final Result   Suboptimal contrast timing although there is no evidence for large or central   pulmonary embolism. The segmental and subsegmental branches are not well   evaluated. Small left basilar effusion with adjacent consolidation and there are also   infiltrates within the lingula and right lung base that may represent   atelectasis versus pneumonia. Small amount of free fluid in the visualized upper abdomen. XR CHEST PORTABLE   Final Result   Cardiomegaly and mild pulmonary edema.          XR CHEST PORTABLE    (Results Pending)           Assessment/Plan:    Principal Problem:    Acute respiratory failure with hypoxia [facility])  Active Problems:    Rheumatoid arthritis ([facility] [address]    Tobacco abuse    Anxiety state    SOB (shortness of breath)    Moderate persistent asthma with acute exacerbation    New onset of congestive heart failure (HCC)    Morbid obesity ([facility] [address]    Obesity hypoventilation syndrome (HCC)    Erythrocytosis    Acute deep vein thrombosis (DVT) of left peroneal vein (HCC)    Pulmonary embolus (HCC)    Moderate protein-calorie malnutrition (HCC)  Resolved Problems:    * No resolved hospital problems. *         1. Acute hypoxic resp failure. possibly secondary to acute pulmonary edema and acute congestive heart failure. was on IV Lasix-->now on prn lasix.  Echocardiogram has been obtained.  Echocardiogram however shows normal LV function does not show diastolic heart failure. shows moderate pulm HTN. Likely acute PE. Venous doppler - DVT below knee. CTA was suboptimal( also severe hypoxia otherwise unexplained). Started anticoagulation with heparin gtt. Her DVT and PE were likely present at the time of her admission. Intubated 5/3 for worsening hypercarbia. She is on a PEEP of 10. Continue with ventilator support for now. Bronchoscopy was done on 5/7/19. Extubated on 5/11/19. On NC oxygen.     2.  Chronic hypercarbic respiratory failure likely obesity hypoventilation syndrome. mod pulm HTN on ECHO.     3.  Acute bronchitis.  No fevers.  Has mildly elevated leukocytosis.  Pro-calcitonin however is negative. Vancomycin day 7/7 and Levaquin day 7/7.     4.  Morbid Obesity  - Body mass index is 43.72 kg/m². - Complicating assessment and treatment. Placing patient at risk for multiple co-morbidities as well as early death and contributing to the patient's presentation.        5.  Anasarca - monitor I/O net -15L, ?nutritional, received lasix       Diet: No diet orders on file  Code Status: Full Code    PT/OT Eval Status: not indicated    Dispo - cont care in ICU    Montserrat Woodard 5/12/2019 11:05 AM

## 2019-05-12 NOTE — PROGRESS NOTES
Pt is not receiving oral medications or liquids due to difficulty swallowing needs evaluated by speech.

## 2019-05-12 NOTE — PROGRESS NOTES
05/11/19 0841   NIV Type   $NIV $Daily Charge   Equipment Type v60   Mode BIPAP   Mask Type Full face mask   Mask Size Medium   Settings/Measurements   Comfort Level Good   Using Accessory Muscles No   IPAP 12 cmH20   EPAP 6 cmH2O   Rate Ordered 12   Resp (!) 32   SpO2 92   FiO2  50 %   Vt Exhaled 550 mL   Mask Leak (lpm) 0 lpm   Skin Protection for O2 Device Yes   Patient Observation   Observations spo2 92% on 50% bipap

## 2019-05-13 LAB
ALBUMIN SERPL-MCNC: 3 G/DL (ref 3.4–5)
ANION GAP SERPL CALCULATED.3IONS-SCNC: 9 MMOL/L (ref 3–16)
APTT: 83.8 SEC (ref 26–36)
BASOPHILS ABSOLUTE: 0 K/UL (ref 0–0.2)
BASOPHILS RELATIVE PERCENT: 0.5 %
BUN BLDV-MCNC: 14 MG/DL (ref 7–20)
CALCIUM SERPL-MCNC: 9.3 MG/DL (ref 8.3–10.6)
CHLORIDE BLD-SCNC: 95 MMOL/L (ref 99–110)
CO2: 36 MMOL/L (ref 21–32)
CREAT SERPL-MCNC: <0.5 MG/DL (ref 0.6–1.1)
EOSINOPHILS ABSOLUTE: 0.3 K/UL (ref 0–0.6)
EOSINOPHILS RELATIVE PERCENT: 3.3 %
FINAL REPORT: NORMAL
FINAL REPORT: NORMAL
GFR AFRICAN AMERICAN: >60
GFR NON-AFRICAN AMERICAN: >60
GLUCOSE BLD-MCNC: 110 MG/DL (ref 70–99)
GLUCOSE BLD-MCNC: 80 MG/DL (ref 70–99)
GLUCOSE BLD-MCNC: 88 MG/DL (ref 70–99)
HCT VFR BLD CALC: 51.4 % (ref 36–48)
HEMOGLOBIN: 16.6 G/DL (ref 12–16)
INR BLD: 1.23 (ref 0.86–1.14)
LYMPHOCYTES ABSOLUTE: 1.6 K/UL (ref 1–5.1)
LYMPHOCYTES RELATIVE PERCENT: 14.6 %
MCH RBC QN AUTO: 29.4 PG (ref 26–34)
MCHC RBC AUTO-ENTMCNC: 32.3 G/DL (ref 31–36)
MCV RBC AUTO: 91 FL (ref 80–100)
MONOCYTES ABSOLUTE: 1.1 K/UL (ref 0–1.3)
MONOCYTES RELATIVE PERCENT: 10.6 %
NEUTROPHILS ABSOLUTE: 7.6 K/UL (ref 1.7–7.7)
NEUTROPHILS RELATIVE PERCENT: 71 %
PDW BLD-RTO: 16.4 % (ref 12.4–15.4)
PERFORMED ON: NORMAL
PERFORMED ON: NORMAL
PHOSPHORUS: 4.8 MG/DL (ref 2.5–4.9)
PLATELET # BLD: 230 K/UL (ref 135–450)
PMV BLD AUTO: 8 FL (ref 5–10.5)
POTASSIUM SERPL-SCNC: 3.4 MMOL/L (ref 3.5–5.1)
PRELIMINARY: NORMAL
PRELIMINARY: NORMAL
PROTHROMBIN TIME: 14 SEC (ref 9.8–13)
RBC # BLD: 5.64 M/UL (ref 4–5.2)
SODIUM BLD-SCNC: 140 MMOL/L (ref 136–145)
WBC # BLD: 10.6 K/UL (ref 4–11)

## 2019-05-13 PROCEDURE — 94640 AIRWAY INHALATION TREATMENT: CPT

## 2019-05-13 PROCEDURE — 94660 CPAP INITIATION&MGMT: CPT

## 2019-05-13 PROCEDURE — 2700000000 HC OXYGEN THERAPY PER DAY

## 2019-05-13 PROCEDURE — 6360000002 HC RX W HCPCS

## 2019-05-13 PROCEDURE — 6360000002 HC RX W HCPCS: Performed by: INTERNAL MEDICINE

## 2019-05-13 PROCEDURE — 6370000000 HC RX 637 (ALT 250 FOR IP): Performed by: INTERNAL MEDICINE

## 2019-05-13 PROCEDURE — 85025 COMPLETE CBC W/AUTO DIFF WBC: CPT

## 2019-05-13 PROCEDURE — 85730 THROMBOPLASTIN TIME PARTIAL: CPT

## 2019-05-13 PROCEDURE — 97110 THERAPEUTIC EXERCISES: CPT

## 2019-05-13 PROCEDURE — 85610 PROTHROMBIN TIME: CPT

## 2019-05-13 PROCEDURE — 92610 EVALUATE SWALLOWING FUNCTION: CPT

## 2019-05-13 PROCEDURE — 94668 MNPJ CHEST WALL SBSQ: CPT

## 2019-05-13 PROCEDURE — 2580000003 HC RX 258

## 2019-05-13 PROCEDURE — 99291 CRITICAL CARE FIRST HOUR: CPT | Performed by: INTERNAL MEDICINE

## 2019-05-13 PROCEDURE — 80069 RENAL FUNCTION PANEL: CPT

## 2019-05-13 PROCEDURE — 2000000000 HC ICU R&B

## 2019-05-13 PROCEDURE — 92526 ORAL FUNCTION THERAPY: CPT

## 2019-05-13 PROCEDURE — 36592 COLLECT BLOOD FROM PICC: CPT

## 2019-05-13 PROCEDURE — 94761 N-INVAS EAR/PLS OXIMETRY MLT: CPT

## 2019-05-13 PROCEDURE — 99232 SBSQ HOSP IP/OBS MODERATE 35: CPT | Performed by: INTERNAL MEDICINE

## 2019-05-13 PROCEDURE — 94667 MNPJ CHEST WALL 1ST: CPT

## 2019-05-13 PROCEDURE — 2580000003 HC RX 258: Performed by: INTERNAL MEDICINE

## 2019-05-13 PROCEDURE — 94669 MECHANICAL CHEST WALL OSCILL: CPT

## 2019-05-13 RX ORDER — WARFARIN SODIUM 7.5 MG/1
7.5 TABLET ORAL
Status: COMPLETED | OUTPATIENT
Start: 2019-05-13 | End: 2019-05-13

## 2019-05-13 RX ORDER — POTASSIUM CHLORIDE 29.8 MG/ML
20 INJECTION INTRAVENOUS
Status: COMPLETED | OUTPATIENT
Start: 2019-05-13 | End: 2019-05-13

## 2019-05-13 RX ORDER — FUROSEMIDE 10 MG/ML
40 INJECTION INTRAMUSCULAR; INTRAVENOUS DAILY
Status: DISCONTINUED | OUTPATIENT
Start: 2019-05-13 | End: 2019-05-17

## 2019-05-13 RX ADMIN — POTASSIUM CHLORIDE 20 MEQ: 400 INJECTION, SOLUTION INTRAVENOUS at 12:21

## 2019-05-13 RX ADMIN — HEPARIN SODIUM 11 UNITS/KG/HR: 10000 INJECTION, SOLUTION INTRAVENOUS at 05:54

## 2019-05-13 RX ADMIN — DICLOFENAC 2 G: 10 GEL TOPICAL at 20:39

## 2019-05-13 RX ADMIN — ALBUTEROL SULFATE 2.5 MG: 2.5 SOLUTION RESPIRATORY (INHALATION) at 19:26

## 2019-05-13 RX ADMIN — SENNOSIDES AND DOCUSATE SODIUM 2 TABLET: 8.6; 5 TABLET ORAL at 20:34

## 2019-05-13 RX ADMIN — POLYETHYLENE GLYCOL (3350) 17 G: 17 POWDER, FOR SOLUTION ORAL at 10:26

## 2019-05-13 RX ADMIN — ALPRAZOLAM 0.25 MG: 0.25 TABLET ORAL at 14:20

## 2019-05-13 RX ADMIN — ALBUTEROL SULFATE 2.5 MG: 2.5 SOLUTION RESPIRATORY (INHALATION) at 10:56

## 2019-05-13 RX ADMIN — WARFARIN SODIUM 7.5 MG: 7.5 TABLET ORAL at 17:41

## 2019-05-13 RX ADMIN — ALPRAZOLAM 0.25 MG: 0.25 TABLET ORAL at 20:34

## 2019-05-13 RX ADMIN — Medication 10 ML: at 20:34

## 2019-05-13 RX ADMIN — POTASSIUM CHLORIDE 20 MEQ: 400 INJECTION, SOLUTION INTRAVENOUS at 11:13

## 2019-05-13 RX ADMIN — ALBUTEROL SULFATE 2.5 MG: 2.5 SOLUTION RESPIRATORY (INHALATION) at 15:38

## 2019-05-13 RX ADMIN — Medication 10 ML: at 10:26

## 2019-05-13 RX ADMIN — FUROSEMIDE 40 MG: 10 INJECTION, SOLUTION INTRAMUSCULAR; INTRAVENOUS at 10:26

## 2019-05-13 RX ADMIN — ALBUTEROL SULFATE 2.5 MG: 2.5 SOLUTION RESPIRATORY (INHALATION) at 23:20

## 2019-05-13 RX ADMIN — SENNOSIDES AND DOCUSATE SODIUM 2 TABLET: 8.6; 5 TABLET ORAL at 09:45

## 2019-05-13 RX ADMIN — ALPRAZOLAM 0.25 MG: 0.25 TABLET ORAL at 09:45

## 2019-05-13 RX ADMIN — DICLOFENAC 2 G: 10 GEL TOPICAL at 10:32

## 2019-05-13 RX ADMIN — POTASSIUM CHLORIDE 20 MEQ: 400 INJECTION, SOLUTION INTRAVENOUS at 10:26

## 2019-05-13 RX ADMIN — ALBUTEROL SULFATE 2.5 MG: 2.5 SOLUTION RESPIRATORY (INHALATION) at 07:16

## 2019-05-13 RX ADMIN — CITALOPRAM HYDROBROMIDE 20 MG: 20 TABLET ORAL at 09:45

## 2019-05-13 ASSESSMENT — PAIN SCALES - GENERAL: PAINLEVEL_OUTOF10: 0

## 2019-05-13 NOTE — PLAN OF CARE
Nutrition Problem:  Moderate malnutrition  Intervention: Food and/or Nutrient Delivery: Continue NPO  Nutritional Goals: patient will adhere to NPO status until medically cleared/cleared by SLP for po diet order with appropriate/safest consistencies; weight will remain stable during remainder of admission

## 2019-05-13 NOTE — PROGRESS NOTES
05/12/19 3377   NIV Type   $NIV $Daily Charge   Equipment Type v60   Mode BIPAP   Mask Type Other (Comment)   Mask Size   (c)   Settings/Measurements   Comfort Level Good   Using Accessory Muscles No   IPAP 12 cmH20   EPAP 6 cmH2O   Rate Ordered 12   Resp 18   SpO2 98   FiO2  60 %   Vt Exhaled 435 mL   Mask Leak (lpm) 14 lpm   Patient Observation   Observations spo2 97% on 60% bipap

## 2019-05-13 NOTE — PROGRESS NOTES
Report given to [facility] for transfer of pt care. Pt repositioned x2 at this time.    Colton Correia RN, BSN

## 2019-05-13 NOTE — PROGRESS NOTES
Patient report given to FELIX Gilbert. Patient rested comfortably on BiPAP overnight, placed on 11L/HFNC at 0530. SpO2 89-90%, patient encouraged to take deep breaths. Care transferred.

## 2019-05-13 NOTE — PROGRESS NOTES
05/13/19 0314   NIV Type   Equipment Type v60   Mode BIPAP   Mask Type Other (Comment)   Mask Size   (c)   Settings/Measurements   Comfort Level Good   Using Accessory Muscles No   IPAP 12 cmH20   EPAP 6 cmH2O   Rate Ordered 12   Resp 19   SpO2 96   FiO2  60 %   Vt Exhaled 326 mL   Mask Leak (lpm) 9 lpm   Patient Observation   Observations spo2 96% on 60% bipap

## 2019-05-13 NOTE — CONSULTS
5-13-19 (0415) aptt 83.8. Please continue heparin drip at 12.5 ml/hr. Next aptt 0600 on 5-14-19. [facility]. .  5/13/2019 6:57 AM

## 2019-05-13 NOTE — PROGRESS NOTES
Pharmacy to Dose Warfarin    Dx: H/O DVT  Goal INR range 2-3  Home Warfarin dose:New Start  New start Warfarin with Heparin/Enoxaparin bridge. Would recommend continue bridge until INR therapeutic. Pt currently on Heparin Drip. Date  INR  Warfarin  5/13               1.23                 7.5mg  Recommend Warfarin 7.5mg tonight x1. Daily INR ordered. Rx will continue to manage therapy per consult order.   Yamile Navarro D 7/63/[id number]:10 AM  .

## 2019-05-13 NOTE — PROGRESS NOTES
Pulmonary & Critical Care Medicine ICU Progress Note    CC: Acute hypoxemic respiratory failure    Events of Last 24 hours:   Still on high FiO2 12 L O2  Thin liquid diet   BiPAP all night last night       Invasive Lines: IV: PICC      MV:  5/3-      Vent Mode: AC/VC Rate Set: 0 bmp/Vt Ordered: 400 mL/ /FiO2 : 60 %  Recent Labs     19  0515 19  1023   PHART 7.389 7.359   HJB8QGR 58.2* 60.0*   PO2ART 71.3* 73.9*       IV:   dextrose      heparin (porcine) 11 Units/kg/hr (19 0554)       Vitals:  Blood pressure 120/62, pulse 95, temperature 97.3 °F (36.3 °C), temperature source Oral, resp. rate 28, height 5' 3\" (1.6 m), weight 231 lb 14.4 oz (105.2 kg), SpO2 93 %, not currently breastfeeding. on 12LPM   Temp  Av.2 °F (36.8 °C)  Min: 97.3 °F (36.3 °C)  Max: 98.6 °F (37 °C)    Intake/Output Summary (Last 24 hours) at 2019 0735  Last data filed at 2019 0554  Gross per 24 hour   Intake 446 ml   Output 930 ml   Net -484 ml     EXAM:  General: ill appearing.  + Distress  Eyes: PERRL. No sclera icterus. No conjunctival injection. ENT: No discharge. Pharynx clear. Neck: Trachea midline. Normal thyroid. Resp: + accessory muscle use. Basilar crackles. Few wheezing. No rhonchi. No dullness on percussion. CV: Regular rate. Regular rhythm. No mumur or rub. No edema. GI: Non-tender. Non-distended. No masses. No organomegaly. Normal bowel sounds. No hernia. Skin: Warm and dry. No nodule on exposed extremities. No rash on exposed extremities. Lymph: No cervical LAD. No supraclavicular LAD. M/S: No cyanosis. No joint deformity. No clubbing. Neuro: AAOx3. Followed commands.    Psych: No agitation, no anxiety, affect is full     Scheduled Meds:   warfarin (COUMADIN) daily dosing (placeholder)   Other RX Placeholder    insulin lispro  0-6 Units Subcutaneous TID WC    insulin lispro  0-3 Units Subcutaneous Nightly    albuterol  2.5 mg Nebulization Q4H WA    polyethylene glycol 17 g Oral Daily    pneumococcal 13-valent conjugate  0.5 mL Intramuscular Once    STYE  0.5 inch Ophthalmic Daily    ALPRAZolam  0.25 mg Oral TID    sennosides-docusate sodium  2 tablet Oral BID    diclofenac sodium  2 g Topical BID    citalopram  20 mg Oral Daily    sodium chloride flush  10 mL Intravenous 2 times per day     PRN Meds:  glucose, dextrose, glucagon (rDNA), dextrose, carboxymethylcellulose PF, acetaminophen, heparin (porcine), heparin (porcine), perflutren lipid microspheres, ibuprofen, sodium chloride flush, magnesium hydroxide, ondansetron, albuterol    Results:  CBC:   Recent Labs     05/11/19 0518 05/12/19  0600 05/13/19  0415   WBC 12.3* 12.6* 10.6   HGB 16.5* 16.7* 16.6*   HCT 51.0* 51.7* 51.4*   MCV 90.3 89.9 91.0    227 230     BMP:   Recent Labs     05/11/19 0518 05/12/19  0600 05/13/19  0415    138 140   K 3.6 3.8 3.4*   CL 95* 96* 95*   CO2 34* 35* 36*   PHOS  --  4.4 4.8   BUN 20 15 14   CREATININE <0.5* <0.5* <0.5*     LIVER PROFILE: No results for input(s): AST, ALT, LIPASE, BILIDIR, BILITOT, ALKPHOS in the last 72 hours. Invalid input(s):   AMYLASE,  ALB    Cultures:  5/3 sputum normal respiratory estiven   5/6 BAL Candida  5/9EL      Films:  CXR 5/11 was reviewed by me and it showed bilateral ASD L>R     ASSESSMENT:  · Acute hypoxemic respiratory failure  · Bacterial pneumonia  · Acute left lower extremity DVT  · Acute cor pulmonale  · Electrolytes disorder   · Chronic hypercapnic respiratory failure/OHS       PLAN:  Bilevel non-invasive positive pressure ventilation per orders  Supplemental oxygen to maintain SaO2 >92%; wean as tolerated  Completed 7 days of vancomycin and Levaquin  Heparin drip with Coumadin  Off steroids  Home Xanax  Electrolytes replacement   PTOT  IV lasix 40 mg daily   Follow electrolytes   DVT prophylaxis: Heparin drip   MRSA prophylaxis: Bactroban                  Total critical care time caring for this patient with life threatening,

## 2019-05-13 NOTE — PROGRESS NOTES
Occupational Therapy Daily Treatment Note    Unit: ICU  Date:  5/13/2019  Patient Name:    Jacquelin Lewis  Ad[facility]:  [facility] [J96.00]  Admit Date:  4/28/2019  Precautions/Restrictions:  fall risk, IV, bed/chair alarm, anduajr catheter , supplemental o2 (10L) and ICU monitoring      Discharge Recommendations: SNF  DME needs for discharge: defer to facility       Therapy recommendations for staff:   Assist of 2 with use of MAXI-LIFT for all transfers to/from chair    AM-PAC Score: 8  Home Health S4 Level: NA       Treatment Time:  4:00-4:18  Treatment number:  2   Total Treatment Time:   18 minutes      Subjective:  Pt agreeable to tx     Pain   No  Rating: NA  Location:NA  Pain Medicine Status: Denies need      Bed Mobility:   Supine to Sit:  Not Tested  Sit to Supine:  Not Tested  Rolling: Mod A and Max A  Scooting:        N/A    Transfer Training:   Sit to stand:   Not Tested  Stand to sit:  Not Tested  Bed to Chair:  Not Tested  Bed to [facility]:   Not Tested  Standard toilet:   Not Tested    Activity Tolerance   Pt completed therapy session with No nausea, dizziness, pain or SOB noted w/activity  SpO2: 95% throughout exercises and rolling however 1 time pt dropped to 88% for 3 seconds then back up to 92% on 10 L   HR: 112      ADL Training:   Upper body dressing:  Not Tested  Upper body bathing:  Not Tested  Lower body dressing: Total A   Lower body bathing:  Not Tested  Toileting:   Not Tested  Grooming/Hygiene:  Not Tested    Therapeutic Exercise:   Forearm sup/pronation:  x10  Shoulder flex/ext:  x10  Shoulder abd/add:  x10    Patient Education:   Role of OT  Energy conservation techniques    Positioning Needs: In bed, call light and needs in reach. Family Present:  Yes    Assessment: Pt would greatly benefit from cont'd OT tx to work toward increasing Ind with ADL's, IADL's, transfers, balance, endurance and overall strength     GOALS  1).  Bed to toilet/BSC: Will tolerate assessment when appropriate     To be met in 5 Visits:  1). Supine to Sit: Will tolerate assessment when appropriate   2). Upper Body Bathing:  NA   3). Lower Body Bathing:  NA  4). Upper Body Dressing: NA  5). Lower Body Dressing: Tot A  6).  Pt to des UE exs x 10 reps           Plan: cont with CHET Spence OTR/L #[id number]      If patient discharges from this facility prior to next visit, this note will serve as the Discharge Summary

## 2019-05-13 NOTE — CARE COORDINATION
INTERDISCIPLINARY PLAN OF CARE CONFERENCE    Date/Time: 5/13/2019 11:14 AM  Completed by: Meenakshi Tafoya, Case Management      Patient Name:  Bharat Green  YOB: 1960  Admitting Diagnosis: Acute respiratory failure ([facility] [address] [J96.00]     Admit Date/Time:  4/28/2019  1:37 PM    Chart reviewed. Interdisciplinary team met to discuss patient progress and discharge plans. Disciplines included Case Management, Nursing, and Dietitian. Current Status: Inpt status    PT/OT recommendation:ARU    Anticipated Discharge Date: TBD  Expected D/C Disposition:  Rehab  Confirmed plan with patient and/or family Yes  Discharge Plan Comments: Off vent. Bipap/FiO2 12 L O2. Met with Pt and family at bedside. Discussed Pt/ot rec of ARU. Pt is agreeable to ref. At ARU at [facility]. Ref called to ARU. Needs f/u on be availability.  +CM following. +jovanny    Home O2 in place on admit: Yes  Pt informed of need to bring portable home O2 tank on day of discharge for nursing to connect prior to leaving:  Not Indicated  Verbalized agreement/Understanding:  Not Indicated

## 2019-05-13 NOTE — PROGRESS NOTES
High risk vesicant drug infusing:  ___no_______    Multiple incompatible medications infusing:  ___no_____    CVP Monitoring:  ____no_____    Extremely difficult IV access challenge:  ___yes____    Continued need for central line access:  ____yes______    Addressed with physician:  ____yes___    RIGHT PATIENT, RIGHT TIME, RIGHT LINE

## 2019-05-13 NOTE — PROGRESS NOTES
Speech Language Pathology  Facility/Department: [facility]   CLINICAL BEDSIDE SWALLOW EVALUATION    NAME: Eladia Angel  : 1960  MRN: [id number]    ADMISSION DATE: 2019  ADMITTING DIAGNOSIS: has Anxiety state; Rheumatoid arthritis ([address] Tobacco abuse; SOB (shortness of breath); Moderate persistent asthma with acute exacerbation; Acute respiratory failure with hypoxia ([address] New onset of congestive heart failure ([address] Morbid obesity ([address] Obesity hypoventilation syndrome ([address] Erythrocytosis; Acute deep vein thrombosis (DVT) of left peroneal vein ([address] Pulmonary embolus ([address] and Moderate protein-calorie malnutrition ([address] on their problem list.  ONSET DATE:   19    Date of Eval: 2019  Evaluating Therapist: Sasha Heredia CCC-SLPKatja MOODY BCS-S    Current Diet level:  Current Diet : NPO  Current Liquid Diet : NPO        Pain:  None reported  Reason for Referral  Eladia Angel was referred for a bedside swallow evaluation to assess the efficiency of her swallow function, identify signs and symptoms of aspiration and make recommendations regarding safe dietary consistencies, effective compensatory strategies, and safe eating environment. Impression  Dysphagia Impression : She had no clinical signs of aspiration with small portion of puree and thin liquid. She is noted to have oxygen saturation at baseline 89% that improved to 92 % after upright position and pursed lip breathing. She has potential for reduction to respiratory-swallow coordination. Case discussed with attending nurse. Would not start on PO diet yet. If she has worsening respiratory status and requires BIPAP use, would hold all oral intake. If she is able to maintain oxygen saturation on nasal cannula of 90% or greater, she can attempt small sips of water without straw with 1:1 nurse supervision and crushed PO meds in pudding, with 1:1 nurse supervision.  Hold this oral intake again for any signs of aspiration or worsening respiratory status. Will re-assess tomorrow, pending any changes in her supplemental oxygen demands. Treatment Plan  Requires SLP Intervention: Yes  Duration/Frequency of Treatment: 3-5x/wk LOS  D/C Recommendations: To be determined            Treatment/Goals  Short-term Goals  Timeframe for Short-term Goals: 1-2 days  Goal 1: Re-assess PO intake  Long-term Goals  Timeframe for Long-term Goals: 1 week  Goal 1: Most functional oral diet while maintaining adequate nutrition and hydration without signs of aspiration  Dysphagia Goals: The patient will tolerate repeat BSE when able. General  Chart Reviewed: Yes  Comments: Medical chart checked for CBSE. Subjective  Subjective: Alert in bed, no her mother arrived at the end of the visit. All visible lines intact and all precautions maintained. Behavior/Cognition: Alert; Cooperative  Temperature Spikes Noted: N/A  Respiratory Status: O2 via nasual cannula  Breath Sounds: Diminished and shallow breath sounds in area of larynx  O2 Device: Nasal cannula  Communication Observation: Functional  Follows Directions: Simple  Dentition: Some missing teeth  Patient Positioning: Upright in bed  Baseline Vocal Quality: Dysphonic and hypophonic  Volitional Cough: fair strength  Volitional Swallow: fair strength and timeliness         ASSESSMENT/RECOMMENDATIONS/SAFE FEEDING STRATEGIES:  She is noted to have mild hand tremors, suggest Neurology follow up. She is able to state her name and she is aware of her location. She reports that she does not use nasal cannula at home. She has no GERD meds per MAR. She has dry cough on cue, she has hoarse phonation. She has reduction to sustained phonation, she has reduction to pitch range, she has reduction to vocal projection. She was intubated from 5-3-19 to 5-11-19 and would benefit from ENT assessment. She has a vocal disturbance. She had slowed velar elevation on phonation. She has reduction to gag reflex response.  She had fair oral ROM on command. She had no oral asymmetry noted at the time of the visit. She denies having cough and choke with meals. She denies that foods stick in her throat. She denies having odynophagia. She denies having trouble swallowing pills. She is not ready for MBS given the increased supplemental oxygen demands. She would need Pulmonary clearance for FEES, as the use of an endoscope in the nares for 6-10 minutes could alter the flow of the HFNC and further impact oxygen saturation. She reports no general shortness of breath or trouble chewing with PO intake at home. She had no clinical signs of aspiration with small portion of puree (5 tsp) and thin liquid (5 oz small cup sips). She is noted to have oxygen saturation at baseline 89% that improved to 92 % after upright position and pursed lip breathing. She had excellent carryover for this task. She had heart rate 103-106, 02 sat 92-93% once upright, resp rate 23-25, and BP sustained 123/66. She had no oral asymmetry. She had intelligible speech. She was monitored as per palpable assessment of the areas of the mandible, hyoid bone, and thyroid cartilage. There was no cough, no choke, no wet voice, no increased work of breathing, her color remained good, no clinical signs of aspiration, fair swallow strength and timeliness noted, 1-2 spontaneous repeat swallows per bolus, no oral retention, no anterior oral loss. SLP explained findings to patient and attending nurse, as well as patient's mother. SLP explained safe feeding precautions. SLP explained aspiration precautions. SLP explained scope of practice. SLP explained the purpose of the visit. She has potential for reduction to respiratory-swallow coordination. Case discussed with attending nurse. Would not start on PO diet yet. If she has worsening respiratory status and requires BIPAP use, would hold all oral intake.  If she is able to maintain oxygen saturation on nasal cannula of 90% or greater, she can attempt small sips of water without straw with 1:1 nurse supervision and crushed PO meds in pudding, with 1:1 nurse supervision. Hold this oral intake again for any signs of aspiration or worsening respiratory status. Will re-assess tomorrow, pending any changes in her supplemental oxygen demands. These PO trials with nursing should be done at slow rate, with patient fully upright in bed during and after these PO trials, check for oral pooling, only when she is awake and alert, maintain good oral hygiene. Monitor her temperature, lung, diet, and weight status. Sticky note was also left to MD. SLP explained that given her current status, we would have to manage her clinically, as her medical status does not permit instrumental assessment. Suggest portable chest x-ray. Prognosis  Prognosis  Prognosis for safe diet advancement: guarded  Individuals consulted  Consulted and agree with results and recommendations: Patient; Family member;RN    Education  Patient Education Response: Verbalizes understanding;Demonstrated understanding;Needs reinforcement  Safety Devices in place: Yes  Type of devices: Left in bed;Call light within reach;Nurse notified                Therapy Time  SLP Individual Minutes  Time In: 0830  Time Out: 0900  Minutes: 27        THIS IS A LATE ENTRY, SHE WAS SEEN EARLIER TODAY. SPOKE WITH PATIENT, NURSE, PATIENT'S MOTHER, LEFT A BRIEF NOTE, AND MD STICKY NOTE AT THE TIME OF THE VISIT. Sasha Dayvillenathan [facility] SLP. D BCSS  5/13/2019 10:48 AM

## 2019-05-13 NOTE — CARE COORDINATION
[address] Transitions Interview     2019    Patient: Kate Gonsales Patient : 1960   MRN: [id number]  Reason for Admission: new onset CHF  RARS: Readmission Risk Score: 12         Spoke with: Sara Schrader (patient)      Readmission Risk  Patient Active Problem List   Diagnosis    Anxiety state    Rheumatoid arthritis ([address]    Tobacco abuse    SOB (shortness of breath)    Moderate persistent asthma with acute exacerbation    Acute respiratory failure with hypoxia ([address]    New onset of congestive heart failure (HCC)    Morbid obesity ([address]    Obesity hypoventilation syndrome (HCC)    Erythrocytosis    Acute deep vein thrombosis (DVT) of left peroneal vein (HCC)    Pulmonary embolus (HCC)    Moderate protein-calorie malnutrition (HCC)    Bacterial pneumonia    Acute deep vein thrombosis (DVT) of distal end of left lower extremity (HCC)    Acute cor pulmonale (HCC)    Disorder of electrolytes       Inpatient Assessment  Care Transitions Summary    Care Transitions Inpatient Review  Medication Review  Are you able to afford your medications?:  Yes  How often do you have difficulty taking your medications?:  I always take them as prescribed. Housing Review  Who do you live with?:  Alone  Are you an active caregiver in your home?:  No  Social Support  Do you have a ?:  No  Do you have a [address] [place] [address]?:  No  Durable Medical Equipment  Patient Home Equipment:  Nebulizer  Functional Review  Ability to seek help/take action for Emergent/Urgent situations i.e. fire, crime, inclement weather or health crisis. :  Independent  Ability handle personal hygiene needs (bathing/dressing/grooming): Independent  Ability to manage medications: Independent  Ability to prepare food:  Independent  Ability to maintain home (clean home, laundry):   Independent  Ability to drive and/or has transportation:  Independent  Ability to do shopping:  Independent  Ability to

## 2019-05-13 NOTE — PROGRESS NOTES
Speech Language Pathology  SPEECH BRIEF NOTE: She had no clinical signs of aspiration with small portion of puree and thin liquid. She is noted to have oxygen saturation at baseline 89% that improved to 92 % after upright position and pursed lip breathing. She has potential for reduction to respiratory-swallow coordination. Case discussed with attending nurse. Would not start on PO diet yet. If she has worsening respiratory status and requires BIPAP use, would hold all oral intake. If she is able to maintain oxygen saturation on nasal cannula of 90% or greater, she can attempt small sips of water without straw with 1:1 nurse supervision and crushed PO meds in pudding, with 1:1 nurse supervision. Hold this oral intake again for any signs of aspiration or worsening respiratory status. Will re-assess tomorrow, pending any changes in her supplemental oxygen demands. Harini Addison CCC-SLP. D BCS-S  5-13-19

## 2019-05-13 NOTE — PROGRESS NOTES
Report given to [address] [place] [address] and Tiffany Osorio RN for transfer of pt care.   Elizabeth Carter RN, BSN

## 2019-05-13 NOTE — PROGRESS NOTES
Dr. Mary Ellen Fontanez at bedside- see new orders. Pt to remain in ICU today.   Elizabeth Carter RN, BSN

## 2019-05-13 NOTE — PROGRESS NOTES
Hospitalist Progress Note        PCP: Niko Feldman MD    Date of Admission: 4/28/2019    Subjective:     5/6-  she is sedated on the ventilator. Responds to commands like squeezing. Appears sick Plans for bronchoscopy today. 5/7- Bronchoscopy was done. No endobronchial lesion seen. Mucosa appears normal. She had yellow and white secretions were thick. 5/8- still on the vent. SBT with agitation. 5/9- she is on Precedex. Dose increased. Opens eyes. 5/10- was on SBT for over a hour. Opens eyes. Has a low grade fever. Not on antibiotics. 5/11- extubated on 5/11/19. Awake and alert. On 10 L of HF oxygen. 5/12- on 11 L of oxygen.  Mentation is normal.    5/13 - Remains hypoxic on high flow oxygen 10 L   very weak and getting therapy   looks fatigued    Medications:  Reviewed    Infusion Medications    dextrose      heparin (porcine) 11 Units/kg/hr (05/13/19 2187)     Scheduled Medications    furosemide  40 mg Intravenous Daily    warfarin  7.5 mg Oral Once    warfarin (COUMADIN) daily dosing (placeholder)   Other RX Placeholder    albuterol  2.5 mg Nebulization Q4H WA    polyethylene glycol  17 g Oral Daily    pneumococcal 13-valent conjugate  0.5 mL Intramuscular Once    STYE  0.5 inch Ophthalmic Daily    ALPRAZolam  0.25 mg Oral TID    sennosides-docusate sodium  2 tablet Oral BID    diclofenac sodium  2 g Topical BID    citalopram  20 mg Oral Daily    sodium chloride flush  10 mL Intravenous 2 times per day     PRN Meds: glucose, dextrose, glucagon (rDNA), dextrose, carboxymethylcellulose PF, acetaminophen, heparin (porcine), heparin (porcine), perflutren lipid microspheres, ibuprofen, sodium chloride flush, magnesium hydroxide, ondansetron, albuterol      Intake/Output Summary (Last 24 hours) at 5/13/2019 1642  Last data filed at 5/13/2019 1221  Gross per 24 hour   Intake 446 ml   Output 2060 ml   Net -1614 ml       Physical Exam Performed:    /73   Pulse 107   Temp 98.9 °F (37.2 °C) (Oral)   Resp (!) 35   Ht 5' 3\" (1.6 m)   Wt 231 lb 14.4 oz (105.2 kg)   SpO2 96%   BMI 41.08 kg/m²       General: extubated. On high flow NC oxygen. faigued   Mucous Membranes:  Pink , anicteric  Neck: No JVD, no carotid bruit, no thyromegaly  Chest: no wheezes/coarse BS  Cardiovascular:  RRR S1S2 heard, no murmurs or gallops  Abdomen:  Soft, undistended, non tender, no organomegaly, BS present  Extremities: No edema or cyanosis. Distal pulses well felt  Neurological : non focal       Labs:   Recent Labs     05/11/19  0518 05/12/19  0600 05/13/19  0415   WBC 12.3* 12.6* 10.6   HGB 16.5* 16.7* 16.6*   HCT 51.0* 51.7* 51.4*    227 230     Recent Labs     05/11/19  0518 05/12/19  0600 05/13/19  0415    138 140   K 3.6 3.8 3.4*   CL 95* 96* 95*   CO2 34* 35* 36*   BUN 20 15 14   CREATININE <0.5* <0.5* <0.5*   CALCIUM 9.3 9.6 9.3   PHOS  --  4.4 4.8     No results for input(s): AST, ALT, BILIDIR, BILITOT, ALKPHOS in the last 72 hours. Recent Labs     05/12/19  1420 05/13/19  0415   INR 1.25* 1.23*     No results for input(s): CKTOTAL, TROPONINI in the last 72 hours. Urinalysis:      Lab Results   Component Value Date    NITRU Negative 04/28/2019    WBCUA 0-2 04/28/2019    BACTERIA 1+ 04/28/2019    RBCUA 0-2 04/28/2019    BLOODU TRACE-INTACT 04/28/2019    SPECGRAV <=1.005 04/28/2019    GLUCOSEU Negative 04/28/2019       Radiology:  XR CHEST PORTABLE   Final Result   Stable life support device positioning. Persistent layering bilateral effusions and basilar opacities. Slightly   improved right lung expansion. XR CHEST PORTABLE   Final Result   Improved pleural fluid and aeration in the left lung with worsening fluid and   aeration on the right. Pattern would favor pulmonary edema/ARDS. XR CHEST PORTABLE   Final Result   1. Pulmonary edema. 2. Improving right basilar and grossly stable left basilar atelectasis and/or   pneumonia.          XR CHEST PORTABLE   Final Result Worsening bibasilar atelectasis and/or pneumonia. XR CHEST PORTABLE   Final Result   Improving bilateral airspace disease and pleural effusions. XR CHEST PORTABLE   Final Result   Increasing right basilar and new bilateral upper lobe airspace disease could   represent edema or pneumonia. Left basilar pleuroparenchymal disease is   stable. XR CHEST PORTABLE   Final Result   1. No significant change. IR PICC WO SQ PORT/PUMP > 5 YEARS   Final Result   Successful placement of PICC line. XR CHEST PORTABLE   Final Result   Supportive devices are positioned appropriately. There is increasing   pulmonary edema since prior exam.         VL Extremity Venous Bilateral   Final Result      CT Chest WO Contrast   Final Result   1. Stable small left pleural effusion with a new small right pleural effusion. 2. Interval worsening of dependent consolidative opacity within bilateral   lower lobe since the study of 04/28/2019. In combination with worsening   bibasilar predominant mucous plugging, this likely represents either   atelectasis or aspiration. Clinical correlation is advised. 3. Slight interval improvement in appearance of lingular airspace   consolidation, likely representing resolving pneumonia. XR CHEST PORTABLE   Final Result   Mild cardiomegaly and chronic pulmonary change without definite acute   pulmonary process. CT Chest Pulmonary Embolism W Contrast   Final Result   Suboptimal contrast timing although there is no evidence for large or central   pulmonary embolism. The segmental and subsegmental branches are not well   evaluated. Small left basilar effusion with adjacent consolidation and there are also   infiltrates within the lingula and right lung base that may represent   atelectasis versus pneumonia. Small amount of free fluid in the visualized upper abdomen.          XR CHEST PORTABLE   Final Result   Cardiomegaly and mild pulmonary edema.         XR CHEST PORTABLE    (Results Pending)           Assessment/Plan:    Principal Problem:    Acute respiratory failure with hypoxia (HCC)  Active Problems:    Rheumatoid arthritis (HCC)    Tobacco abuse    Anxiety state    SOB (shortness of breath)    Moderate persistent asthma with acute exacerbation    New onset of congestive heart failure (HCC)    Morbid obesity (HCC)    Obesity hypoventilation syndrome (HCC)    Erythrocytosis    Acute deep vein thrombosis (DVT) of left peroneal vein (HCC)    Pulmonary embolus (HCC)    Moderate protein-calorie malnutrition (HCC)    Bacterial pneumonia    Acute deep vein thrombosis (DVT) of distal end of left lower extremity (HCC)    Acute cor pulmonale (HCC)    Disorder of electrolytes  Resolved Problems:    * No resolved hospital problems. *         1. Acute hypoxic resp failure. possibly secondary to acute pulmonary edema and acute congestive heart failure. - was on IV Lasix-->now on prn lasix.    Echocardiogram shows normal LV function does not show diastolic heart failure. shows moderate pulm HTN. Likely acute PE. Venous doppler - DVT below knee. CTA was suboptimal( also severe hypoxia otherwise unexplained). Started anticoagulation with heparin gtt. Her DVT and PE were likely present at the time of her admission. Intubated 5/3 for worsening hypercarbia. Bronchoscopy was done on 5/7/19. Extubated on 5/11/19. On NC oxygen. - > wean  Off O2 as tolerated      2.  Chronic hypercarbic respiratory failure likely obesity hypoventilation syndrome. mod pulm HTN on ECHO.     3.  Acute bronchitis.  No fevers.  Has mildly elevated leukocytosis.  Pro-calcitonin however is negative. Vancomycin day 7/7 and Levaquin day 7/7.     4.  Morbid Obesity  - Body mass index is 41.08 kg/m². - Complicating assessment and treatment. Placing patient at risk for multiple co-morbidities as well as early death and contributing to the patient's presentation.      5. Anasarca - monitor I/O net -15L, ?nutritional, received lasix     Code Status: Full Code    PT/OT Eval Status: not indicated    Dispo - cont care in ICU    Linda Lopez 5/13/2019 4:42 PM

## 2019-05-13 NOTE — PLAN OF CARE
Problem: OXYGENATION/RESPIRATORY FUNCTION  Goal: Patient will maintain patent airway  Outcome: Ongoing  Goal: Patient will achieve/maintain normal respiratory rate/effort  Description  Respiratory rate and effort will be within normal limits for the patient  Outcome: Ongoing     Problem: HEMODYNAMIC STATUS  Goal: Patient has stable vital signs and fluid balance  Outcome: Ongoing     Problem: FLUID AND ELECTROLYTE IMBALANCE  Goal: Fluid and electrolyte balance are achieved/maintained  Outcome: Ongoing     Problem: Nutrition  Goal: Optimal nutrition therapy  Outcome: Ongoing  Goal: Understanding of nutritional guidelines  Outcome: Ongoing     Problem: Falls - Risk of:  Goal: Will remain free from falls  Description  Will remain free from falls  Outcome: Ongoing  Note:   [place]ange fall-risk light on outside door, fall precautions in place. Goal: Absence of physical injury  Description  Absence of physical injury  Outcome: Ongoing     Problem: Risk for Impaired Skin Integrity  Goal: Tissue integrity - skin and mucous membranes  Description  Structural intactness and normal physiological function of skin and  mucous membranes. Outcome: Ongoing  Note:   Patient turned/repositioned every 2 hours and as needed to maintain and improve skin integrity. Problem: Pain:  Goal: Pain level will decrease  Description  Pain level will decrease  Outcome: Ongoing  Goal: Control of acute pain  Description  Control of acute pain  Outcome: Ongoing  Note:   Patient denies pain at this time. Instructed to notify staff if experiencing pain. Verbalizes agreement.   Goal: Control of chronic pain  Description  Control of chronic pain  Outcome: Ongoing     Problem: Discharge Planning:  Goal: Discharged to appropriate level of care  Description  Discharged to appropriate level of care  Outcome: Ongoing  Goal: Participates in care planning  Description  Participates in care planning  Outcome: Ongoing     Problem: Airway Clearance - Ineffective:  Goal: Clear lung sounds  Description  Clear lung sounds  Outcome: Ongoing  Note:   Breath sounds assessed every four hours and as indicated by nursing staff.     Goal: Ability to maintain a clear airway will improve  Description  Ability to maintain a clear airway will improve  Outcome: Ongoing     Problem: Fluid Volume - Deficit:  Goal: Achieves intake and output within specified parameters  Description  Achieves intake and output within specified parameters  Outcome: Ongoing     Problem: Gas Exchange - Impaired:  Goal: Levels of oxygenation will improve  Description  Levels of oxygenation will improve  Outcome: Ongoing     Problem: Hyperthermia:  Goal: Ability to maintain a body temperature in the normal range will improve  Description  Ability to maintain a body temperature in the normal range will improve  Outcome: Ongoing     Problem: Tobacco Use:  Goal: Will participate in inpatient tobacco-use cessation counseling  Description  Will participate in inpatient tobacco-use cessation counseling  Outcome: Ongoing

## 2019-05-13 NOTE — PROGRESS NOTES
Nutrition Assessment    Type and Reason for Visit: Reassess    Nutrition Recommendations:   1. Continue NPO status until patient is medically cleared/cleared by SLP for po diet order with appropriate/safest consistencies. 2. Monitor SLP progress notes and nutrition progression. 3. Monitor for additional in-patient weight changes. 4. Monitor nutrition-related labs and bowel function/regimen. Nutrition Assessment: patient continues to decline from a nutritional standpoint AEB inadequate nutrition intake x 11 days (out of 15 days) admission and she remains at risk for further compromise d/t failed swallow evaluation today, high O2 needs, and no BM x 15 days admission; will continue NPO status and monitor nutrition progression    Malnutrition Assessment:  · Malnutrition Status: Meets the criteria for moderate malnutrition  · Context: Acute illness or injury  · Findings of the 6 clinical characteristics of malnutrition (Minimum of 2 out of 6 clinical characteristics is required to make the diagnosis of moderate or severe Protein Calorie Malnutrition based on AND/ASPEN Guidelines):  1. Energy Intake-Less than or equal to 75% of estimated energy requirement, Greater than or equal to 7 days    2. Weight Loss-10% loss or greater(- 44# or 16% weight loss ), in 1 month(x 15 days admission)  3. Fat Loss-No significant subcutaneous fat loss,    4. Muscle Loss-Unable to assess(suspect muscle loss but difficult to assess d/t body habitus),    5. Fluid Accumulation-No significant fluid accumulation, Extremities(BUE/BLE + 2 pitting edema noted)  6.  Strength-Not measured    Nutrition Risk Level: High    Nutrient Needs:  · Estimated Daily Total Kcal: 984 - 1845 kcals based on 8-15 kcals/kg/CBW  · Estimated Daily Protein (g): 104 - 114 g protein based on 2.0-2.2 g/kg/IBW  · Estimated Daily Total Fluid (ml/day): 1000 - 1500 ml    Nutrition Diagnosis:   · Problem:  Moderate malnutrition  · Etiology: related to

## 2019-05-14 ENCOUNTER — APPOINTMENT (OUTPATIENT)
Dept: GENERAL RADIOLOGY | Age: 59
DRG: 207 | End: 2019-05-14
Payer: COMMERCIAL

## 2019-05-14 LAB
ANION GAP SERPL CALCULATED.3IONS-SCNC: 8 MMOL/L (ref 3–16)
APTT: 71.3 SEC (ref 26–36)
BASOPHILS ABSOLUTE: 0.1 K/UL (ref 0–0.2)
BASOPHILS RELATIVE PERCENT: 0.7 %
BLOOD CULTURE, ROUTINE: NORMAL
BUN BLDV-MCNC: 13 MG/DL (ref 7–20)
CALCIUM SERPL-MCNC: 9.3 MG/DL (ref 8.3–10.6)
CHLORIDE BLD-SCNC: 91 MMOL/L (ref 99–110)
CO2: 37 MMOL/L (ref 21–32)
CREAT SERPL-MCNC: <0.5 MG/DL (ref 0.6–1.1)
CULTURE, BLOOD 2: NORMAL
EOSINOPHILS ABSOLUTE: 0.3 K/UL (ref 0–0.6)
EOSINOPHILS RELATIVE PERCENT: 3 %
GFR AFRICAN AMERICAN: >60
GFR NON-AFRICAN AMERICAN: >60
GLUCOSE BLD-MCNC: 109 MG/DL (ref 70–99)
HCT VFR BLD CALC: 52.9 % (ref 36–48)
HEMOGLOBIN: 16.8 G/DL (ref 12–16)
INR BLD: 1.59 (ref 0.86–1.14)
LYMPHOCYTES ABSOLUTE: 1.5 K/UL (ref 1–5.1)
LYMPHOCYTES RELATIVE PERCENT: 13.7 %
MAGNESIUM: 1.8 MG/DL (ref 1.8–2.4)
MCH RBC QN AUTO: 28.6 PG (ref 26–34)
MCHC RBC AUTO-ENTMCNC: 31.8 G/DL (ref 31–36)
MCV RBC AUTO: 90.1 FL (ref 80–100)
MONOCYTES ABSOLUTE: 1 K/UL (ref 0–1.3)
MONOCYTES RELATIVE PERCENT: 9.3 %
NEUTROPHILS ABSOLUTE: 8 K/UL (ref 1.7–7.7)
NEUTROPHILS RELATIVE PERCENT: 73.3 %
PDW BLD-RTO: 17.3 % (ref 12.4–15.4)
PLATELET # BLD: 242 K/UL (ref 135–450)
PMV BLD AUTO: 8.1 FL (ref 5–10.5)
POTASSIUM SERPL-SCNC: 3.4 MMOL/L (ref 3.5–5.1)
PROTHROMBIN TIME: 18.1 SEC (ref 9.8–13)
RBC # BLD: 5.87 M/UL (ref 4–5.2)
SODIUM BLD-SCNC: 136 MMOL/L (ref 136–145)
WBC # BLD: 10.9 K/UL (ref 4–11)

## 2019-05-14 PROCEDURE — 97110 THERAPEUTIC EXERCISES: CPT

## 2019-05-14 PROCEDURE — 6370000000 HC RX 637 (ALT 250 FOR IP): Performed by: INTERNAL MEDICINE

## 2019-05-14 PROCEDURE — 80048 BASIC METABOLIC PNL TOTAL CA: CPT

## 2019-05-14 PROCEDURE — 6360000002 HC RX W HCPCS: Performed by: INTERNAL MEDICINE

## 2019-05-14 PROCEDURE — 85025 COMPLETE CBC W/AUTO DIFF WBC: CPT

## 2019-05-14 PROCEDURE — 94668 MNPJ CHEST WALL SBSQ: CPT

## 2019-05-14 PROCEDURE — 85730 THROMBOPLASTIN TIME PARTIAL: CPT

## 2019-05-14 PROCEDURE — 97530 THERAPEUTIC ACTIVITIES: CPT

## 2019-05-14 PROCEDURE — 94660 CPAP INITIATION&MGMT: CPT

## 2019-05-14 PROCEDURE — 94761 N-INVAS EAR/PLS OXIMETRY MLT: CPT

## 2019-05-14 PROCEDURE — 99233 SBSQ HOSP IP/OBS HIGH 50: CPT | Performed by: INTERNAL MEDICINE

## 2019-05-14 PROCEDURE — 94640 AIRWAY INHALATION TREATMENT: CPT

## 2019-05-14 PROCEDURE — 2000000000 HC ICU R&B

## 2019-05-14 PROCEDURE — 36415 COLL VENOUS BLD VENIPUNCTURE: CPT

## 2019-05-14 PROCEDURE — 99232 SBSQ HOSP IP/OBS MODERATE 35: CPT | Performed by: INTERNAL MEDICINE

## 2019-05-14 PROCEDURE — 2580000003 HC RX 258: Performed by: INTERNAL MEDICINE

## 2019-05-14 PROCEDURE — 2700000000 HC OXYGEN THERAPY PER DAY

## 2019-05-14 PROCEDURE — 83735 ASSAY OF MAGNESIUM: CPT

## 2019-05-14 PROCEDURE — 85610 PROTHROMBIN TIME: CPT

## 2019-05-14 PROCEDURE — 92526 ORAL FUNCTION THERAPY: CPT

## 2019-05-14 RX ORDER — POTASSIUM CHLORIDE 29.8 MG/ML
20 INJECTION INTRAVENOUS PRN
Status: DISCONTINUED | OUTPATIENT
Start: 2019-05-14 | End: 2019-05-20 | Stop reason: HOSPADM

## 2019-05-14 RX ORDER — MAGNESIUM SULFATE 1 G/100ML
1 INJECTION INTRAVENOUS ONCE
Status: COMPLETED | OUTPATIENT
Start: 2019-05-14 | End: 2019-05-14

## 2019-05-14 RX ORDER — WARFARIN SODIUM 7.5 MG/1
7.5 TABLET ORAL
Status: DISCONTINUED | OUTPATIENT
Start: 2019-05-14 | End: 2019-05-14

## 2019-05-14 RX ORDER — POTASSIUM CHLORIDE 29.8 MG/ML
20 INJECTION INTRAVENOUS
Status: COMPLETED | OUTPATIENT
Start: 2019-05-14 | End: 2019-05-14

## 2019-05-14 RX ORDER — MAGNESIUM SULFATE 1 G/100ML
1 INJECTION INTRAVENOUS PRN
Status: DISCONTINUED | OUTPATIENT
Start: 2019-05-14 | End: 2019-05-20 | Stop reason: HOSPADM

## 2019-05-14 RX ADMIN — ALPRAZOLAM 0.25 MG: 0.25 TABLET ORAL at 14:07

## 2019-05-14 RX ADMIN — POTASSIUM CHLORIDE 20 MEQ: 400 INJECTION, SOLUTION INTRAVENOUS at 11:19

## 2019-05-14 RX ADMIN — ALBUTEROL SULFATE 2.5 MG: 2.5 SOLUTION RESPIRATORY (INHALATION) at 06:49

## 2019-05-14 RX ADMIN — ALBUTEROL SULFATE 2.5 MG: 2.5 SOLUTION RESPIRATORY (INHALATION) at 19:39

## 2019-05-14 RX ADMIN — ALPRAZOLAM 0.25 MG: 0.25 TABLET ORAL at 20:30

## 2019-05-14 RX ADMIN — POTASSIUM CHLORIDE 20 MEQ: 400 INJECTION, SOLUTION INTRAVENOUS at 12:20

## 2019-05-14 RX ADMIN — FUROSEMIDE 40 MG: 10 INJECTION, SOLUTION INTRAMUSCULAR; INTRAVENOUS at 08:16

## 2019-05-14 RX ADMIN — ALBUTEROL SULFATE 2.5 MG: 2.5 SOLUTION RESPIRATORY (INHALATION) at 11:05

## 2019-05-14 RX ADMIN — CITALOPRAM HYDROBROMIDE 20 MG: 20 TABLET ORAL at 08:15

## 2019-05-14 RX ADMIN — POTASSIUM CHLORIDE 20 MEQ: 400 INJECTION, SOLUTION INTRAVENOUS at 10:30

## 2019-05-14 RX ADMIN — ALBUTEROL SULFATE 2.5 MG: 2.5 SOLUTION RESPIRATORY (INHALATION) at 15:28

## 2019-05-14 RX ADMIN — POLYETHYLENE GLYCOL (3350) 17 G: 17 POWDER, FOR SOLUTION ORAL at 08:15

## 2019-05-14 RX ADMIN — Medication 10 ML: at 08:16

## 2019-05-14 RX ADMIN — HEPARIN SODIUM 11 UNITS/KG/HR: 10000 INJECTION, SOLUTION INTRAVENOUS at 01:41

## 2019-05-14 RX ADMIN — Medication 10 ML: at 20:31

## 2019-05-14 RX ADMIN — DICLOFENAC 2 G: 10 GEL TOPICAL at 08:16

## 2019-05-14 RX ADMIN — SENNOSIDES AND DOCUSATE SODIUM 2 TABLET: 8.6; 5 TABLET ORAL at 08:15

## 2019-05-14 RX ADMIN — SENNOSIDES AND DOCUSATE SODIUM 2 TABLET: 8.6; 5 TABLET ORAL at 20:30

## 2019-05-14 RX ADMIN — ALBUTEROL SULFATE 2.5 MG: 2.5 SOLUTION RESPIRATORY (INHALATION) at 23:32

## 2019-05-14 RX ADMIN — MAGNESIUM SULFATE HEPTAHYDRATE 1 G: 1 INJECTION, SOLUTION INTRAVENOUS at 10:33

## 2019-05-14 RX ADMIN — APIXABAN 10 MG: 5 TABLET, FILM COATED ORAL at 21:21

## 2019-05-14 RX ADMIN — APIXABAN 10 MG: 5 TABLET, FILM COATED ORAL at 14:10

## 2019-05-14 RX ADMIN — ALPRAZOLAM 0.25 MG: 0.25 TABLET ORAL at 08:15

## 2019-05-14 ASSESSMENT — PAIN SCALES - GENERAL: PAINLEVEL_OUTOF10: 0

## 2019-05-14 NOTE — PROGRESS NOTES
BiPAP removed and applied high flow nasal canula per pt request at this time. Pt in bed eyes open respirations even and easy. Call light and bedside table within reach.

## 2019-05-14 NOTE — PROGRESS NOTES
Pt in bed eyes open A&Ox4. Breath sounds deminished through out lung fields, no cough noted or complaints of by pt. Skin natural/war/dry for this pt. ABD soft round non tender BS+x4 quads. Bilateral pedal pulses 2+. No edema noted. call light and bedside table within reach.

## 2019-05-14 NOTE — PROGRESS NOTES
Inpatient Physical Therapy Daily Treatment Note    Unit: ICU  Date:  5/14/2019  Patient Name:    Bharat Green  Admitting diagnosis:  Acute respiratory failure ([facility] [address] [J96.00]  Admit Date:  4/28/2019  Precautions/Restrictions:  fall risk, IV, bed/chair alarm, andujar catheter , supplemental o2 (10L) and ICU monitoring, L foot drop       Discharge Recommendations: Acute Rehab (ARU)/ Inpatient Rehab Facility (IRF)  DME needs for discharge: defer to facility       Therapy recommendations for staff:   Assist x1 to sit EOB. Home Health S4 Level Recommendation: NA  AM-PAC Mobility Score   AM-PAC Inpatient Mobility Raw Score : 9       Treatment Time:  15:35-16:07  Treatment number: 2  Timed Code Treatment Minutes: 32 minutes  Total Treatment Minutes:  32  minutes    Cognition    orientation not directly assessed. Pt seemed aware of current situation. Able to follow 2 step commands    Subjective  Patient lying supine in bed with no family present  Pt agreeable to this PT tx. Pt agreed to sitting at the EOB and standing EOB. Pain   Yes  Rating:  mild/10  Location: Aching (whole body)  Pain Medicine Status: Denies need     Bed Mobility   Supine to Sit:   Mod A of 2  Sit to Supine: Mod A of 2  Rolling: Mod A  Scooting: Mod A of 2 (Towards HOB) CGA towards EOB     Transfer Training     Sit to stand: Mod A of 2 and with use of RW  Stand to sit:   Mod A of 2  Bed to Chair:  Not Tested with use of N/A    Gait Training gait deferred due to fatigue  Distance:   ft  Deviations (firm surface/linoleum): N/A   Assistive Device Used:  N/A  Level of Assist: N/A  Comment:     Stair Training deferred, pt unsafe/not appropriate to complete stairs at this time  Pt ascended/descended  stairs with N/A with N/A, and use of N/A.   Pattern: N/A    Therapeutic Exercise all completed bilaterally unless indicated  Ankle pumps: x10 (L foot inversion/eversion due to lack of AROM DF)  Quad sets:   Glut sets:   Heel slides:   SLR: Hip abd/add:   LAQ: x10  Marching:   Shoulder Abduction: x5  Scapular Retraction: x5    Balance  Static Sitting:  Good    Tolerance: Tolerated sitting EOB ~10 minutes   Dynamic Sitting:  Fair +  Static Standing: Fair    Tolerance: Tolerated standing to RW ~10 seconds with min A of 2  Dynamic Standing: Not tested    Patient Education      Role of PT, POC, Discharge recommendations, pursed lip breathing, HEP and calling for assist with mobility. Positioning Needs       Pt returned to bed, call light and needs in reach and pillows placed for support/comfort. ROM Measurements N/A  Knee Flexion:  Knee Extension:     Activity Tolerance   Pt completed therapy session with Dizziness and SOB noted w/activity. (Dizziness with sitting EOB from supine- subsided ~1 minute)  SpO2: 89-94% throughout session  HR: 110bpm seated EOB, 120-130bpm with EOB activity; 141bpm (with transition to standing recovered with return to sitting)   BP:     Other  None. Assessment :  Patient demonstrated increased activity tolerance- indicated by sitting EOB, Genny at EOB, and standing to RW with mod A of 2. Pt was previously independent with ADLs and working full time, pt would benefit from ARU to improve independence with functional mobility to baseline. Goals (all goals ongoing unless otherwise indicated)    To be met in 3 visits:  1). Independent with LE Ex x 10 reps     To be met in 6 visits:  1).  Supine to/from sit: tolerate assessment Met 5/14  2).  Sit to/from stand: tolerate assessment Met 5/14  3).  Bed to chair: tolerate assessment  4).  Gait: Ambulate to be assessed  5).  Tolerate B LE exercises 3 sets of 10-15 reps  6).  Ascend/descend 3 steps to be assessed    Plan   Plan of care changed to consist of treatment 5 times per week.      Zuri Garcia, SPT     PT present for entire treatment session, providing direct one on one service, and making all skilled judgements and assessments for the treatment while allowing student participation. If patient discharges from this facility prior to next visit, this note will serve as the Discharge Summary.

## 2019-05-14 NOTE — CONSULTS
5-14-19 (0430) aptt 71.3. Please continue heparin drip at 12.5 ml/hr. Next aptt 0600 on 5-15-19. [facility]. .  5/14/2019 7:24 AM

## 2019-05-14 NOTE — PROGRESS NOTES
Pt remains on 10 LNC- o2 sat 92-94%- o2 sats drop with minimal exertion. PICC WNL. Assessment unchanged. No needs expressed. Will continue to closely monitor.   Colton Correia RN, BSN

## 2019-05-14 NOTE — PROGRESS NOTES
Occupational Therapy Daily Treatment Note    Unit: ICU  Date:  5/14/2019  Patient Name:    Eladia Angel  Admitting diagnosis:  Acute respiratory failure ([facility] [address] [J96.00]  Admit Date:  4/28/2019  Precautions/Restrictions:  fall risk, IV, bed/chair alarm, andujar catheter , supplemental o2 (10L) and ICU monitoring      Discharge Recommendations: Acute Rehab (ARU)/ Inpatient Rehab Facility (IRF)  DME needs for discharge: defer to facility       Therapy recommendations for staff:   Assist of 2 with use of STEDY for all transfers to/from chair    AM-PAC Score: [address] S4 Level: NA       Treatment Time:  15:35-16:08  Treatment number:  2   Total Treatment Time:   18 minutes      Subjective:  Pt agreeable to tx     Pain   yes  Rating: mild  Location:aching all over  Pain Medicine Status: Denies need      Bed Mobility:   Supine to Sit:  Mod A of 2  Sit to Supine: Mod A of 2  Rolling: Mod A  Scooting:        CGA towards [facility], mod A of 2 towards [facility]    Transfer Training:   Sit to stand: Mod A of 2 and with use of RW  Stand to sit:  Mod A of 2  Bed to Chair:  Not Tested  Bed to [facility]:   Not Tested  Standard toilet:   Not Tested    Activity Tolerance   Pt completed therapy session with Dizziness and SOB noted w/activity  SpO2: Patient on 10L of O2. SpO2 89-94% during treatment. HR: 110s-141. Increased HR with transition to standing for ~15 seconds. ADL Training:   Upper body dressing:  Not Tested  Upper body bathing:  Not Tested  Lower body dressing:  Not Tested  Lower body bathing:  Not Tested  Toileting:   Not Tested  Grooming/Hygiene:  Not Tested    Therapeutic Exercise:   Forearm sup/pronation:  x10  Shoulder flex/ext:  x10  Shoulder abd/add:  x10    Patient Education:   Role of OT  Energy conservation techniques   PLB with activity   Safety with transfers     Positioning Needs: In bed, call light and needs in reach.       Family Present:  Yes    Assessment: Pt demonstrated improved tolerance to bed mobility. Continues to be limited by O2 requirements and tachycardic. Patient would benefit from intensive inpatient rehabilitation at [facility] to return to baseline. GOALS (updated on 5/14). 1). Bed to toilet/BSC: min A      To be met in 5 Visits:  1). Supine to Sit: min A  2). Upper Body Bathing:  SBA  3). Lower Body Bathing: min A  4). Upper Body Dressing: SBA  5). Lower Body Dressing: min A  6).  Pt to des UE exs x 10 reps       Plan: cont with [facility], OTR/L #[id number]    If patient discharges from this facility prior to next visit, this note will serve as the Discharge Summary

## 2019-05-14 NOTE — PROGRESS NOTES
Hospitalist Progress Note        PCP: Natalie Rangel MD    Date of Admission: 4/28/2019       S/P mechanical ventilation . Extubated  On 5/11 5/7- Bronchoscopy was done. No endobronchial lesion seen. Mucosa appears normal. She had yellow and white secretions were thick. Subjective:     Awake and alert. Mentation is normal.  Very weak    - Remains hypoxic on high flow oxygen 10 L   very weak and getting therapy   looks fatigued     tachycardic, tachypneic with minimal activity     Medications:  Reviewed    Infusion Medications    dextrose       Scheduled Medications    apixaban  10 mg Oral BID    [START ON 5/21/2019] apixaban  5 mg Oral BID    furosemide  40 mg Intravenous Daily    albuterol  2.5 mg Nebulization Q4H WA    polyethylene glycol  17 g Oral Daily    pneumococcal 13-valent conjugate  0.5 mL Intramuscular Once    STYE  0.5 inch Ophthalmic Daily    ALPRAZolam  0.25 mg Oral TID    sennosides-docusate sodium  2 tablet Oral BID    diclofenac sodium  2 g Topical BID    citalopram  20 mg Oral Daily    sodium chloride flush  10 mL Intravenous 2 times per day     PRN Meds: magnesium sulfate, potassium chloride, glucose, dextrose, glucagon (rDNA), dextrose, carboxymethylcellulose PF, acetaminophen, perflutren lipid microspheres, ibuprofen, sodium chloride flush, magnesium hydroxide, ondansetron, albuterol      Intake/Output Summary (Last 24 hours) at 5/14/2019 1558  Last data filed at 5/14/2019 1220  Gross per 24 hour   Intake 585 ml   Output 1945 ml   Net -1360 ml       Physical Exam Performed:    /75   Pulse 107   Temp 99.5 °F (37.5 °C) (Oral)   Resp 23   Ht 5' 3\" (1.6 m)   Wt 232 lb 3.2 oz (105.3 kg)   SpO2 94%   BMI 41.13 kg/m²       General: extubated. On high flow NC oxygen.   faigued   Mucous Membranes:  Pink , anicteric  Neck: No JVD, no carotid bruit, no thyromegaly  Chest: no wheezes/coarse BS  Cardiovascular:  tachy,  S1S2 heard, no murmurs or gallops  Abdomen:  Soft, undistended, non tender, no organomegaly, BS present  Extremities: No edema or cyanosis. Distal pulses well felt  Neurological : non focal       Labs:   Recent Labs     05/12/19  0600 05/13/19  0415 05/14/19  0430   WBC 12.6* 10.6 10.9   HGB 16.7* 16.6* 16.8*   HCT 51.7* 51.4* 52.9*    230 242     Recent Labs     05/12/19  0600 05/13/19  0415 05/14/19  0517    140 136   K 3.8 3.4* 3.4*   CL 96* 95* 91*   CO2 35* 36* 37*   BUN 15 14 13   CREATININE <0.5* <0.5* <0.5*   CALCIUM 9.6 9.3 9.3   PHOS 4.4 4.8  --      No results for input(s): AST, ALT, BILIDIR, BILITOT, ALKPHOS in the last 72 hours. Recent Labs     05/12/19  1420 05/13/19  0415 05/14/19  0430   INR 1.25* 1.23* 1.59*     No results for input(s): CKTOTAL, TROPONINI in the last 72 hours. Urinalysis:      Lab Results   Component Value Date    NITRU Negative 04/28/2019    WBCUA 0-2 04/28/2019    BACTERIA 1+ 04/28/2019    RBCUA 0-2 04/28/2019    BLOODU TRACE-INTACT 04/28/2019    SPECGRAV <=1.005 04/28/2019    GLUCOSEU Negative 04/28/2019       Radiology:  XR CHEST PORTABLE   Final Result   Stable life support device positioning. Persistent layering bilateral effusions and basilar opacities. Slightly   improved right lung expansion. XR CHEST PORTABLE   Final Result   Improved pleural fluid and aeration in the left lung with worsening fluid and   aeration on the right. Pattern would favor pulmonary edema/ARDS. XR CHEST PORTABLE   Final Result   1. Pulmonary edema. 2. Improving right basilar and grossly stable left basilar atelectasis and/or   pneumonia. XR CHEST PORTABLE   Final Result   Worsening bibasilar atelectasis and/or pneumonia. XR CHEST PORTABLE   Final Result   Improving bilateral airspace disease and pleural effusions. XR CHEST PORTABLE   Final Result   Increasing right basilar and new bilateral upper lobe airspace disease could   represent edema or pneumonia.   Left basilar pleuroparenchymal disease is   stable. XR CHEST PORTABLE   Final Result   1. No significant change. IR PICC WO SQ PORT/PUMP > 5 YEARS   Final Result   Successful placement of PICC line. XR CHEST PORTABLE   Final Result   Supportive devices are positioned appropriately. There is increasing   pulmonary edema since prior exam.         VL Extremity Venous Bilateral   Final Result      CT Chest WO Contrast   Final Result   1. Stable small left pleural effusion with a new small right pleural effusion. 2. Interval worsening of dependent consolidative opacity within bilateral   lower lobe since the study of 04/28/2019. In combination with worsening   bibasilar predominant mucous plugging, this likely represents either   atelectasis or aspiration. Clinical correlation is advised. 3. Slight interval improvement in appearance of lingular airspace   consolidation, likely representing resolving pneumonia. XR CHEST PORTABLE   Final Result   Mild cardiomegaly and chronic pulmonary change without definite acute   pulmonary process. CT Chest Pulmonary Embolism W Contrast   Final Result   Suboptimal contrast timing although there is no evidence for large or central   pulmonary embolism. The segmental and subsegmental branches are not well   evaluated. Small left basilar effusion with adjacent consolidation and there are also   infiltrates within the lingula and right lung base that may represent   atelectasis versus pneumonia. Small amount of free fluid in the visualized upper abdomen. XR CHEST PORTABLE   Final Result   Cardiomegaly and mild pulmonary edema. LE doppler on 5/1/19  Summary      1. There is evidence of isolated acute deep venous thrombosis involving the   peroneal vein below the knee in the left lower extremity.   2. There is no other evidence of deep or superficial venous thrombosis   involving the lower extremities bilaterally.      ECHO 4/29/19   Summary   Left ventricular systolic function is normal with ejection fraction   estimated at 55 %.   No regional wall motion abnormality.   Left ventricle size is normal.   There is moderate concentric left ventricular hypertrophy.   Normal left ventricular diastolic filling pressure.   The aortic valve leaflets are not well visualized.   Aortic valve appears sclerotic but opens adequately.   No evidence of aortic valve stenosis.   Trivial aortic regurgitation is present.   Right ventricular systolic function is normal.   The right ventricle is moderately enlarged.   Systolic pulmonary artery pressure (SPAP) estimated at 54 mmHg (RA pressure   15 mmHg), consistent with moderate pulmonary hypertension. Assessment/Plan:    Principal Problem:    Acute respiratory failure with hypoxia (HCC)  Active Problems:    Rheumatoid arthritis (HCC)    Tobacco abuse    Anxiety state    SOB (shortness of breath)    Moderate persistent asthma with acute exacerbation    New onset of congestive heart failure (HCC)    Morbid obesity (HCC)    Obesity hypoventilation syndrome (HCC)    Erythrocytosis    Acute deep vein thrombosis (DVT) of left peroneal vein (HCC)    Pulmonary embolus (HCC)    Moderate protein-calorie malnutrition (HCC)    Bacterial pneumonia    Acute deep vein thrombosis (DVT) of distal end of left lower extremity (HCC)    Acute cor pulmonale (HCC)    Disorder of electrolytes    Anasarca  Resolved Problems:    * No resolved hospital problems. *         1. Acute hypoxic resp failure. - Possibly secondary to acute pulmonary edema and acute congestive heart failure. - cont  IV Lasix  -Echocardiogram shows normal LV function does not show diastolic heart failure. shows moderate pulm HTN.  - Likely acute PE. Venous doppler , positive for  DVT  Left LE, below knee. CTA was suboptimal. Also severe hypoxia otherwise unexplained. - Started anticoagulation with heparin gtt.   Her DVT and PE were likely present at

## 2019-05-14 NOTE — CARE COORDINATION
SHORTY Mcfarlane) here to assess Pt for rehab stay. Will need updated PT/OT Eval before accepting Pt for .

## 2019-05-14 NOTE — PROGRESS NOTES
Pt requesting to be removed from BiPAP. Pt A&Ox4 no signs and symptoms of respiratory distress. Writer removed BiPAP, and applied high flow nasal cannula at 10 LPM per order. Pt educated on the need for BiPAP while sleeping pt voiced understanding. Pt stated \" I will call for you if I get tired\". Writer will continue to monitor.

## 2019-05-14 NOTE — PROGRESS NOTES
PT/ OT at bedside. Pt sitting up on side of bed- 's RR 30's O2 pt remains on 10 LNC.    Christy Miller RN, BSN

## 2019-05-14 NOTE — PROGRESS NOTES
Pulmonary & Critical Care Medicine ICU Progress Note    CC: Acute hypoxemic respiratory failure    Events of Last 24 hours:   Still on high FiO2 10 L O2  Thin liquid diet   BiPAP all night last night       Invasive Lines: IV: PICC      MV:  5/3-      Vent Mode: AC/VC Rate Set: 0 bmp/Vt Ordered: 400 mL/ /FiO2 : 40 %  Recent Labs     19  1023   PHART 7.359   HYF8BWB 60.0*   PO2ART 73.9*       IV:   dextrose      heparin (porcine) 11 Units/kg/hr (19 0141)       Vitals:  Blood pressure 133/78, pulse 95, temperature 98.7 °F (37.1 °C), temperature source Axillary, resp. rate 24, height 5' 3\" (1.6 m), weight 232 lb 3.2 oz (105.3 kg), SpO2 95 %, not currently breastfeeding. on 10 LPM   Temp  Av.1 °F (37.3 °C)  Min: 98.7 °F (37.1 °C)  Max: 99.7 °F (37.6 °C)    Intake/Output Summary (Last 24 hours) at 2019 0733  Last data filed at 2019 0600  Gross per 24 hour   Intake 585 ml   Output 2025 ml   Net -1440 ml     EXAM:  General: ill appearing. Eyes: PERRL. No sclera icterus. No conjunctival injection. ENT: No discharge. Pharynx clear. Neck: Trachea midline. Normal thyroid. Resp: + accessory muscle use. Basilar crackles. No wheezing. No rhonchi. No dullness on percussion. CV: Regular rate. Regular rhythm. No mumur or rub. No edema. GI: Non-tender. Non-distended. No masses. No organomegaly. Normal bowel sounds. No hernia. Skin: Warm and dry. No nodule on exposed extremities. No rash on exposed extremities. Lymph: No cervical LAD. No supraclavicular LAD. M/S: No cyanosis. No joint deformity. No clubbing. Neuro: AAOx3. Followed commands.    Psych: No agitation, no anxiety, affect is full     Scheduled Meds:   furosemide  40 mg Intravenous Daily    warfarin (COUMADIN) daily dosing (placeholder)   Other RX Placeholder    albuterol  2.5 mg Nebulization Q4H WA    polyethylene glycol  17 g Oral Daily    pneumococcal 13-valent conjugate  0.5 mL Intramuscular Once    STYE  0.5 inch Ophthalmic Daily    ALPRAZolam  0.25 mg Oral TID    sennosides-docusate sodium  2 tablet Oral BID    diclofenac sodium  2 g Topical BID    citalopram  20 mg Oral Daily    sodium chloride flush  10 mL Intravenous 2 times per day     PRN Meds:  magnesium sulfate, potassium chloride, glucose, dextrose, glucagon (rDNA), dextrose, carboxymethylcellulose PF, acetaminophen, heparin (porcine), heparin (porcine), perflutren lipid microspheres, ibuprofen, sodium chloride flush, magnesium hydroxide, ondansetron, albuterol    Results:  CBC:   Recent Labs     05/12/19  0600 05/13/19  0415 05/14/19  0430   WBC 12.6* 10.6 10.9   HGB 16.7* 16.6* 16.8*   HCT 51.7* 51.4* 52.9*   MCV 89.9 91.0 90.1    230 242     BMP:   Recent Labs     05/12/19  0600 05/13/19  0415 05/14/19  0517    140 136   K 3.8 3.4* 3.4*   CL 96* 95* 91*   CO2 35* 36* 37*   PHOS 4.4 4.8  --    BUN 15 14 13   CREATININE <0.5* <0.5* <0.5*     LIVER PROFILE: No results for input(s): AST, ALT, LIPASE, BILIDIR, BILITOT, ALKPHOS in the last 72 hours. Invalid input(s): AMYLASE,  ALB    Cultures:  5/3 sputum normal respiratory estiven   5/6 BAL Candida          Films:  CXR 5/11 bilateral ASD L>R     ASSESSMENT:  · Acute hypoxemic respiratory failure  · Bacterial pneumonia  · Acute left lower extremity DVT  · Acute cor pulmonale  · Electrolytes disorder   · Chronic hypercapnic respiratory failure/OHS       PLAN:  BiPAP PRN   Supplemental oxygen to maintain SaO2 >92%; wean as tolerated  Completed 7 days of vancomycin and Levaquin  Heparin drip with Coumadin- consider outpatient novel oral anticoagulants, factor Xa inhibitors or direct thrombin inhibitors.    Off steroids  Home Xanax  Electrolytes replacement   PTOT  IV lasix 40 mg daily   Follow electrolytes   DVT prophylaxis: Heparin drip   MRSA prophylaxis: Bactroban  Okay to move out of the ICU from our perspective

## 2019-05-14 NOTE — FLOWSHEET NOTE
Followed with Pt. Listened and offered emotional support. Pt in good spirits. Glad to be doing better. Hoping for quick recovery.  acknowledged and affirmed her emotions. Pt appreciative of visit. [id number]       05/14/19 1342   Encounter Summary   Services provided to: Patient   Referral/Consult From: [facility] Family members   Continue Visiting   (5/14 follow up, listened and sppt)   Complexity of Encounter Moderate   Length of Encounter 15 minutes   Routine   Type Follow up   Assessment Calm; Approachable; Hopeless;Coping   Intervention Active listening;Explored feelings, thoughts, concerns; Discussed illness/injury and it's impact   Outcome Comfort;Expressed gratitude;Engaged in conversation;Expressed feelings/needs/concerns

## 2019-05-14 NOTE — PROGRESS NOTES
05/13/19 2320   NIV Type   Mode BIPAP   Settings/Measurements   Comfort Level Good   IPAP 14 cmH20   EPAP 7 cmH2O   Rate Ordered 12   Resp 20   SpO2 97   FiO2  40 %   Vt Exhaled 433 mL   Skin Protection for O2 Device N/A

## 2019-05-14 NOTE — PROGRESS NOTES
Speech Language Pathology  Facility/Department: [facility]  Dysphagia Daily Treatment Note    NAME: Miguel Woods  : 1960  MRN: [id number]    Patient Diagnosis(es):   Patient Active Problem List    Diagnosis Date Noted    Tobacco abuse 2011     Priority: Medium    Rheumatoid arthritis ([facility] [address] 2010     Priority: Medium    Anxiety state 2010     Priority: Low    Anasarca     Bacterial pneumonia     Acute deep vein thrombosis (DVT) of distal end of left lower extremity (HCC)     Acute cor pulmonale (HCC)     Disorder of electrolytes     Moderate protein-calorie malnutrition ([facility] [address] [facility] 2019    Acute deep vein thrombosis (DVT) of left peroneal vein (HCC)     Pulmonary embolus (HCC)     Erythrocytosis     Morbid obesity (HCC)     Obesity hypoventilation syndrome (HCC)     Acute respiratory failure with hypoxia ([facility] 2019    New onset of congestive heart failure ([facility] 2019    Moderate persistent asthma with acute exacerbation 2019    SOB (shortness of breath) 2011     Allergies: No Known Allergies  Onset Date:   19  Subjective:  Alert in bed, her mother was present and another male visitor was present. Pain:  No report of pain  Current Diet:  Npo at the time of the visit, taking thin liquid per nursing    Diet Tolerance:  n/a    P.O. Trials: see below  Thin       Nectar       Honey       Puree       Solid         Dysphagia Therapy: The patient has been tolerating liquids as per attending nurse without overt difficulty. She had her mother present. The patient's mother reports that the patient has increased hoarse phonation when compared to her pre-hospital status. The patient has reduction to vocal projection noted. The patient has mono-tone and mono-pitch noted. SLP explained scope of practice. The patient reports 3/10 severity for soreness to throat.  ENT assessment is suggested for clinical presentation of vocal disturbance post extubation. She was repositioned upright and had 10 L HFNC at the time of the visit. She would clearly benefit from [facility] and this would be at discretion of Pulmonologist, as to when her medical status permits. FEES not ideal, as she is showing continued high supplemental oxygen requirements and lower oxygen saturation. She had heart rate 105-108, she had 02 sat 92-93%, she had resp rate 23, and /85. She was given puree 6 tsps and thin liquid 5 oz small cup sips. Mild distortion to breath sounds in area of larynx, dry cough on cue, no overt upper airway congestion. She had no oral pooling, no anterior oral loss, she had no cough, no choke, no wet voice, no throat clearing, no increased work of breathing, her color remained good, no clinical signs of aspiration noted. She had fair swallow strength and timeliness as per palpable assessment of the areas of the mandible, hyoid bone, and thyroid cartilage. She had 1-2 spontaneous repeat swallow per bolus. She had some slowed oral prep and transit with puree. She was cleared for the visit by the attending nurse. SLP explained recommendations. Patient her mother in agreement. SLP explained process of gradual dietary consistency upgrade contingent on medical status improvement. SLP explained safe feeding precautions. SLP explained aspiration precautions. SLP left sticky note to the MD.    Recommendations:  She would benefit from an MBS when she is requiring less supplemental oxygen support.  If she has worsening respiratory status and requires BIPAP use, would hold all oral intake. If she is able to maintain oxygen saturation on nasal cannula of 90% or greater, then suggest Dysphagia Level I diet and thin liquids as tolerated, no straw. Crush PO meds and place in puree. Case discussed with the attending nurse. Hold PO intake if signs of aspiration develop. Patient/Family/Caregiver Education:  Explained scope of practice    Compensatory Strategies:   The following measures may help reduce but not completely eliminate her aspiration risk: slow rate of intake, small bites and sips, feed only when awake and alert, maintain good oral hygiene. Monitor temperature, lung, diet, and weight status. Hold PO intake if signs of aspiration develop. Full staff supervision and assist with meals, alt foods and liquids, dry saliva swallows between bites and sips, seat as close to 90 degree angle during meal and 60 min after meal, no straws      Plan:    Continued Dysphagia treatment with goals per plan of care. Discharge Recommendations: If pt discharges from hospital prior to Speech/Swallowing discharge, this note serves as tx and discharge summary. Total Treatment Time:   swallow tx  [id number]    THIS IS A LATE ENTRY, SHE WAS SEEN EARLIER TODAY. ALL STAFF COMMUNICATION DONE AT THE TIME OF THE VISIT. Karen Armstrong CCC-SLP. D [facility]-[facility]  5-14-19

## 2019-05-14 NOTE — PROGRESS NOTES
Pt continues to be awake watching television no signs and symptoms of respiratory distress. Call light and bedside table within reach.

## 2019-05-15 LAB
ANION GAP SERPL CALCULATED.3IONS-SCNC: 12 MMOL/L (ref 3–16)
BUN BLDV-MCNC: 13 MG/DL (ref 7–20)
CALCIUM SERPL-MCNC: 9.8 MG/DL (ref 8.3–10.6)
CHLORIDE BLD-SCNC: 91 MMOL/L (ref 99–110)
CO2: 36 MMOL/L (ref 21–32)
CREAT SERPL-MCNC: <0.5 MG/DL (ref 0.6–1.1)
GFR AFRICAN AMERICAN: >60
GFR NON-AFRICAN AMERICAN: >60
GLUCOSE BLD-MCNC: 106 MG/DL (ref 70–99)
GLUCOSE BLD-MCNC: 99 MG/DL (ref 70–99)
INR BLD: 3.02 (ref 0.86–1.14)
PERFORMED ON: NORMAL
POTASSIUM REFLEX MAGNESIUM: 3.7 MMOL/L (ref 3.5–5.1)
PROTHROMBIN TIME: 34.4 SEC (ref 9.8–13)
SODIUM BLD-SCNC: 139 MMOL/L (ref 136–145)

## 2019-05-15 PROCEDURE — 80048 BASIC METABOLIC PNL TOTAL CA: CPT

## 2019-05-15 PROCEDURE — 97530 THERAPEUTIC ACTIVITIES: CPT

## 2019-05-15 PROCEDURE — 94668 MNPJ CHEST WALL SBSQ: CPT

## 2019-05-15 PROCEDURE — 6360000002 HC RX W HCPCS: Performed by: INTERNAL MEDICINE

## 2019-05-15 PROCEDURE — 99232 SBSQ HOSP IP/OBS MODERATE 35: CPT | Performed by: INTERNAL MEDICINE

## 2019-05-15 PROCEDURE — 2700000000 HC OXYGEN THERAPY PER DAY

## 2019-05-15 PROCEDURE — 99233 SBSQ HOSP IP/OBS HIGH 50: CPT | Performed by: INTERNAL MEDICINE

## 2019-05-15 PROCEDURE — 6370000000 HC RX 637 (ALT 250 FOR IP): Performed by: INTERNAL MEDICINE

## 2019-05-15 PROCEDURE — 85610 PROTHROMBIN TIME: CPT

## 2019-05-15 PROCEDURE — 2580000003 HC RX 258: Performed by: INTERNAL MEDICINE

## 2019-05-15 PROCEDURE — 94761 N-INVAS EAR/PLS OXIMETRY MLT: CPT

## 2019-05-15 PROCEDURE — 92526 ORAL FUNCTION THERAPY: CPT

## 2019-05-15 PROCEDURE — 97535 SELF CARE MNGMENT TRAINING: CPT

## 2019-05-15 PROCEDURE — 94640 AIRWAY INHALATION TREATMENT: CPT

## 2019-05-15 PROCEDURE — 2060000000 HC ICU INTERMEDIATE R&B

## 2019-05-15 RX ORDER — ALBUTEROL SULFATE 2.5 MG/3ML
1.25 SOLUTION RESPIRATORY (INHALATION) EVERY 4 HOURS PRN
Status: DISCONTINUED | OUTPATIENT
Start: 2019-05-15 | End: 2019-05-20 | Stop reason: HOSPADM

## 2019-05-15 RX ADMIN — Medication 10 ML: at 22:47

## 2019-05-15 RX ADMIN — ALBUTEROL SULFATE 2.5 MG: 2.5 SOLUTION RESPIRATORY (INHALATION) at 10:57

## 2019-05-15 RX ADMIN — POLYETHYLENE GLYCOL (3350) 17 G: 17 POWDER, FOR SOLUTION ORAL at 10:59

## 2019-05-15 RX ADMIN — ALBUTEROL SULFATE 2.5 MG: 2.5 SOLUTION RESPIRATORY (INHALATION) at 15:04

## 2019-05-15 RX ADMIN — ALPRAZOLAM 0.25 MG: 0.25 TABLET ORAL at 14:40

## 2019-05-15 RX ADMIN — SENNOSIDES AND DOCUSATE SODIUM 2 TABLET: 8.6; 5 TABLET ORAL at 10:58

## 2019-05-15 RX ADMIN — ALBUTEROL SULFATE 2.5 MG: 2.5 SOLUTION RESPIRATORY (INHALATION) at 19:45

## 2019-05-15 RX ADMIN — ALPRAZOLAM 0.25 MG: 0.25 TABLET ORAL at 10:58

## 2019-05-15 RX ADMIN — CITALOPRAM HYDROBROMIDE 20 MG: 20 TABLET ORAL at 10:59

## 2019-05-15 RX ADMIN — APIXABAN 10 MG: 5 TABLET, FILM COATED ORAL at 22:47

## 2019-05-15 RX ADMIN — DICLOFENAC 2 G: 10 GEL TOPICAL at 10:59

## 2019-05-15 RX ADMIN — ALPRAZOLAM 0.25 MG: 0.25 TABLET ORAL at 22:47

## 2019-05-15 RX ADMIN — Medication 10 ML: at 10:58

## 2019-05-15 RX ADMIN — ALBUTEROL SULFATE 2.5 MG: 2.5 SOLUTION RESPIRATORY (INHALATION) at 07:06

## 2019-05-15 RX ADMIN — ALBUTEROL SULFATE 2.5 MG: 2.5 SOLUTION RESPIRATORY (INHALATION) at 23:25

## 2019-05-15 RX ADMIN — FUROSEMIDE 40 MG: 10 INJECTION, SOLUTION INTRAMUSCULAR; INTRAVENOUS at 10:58

## 2019-05-15 RX ADMIN — APIXABAN 10 MG: 5 TABLET, FILM COATED ORAL at 10:59

## 2019-05-15 ASSESSMENT — PAIN SCALES - GENERAL
PAINLEVEL_OUTOF10: 0

## 2019-05-15 NOTE — PROGRESS NOTES
Dr Douglas Lob here for rounding,  Remains up in chair, POC and possible transfer out of ICU discussed.

## 2019-05-15 NOTE — PROGRESS NOTES
Pulmonary & Critical Care Medicine ICU Progress Note    CC: Acute hypoxemic respiratory failure    Events of Last 24 hours:   Still on high FiO2 10 L O2  Did not get BiPAP last night       Invasive Lines: IV: PICC      MV:  5/3-      Vent Mode: AC/VC Rate Set: 0 bmp/Vt Ordered: 400 mL/ /FiO2 : 40 %  No results for input(s): PHART, BQO0BNX, PO2ART in the last 72 hours. IV:   dextrose         Vitals:  Blood pressure (!) 153/84, pulse 108, temperature 99.2 °F (37.3 °C), temperature source Axillary, resp. rate 20, height 5' 3\" (1.6 m), weight 234 lb (106.1 kg), SpO2 94 %, not currently breastfeeding. on 10 LPM   Temp  Av.2 °F (37.3 °C)  Min: 97.8 °F (36.6 °C)  Max: 100.3 °F (37.9 °C)    Intake/Output Summary (Last 24 hours) at 5/15/2019 0731  Last data filed at 5/15/2019 0429  Gross per 24 hour   Intake 572 ml   Output 2115 ml   Net -1543 ml     EXAM:  General: ill appearing. Eyes: PERRL. No sclera icterus. No conjunctival injection. ENT: No discharge. Pharynx clear. Neck: Trachea midline. Normal thyroid. Resp: + accessory muscle use. Basilar crackles. No wheezing. No rhonchi. No dullness on percussion. CV: Regular rate. Regular rhythm. No mumur or rub. No edema. GI: Non-tender. Non-distended. No masses. No organomegaly. Normal bowel sounds. No hernia. Skin: Warm and dry. No nodule on exposed extremities. No rash on exposed extremities. Lymph: No cervical LAD. No supraclavicular LAD. M/S: No cyanosis. No joint deformity. No clubbing. Neuro: AAOx3. Followed commands.    Psych: No agitation, no anxiety, affect is full     Scheduled Meds:   apixaban  10 mg Oral BID    [START ON 2019] apixaban  5 mg Oral BID    furosemide  40 mg Intravenous Daily    albuterol  2.5 mg Nebulization Q4H WA    polyethylene glycol  17 g Oral Daily    pneumococcal 13-valent conjugate  0.5 mL Intramuscular Once    STYE  0.5 inch Ophthalmic Daily    ALPRAZolam  0.25 mg Oral TID    sennosides-docusate sodium  2 tablet Oral BID    diclofenac sodium  2 g Topical BID    citalopram  20 mg Oral Daily    sodium chloride flush  10 mL Intravenous 2 times per day     PRN Meds:  magnesium sulfate, potassium chloride, glucose, dextrose, glucagon (rDNA), dextrose, carboxymethylcellulose PF, acetaminophen, perflutren lipid microspheres, ibuprofen, sodium chloride flush, magnesium hydroxide, ondansetron, albuterol    Results:  CBC:   Recent Labs     05/13/19  0415 05/14/19  0430   WBC 10.6 10.9   HGB 16.6* 16.8*   HCT 51.4* 52.9*   MCV 91.0 90.1    242     BMP:   Recent Labs     05/13/19  0415 05/14/19  0517 05/15/19  0600    136 139   K 3.4* 3.4* 3.7   CL 95* 91* 91*   CO2 36* 37* 36*   PHOS 4.8  --   --    BUN 14 13 13   CREATININE <0.5* <0.5* <0.5*     LIVER PROFILE: No results for input(s): AST, ALT, LIPASE, BILIDIR, BILITOT, ALKPHOS in the last 72 hours. Invalid input(s):   AMYLASE,  ALB    Cultures:  5/3 sputum normal respiratory estiven   5/6 BAL Candida          Films:  CXR 5/11 bilateral ASD L>R     ASSESSMENT:  · Acute hypoxemic respiratory failure  · Bacterial pneumonia  · Acute left lower extremity DVT  · Acute cor pulmonale  · Electrolytes disorder   · Chronic hypercapnic respiratory failure/OHS       PLAN:  BiPAP PRN   Supplemental oxygen to maintain SaO2 >92%; wean as tolerated  Completed 7 days of vancomycin and Levaquin  Eliquis BID   Off steroids  Home Xanax  Electrolytes replacement   PTOT  IV lasix 40 mg daily   DVT prophylaxis: Heparin drip   MRSA prophylaxis: Bactroban  Okay to move out of the ICU from our perspective

## 2019-05-15 NOTE — PROGRESS NOTES
See shift assessment. Pt in bed eyes open watching television, no complaints of discomfort. Skin natural/warm/dry for this pt ABD soft round non tender BS+x 4 quads. Bilateral pedal pulses 2+. Pt in bed call light and bedside table within reach.

## 2019-05-15 NOTE — PROGRESS NOTES
RESPIRATORY THERAPY ASSESSMENT    Name:  [facility] Record Number:  [id number]  Age: 64 y.o. Gender: female  : 1960  Today's Date:  5/15/2019  Room:  [id number]    Assessment     Is the patient being admitted for a COPD or Asthma exacerbation? No   (If yes the patient will be seen every 4 hours for the first 24 hours and then reassessed)    Patient Admission Diagnosis      Allergies  No Known Allergies    Minimum Predicted Vital Capacity:     782          Actual Vital Capacity:      910              Pulmonary History:No history  Home Oxygen Therapy:  room air  Home Respiratory Therapy:Albuterol MDI prn  Current Respiratory Therapy:  Albuterol Q4H w/a  Treatment Type: Positive expiratory pressure therapy  Medications: Albuterol    Respiratory Severity Index(RSI)   Patients with orders for inhalation medications, oxygen, or any therapeutic treatment modality will be placed on Respiratory Protocol. They will be assessed with the first treatment and at least every 72 hours thereafter. The following severity scale will be used to determine frequency of treatment intervention.     Smoking History: Smoking History Less than 1ppd or less than 15 pack year = 1    Social History  Social History     Tobacco Use    Smoking status: Current Every Day Smoker     Packs/day: 0.10     Years: 30.00     Pack years: 3.00     Types: Cigarettes    Smokeless tobacco: Never Used   Substance Use Topics    Alcohol use: No     Alcohol/week: 0.0 oz    Drug use: No       Recent Surgical History: None = 0  Past Surgical History  Past Surgical History:   Procedure Laterality Date    BRONCHOSCOPY N/A 2019    BRONCHOSCOPY ALVEOLAR LAVAGE performed by Елена Sharp MD at [address] [place] [address]     LUMBAR SPINE SURGERY         Level of Consciousness: Alert, Oriented, and Cooperative = 0    Level of Activity: Mostly sedentary, minimal walking = 2    Respiratory Pattern: Dyspnea with exertion;Irregular pattern;or RR less than 6 = 2    Breath Sounds: Diminshed bilaterally and/or crackles = 2    Sputum  Sputum Color: Tan, Tenacity: Thick, Sputum How Obtained: None  Cough: Strong, productive = 1    Vital Signs   /76   Pulse 112   Temp 99.1 °F (37.3 °C) (Oral)   Resp 26   Ht 5' 3\" (1.6 m)   Wt 234 lb (106.1 kg)   SpO2 94%   BMI 41.45 kg/m²   SPO2 (COPD values may differ): Less than 86% on room air or greater than 92% on FiO2 greater than 50% = 4    Peak Flow (asthma only): not applicable = 0    RSI: 19-82 = Q6H or QID and Q4HPRN for dyspnea        Plan       Goals: medication delivery and improve oxygenation    Patient/caregiver was educated on the proper method of use for Respiratory Care Devices:  Yes      Level of patient/caregiver understanding able to:   ? Verbalize understanding   ? Demonstrate understanding       ? Teach back        ? Needs reinforcement       ? No available caregiver               ? Other:     Response to education:  Excellent     Is patient being placed on Home Treatment Regimen? No     Does the patient have everything they need prior to discharge? Yes     Comments: pt assessed, interviewed/chart reviewed    Plan of Care: Albuterol Q4H w/a    Electronically signed by Abby Velasquez RCP on 5/15/2019 at 4:32 PM    Respiratory Protocol Guidelines     1. Assessment and treatment by Respiratory Therapy will be initiated for medication and therapeutic interventions upon initiation of aerosolized medication. 2. Physician will be contacted for respiratory rate (RR) greater than 35 breaths per minute. Therapy will be held for heart rate (HR) greater than 140 beats per minute, pending direction from physician. 3. Bronchodilators will be administered via Metered Dose Inhaler (MDI) with spacer when the following criteria are met:  a.  Alert and cooperative     b. HR < 140 bpm  c. RR < 30 bpm                d. Can demonstrate a 2-3 second inspiratory hold  4. Bronchodilators will be administered via Hand Held Nebulizer AILYN [facility]) to patients when ANY of the following criteria are met  a. Incognizant or uncooperative          b. Patients treated with HHN at Home        c. Unable to demonstrate proper use of MDI with spacer     d. RR > 30 bpm   5. Bronchodilators will be delivered via Metered Dose Inhaler (MDI), HHN, Aerogen to intubated patients on mechanical ventilation. 6. Inhalation medication orders will be delivered and/or substituted as outlined below. Aerosolized Medications Ordering and Administration Guidelines:    1. All Medications will be ordered by a physician, and their frequency and/or modality will be adjusted as defined by the patients Respiratory Severity Index (RSI) score. 2. If the patient does not have documented COPD, consider discontinuing anticholinergics when RSI is less than 9.  3. If the bronchospasm worsens (increased RSI), then the bronchodilator frequency can be increased to a maximum of every 4 hours. If greater than every 4 hours is required, the physician will be contacted. 4. If the bronchospasm improves, the frequency of the bronchodilator can be decreased, based on the patient's RSI, but not less than home treatment regimen frequency. 5. Bronchodilator(s) will be discontinued if patient has a RSI less than 9 and has received no scheduled or as needed treatment for 72  Hrs. Patients Ordered on a Mucolytic Agent:    1. Must always be administered with a bronchodilator. 2. Discontinue if patient experiences worsened bronchospasm, or secretions have lessened to the point that the patient is able to clear them with a cough. Anti-inflammatory and Combination Medications:    1. If the patient lacks prior history of lung disease, is not using inhaled anti-inflammatory medication at home, and lacks wheezing by examination or by history for at least 24 hours, contact physician for possible discontinuation.

## 2019-05-15 NOTE — PROGRESS NOTES
Inpatient Physical Therapy Daily Treatment Note    Unit: ICU  Date:  5/15/2019  Patient Name:    Pilar Childers  Admitting diagnosis:  Acute respiratory failure ([facility] [J96.00]  Admit Date:  2019  Precautions/Restrictions:  fall risk, IV, bed/chair alarm, andujar catheter , supplemental o2 (10L) and ICU monitoring, L foot drop       Discharge Recommendations: Acute Rehab (ARU)/ Inpatient Rehab Facility (IRF)  DME needs for discharge: defer to facility       Therapy recommendations for staff:   Assist of 2 with use of STEDY for all transfers to/from bedside [facility] S4 Level Recommendation: NA  AM-PAC Mobility Score   AM-PAC Inpatient Mobility Raw Score : 11       Treatment Time:  13:07-13:37  Treatment number: 3  Timed Code Treatment Minutes: 30 minutes  Total Treatment Minutes:  30  minutes    Cognition    orientation not directly assessed. Pt seemed aware of current situation. Able to follow 2 step commands    Subjective  Patient sitting on BSC with no family present. Mother walked in during tx. Pt agreeable to this PT tx. Pt says she has not eaten lunch yet and is ready to get back into bed. Pain   Yes  Ratin/10  Location: Low Back   Pain Medicine Status: Denies need- Pt mentions hx of back pain that gets better with changing position- assisted patient to lay supine     Bed Mobility   Supine to Sit:   Not Tested  Sit to Supine:  Max A  Rolling:   Min A (to L side)  Scooting: Max A of 2 to [address]    Transfer Training     Sit to stand:    Mod A of 2  Stand to sit:   Mod A of 2  BSC to Chair:  Mod A of 2 with use of rolling walker (RW) (L foot drop and L knee buckling)  Chair to Bed:  Mod A of 2 with use of rolling walker (RW) (L foot drop and L knee buckling)    Gait Training gait deferred due to difficulty with transfers  Distance:   ft  Deviations (firm surface/linoleum): N/A   Assistive Device Used:  N/A  Level of Assist: N/A  Comment:     Stair Training deferred, pt unsafe/not appropriate to complete stairs at this time  Pt ascended/descended  stairs with N/A with N/A, and use of N/A. Pattern: N/A    Therapeutic Exercise Genny not completed this date  Ankle pumps:  Quad sets:   Glut sets:   Heel slides:   SLR:   Hip abd/add:   LAQ:   Marching:     Balance  Static Sitting:  Good    Tolerance: WNL  Dynamic Sitting:  Good -   Static Standing: Fair -   Tolerance: Tolerated standing at [facility] ~1 minute with mod A of 2 and RW   Dynamic Standing: Poor    Patient Education      Role of PT, POC, Discharge recommendations, transfer techniques and calling for assist with mobility. Positioning Needs       Pt returned to bed, call light and needs in reach. ROM Measurements N/A  Knee Flexion:  Knee Extension:     Activity Tolerance   Pt completed therapy session with SOB noted w/activity. SpO2: 90's throughout session  HR: 133bpm sitting on BSC; 146bpm standing; 111bpm supine   BP:     Other  None. Assessment :  Patient tolerated transfers with RW and mod A of 2. Pt's tachycardia continues to limit activity tolerance. Pt was previously independent with ADLs and working full time, pt would benefit from ARU to improve independence with functional mobility to baseline. Goals (all goals ongoing unless otherwise indicated)  To be met in 3 visits:  1). Independent with LE Ex x 10 reps     To be met in 6 visits:  1).  Supine to/from sit: tolerate assessment Met 5/14  2).  Sit to/from stand: tolerate assessment Met 5/14  3).  Bed to chair: tolerate assessment  4).  Gait: Ambulate to be assessed  5).  Tolerate B LE exercises 3 sets of 10-15 reps  6).  Ascend/descend 3 steps to be assessed    Plan   Continue with plan of care. TRENA Anaya     PT present for entire treatment session, providing direct one on one service, and making all skilled judgements and assessments for the treatment while allowing student participation.           If patient discharges from this facility prior to next visit, this note will serve as the Discharge Summary.

## 2019-05-15 NOTE — PLAN OF CARE
Problem: OXYGENATION/RESPIRATORY FUNCTION  Goal: Patient will maintain patent airway  Outcome: Ongoing  Note:   No complaints of SOB this shift. Goal: Patient will achieve/maintain normal respiratory rate/effort  Description  Respiratory rate and effort will be within normal limits for the patient  Outcome: Ongoing     Problem: HEMODYNAMIC STATUS  Goal: Patient has stable vital signs and fluid balance  Outcome: Ongoing     Problem: FLUID AND ELECTROLYTE IMBALANCE  Goal: Fluid and electrolyte balance are achieved/maintained  Outcome: Ongoing     Problem: Nutrition  Goal: Optimal nutrition therapy  Outcome: Ongoing  Goal: Understanding of nutritional guidelines  Outcome: Ongoing     Problem: Falls - Risk of:  Goal: Will remain free from falls  Description  Will remain free from falls  Outcome: Ongoing  Note:   Pt utilizes call light this far, no attempts to get out of bed. Goal: Absence of physical injury  Description  Absence of physical injury  Outcome: Ongoing     Problem: Risk for Impaired Skin Integrity  Goal: Tissue integrity - skin and mucous membranes  Description  Structural intactness and normal physiological function of skin and  mucous membranes. Outcome: Ongoing  Note:   Skin assessment done this shift, skin intact preventive ob coccyx     Problem: Pain:  Goal: Pain level will decrease  Description  Pain level will decrease  Outcome: Ongoing  Note:   Pt denies discomfort.   Goal: Control of acute pain  Description  Control of acute pain  Outcome: Ongoing  Goal: Control of chronic pain  Description  Control of chronic pain  Outcome: Ongoing     Problem: Discharge Planning:  Goal: Discharged to appropriate level of care  Description  Discharged to appropriate level of care  Outcome: Ongoing  Goal: Participates in care planning  Description  Participates in care planning  Outcome: Ongoing     Problem: Airway Clearance - Ineffective:  Goal: Clear lung sounds  Description  Clear lung sounds  Outcome: Ongoing  Goal: Ability to maintain a clear airway will improve  Description  Ability to maintain a clear airway will improve  Outcome: Ongoing     Problem: Fluid Volume - Deficit:  Goal: Achieves intake and output within specified parameters  Description  Achieves intake and output within specified parameters  Outcome: Ongoing     Problem: Gas Exchange - Impaired:  Goal: Levels of oxygenation will improve  Description  Levels of oxygenation will improve  Outcome: Ongoing     Problem: Hyperthermia:  Goal: Ability to maintain a body temperature in the normal range will improve  Description  Ability to maintain a body temperature in the normal range will improve  Outcome: Ongoing     Problem: Tobacco Use:  Goal: Will participate in inpatient tobacco-use cessation counseling  Description  Will participate in inpatient tobacco-use cessation counseling  Outcome: Ongoing

## 2019-05-15 NOTE — PROGRESS NOTES
Hospitalist Progress Note        PCP: Kurt Cordoba MD    Date of Admission: 4/28/2019      S/P mechanical ventilation . Extubated  On 5/11 5/7- Bronchoscopy was done. No endobronchial lesion seen. Mucosa appears normal. She had yellow and white secretions were thick. Subjective:   Resting now, remains hypoxic on high flow oxygen 10 L. Tachycardia and tachypnea have improved now. Diet advanced  Tolerating dysphagia 2 diet      Medications:  Reviewed    Infusion Medications    dextrose       Scheduled Medications    [START ON 5/16/2019] pneumococcal 13-valent conjugate  0.5 mL Intramuscular Once    apixaban  10 mg Oral BID    [START ON 5/21/2019] apixaban  5 mg Oral BID    furosemide  40 mg Intravenous Daily    albuterol  2.5 mg Nebulization Q4H WA    ALPRAZolam  0.25 mg Oral TID    sennosides-docusate sodium  2 tablet Oral BID    diclofenac sodium  2 g Topical BID    citalopram  20 mg Oral Daily    sodium chloride flush  10 mL Intravenous 2 times per day     PRN Meds: albuterol, magnesium sulfate, potassium chloride, glucose, dextrose, glucagon (rDNA), dextrose, carboxymethylcellulose PF, acetaminophen, perflutren lipid microspheres, ibuprofen, sodium chloride flush, magnesium hydroxide, ondansetron      Intake/Output Summary (Last 24 hours) at 5/15/2019 1803  Last data filed at 5/15/2019 1400  Gross per 24 hour   Intake 1030 ml   Output 1965 ml   Net -935 ml       Physical Exam Performed:    BP (!) 144/85   Pulse 106   Temp 99.1 °F (37.3 °C) (Oral)   Resp 24   Ht 5' 3\" (1.6 m)   Wt 234 lb (106.1 kg)   SpO2 97%   BMI 41.45 kg/m²       General: No distress  On high flow NC oxygen. Mucous Membranes:  Pink , anicteric  Neck: No JVD, no carotid bruit, no thyromegaly  Chest:  Diminished breath sounds . no wheezes/coarse BS  Cardiovascular:  RRR  S1S2 heard, no murmurs or gallops  Abdomen:  Soft, undistended, non tender, no organomegaly, BS present  Extremities: No edema or cyanosis. > 5 YEARS   Final Result   Successful placement of PICC line. XR CHEST PORTABLE   Final Result   Supportive devices are positioned appropriately. There is increasing   pulmonary edema since prior exam.         VL Extremity Venous Bilateral   Final Result      CT Chest WO Contrast   Final Result   1. Stable small left pleural effusion with a new small right pleural effusion. 2. Interval worsening of dependent consolidative opacity within bilateral   lower lobe since the study of 04/28/2019. In combination with worsening   bibasilar predominant mucous plugging, this likely represents either   atelectasis or aspiration. Clinical correlation is advised. 3. Slight interval improvement in appearance of lingular airspace   consolidation, likely representing resolving pneumonia. XR CHEST PORTABLE   Final Result   Mild cardiomegaly and chronic pulmonary change without definite acute   pulmonary process. CT Chest Pulmonary Embolism W Contrast   Final Result   Suboptimal contrast timing although there is no evidence for large or central   pulmonary embolism. The segmental and subsegmental branches are not well   evaluated. Small left basilar effusion with adjacent consolidation and there are also   infiltrates within the lingula and right lung base that may represent   atelectasis versus pneumonia. Small amount of free fluid in the visualized upper abdomen. XR CHEST PORTABLE   Final Result   Cardiomegaly and mild pulmonary edema. LE doppler on 5/1/19  Summary      1. There is evidence of isolated acute deep venous thrombosis involving the   peroneal vein below the knee in the left lower extremity.   2. There is no other evidence of deep or superficial venous thrombosis   involving the lower extremities bilaterally.      ECHO 4/29/19   Summary   Left ventricular systolic function is normal with ejection fraction   estimated at 55 %.   No regional wall motion abnormality.   Left ventricle size is normal.   There is moderate concentric left ventricular hypertrophy.   Normal left ventricular diastolic filling pressure.   The aortic valve leaflets are not well visualized.   Aortic valve appears sclerotic but opens adequately.   No evidence of aortic valve stenosis.   Trivial aortic regurgitation is present.   Right ventricular systolic function is normal.   The right ventricle is moderately enlarged.   Systolic pulmonary artery pressure (SPAP) estimated at 54 mmHg (RA pressure   15 mmHg), consistent with moderate pulmonary hypertension. Assessment/Plan:    Principal Problem:    Acute respiratory failure with hypoxia (HCC)  Active Problems:    Rheumatoid arthritis (HCC)    Tobacco abuse    Anxiety state    SOB (shortness of breath)    Moderate persistent asthma with acute exacerbation    New onset of congestive heart failure (HCC)    Morbid obesity (HCC)    Obesity hypoventilation syndrome (HCC)    Erythrocytosis    Acute deep vein thrombosis (DVT) of left peroneal vein (HCC)    Pulmonary embolus (HCC)    Moderate protein-calorie malnutrition (HCC)    Bacterial pneumonia    Acute deep vein thrombosis (DVT) of distal end of left lower extremity (HCC)    Acute cor pulmonale (HCC)    Disorder of electrolytes    Anasarca  Resolved Problems:    * No resolved hospital problems. *         1. Acute hypoxic resp failure. - Possibly secondary to acute pulmonary edema and acute congestive heart failure. - cont  IV Lasix  -Echocardiogram shows normal LV function does not show diastolic heart failure. shows moderate pulm HTN.  - Likely acute PE. Venous doppler , positive for  DVT  Left LE, below knee. CTA was suboptimal. Also severe hypoxia otherwise unexplained. - Started anticoagulation with heparin gtt. Her DVT and PE were likely present at the time of her admission. - > switched to Eliquis now   Intubated 5/3 for worsening hypercarbia. Bronchoscopy was done on 5/7/19.

## 2019-05-15 NOTE — CARE COORDINATION
INTERDISCIPLINARY PLAN OF CARE CONFERENCE    Date/Time: 5/15/2019 12:43 PM  Completed by: Bethel Rutherford, Case Management      Patient Name:  Kelsey Latham  YOB: 1960  Admitting Diagnosis: Acute respiratory failure ([address] [J96.00]     Admit Date/Time:  4/28/2019  1:37 PM    Chart reviewed. Interdisciplinary team met to discuss patient progress and discharge plans. Disciplines included Case Management, Nursing, and Dietitian. Current Status: Inpatient 4/28/2019    PT/OT recommendation: ARU    Anticipated Discharge Date: TBD  Expected D/C Disposition:  Rehab  Confirmed plan with patient and/or family Yes  Discharge Plan Comments: Plan: ARU recommended. Referral made to [facility]. Sandeep (AARU intake) following. Pt remains on 10 liters of high flow O2 and weaning. Will transfer out of ICU today. CM consult for assistance with short term disability paperwork. Discussed with patient and mother. Disability paperwork will need to be submitted by patients PCP. They verbalized understanding. +eCOC, +CM following.          Home O2 in place on admit: No  Pt informed of need to bring portable home O2 tank on day of discharge for nursing to connect prior to leaving:  No  Verbalized agreement/Understanding:  No

## 2019-05-15 NOTE — PROGRESS NOTES
Speech Language Pathology  Facility/Department: [facility] ICU  Dysphagia Daily Treatment Note    NAME: Jeffery Carrillo  : 1960  MRN: [id number]    Patient Diagnosis(es):   Patient Active Problem List    Diagnosis Date Noted    Tobacco abuse 2011     Priority: Medium    Rheumatoid arthritis ([facility] 2010     Priority: Medium    Anxiety state 2010     Priority: Low    Anasarca     Bacterial pneumonia     Acute deep vein thrombosis (DVT) of distal end of left lower extremity (HCC)     Acute cor pulmonale (HCC)     Disorder of electrolytes     Moderate protein-calorie malnutrition ([address] 2019    Acute deep vein thrombosis (DVT) of left peroneal vein (HCC)     Pulmonary embolus (HCC)     Erythrocytosis     Morbid obesity (HCC)     Obesity hypoventilation syndrome (HCC)     Acute respiratory failure with hypoxia ([facility] 2019    New onset of [facility] failure ([facility] 2019    Moderate persistent asthma with acute exacerbation 2019    SOB (shortness of breath) 2011     Allergies: No Known Allergies  Onset Date:   19    Subjective:  Alert in chair, her mother was present in the room. The patient had no overt distress. Pain:  No report of pain    Current Diet:  Level I and thin liquid    Diet Tolerance:  Patient tolerating current diet level without staff reported signs/symptoms of penetration / aspiration. P.O. Trials: see below  Thin       Nectar       Honey       Puree       Solid         Dysphagia Therapy: The patient had heart rate 116, 02 sat 92-93%, resp rate 22-29, the patient was noted to have /86, 10 L 02 HFNC. The patient has hoarse and gravely phonation, ENT assessment is suggested. She was given instructions for pursed lip breathing. She remained in chair. She has fair cough and throat clear effort on command at the time of the visit. She did not expectorate any secretions.  She indicates that she would like to have a dietary consistency upgrade. She was given 7 tsps of mech soft and 5 oz small cup sips of thin liquid. There was no cough, no choke, no throat clear, no wet voice, no increased work of breathing, her color remained good. No clinical signs of aspiration noted. No oral pooling, no anterior oral loss. There was mild slowed mastication noted at the time of the visit, but this was not effortful and she did seem to have adequate mandibular ROM. She denies any odynophagia or globus sensation. SLP explained safe feeding precautions. SLP explained aspiration precautions. Staff should maintain good oral hygiene for this patient and monitor her temperature, lung, diet, and weight status. Portable chest x-ray is suggested. No increased congestion in breath sounds or cued cough contingent with PO intake. Recommendations:   If she has worsening respiratory status and requires BIPAP use, would hold all oral intake. If she is able to maintain oxygen saturation on nasal cannula of 90% or greater, then suggest Dysphagia Level II diet and thin liquids as tolerated, no straw. Crush PO meds and place in puree. Case discussed with the attending nurse. Hold PO intake if signs of aspiration develop. She would benefit from an MBS when she is requiring less supplemental oxygen support. Patient/Family/Caregiver Education:  Explained scope of practice    Compensatory Strategies: The following measures may help reduce but not completely eliminate her aspiration risk: slow rate of intake, small bites and sips, feed only when awake and alert, maintain good oral hygiene. Monitor temperature, lung, diet, and weight status. Hold PO intake if signs of aspiration develop.  Full staff supervision and assist with meals, alt foods and liquids, dry saliva swallows between bites and sips, seat as close to 90 degree angle during meal and 60 min after meal, no straws      Plan:    Continued Dysphagia treatment with goals per plan of care.    Discharge Recommendations: If pt discharges from hospital prior to Speech/Swallowing discharge, this note serves as tx and discharge summary. Total Treatment Time:   swallow tx  [id number]    [facility] CCC-SLP. HEIDY [employer]  5-15-19

## 2019-05-15 NOTE — PROGRESS NOTES
Up to chair at bedside 2 assist, tolerated well. Able to move LLE slowly, small shuffle steps. Denies pain, Physical exam completed. All ICU monitoring lines in place. 02 @ 10lpm/NC. Family visiting at bedside, call light in reach. Jessica phoenix, secured.

## 2019-05-15 NOTE — CARE COORDINATION
Referral received for [facility]. Currently trending medical stability for possible placement. Thanks for the opportunity to participate in this patient's care.      [facility] Liaison  [phone]

## 2019-05-15 NOTE — PROGRESS NOTES
Occupational Therapy Daily Treatment Note    Unit: ICU  Date:  5/15/2019  Patient Name:    Jaspreet Joyce  Admitting diagnosis:  Acute respiratory failure ([facility] [J96.00]  Admit Date:  2019  Precautions/Restrictions:  fall risk, IV, bed/chair alarm, andujar catheter , supplemental o2 (10L) and ICU monitoring      Discharge Recommendations: Acute Rehab (ARU)/ Inpatient Rehab Facility (IRF)  DME needs for discharge: defer to facility       Therapy recommendations for staff:   Assist of 2 with use of STEDY for all transfers to/from chair    AM-PAC Score: [facility] S4 Level: NA       Treatment Time:  7533-9125  Treatment number:  3  Total Treatment Time:   32 minutes      Subjective:  Pt agreeable to tx, finishing up on BSC and requesting to return to bed    Pain   yes  Ratin  Location:low back  Pain Medicine Status: Denies need  Pt mentions hx of back pain that gets better with changing position- assisted patient to lay supine       Bed Mobility:   Supine to Sit:  Not Tested   Sit to Supine:  Max A  Rolling:           Min A to L  Scooting:        CGA  Seated scoot toward [facility], mod A of 2 supine scoot toward [facility]    Transfer Training:   Sit to stand: Mod A of 2 and with use of RW  Stand to sit:  Mod A of 2  Bed to Chair:  Mod A of 2 and with use of RW -third person to assist with moving RW and monitoring L LE (L foot with foot drop/L knee buckling  Bed to BSC:   Mod A and with use of RW  Standard toilet:   Not Tested    Activity Tolerance   Pt completed therapy session with SOB noted w/activity  SpO2: Patient on 10L of O2. SpO2 >92% during treatment.     HR: 133bpm sitting on BSC; 146bpm standing; 111bpm supine      ADL Training:   Upper body dressing:  Not Tested  Upper body bathing:  Not Tested  Lower body dressing:  Not Tested  Lower body bathing:  Not Tested  Toileting:   Not Tested  Grooming/Hygiene:  SBA wash face seated at EOB    Therapeutic Exercise:   N/A    Patient Education:   Role of OT  Energy conservation techniques   PLB with activity   Safety with transfers     Positioning Needs: In bed, call light and needs in reach. Family Present:  Yes    Assessment: Pt demonstrated improved tolerance to OOB, has been up in chair most of day. Continues to be limited by O2 requirements and tachycardic. Patient would benefit from intensive inpatient rehabilitation at OR to return to baseline. GOALS (updated on 5/14). -continue all  1). Bed to toilet/BSC: min A      To be met in 5 Visits:  1). Supine to Sit: min A  2). Upper Body Bathing:  SBA  3). Lower Body Bathing: min A  4). Upper Body Dressing: SBA  5). Lower Body Dressing: min A  6).  Pt to des UE exs x 10 reps       Plan: cont with [address], OTR/L 8993      If patient discharges from this facility prior to next visit, this note will serve as the Discharge Summary

## 2019-05-16 LAB
ANION GAP SERPL CALCULATED.3IONS-SCNC: 11 MMOL/L (ref 3–16)
BUN BLDV-MCNC: 13 MG/DL (ref 7–20)
CALCIUM SERPL-MCNC: 10.1 MG/DL (ref 8.3–10.6)
CHLORIDE BLD-SCNC: 88 MMOL/L (ref 99–110)
CO2: 40 MMOL/L (ref 21–32)
CREAT SERPL-MCNC: <0.5 MG/DL (ref 0.6–1.1)
GFR AFRICAN AMERICAN: >60
GFR NON-AFRICAN AMERICAN: >60
GLUCOSE BLD-MCNC: 103 MG/DL (ref 70–99)
GLUCOSE BLD-MCNC: 105 MG/DL (ref 70–99)
GLUCOSE BLD-MCNC: 92 MG/DL (ref 70–99)
GLUCOSE BLD-MCNC: 94 MG/DL (ref 70–99)
GLUCOSE BLD-MCNC: 96 MG/DL (ref 70–99)
INR BLD: 2.83 (ref 0.86–1.14)
PERFORMED ON: ABNORMAL
PERFORMED ON: ABNORMAL
PERFORMED ON: NORMAL
PERFORMED ON: NORMAL
POTASSIUM REFLEX MAGNESIUM: 3.7 MMOL/L (ref 3.5–5.1)
PROTHROMBIN TIME: 32.3 SEC (ref 9.8–13)
SODIUM BLD-SCNC: 139 MMOL/L (ref 136–145)

## 2019-05-16 PROCEDURE — 6360000002 HC RX W HCPCS: Performed by: INTERNAL MEDICINE

## 2019-05-16 PROCEDURE — 94669 MECHANICAL CHEST WALL OSCILL: CPT

## 2019-05-16 PROCEDURE — 6370000000 HC RX 637 (ALT 250 FOR IP): Performed by: INTERNAL MEDICINE

## 2019-05-16 PROCEDURE — 97530 THERAPEUTIC ACTIVITIES: CPT

## 2019-05-16 PROCEDURE — 80048 BASIC METABOLIC PNL TOTAL CA: CPT

## 2019-05-16 PROCEDURE — 94668 MNPJ CHEST WALL SBSQ: CPT

## 2019-05-16 PROCEDURE — 85610 PROTHROMBIN TIME: CPT

## 2019-05-16 PROCEDURE — 97535 SELF CARE MNGMENT TRAINING: CPT

## 2019-05-16 PROCEDURE — 99232 SBSQ HOSP IP/OBS MODERATE 35: CPT | Performed by: INTERNAL MEDICINE

## 2019-05-16 PROCEDURE — G0009 ADMIN PNEUMOCOCCAL VACCINE: HCPCS | Performed by: INTERNAL MEDICINE

## 2019-05-16 PROCEDURE — 90670 PCV13 VACCINE IM: CPT | Performed by: INTERNAL MEDICINE

## 2019-05-16 PROCEDURE — 94640 AIRWAY INHALATION TREATMENT: CPT

## 2019-05-16 PROCEDURE — 97110 THERAPEUTIC EXERCISES: CPT

## 2019-05-16 PROCEDURE — 92526 ORAL FUNCTION THERAPY: CPT

## 2019-05-16 PROCEDURE — 2700000000 HC OXYGEN THERAPY PER DAY

## 2019-05-16 PROCEDURE — 94761 N-INVAS EAR/PLS OXIMETRY MLT: CPT

## 2019-05-16 PROCEDURE — 97116 GAIT TRAINING THERAPY: CPT

## 2019-05-16 PROCEDURE — 2060000000 HC ICU INTERMEDIATE R&B

## 2019-05-16 PROCEDURE — 2580000003 HC RX 258: Performed by: INTERNAL MEDICINE

## 2019-05-16 RX ORDER — FLUCONAZOLE 100 MG/1
100 TABLET ORAL DAILY
Status: DISCONTINUED | OUTPATIENT
Start: 2019-05-16 | End: 2019-05-20

## 2019-05-16 RX ADMIN — SENNOSIDES AND DOCUSATE SODIUM 2 TABLET: 8.6; 5 TABLET ORAL at 22:01

## 2019-05-16 RX ADMIN — ALBUTEROL SULFATE 2.5 MG: 2.5 SOLUTION RESPIRATORY (INHALATION) at 11:38

## 2019-05-16 RX ADMIN — APIXABAN 10 MG: 5 TABLET, FILM COATED ORAL at 22:02

## 2019-05-16 RX ADMIN — ALBUTEROL SULFATE 2.5 MG: 2.5 SOLUTION RESPIRATORY (INHALATION) at 07:30

## 2019-05-16 RX ADMIN — ALPRAZOLAM 0.25 MG: 0.25 TABLET ORAL at 08:34

## 2019-05-16 RX ADMIN — IBUPROFEN 600 MG: 600 TABLET ORAL at 08:34

## 2019-05-16 RX ADMIN — FLUCONAZOLE 100 MG: 100 TABLET ORAL at 11:55

## 2019-05-16 RX ADMIN — PNEUMOCOCCAL 13-VALENT CONJUGATE VACCINE 0.5 ML: 2.2; 2.2; 2.2; 2.2; 2.2; 4.4; 2.2; 2.2; 2.2; 2.2; 2.2; 2.2; 2.2 INJECTION, SUSPENSION INTRAMUSCULAR at 11:55

## 2019-05-16 RX ADMIN — ALPRAZOLAM 0.25 MG: 0.25 TABLET ORAL at 22:02

## 2019-05-16 RX ADMIN — Medication 10 ML: at 08:35

## 2019-05-16 RX ADMIN — Medication 10 ML: at 22:02

## 2019-05-16 RX ADMIN — SENNOSIDES AND DOCUSATE SODIUM 2 TABLET: 8.6; 5 TABLET ORAL at 08:34

## 2019-05-16 RX ADMIN — ALBUTEROL SULFATE 2.5 MG: 2.5 SOLUTION RESPIRATORY (INHALATION) at 19:47

## 2019-05-16 RX ADMIN — CITALOPRAM HYDROBROMIDE 20 MG: 20 TABLET ORAL at 08:34

## 2019-05-16 RX ADMIN — ALBUTEROL SULFATE 2.5 MG: 2.5 SOLUTION RESPIRATORY (INHALATION) at 16:09

## 2019-05-16 RX ADMIN — ALPRAZOLAM 0.25 MG: 0.25 TABLET ORAL at 15:10

## 2019-05-16 RX ADMIN — APIXABAN 10 MG: 5 TABLET, FILM COATED ORAL at 08:34

## 2019-05-16 RX ADMIN — FUROSEMIDE 40 MG: 10 INJECTION, SOLUTION INTRAMUSCULAR; INTRAVENOUS at 08:34

## 2019-05-16 ASSESSMENT — PAIN SCALES - GENERAL: PAINLEVEL_OUTOF10: 3

## 2019-05-16 NOTE — PROGRESS NOTES
Patient to room, alert and oriented, SOB noted with exertion during turns, lung sounds diminished, oriented to room and flow of new unit, expectations discussed, water given, patient denies needs, call light in reach.

## 2019-05-16 NOTE — PROGRESS NOTES
Speech Language Pathology  Facility/Department: [facility]  Dysphagia Daily Treatment Note    NAME: Alesia Motta  : 1960  MRN: [id number]    Patient Diagnosis(es):   Patient Active Problem List    Diagnosis Date Noted    Tobacco abuse 2011     Priority: Medium    Rheumatoid arthritis ([facility] 2010     Priority: Medium    Anxiety state 2010     Priority: Low    Anasarca     Bacterial pneumonia     Acute deep vein thrombosis (DVT) of distal end of left lower extremity (HCC)     Acute cor pulmonale (HCC)     Disorder of electrolytes     Moderate protein-calorie malnutrition ([facility] 2019    Acute deep vein thrombosis (DVT) of left peroneal vein (HCC)     Pulmonary embolus (HCC)     Erythrocytosis     Morbid obesity (HCC)     Obesity hypoventilation syndrome (HCC)     Acute respiratory failure with hypoxia ([facility] 2019    New onset of congestive heart failure ([facility] 2019    Moderate persistent asthma with acute exacerbation 2019    SOB (shortness of breath) 2011     Allergies: No Known Allergies  Onset Date:   19    Subjective:  Alert in chair, her mother was present in the room. The patient had no overt distress. Pain:  No report of pain    Current Diet:  Level I and thin liquid    Diet Tolerance:  Patient tolerating current diet level without staff reported signs/symptoms of penetration / aspiration. P.O. Trials: see below  Thin       Nectar       Honey       Puree       Solid         Dysphagia Therapy: The patient was seen for dysphagia therapy this date. The patient was seated upright in bed with 8L of O2 via HFNC. The patient politely decline PO trials at this time as she had just eaten lunch. SLP educating the patient and her sister regarding s/s of aspiration precautions. The patient denies coughing, choking, or difficulty masticating dysphagia 2 solids and thin liquids.  The patient is noted with wet vocal quality at baseline. SLP educating the patient on importance of volitional throat clear when wet vocal quality is noticeable. The patient's voice continues to be rough and low with frequent pitch and phonation breaks noted. SLP recommends ENT evaluation, particularly given prolonged intubation time. The patient is able to recall compensatory and safe-swallow strategies reviewed in previous session with different clinician, indicating carryover. SLP recommends to cont with current diet. Speech to follow. Short-term Goals  Timeframe for Short-term Goals: 1-2 days  Goal 1: Re-assess PO intake  05/16- ongoing, progressing, not directly targeted this session  Long-term Goals  Timeframe for Long-term Goals: 1 week  Goal 1: Most functional oral diet while maintaining adequate nutrition and hydration without signs of aspiration  05/16: no reported s/s of aspiration. See above. Dysphagia Goals: The patient will tolerate repeat BSE when able. 05/16-ongoing, proressing      Recommendations:   Per previous recommendations from evaluating therapist: If she has worsening respiratory status and requires BIPAP use, would hold all oral intake. If she is able to maintain oxygen saturation on nasal cannula of 90% or greater, then suggest Dysphagia Level II diet and thin liquids as tolerated, no straw. Crush PO meds and place in puree. Case discussed with the attending nurse. Hold PO intake if signs of aspiration develop. She would benefit from an MBS when she is requiring less supplemental oxygen support. Patient/Family/Caregiver Education:  Explained scope of practice    Compensatory Strategies:  Per evaluation: The following measures may help reduce but not completely eliminate her aspiration risk: slow rate of intake, small bites and sips, feed only when awake and alert, maintain good oral hygiene. Monitor temperature, lung, diet, and weight status. Hold PO intake if signs of aspiration develop.  Full staff supervision and assist with meals, alt foods and liquids, dry saliva swallows between bites and sips, seat as close to 90 degree angle during meal and 60 min after meal, no straws      Plan:    Continued Dysphagia treatment with goals per plan of care. Discharge Recommendations: If pt discharges from hospital prior to Speech/Swallowing discharge, this note serves as tx and discharge summary.      Total Treatment Time:   swallow tx  [id number]    Donna Mcknight M.A., [address] [facility] #[id number]  Speech-Language Pathologist

## 2019-05-16 NOTE — PROGRESS NOTES
4 Eyes Skin Assessment     The patient is being assess for   Transfer to New Unit    I agree that 2 RN's have performed a thorough Head to Toe Skin Assessment on the patient. ALL assessment sites listed below have been assessed. Areas assessed by both nurses: Farhana Lange and Marcie RN   [x]   Head, Face, and Ears   [x]   Shoulders, Back, and Chest, Abdomen  [x]   Arms, Elbows, and Hands   [x]   Coccyx, Sacrum, and Ischium  [x]   Legs, Feet, and Heels        Scattered bruises, redness bilateral feet/toes, cluster of blisters on buttock both sides small, rash noted on right back. **SHARE this note so that the co-signing nurse is able to place an eSignature**    Co-signer eSignature: {[email or web address]:[id number]}    Does the Patient have Skin Breakdown?   No          Yo Prevention initiated:  Yes   Wound Care Orders initiated:  NA      [facility] nurse consulted for Pressure Injury (Stage 3,4, Unstageable, DTI, NWPT, Complex wounds)and New or Established Ostomies:  NA      Primary Nurse eSignature: Electronically signed by Nieves Pabon RN on 5/16/19 at 12:55 AM

## 2019-05-16 NOTE — PROGRESS NOTES
sodium chloride flush  10 mL Intravenous 2 times per day     PRN Meds:  albuterol, magnesium sulfate, potassium chloride, glucose, dextrose, glucagon (rDNA), dextrose, carboxymethylcellulose PF, acetaminophen, perflutren lipid microspheres, ibuprofen, sodium chloride flush, magnesium hydroxide, ondansetron    Results:  CBC:   Recent Labs     05/14/19  0430   WBC 10.9   HGB 16.8*   HCT 52.9*   MCV 90.1        BMP:   Recent Labs     05/14/19  0517 05/15/19  0600 05/16/19  0530    139 139   K 3.4* 3.7 3.7   CL 91* 91* 88*   CO2 37* 36* 40*   BUN 13 13 13   CREATININE <0.5* <0.5* <0.5*     LIVER PROFILE: No results for input(s): AST, ALT, LIPASE, BILIDIR, BILITOT, ALKPHOS in the last 72 hours. Invalid input(s):   AMYLASE,  ALB    Cultures:  5/3 sputum normal respiratory estiven   5/6 BAL Candida          Films:  CXR 5/11 bilateral ASD L>R     ASSESSMENT:  · Acute hypoxemic respiratory failure  · Bacterial pneumonia  · Acute left lower extremity DVT  · Acute cor pulmonale  · Electrolytes disorder   · Chronic hypercapnic respiratory failure/OHS       PLAN:  BiPAP PRN   Supplemental oxygen to maintain SaO2 >92%; wean as tolerated  Completed 7 days of vancomycin and Levaquin  Eliquis BID   Off steroids  Home Xanax  PTOT  IV lasix 40 mg daily   Discussed with family at the bedside  Discussed with internal medicine

## 2019-05-16 NOTE — PROGRESS NOTES
Patient assessment and medications complete, denies needs, alert and oriented, very pleasant, oxygen on 8 liters high flow NC humidified, patient denies SOB at rest, lung sounds diminished. Lopez output dark yellow. Call light given and explained, bed alarm on, no s/s distress at present.

## 2019-05-16 NOTE — PROGRESS NOTES
Hospitalist Progress Note        PCP: Mariela Lopez MD    Date of Admission: 4/28/2019     Interval history  Admitted with acute respiratory failure  S/P mechanical ventilation . Extubated  On 5/11 5/7- Bronchoscopy was done. No endobronchial lesion seen. Mucosa appears normal. She had yellow and white secretions were thick. Continues to require high flow oxygen. Transferred out of ICU to PCU on 5/15/19      Bronchial washings with candida      Subjective:   Looks much better. Off BiPAP. Hypoxia slowly improving, still on high flow oxygen 10 L - > down to 8 L today  Tachycardia and tachypnea have improved now. Diet advanced. Tolerating dysphagia 2 diet      Medications:  Reviewed    Infusion Medications    dextrose       Scheduled Medications    fluconazole  100 mg Oral Daily    pneumococcal 13-valent conjugate  0.5 mL Intramuscular Once    apixaban  10 mg Oral BID    [START ON 5/21/2019] apixaban  5 mg Oral BID    furosemide  40 mg Intravenous Daily    albuterol  2.5 mg Nebulization Q4H WA    ALPRAZolam  0.25 mg Oral TID    sennosides-docusate sodium  2 tablet Oral BID    diclofenac sodium  2 g Topical BID    citalopram  20 mg Oral Daily    sodium chloride flush  10 mL Intravenous 2 times per day     PRN Meds: albuterol, magnesium sulfate, potassium chloride, glucose, dextrose, glucagon (rDNA), dextrose, carboxymethylcellulose PF, acetaminophen, perflutren lipid microspheres, ibuprofen, sodium chloride flush, magnesium hydroxide, ondansetron      Intake/Output Summary (Last 24 hours) at 5/16/2019 1042  Last data filed at 5/16/2019 0834  Gross per 24 hour   Intake 1240 ml   Output 1900 ml   Net -660 ml       Physical Exam Performed:    /77   Pulse 111   Temp 98.7 °F (37.1 °C) (Oral)   Resp 20   Ht 5' 3\" (1.6 m)   Wt 224 lb 12.8 oz (102 kg)   SpO2 90%   BMI 39.82 kg/m²       General: No distress  awake alert and well oriented . On high flow NC oxygen.   Mucous Membranes:  Pink , anicteric  Neck: No JVD, no carotid bruit, no thyromegaly  Chest:  Diminished breath sounds . No wheezes rales or rhonchi. Cardiovascular:  RRR  S1S2 heard, no murmurs or gallops  Abdomen:  Soft, undistended, non tender, no organomegaly, BS present  Extremities: No edema or cyanosis. Distal pulses well felt  Neurological : non focal       Labs:   Recent Labs     05/14/19  0430   WBC 10.9   HGB 16.8*   HCT 52.9*        Recent Labs     05/14/19  0517 05/15/19  0600 05/16/19  0530    139 139   K 3.4* 3.7 3.7   CL 91* 91* 88*   CO2 37* 36* 40*   BUN 13 13 13   CREATININE <0.5* <0.5* <0.5*   CALCIUM 9.3 9.8 10.1     No results for input(s): AST, ALT, BILIDIR, BILITOT, ALKPHOS in the last 72 hours. Recent Labs     05/14/19  0430 05/15/19  0600 05/16/19  0530   INR 1.59* 3.02* 2.83*     No results for input(s): CKTOTAL, TROPONINI in the last 72 hours. Urinalysis:      Lab Results   Component Value Date    NITRU Negative 04/28/2019    WBCUA 0-2 04/28/2019    BACTERIA 1+ 04/28/2019    RBCUA 0-2 04/28/2019    BLOODU TRACE-INTACT 04/28/2019    SPECGRAV <=1.005 04/28/2019    GLUCOSEU Negative 04/28/2019       Radiology:  XR CHEST PORTABLE   Final Result   Stable life support device positioning. Persistent layering bilateral effusions and basilar opacities. Slightly   improved right lung expansion. XR CHEST PORTABLE   Final Result   Improved pleural fluid and aeration in the left lung with worsening fluid and   aeration on the right. Pattern would favor pulmonary edema/ARDS. XR CHEST PORTABLE   Final Result   1. Pulmonary edema. 2. Improving right basilar and grossly stable left basilar atelectasis and/or   pneumonia. XR CHEST PORTABLE   Final Result   Worsening bibasilar atelectasis and/or pneumonia. XR CHEST PORTABLE   Final Result   Improving bilateral airspace disease and pleural effusions.          XR CHEST PORTABLE   Final Result   Increasing right basilar and new bilateral upper lobe airspace disease could   represent edema or pneumonia. Left basilar pleuroparenchymal disease is   stable. XR CHEST PORTABLE   Final Result   1. No significant change. IR PICC WO SQ PORT/PUMP > 5 YEARS   Final Result   Successful placement of PICC line. XR CHEST PORTABLE   Final Result   Supportive devices are positioned appropriately. There is increasing   pulmonary edema since prior exam.         VL Extremity Venous Bilateral   Final Result      CT Chest WO Contrast   Final Result   1. Stable small left pleural effusion with a new small right pleural effusion. 2. Interval worsening of dependent consolidative opacity within bilateral   lower lobe since the study of 04/28/2019. In combination with worsening   bibasilar predominant mucous plugging, this likely represents either   atelectasis or aspiration. Clinical correlation is advised. 3. Slight interval improvement in appearance of lingular airspace   consolidation, likely representing resolving pneumonia. XR CHEST PORTABLE   Final Result   Mild cardiomegaly and chronic pulmonary change without definite acute   pulmonary process. CT Chest Pulmonary Embolism W Contrast   Final Result   Suboptimal contrast timing although there is no evidence for large or central   pulmonary embolism. The segmental and subsegmental branches are not well   evaluated. Small left basilar effusion with adjacent consolidation and there are also   infiltrates within the lingula and right lung base that may represent   atelectasis versus pneumonia. Small amount of free fluid in the visualized upper abdomen. XR CHEST PORTABLE   Final Result   Cardiomegaly and mild pulmonary edema. LE doppler on 5/1/19  Summary      1.  There is evidence of isolated acute deep venous thrombosis involving the   peroneal vein below the knee in the left lower extremity.   2. There is no other evidence of deep or superficial venous thrombosis   involving the lower extremities bilaterally. ECHO 4/29/19   Summary   Left ventricular systolic function is normal with ejection fraction   estimated at 55 %.   No regional wall motion abnormality.   Left ventricle size is normal.   There is moderate concentric left ventricular hypertrophy.   Normal left ventricular diastolic filling pressure.   The aortic valve leaflets are not well visualized.   Aortic valve appears sclerotic but opens adequately.   No evidence of aortic valve stenosis.   Trivial aortic regurgitation is present.   Right ventricular systolic function is normal.   The right ventricle is moderately enlarged.   Systolic pulmonary artery pressure (SPAP) estimated at 54 mmHg (RA pressure   15 mmHg), consistent with moderate pulmonary hypertension. Assessment/Plan:    Principal Problem:    Acute respiratory failure with hypoxia (HCC)  Active Problems:    Rheumatoid arthritis (HCC)    Tobacco abuse    Anxiety state    SOB (shortness of breath)    Moderate persistent asthma with acute exacerbation    New onset of congestive heart failure (HCC)    Morbid obesity (HCC)    Obesity hypoventilation syndrome (HCC)    Erythrocytosis    Acute deep vein thrombosis (DVT) of left peroneal vein (HCC)    Pulmonary embolus (HCC)    Moderate protein-calorie malnutrition (HCC)    Bacterial pneumonia    Acute deep vein thrombosis (DVT) of distal end of left lower extremity (HCC)    Acute cor pulmonale (HCC)    Disorder of electrolytes    Anasarca  Resolved Problems:    * No resolved hospital problems. *         1. Acute hypoxic resp failure. - Patient came with severe hypoxia . Possibly secondary to acute pulmonary edema and acute congestive heart failure. Also likely  acute PE.  - Status post mechanical ventilation  - Echocardiogram shows normal LV function does not show diastolic heart failure. shows moderate pulm HTN.   - Venous doppler , positive for  DVT  Left LE, below knee. CTA was suboptimal.   - Continued on IV Lasix  - Started anticoagulation with heparin gtt. Her DVT and PE were likely present at the time of her admission. - > switched to Eliquis now   Intubated 5/3 for worsening hypercarbia. Bronchoscopy was done on 5/7/19. Extubated on 5/11/19.   - on High flow NC oxygen. - > wean  Off O2 as tolerated. Transfer to PCU on 5/15/19. Continue diuresis and anticoagulation . Continue nebulizer treatments. Bronc washings positive for Candida,  add Diflucan     2.  Chronic hypercarbic respiratory failure likely obesity hypoventilation syndrome. mod pulm HTN on ECHO.     3.  Acute bronchitis.  No fevers.  Has mildly elevated leukocytosis.  Pro-calcitonin however is negative. completed abxc -  Vancomycin day 7/7 and Levaquin day 7/7.     4.  Morbid Obesity  - Body mass index is 39.82 kg/m². - Complicating assessment and treatment. Placing patient at risk for multiple co-morbidities as well as early death and contributing to the patient's presentation. 5. Anasarca   - monitor I/O   - net  ~ 7 L   - ?nutritional, cont IV  Lasix     6. Debility   - cont PT,OT       Code Status: Full Code  DIET DYSPHAGIA II MECHANICALLY ALTERED; Dysphagia II Mechanically Altered      Patient improving. Wean off O2 as tolerated.   Will likely need rehab    Dilia Toribio 5/16/2019 10:42 AM

## 2019-05-16 NOTE — PROGRESS NOTES
Pt semi-fowlers in bed. Alert, oriented x4 but forgetful at times. Full assessment completed. See flowsheet. Pt verbalizes pain to BLE. Will update MD. Denies needs at this time. Possessions within reach.

## 2019-05-16 NOTE — CARE COORDINATION
INTERDISCIPLINARY PLAN OF CARE CONFERENCE    Date/Time: 5/16/2019 3:48 PM  Completed by: Meg Pate Case Management      Patient Name:  Jeff Vásquez  YOB: 1960  Admitting Diagnosis: Acute respiratory failure ([facility] [address] [J96.00]     Admit Date/Time:  4/28/2019  1:37 PM    Chart reviewed. Interdisciplinary team met to discuss patient progress and discharge plans. Disciplines included Case Management, Nursing, and Dietitian. Current Status: Stable    PT/OT recommendation:ARU    Anticipated Discharge Date: TBD  Expected D/C Disposition:  Rehab  Confirmed plan with patient  Yes  Discharge Plan Comments: Reviewed chart and spoke with Sandeep at ARU re: pt is now on 8L o2 and would like pre cert started. James Mcclendon will review and start pre cert. Will cont to follow.     Home O2 in place on admit: No  Pt informed of need to bring portable home O2 tank on day of discharge for nursing to connect prior to leaving:  Not Indicated  Verbalized agreement/Understanding:  Not Indicated

## 2019-05-16 NOTE — PLAN OF CARE
Problem: OXYGENATION/RESPIRATORY FUNCTION  Goal: Patient will maintain patent airway  Outcome: Ongoing  Goal: Patient will achieve/maintain normal respiratory rate/effort  Description  Respiratory rate and effort will be within normal limits for the patient  Outcome: Ongoing     Problem: HEMODYNAMIC STATUS  Goal: Patient has stable vital signs and fluid balance  Outcome: Ongoing     Problem: FLUID AND ELECTROLYTE IMBALANCE  Goal: Fluid and electrolyte balance are achieved/maintained  Outcome: Ongoing     Problem: Nutrition  Goal: Optimal nutrition therapy  Outcome: Ongoing  Goal: Understanding of nutritional guidelines  Outcome: Ongoing     Problem: Falls - Risk of:  Goal: Will remain free from falls  Description  Will remain free from falls  Outcome: Ongoing  Goal: Absence of physical injury  Description  Absence of physical injury  Outcome: Ongoing     Problem: Risk for Impaired Skin Integrity  Goal: Tissue integrity - skin and mucous membranes  Description  Structural intactness and normal physiological function of skin and  mucous membranes.   Outcome: Ongoing     Problem: Pain:  Goal: Pain level will decrease  Description  Pain level will decrease  Outcome: Ongoing  Goal: Control of acute pain  Description  Control of acute pain  Outcome: Ongoing  Goal: Control of chronic pain  Description  Control of chronic pain  Outcome: Ongoing     Problem: Discharge Planning:  Goal: Discharged to appropriate level of care  Description  Discharged to appropriate level of care  Outcome: Ongoing  Goal: Participates in care planning  Description  Participates in care planning  Outcome: Ongoing     Problem: Airway Clearance - Ineffective:  Goal: Clear lung sounds  Description  Clear lung sounds  Outcome: Ongoing  Goal: Ability to maintain a clear airway will improve  Description  Ability to maintain a clear airway will improve  Outcome: Ongoing     Problem: Fluid Volume - Deficit:  Goal: Achieves intake and output within

## 2019-05-16 NOTE — PROGRESS NOTES
Inpatient Physical Therapy Progress Note    Unit: PCU  Date:  5/16/2019  Patient Name:    Tita Weston  Admitting diagnosis:  Acute respiratory failure ([facility] [address] [J96.00]  Admit Date:  4/28/2019  Precautions/Restrictions:  fall risk, IV, bed/chair alarm, andujar catheter , supplemental o2 (8L) and telemetry, L foot drop      Discharge Recommendations: Acute Rehab (ARU)/ Inpatient Rehab Facility (IRF)  DME needs for discharge: defer to facility       Therapy recommendations for staff:   Assist of 2 with use of rolling walker (RW) for all transfers to/from chair    Home Health S4 Level Recommendation: NA  AM-PAC Mobility Score   AM-PAC Inpatient Mobility Raw Score : 11       Treatment Time:  10:26-11:06  Treatment number: 4  Timed Code Treatment Minutes: 46 minutes  Total Treatment Minutes:  46  minutes    Cognition    orientation not directly assessed. Pt seemed aware of current situation. Able to follow 2 step commands    Subjective  Patient lying supine in bed with family at bedside (mother and father). Pt agreeable to this PT tx. Pt says she is having back pain this morning but that she is willing to participate in therapy- pt says that changing positions usually assists with alleviating the back pain. Pain   Yes  Rating:  10/10  Location: low back (right side worse than left)  Pain Medicine Status: Received pain med prior to tx     Bed Mobility   Supine to Sit:   Mod A  Sit to Supine:  Not Tested  Rolling:   Not Tested  Scooting:   CGA  (to EOB)    Transfer Training     Sit to stand:    Mod A  Stand to sit:   Mod A and VC for hand placement  Bed to Chair:  Mod A and VC for hand placement with use of rolling walker (RW)    Gait Training gait completed as indicated below  Distance:  5 ft + 8 ft   Deviations (firm surface/linoleum): decreased kelli, Decreased step length and Decreased step height, L foot drop    Assistive Device Used:  rolling walker (RW)  Level of Assist: Mod A  Comment: Chair follow for duration of gait; pt donned shoes that likely assisted with stability      Stair Training deferred, pt unsafe/not appropriate to complete stairs at this time  Pt ascended/descended  stairs with N/A with N/A, and use of N/A. Pattern: N/A    Therapeutic Exercise all completed bilaterally unless indicated  Ankle pumps:  Quad sets:   Glut sets:   Heel slides:   SLR:   Hip abd/add:   LAQ: x10 (with 2 second hold in extension)   Marching:   Seated Clamshells: x10   Glute Squeeze: x10    Balance  Static Sitting:  Good    Tolerance: WNL  Dynamic Sitting:  Good -   Static Standing: Fair +   Tolerance: Tolerated standing with RW and Mod A ~30 seconds x3  Dynamic Standing: Poor    Patient Education      Role of PT, POC, Discharge recommendations, transfer techniques and calling for assist with mobility. Positioning Needs       Pt sitting up in chair, call light and needs in reach. OT in room. ROM Measurements N/A  Knee Flexion:  Knee Extension:     Activity Tolerance   Pt completed therapy session with SOB noted w/activity. SpO2: 90's throughout session   HR: 128 bpm seated EOB; 137bpm post-transfer to chair; 133bpm post- ambulation  BP:     Other  None. Assessment :  Patient demonstrated increased activity tolerance- as noted with ambulation and seated Genny. Pt continues to function below baseline and would benefit from ARU to improve independence with functional mobility. Goals (all goals ongoing unless otherwise indicated)  1).  Independent with LE Ex x 10 reps     To be met in 6 visits:  1).  Supine to/from sit: tolerate assessment Met 5/14  2).  Sit to/from stand: tolerate assessment Met 5/14  3).  Bed to chair: tolerate assessment Met 5/16  4).  Gait: Ambulate to be assessed Met 5/16  5).  Tolerate B LE exercises 3 sets of 10-15 reps  6).  Ascend/descend 3 steps to be assessed  7.)  Sit to/from stand: min A.   8.)  Bed to chair: Transfer from bed to chair with RW and min A.  9.)  Gait: Ambulate 25 ft with RW and mod A. Plan   Continue with plan of care. TRENA Lu     PT present for entire treatment session, providing direct one on one service, and making all skilled judgements and assessments for the treatment while allowing student participation. If patient discharges from this facility prior to next visit, this note will serve as the Discharge Summary.

## 2019-05-16 NOTE — PROGRESS NOTES
Occupational Therapy Daily Treatment Note    Unit: pcu  Date:  2019  Patient Name:    Carlyle Bowling  Admitting diagnosis:  [facility] [J96.[facility]]  Admit Date:  2019  Precautions/Restrictions:  fall risk, IV, bed/chair alarm, andujar catheter , supplemental o2 (8L), L foot drop (improves with shoes donned)       Discharge Recommendations: Acute Rehab (ARU)/ Inpatient Rehab Facility (IRF)  DME needs for discharge: defer to facility       Therapy recommendations for staff:   Assist of 2 with use of rolling walker (RW) and STEDY for all transfers to/from chair    [facility]       Treatment Time:  10:20-11:13  Treatment number:  4  Total Treatment Time:   40 minutes (53 minutes total)      Subjective:  Pt supine in bed and agreeable to treatment. Pain   yes  Ratin  Location:low back  Pain Medicine Status: Denies need  Pt mentions hx of back pain that gets better with changing position (back surgery in )      Bed Mobility:   Supine to Sit:  Mod A   Sit to Supine:  Not Tested  Rolling:           SBA  Scooting:        CGA  Seated scoot toward  EOB    Transfer Training:   Sit to stand: Mod A and with use of RW  Stand to sit:  Mod A  Bed to Chair:  Mod A and with use of RW   Bed to [facility]:   Not Tested  Standard toilet:   Not Tested    Activity Tolerance   Pt completed therapy session with SOB noted w/activity  SpO2: Patient on 8L of O2. SpO2 >95% during treatment. HR: 120s-130s with activity. Patient completed ~8-10 steps forward with Mod A of 1 and use of RW (assist of 1 for chair follow) x2 trials. Limited by L foot drop and fatigue. ADL Training:   Upper body dressing:  Supervision  Upper body bathing:  Supervision  Lower body dressing:  Max A to don socks and shoes  Lower body bathing:   Mod A  Toileting:   Not Tested  Grooming/Hygiene:  SBA wash face seated at EOB    Therapeutic Exercise:   N/A    Patient Education:   Role of OT  Energy conservation techniques   PLB with activity   Safety with transfers     Positioning Needs: In bed, call light and needs in reach. Family Present:  Yes    Assessment: Pt demonstrated improved tolerance to OOB and functional mobility. Patient is making progress towards goes and would benefit from  intensive inpatient rehabilitation at [facility] to return to baseline. GOALS (updated on 5/14). -continue all  1). Bed to toilet/BSC: min A      To be met in 5 Visits:  1). Supine to Sit: min A  2). Upper Body Bathing:  SBA  3). Lower Body Bathing: min A  4). Upper Body Dressing: SBA  5). Lower Body Dressing: min A  6).  Pt to des UE exs x 10 reps       Plan: cont with [facility] of , OTR/L #[id number]      If patient discharges from this facility prior to next visit, this note will serve as the Discharge Summary

## 2019-05-16 NOTE — PROGRESS NOTES
Additive QT interval prolongation may occur during combination therapy of Fluconazole, Citalopram, and IV Ondansetron. Most adverse events occur when the QTC > 500. Patient's QTC = 427. Observe for for signs and symptoms. Fluconazole may increase the effects of Apixaban and Ibuprofen that may increase the risk of GI bleeding.   Toyin Reynolds R.Ph.5/16/201910:26 AM

## 2019-05-17 ENCOUNTER — APPOINTMENT (OUTPATIENT)
Dept: GENERAL RADIOLOGY | Age: 59
DRG: 207 | End: 2019-05-17
Payer: COMMERCIAL

## 2019-05-17 LAB
ANION GAP SERPL CALCULATED.3IONS-SCNC: 11 MMOL/L (ref 3–16)
BUN BLDV-MCNC: 13 MG/DL (ref 7–20)
CALCIUM SERPL-MCNC: 10 MG/DL (ref 8.3–10.6)
CHLORIDE BLD-SCNC: 87 MMOL/L (ref 99–110)
CO2: 39 MMOL/L (ref 21–32)
CREAT SERPL-MCNC: <0.5 MG/DL (ref 0.6–1.1)
GFR AFRICAN AMERICAN: >60
GFR NON-AFRICAN AMERICAN: >60
GLUCOSE BLD-MCNC: 102 MG/DL (ref 70–99)
GLUCOSE BLD-MCNC: 105 MG/DL (ref 70–99)
GLUCOSE BLD-MCNC: 109 MG/DL (ref 70–99)
GLUCOSE BLD-MCNC: 120 MG/DL (ref 70–99)
GLUCOSE BLD-MCNC: 95 MG/DL (ref 70–99)
INR BLD: 2.39 (ref 0.86–1.14)
PERFORMED ON: ABNORMAL
PERFORMED ON: NORMAL
POTASSIUM REFLEX MAGNESIUM: 3.6 MMOL/L (ref 3.5–5.1)
PROTHROMBIN TIME: 27.3 SEC (ref 9.8–13)
SODIUM BLD-SCNC: 137 MMOL/L (ref 136–145)

## 2019-05-17 PROCEDURE — 97530 THERAPEUTIC ACTIVITIES: CPT

## 2019-05-17 PROCEDURE — 6370000000 HC RX 637 (ALT 250 FOR IP): Performed by: INTERNAL MEDICINE

## 2019-05-17 PROCEDURE — 6360000002 HC RX W HCPCS: Performed by: INTERNAL MEDICINE

## 2019-05-17 PROCEDURE — 2580000003 HC RX 258: Performed by: INTERNAL MEDICINE

## 2019-05-17 PROCEDURE — 94669 MECHANICAL CHEST WALL OSCILL: CPT

## 2019-05-17 PROCEDURE — 99232 SBSQ HOSP IP/OBS MODERATE 35: CPT | Performed by: INTERNAL MEDICINE

## 2019-05-17 PROCEDURE — 97535 SELF CARE MNGMENT TRAINING: CPT

## 2019-05-17 PROCEDURE — 94640 AIRWAY INHALATION TREATMENT: CPT

## 2019-05-17 PROCEDURE — 2060000000 HC ICU INTERMEDIATE R&B

## 2019-05-17 PROCEDURE — 92526 ORAL FUNCTION THERAPY: CPT

## 2019-05-17 PROCEDURE — 97116 GAIT TRAINING THERAPY: CPT

## 2019-05-17 PROCEDURE — 71046 X-RAY EXAM CHEST 2 VIEWS: CPT

## 2019-05-17 PROCEDURE — 2700000000 HC OXYGEN THERAPY PER DAY

## 2019-05-17 PROCEDURE — 85610 PROTHROMBIN TIME: CPT

## 2019-05-17 PROCEDURE — 94761 N-INVAS EAR/PLS OXIMETRY MLT: CPT

## 2019-05-17 PROCEDURE — 80048 BASIC METABOLIC PNL TOTAL CA: CPT

## 2019-05-17 RX ORDER — ALPRAZOLAM 0.25 MG/1
0.25 TABLET ORAL 2 TIMES DAILY PRN
Status: DISCONTINUED | OUTPATIENT
Start: 2019-05-17 | End: 2019-05-20 | Stop reason: HOSPADM

## 2019-05-17 RX ORDER — FUROSEMIDE 40 MG/1
40 TABLET ORAL DAILY
Status: DISCONTINUED | OUTPATIENT
Start: 2019-05-18 | End: 2019-05-20 | Stop reason: HOSPADM

## 2019-05-17 RX ORDER — ERGOCALCIFEROL 1.25 MG/1
50000 CAPSULE ORAL WEEKLY
Status: DISCONTINUED | OUTPATIENT
Start: 2019-05-17 | End: 2019-05-20 | Stop reason: HOSPADM

## 2019-05-17 RX ADMIN — FLUCONAZOLE 100 MG: 100 TABLET ORAL at 09:47

## 2019-05-17 RX ADMIN — ALBUTEROL SULFATE 2.5 MG: 2.5 SOLUTION RESPIRATORY (INHALATION) at 00:37

## 2019-05-17 RX ADMIN — Medication 10 ML: at 21:18

## 2019-05-17 RX ADMIN — CITALOPRAM HYDROBROMIDE 20 MG: 20 TABLET ORAL at 09:47

## 2019-05-17 RX ADMIN — FUROSEMIDE 40 MG: 10 INJECTION, SOLUTION INTRAMUSCULAR; INTRAVENOUS at 09:48

## 2019-05-17 RX ADMIN — APIXABAN 10 MG: 5 TABLET, FILM COATED ORAL at 09:48

## 2019-05-17 RX ADMIN — APIXABAN 10 MG: 5 TABLET, FILM COATED ORAL at 21:19

## 2019-05-17 RX ADMIN — SENNOSIDES AND DOCUSATE SODIUM 2 TABLET: 8.6; 5 TABLET ORAL at 09:47

## 2019-05-17 RX ADMIN — ALBUTEROL SULFATE 2.5 MG: 2.5 SOLUTION RESPIRATORY (INHALATION) at 07:29

## 2019-05-17 RX ADMIN — ALBUTEROL SULFATE 2.5 MG: 2.5 SOLUTION RESPIRATORY (INHALATION) at 22:38

## 2019-05-17 RX ADMIN — ALBUTEROL SULFATE 2.5 MG: 2.5 SOLUTION RESPIRATORY (INHALATION) at 18:41

## 2019-05-17 RX ADMIN — Medication 10 ML: at 09:47

## 2019-05-17 RX ADMIN — ALBUTEROL SULFATE 2.5 MG: 2.5 SOLUTION RESPIRATORY (INHALATION) at 10:59

## 2019-05-17 RX ADMIN — ALBUTEROL SULFATE 2.5 MG: 2.5 SOLUTION RESPIRATORY (INHALATION) at 15:06

## 2019-05-17 RX ADMIN — ERGOCALCIFEROL 50000 UNITS: 1.25 CAPSULE ORAL at 13:32

## 2019-05-17 RX ADMIN — SENNOSIDES AND DOCUSATE SODIUM 2 TABLET: 8.6; 5 TABLET ORAL at 21:18

## 2019-05-17 NOTE — PROGRESS NOTES
activity; 125bpm seated in chair   BP:     Other  Nurse notified about the presence of L foot drop. Assessment :  Patient demonstrated increased tolerance to activity as shown with decreased assistance level with ambulation. Pt continues to demonstrate lack of active DF- causing instability when ambulating and transferring. Pt currently functioning below baseline and would benefit from ARU to improve independence and safety with functional mobility. Goals (all goals ongoing unless otherwise indicated)  1). Independent with LE Ex x 10 reps     To be met in 6 visits:  1).  Supine to/from sit: tolerate assessment Met 5/14  2).  Sit to/from stand: tolerate assessment Met 5/14  3).  Bed to chair: tolerate assessment Met 5/16  4).  Gait: Ambulate to be assessed Met 5/16  5).  Tolerate B LE exercises 3 sets of 10-15 reps  6).  Ascend/descend 3 steps to be assessed  7.)  NEW goal 5/16: Sit to/from stand: min A. Met 5/17  8.)  NEW goal 5/16: Bed to chair: Transfer from bed to chair with RW and min A. Met 5/17  9.)  Gait: Ambulate 25 ft with RW and mod A. Plan   Continue with plan of care. Yumiko Woodward, SPT     PT present for entire treatment session, providing direct one on one service, and making all skilled judgements and assessments for the treatment while allowing student participation. Tyrone More, PT, DPT #[id number]     If patient discharges from this facility prior to next visit, this note will serve as the Discharge Summary.

## 2019-05-17 NOTE — PLAN OF CARE
Problem: OXYGENATION/RESPIRATORY FUNCTION  Goal: Patient will maintain patent airway  Outcome: Ongoing  Goal: Patient will achieve/maintain normal respiratory rate/effort  Description  Respiratory rate and effort will be within normal limits for the patient  Outcome: Ongoing     Problem: HEMODYNAMIC STATUS  Goal: Patient has stable vital signs and fluid balance  Outcome: Ongoing     Problem: FLUID AND ELECTROLYTE IMBALANCE  Goal: Fluid and electrolyte balance are achieved/maintained  Outcome: Ongoing     Problem: Nutrition  Goal: Optimal nutrition therapy  Outcome: Ongoing  Goal: Understanding of nutritional guidelines  Outcome: Ongoing     Problem: Falls - Risk of:  Goal: Will remain free from falls  Description  Will remain free from falls  Outcome: Ongoing  Goal: Absence of physical injury  Description  Absence of physical injury  Outcome: Ongoing     Problem: Risk for Impaired Skin Integrity  Goal: Tissue integrity - skin and mucous membranes  Description  Structural intactness and normal physiological function of skin and  mucous membranes.   Outcome: Ongoing     Problem: Pain:  Goal: Pain level will decrease  Description  Pain level will decrease  Outcome: Ongoing  Goal: Control of acute pain  Description  Control of acute pain  Outcome: Ongoing  Goal: Control of chronic pain  Description  Control of chronic pain  Outcome: Ongoing     Problem: Discharge Planning:  Goal: Discharged to appropriate level of care  Description  Discharged to appropriate level of care  Outcome: Ongoing  Goal: Participates in care planning  Description  Participates in care planning  Outcome: Ongoing     Problem: Airway Clearance - Ineffective:  Goal: Clear lung sounds  Description  Clear lung sounds  Outcome: Ongoing  Goal: Ability to maintain a clear airway will improve  Description  Ability to maintain a clear airway will improve  Outcome: Ongoing     Problem: Fluid Volume - Deficit:  Goal: Achieves intake and output within specified parameters  Description  Achieves intake and output within specified parameters  Outcome: Ongoing     Problem: Gas Exchange - Impaired:  Goal: Levels of oxygenation will improve  Description  Levels of oxygenation will improve  Outcome: Ongoing     Problem: Hyperthermia:  Goal: Ability to maintain a body temperature in the normal range will improve  Description  Ability to maintain a body temperature in the normal range will improve  Outcome: Ongoing     Problem: Tobacco Use:  Goal: Will participate in inpatient tobacco-use cessation counseling  Description  Will participate in inpatient tobacco-use cessation counseling  Outcome: Ongoing

## 2019-05-17 NOTE — PROGRESS NOTES
Nutrition Assessment    Type and Reason for Visit: Reassess    Nutrition Recommendations:   1. Continue DYS diet per SLP rec's  2. Added Ensure High Protein BID with meals     Nutrition Assessment:   Pt improving from a nutritional standpoint AEB she was extubated on 5/11/19 and currently tolerating a dys 2 diet . Remains at risk for further nutritional compromise r/t her intakes are < 75% and her total weight loss has been > 50# x 19 days . Will add ensure high protein BID   Malnutrition Assessment:  · Malnutrition Status: Meets the criteria for moderate malnutrition  · Context: Acute illness or injury  · Findings of the 6 clinical characteristics of malnutrition (Minimum of 2 out of 6 clinical characteristics is required to make the diagnosis of moderate or severe Protein Calorie Malnutrition based on AND/ASPEN Guidelines):  1. Energy Intake-Less than or equal to 75% of estimated energy requirement, Greater than or equal to 7 days    2. Weight Loss-10% loss or greater(- 44# or 16% weight loss ), in 1 month(x 15 days admission)  3. Fat Loss-No significant subcutaneous fat loss,    4. Muscle Loss-Unable to assess(suspect muscle loss but difficult to assess d/t body habitus),    5. Fluid Accumulation-No significant fluid accumulation, Extremities(BUE/BLE + 2 pitting edema noted)  6.  Strength-Not measured    Nutrition Risk Level: Moderate    Nutrient Needs:  · Estimated Daily Total Kcal: 984 - 1845 kcals based on 8-15 kcals/kg/CBW  · Estimated Daily Protein (g): 104 - 114 g protein based on 2.0-2.2 g/kg/IBW  · Estimated Daily Total Fluid (ml/day): 1000 - 1500 ml    Nutrition Diagnosis:   · Problem:  Moderate malnutrition  · Etiology: related to Insufficient energy/nutrient consumption, Impaired respiratory function-inability to consume food, Psychological cause/life stress, Nutrition support - EN     Signs and symptoms:  as evidenced by NPO status due to medical condition, Diet history of poor intake,

## 2019-05-17 NOTE — PROGRESS NOTES
Patient drowsy tonight, awakens easily, denies needs, call light in reach, will continue to monitor, Bed alarm on.

## 2019-05-17 NOTE — PROGRESS NOTES
Hospitalist Progress Note        PCP: Pavel Cartagena MD    Date of Admission: 4/28/2019     Interval history  Admitted with acute respiratory failure  S/P mechanical ventilation . Extubated  On 5/11 5/7- Bronchoscopy was done. No endobronchial lesion seen. Mucosa appears normal. She had yellow and white secretions were thick. Continues to require high flow oxygen. Transferred out of ICU to PCU on 5/15/19  Bronchial washings with candida      Subjective:   Looks much better. Has been Off BiPAP. Hypoxia slowly improving, still on high flow oxygen 10 L - > down to 8 L today. Needs to be down to 6 L before she can go to rehab. Weakness slowly improving  Tachycardia and tachypnea have improved now. Diet advanced.  Tolerating dysphagia 2 diet      Medications:  Reviewed    Infusion Medications    dextrose       Scheduled Medications    [START ON 5/18/2019] vitamin D3  5,000 Units Oral Daily    vitamin D  50,000 Units Oral Weekly    [START ON 5/18/2019] furosemide  40 mg Oral Daily    fluconazole  100 mg Oral Daily    apixaban  10 mg Oral BID    [START ON 5/21/2019] apixaban  5 mg Oral BID    albuterol  2.5 mg Nebulization Q4H WA    sennosides-docusate sodium  2 tablet Oral BID    diclofenac sodium  2 g Topical BID    citalopram  20 mg Oral Daily    sodium chloride flush  10 mL Intravenous 2 times per day     PRN Meds: ALPRAZolam, albuterol, magnesium sulfate, potassium chloride, glucose, dextrose, glucagon (rDNA), dextrose, carboxymethylcellulose PF, acetaminophen, perflutren lipid microspheres, ibuprofen, sodium chloride flush, magnesium hydroxide, ondansetron      Intake/Output Summary (Last 24 hours) at 5/17/2019 1057  Last data filed at 5/17/2019 1052  Gross per 24 hour   Intake 120 ml   Output 2475 ml   Net -2355 ml       Physical Exam Performed:    /82   Pulse 109   Temp 97.8 °F (36.6 °C) (Oral)   Resp 18   Ht 5' 3\" (1.6 m)   Wt 224 lb 1.6 oz (101.7 kg)   SpO2 90%   BMI 39.70 CHEST PORTABLE   Final Result   Improving bilateral airspace disease and pleural effusions. XR CHEST PORTABLE   Final Result   Increasing right basilar and new bilateral upper lobe airspace disease could   represent edema or pneumonia. Left basilar pleuroparenchymal disease is   stable. XR CHEST PORTABLE   Final Result   1. No significant change. IR PICC WO SQ PORT/PUMP > 5 YEARS   Final Result   Successful placement of PICC line. XR CHEST PORTABLE   Final Result   Supportive devices are positioned appropriately. There is increasing   pulmonary edema since prior exam.         VL Extremity Venous Bilateral   Final Result      CT Chest WO Contrast   Final Result   1. Stable small left pleural effusion with a new small right pleural effusion. 2. Interval worsening of dependent consolidative opacity within bilateral   lower lobe since the study of 04/28/2019. In combination with worsening   bibasilar predominant mucous plugging, this likely represents either   atelectasis or aspiration. Clinical correlation is advised. 3. Slight interval improvement in appearance of lingular airspace   consolidation, likely representing resolving pneumonia. XR CHEST PORTABLE   Final Result   Mild cardiomegaly and chronic pulmonary change without definite acute   pulmonary process. CT Chest Pulmonary Embolism W Contrast   Final Result   Suboptimal contrast timing although there is no evidence for large or central   pulmonary embolism. The segmental and subsegmental branches are not well   evaluated. Small left basilar effusion with adjacent consolidation and there are also   infiltrates within the lingula and right lung base that may represent   atelectasis versus pneumonia. Small amount of free fluid in the visualized upper abdomen. XR CHEST PORTABLE   Final Result   Cardiomegaly and mild pulmonary edema. LE doppler on 5/1/19  Summary      1.  There is evidence of isolated acute deep venous thrombosis involving the   peroneal vein below the knee in the left lower extremity.   2. There is no other evidence of deep or superficial venous thrombosis   involving the lower extremities bilaterally. ECHO 4/29/19   Summary   Left ventricular systolic function is normal with ejection fraction   estimated at 55 %.   No regional wall motion abnormality.   Left ventricle size is normal.   There is moderate concentric left ventricular hypertrophy.   Normal left ventricular diastolic filling pressure.   The aortic valve leaflets are not well visualized.   Aortic valve appears sclerotic but opens adequately.   No evidence of aortic valve stenosis.   Trivial aortic regurgitation is present.   Right ventricular systolic function is normal.   The right ventricle is moderately enlarged.   Systolic pulmonary artery pressure (SPAP) estimated at 54 mmHg (RA pressure   15 mmHg), consistent with moderate pulmonary hypertension. Assessment/Plan:    Principal Problem:    Acute respiratory failure with hypoxia (HCC)  Active Problems:    Rheumatoid arthritis (HCC)    Tobacco abuse    Anxiety state    SOB (shortness of breath)    Moderate persistent asthma with acute exacerbation    New onset of congestive heart failure (HCC)    Morbid obesity (HCC)    Obesity hypoventilation syndrome (HCC)    Erythrocytosis    Acute deep vein thrombosis (DVT) of left peroneal vein (HCC)    Pulmonary embolus (HCC)    Moderate protein-calorie malnutrition (HCC)    Bacterial pneumonia    Acute deep vein thrombosis (DVT) of distal end of left lower extremity (HCC)    Acute cor pulmonale (HCC)    Disorder of electrolytes    Anasarca  Resolved Problems:    * No resolved hospital problems. *         1. Acute hypoxic resp failure. - Patient came with severe hypoxia . Possibly secondary to acute pulmonary edema and acute congestive heart failure.  Also likely  acute PE.  - Status post mechanical ventilation  - Echocardiogram shows normal LV function does not show diastolic heart failure. shows moderate pulm HTN. - Venous doppler , positive for  DVT  Left LE, below knee. CTA was suboptimal.   - Continued on IV Lasix- > switch to PO Lasix today  - Started anticoagulation with heparin gtt. Her DVT and PE were likely present at the time of her admission. - > switched to Eliquis now   Intubated 5/3 for worsening hypercarbia. Bronchoscopy was done on 5/7/19. Extubated on 5/11/19.   - on High flow NC oxygen. - > wean  Off O2 as tolerated. Currently on oxygen 8 L  - Transferred to PCU on 5/15/19.    - Continue diuresis and anticoagulation . Continue nebulizer treatments. Bronc washings positive for Candida,  on Diflucan     2.  Chronic hypercarbic respiratory failure likely obesity hypoventilation syndrome.  - mod pulm HTN on ECHO.     3.  Acute bronchitis.    - No fevers.  Has mildly elevated leukocytosis.  Pro-calcitonin however is negative. - completed abxc -  Vancomycin day 7/7 and Levaquin day 7/7.     4.  Morbid Obesity  - Body mass index is 39.7 kg/m². - Complicating assessment and treatment. Placing patient at risk for multiple co-morbidities as well as early death and contributing to the patient's presentation. 5. Anasarca   - monitor I/O   - net  ~ 6.8 L   - ?nutritional  - Has been on IV Lasix,  will switch to oral Lasix today ,      6. Debility   - cont PT,OT    7. Vitamin D deficiency  - levels low at < 0.5  - replace       Code Status: Full Code  DIET DYSPHAGIA II MECHANICALLY ALTERED; Dysphagia II Mechanically Altered      Patient improving. Wean off O2 as tolerated. . Plan - DC to rehab ARU ,  once oxygen saturations improved,    Wil Ohara 5/17/2019 10:57 AM

## 2019-05-17 NOTE — CARE COORDINATION
INTERDISCIPLINARY PLAN OF CARE CONFERENCE    Date/Time: 5/17/2019 11:19 AM  Completed by: Samara Antunez, Case Management      Patient Name:  Nilton Lange  YOB: 1960  Admitting Diagnosis: Acute respiratory failure ([address] [J96.00]     Admit Date/Time:  4/28/2019  1:37 PM    Chart reviewed. Interdisciplinary team met to discuss patient progress and discharge plans. Disciplines included Case Management, Nursing, and Dietitian. Current Status:stable    PT/OT recommendation:ARU    Anticipated Discharge Date: tbd  Expected D/C Disposition:  Rehab  Confirmed plan with patient and/or family Yes  Discharge Plan Comments: Reviewed chart. Plan continues A[facility]U for STR,+eCOC. Pre-cert started yesterday. Writer spoke with Sandeep with ARU and he sent updated notes this AM. Pre-cert is not back yet per Sandeep. Following.           Home O2 in place on admit: to [address]  Pt informed of need to bring portable home O2 tank on day of discharge for nursing to connect prior to leaving:  Not Indicated  Verbalized agreement/Understanding:  Not Indicated

## 2019-05-17 NOTE — PROGRESS NOTES
Pulmonary & Critical Care Medicine ICU Progress Note    CC: Acute hypoxemic respiratory failure    Events of Last 24 hours:   Still on 8 L FiO2-does not use O2 at home  Clinically continues to improve      Invasive Lines: IV: PICC      MV:  5/3-      Vent Mode: AC/VC Rate Set: 0 bmp/Vt Ordered: 400 mL/ /FiO2 : 40 %  No results for input(s): PHART, QLW5FIZ, PO2ART in the last 72 hours. IV:   dextrose         Vitals:  Blood pressure 128/82, pulse 109, temperature 97.8 °F (36.6 °C), temperature source Oral, resp. rate 18, height 5' 3\" (1.6 m), weight 224 lb 1.6 oz (101.7 kg), SpO2 90 %, not currently breastfeeding. on 8 LPM   Temp  Av.1 °F (36.7 °C)  Min: 97.6 °F (36.4 °C)  Max: 98.7 °F (37.1 °C)    Intake/Output Summary (Last 24 hours) at 2019 0755  Last data filed at 2019 0704  Gross per 24 hour   Intake 240 ml   Output 1925 ml   Net -1685 ml     EXAM:  General: ill appearing. Eyes: PERRL. No sclera icterus. No conjunctival injection. ENT: No discharge. Pharynx clear. Neck: Trachea midline. Normal thyroid. Resp: No accessory muscle use. Minimal few crackles. No wheezing. No rhonchi. No dullness on percussion. CV: Regular rate. Regular rhythm. No mumur or rub. No edema. GI: Non-tender. Non-distended. No masses. No organomegaly. Normal bowel sounds. No hernia. Skin: Warm and dry. No nodule on exposed extremities. No rash on exposed extremities. Lymph: No cervical LAD. No supraclavicular LAD. M/S: No cyanosis. No joint deformity. No clubbing. Neuro: AAOx3. Followed commands.    Psych: No agitation, no anxiety, affect is full     Scheduled Meds:   fluconazole  100 mg Oral Daily    apixaban  10 mg Oral BID    [START ON 2019] apixaban  5 mg Oral BID    furosemide  40 mg Intravenous Daily    albuterol  2.5 mg Nebulization Q4H WA    ALPRAZolam  0.25 mg Oral TID    sennosides-docusate sodium  2 tablet Oral BID    diclofenac sodium  2 g Topical BID    citalopram  20 mg

## 2019-05-17 NOTE — PROGRESS NOTES
Speech Language Pathology  Facility/Department: [facility] ICU  Dysphagia Daily Treatment Note    NAME: Lakhsmi Gnozalez  : 1960  MRN: [id number]    Patient Diagnosis(es):   Patient Active Problem List    Diagnosis Date Noted    Tobacco abuse 2011     Priority: Medium    Rheumatoid arthritis ([facility] [address] 2010     Priority: Medium    Anxiety state 2010     Priority: Low    Anasarca     Bacterial pneumonia     Acute deep vein thrombosis (DVT) of distal end of left lower extremity (HCC)     Acute cor pulmonale (HCC)     Disorder of electrolytes     Moderate protein-calorie malnutrition ([facility] 2019    Acute deep vein thrombosis (DVT) of left peroneal vein (HCC)     Pulmonary embolus (HCC)     Erythrocytosis     Morbid obesity (HCC)     Obesity hypoventilation syndrome (HCC)     Acute respiratory failure with hypoxia ([facility] 2019    [facility] of [facility]    Moderate persistent asthma with acute exacerbation 2019    SOB (shortness of breath) 2011     Allergies: No Known Allergies  Onset Date:   19    Subjective:  Pt pleasant, alert, seated up in bed; continues to present with gravelly voice, though states it has improved some. Pain:  No report of pain    Current Diet:  Level II and thin liquid    Diet Tolerance:  Patient tolerating current diet level without reported signs/symptoms of penetration / aspiration. P.O. Trials: see below  Thin   x Water via straw   Nectar       Honey       Puree       Solid   x Regular cracker     Dysphagia Therapy:  Received permission from RN prior to entry. Analyzed tolerance of regular solid and thin liquid via straw. Pt with efficient mastication, positive swallow initiation, good oral clearance, no overt s/s aspiration or penetration, denies presence of globus sensation. Pt expressed dislike for current diet level.  Based on tolerance of today's trialed PO and pt preference, seat as close to 90 degree angle during meal and 60 min after meal, no straws      Plan:    Continued Dysphagia treatment with goals per plan of care. Discharge Recommendations: If pt discharges from hospital prior to Speech/Swallowing discharge, this note serves as tx and discharge summary.      Total Treatment Time:   swallow tx  [id number] (22 min)    James Hull M.A., [id number] [facility].[id number]  Speech-Language Pathologist

## 2019-05-17 NOTE — PROGRESS NOTES
Patient sitting up in chair. Currently on 8L HFNC. Shift assessment complete, see flow sheet. Denies needs at this time. Call light in reach. Will monitor.

## 2019-05-17 NOTE — PROGRESS NOTES
safely progress function and return to PLOF    GOALS (updated on 5/17). -continue all  1). Bed to toilet/BSC: min A      To be met in 5 Visits:  1). Supine to Sit: min A  2). Upper Body Bathing:  SBA  3). Lower Body Bathing: min A  4). Upper Body Dressing: SBA  5). Lower Body Dressing: min A  6).  Pt to des UE exs x 10 reps               Plan: cont with POC      Ayde Gaston OTR/L  Lic # [id number]        If patient discharges from this facility prior to next visit, this note will serve as the Discharge Summary

## 2019-05-18 LAB
ANION GAP SERPL CALCULATED.3IONS-SCNC: 11 MMOL/L (ref 3–16)
BUN BLDV-MCNC: 12 MG/DL (ref 7–20)
CALCIUM SERPL-MCNC: 10 MG/DL (ref 8.3–10.6)
CHLORIDE BLD-SCNC: 85 MMOL/L (ref 99–110)
CO2: 40 MMOL/L (ref 21–32)
CREAT SERPL-MCNC: <0.5 MG/DL (ref 0.6–1.1)
FINAL REPORT: NORMAL
FINAL REPORT: NORMAL
GFR AFRICAN AMERICAN: >60
GFR NON-AFRICAN AMERICAN: >60
GLUCOSE BLD-MCNC: 101 MG/DL (ref 70–99)
GLUCOSE BLD-MCNC: 105 MG/DL (ref 70–99)
GLUCOSE BLD-MCNC: 106 MG/DL (ref 70–99)
GLUCOSE BLD-MCNC: 115 MG/DL (ref 70–99)
GLUCOSE BLD-MCNC: 117 MG/DL (ref 70–99)
INR BLD: 2.13 (ref 0.86–1.14)
MAGNESIUM: 1.9 MG/DL (ref 1.8–2.4)
PERFORMED ON: ABNORMAL
POTASSIUM REFLEX MAGNESIUM: 3.5 MMOL/L (ref 3.5–5.1)
PRELIMINARY: NORMAL
PRELIMINARY: NORMAL
PROTHROMBIN TIME: 24.3 SEC (ref 9.8–13)
SODIUM BLD-SCNC: 136 MMOL/L (ref 136–145)

## 2019-05-18 PROCEDURE — 99232 SBSQ HOSP IP/OBS MODERATE 35: CPT | Performed by: INTERNAL MEDICINE

## 2019-05-18 PROCEDURE — 94640 AIRWAY INHALATION TREATMENT: CPT

## 2019-05-18 PROCEDURE — 85610 PROTHROMBIN TIME: CPT

## 2019-05-18 PROCEDURE — 6370000000 HC RX 637 (ALT 250 FOR IP): Performed by: INTERNAL MEDICINE

## 2019-05-18 PROCEDURE — 97116 GAIT TRAINING THERAPY: CPT

## 2019-05-18 PROCEDURE — 6360000002 HC RX W HCPCS: Performed by: INTERNAL MEDICINE

## 2019-05-18 PROCEDURE — 94761 N-INVAS EAR/PLS OXIMETRY MLT: CPT

## 2019-05-18 PROCEDURE — 2580000003 HC RX 258: Performed by: INTERNAL MEDICINE

## 2019-05-18 PROCEDURE — 2060000000 HC ICU INTERMEDIATE R&B

## 2019-05-18 PROCEDURE — 2700000000 HC OXYGEN THERAPY PER DAY

## 2019-05-18 PROCEDURE — 94669 MECHANICAL CHEST WALL OSCILL: CPT

## 2019-05-18 PROCEDURE — 83735 ASSAY OF MAGNESIUM: CPT

## 2019-05-18 PROCEDURE — 80048 BASIC METABOLIC PNL TOTAL CA: CPT

## 2019-05-18 RX ADMIN — ALBUTEROL SULFATE 2.5 MG: 2.5 SOLUTION RESPIRATORY (INHALATION) at 15:15

## 2019-05-18 RX ADMIN — ALBUTEROL SULFATE 2.5 MG: 2.5 SOLUTION RESPIRATORY (INHALATION) at 10:57

## 2019-05-18 RX ADMIN — ALBUTEROL SULFATE 2.5 MG: 2.5 SOLUTION RESPIRATORY (INHALATION) at 07:26

## 2019-05-18 RX ADMIN — FLUCONAZOLE 100 MG: 100 TABLET ORAL at 09:52

## 2019-05-18 RX ADMIN — CHOLECALCIFEROL TAB 125 MCG (5000 UNIT) 5000 UNITS: 125 TAB at 09:52

## 2019-05-18 RX ADMIN — APIXABAN 10 MG: 5 TABLET, FILM COATED ORAL at 20:32

## 2019-05-18 RX ADMIN — IBUPROFEN 600 MG: 600 TABLET ORAL at 09:57

## 2019-05-18 RX ADMIN — FUROSEMIDE 40 MG: 40 TABLET ORAL at 09:52

## 2019-05-18 RX ADMIN — SENNOSIDES AND DOCUSATE SODIUM 2 TABLET: 8.6; 5 TABLET ORAL at 20:31

## 2019-05-18 RX ADMIN — ALBUTEROL SULFATE 2.5 MG: 2.5 SOLUTION RESPIRATORY (INHALATION) at 23:09

## 2019-05-18 RX ADMIN — Medication 10 ML: at 10:00

## 2019-05-18 RX ADMIN — APIXABAN 10 MG: 5 TABLET, FILM COATED ORAL at 09:52

## 2019-05-18 RX ADMIN — DICLOFENAC 2 G: 10 GEL TOPICAL at 09:53

## 2019-05-18 RX ADMIN — SENNOSIDES AND DOCUSATE SODIUM 2 TABLET: 8.6; 5 TABLET ORAL at 09:52

## 2019-05-18 RX ADMIN — CITALOPRAM HYDROBROMIDE 20 MG: 20 TABLET ORAL at 09:52

## 2019-05-18 RX ADMIN — ALBUTEROL SULFATE 2.5 MG: 2.5 SOLUTION RESPIRATORY (INHALATION) at 19:11

## 2019-05-18 RX ADMIN — Medication 10 ML: at 20:32

## 2019-05-18 ASSESSMENT — PAIN SCALES - GENERAL
PAINLEVEL_OUTOF10: 4
PAINLEVEL_OUTOF10: 2

## 2019-05-18 ASSESSMENT — PAIN - FUNCTIONAL ASSESSMENT: PAIN_FUNCTIONAL_ASSESSMENT: ACTIVITIES ARE NOT PREVENTED

## 2019-05-18 ASSESSMENT — PAIN DESCRIPTION - LOCATION: LOCATION: BACK

## 2019-05-18 ASSESSMENT — PAIN DESCRIPTION - DESCRIPTORS: DESCRIPTORS: ACHING;CONSTANT

## 2019-05-18 ASSESSMENT — PAIN DESCRIPTION - FREQUENCY: FREQUENCY: CONTINUOUS

## 2019-05-18 ASSESSMENT — PAIN DESCRIPTION - PAIN TYPE: TYPE: ACUTE PAIN

## 2019-05-18 NOTE — PROGRESS NOTES
Hospitalist Progress Note        PCP: Liz Peterson MD    Date of Admission: 4/28/2019     Interval history  Admitted with acute respiratory failure  S/P mechanical ventilation . Extubated  On 5/11 5/7- Bronchoscopy was done. No endobronchial lesion seen. Mucosa appears normal. She had yellow and white secretions were thick. Transferred out of ICU to PCU on 5/15/19  Bronchial washings with candida  Continues to require high flow oxygen. Subjective:   She is improving daily, up in chair working with therapy. Strength is improving. Looks much better. Has been Off BiPAP. Hypoxia slowly improving, still on high flow oxygen 10 L - > down to 8 L today. Needs to be down to 6 L before she can go to rehab. Weakness slowly improving  Diet advanced.  Tolerating dysphagia 2 diet      Medications:  Reviewed    Infusion Medications    dextrose       Scheduled Medications    vitamin D3  5,000 Units Oral Daily    vitamin D  50,000 Units Oral Weekly    furosemide  40 mg Oral Daily    fluconazole  100 mg Oral Daily    apixaban  10 mg Oral BID    [START ON 5/21/2019] apixaban  5 mg Oral BID    albuterol  2.5 mg Nebulization Q4H WA    sennosides-docusate sodium  2 tablet Oral BID    diclofenac sodium  2 g Topical BID    citalopram  20 mg Oral Daily    sodium chloride flush  10 mL Intravenous 2 times per day     PRN Meds: ALPRAZolam, albuterol, magnesium sulfate, potassium chloride, glucose, dextrose, glucagon (rDNA), dextrose, carboxymethylcellulose PF, acetaminophen, perflutren lipid microspheres, ibuprofen, sodium chloride flush, magnesium hydroxide, ondansetron      Intake/Output Summary (Last 24 hours) at 5/18/2019 1015  Last data filed at 5/18/2019 1000  Gross per 24 hour   Intake 460 ml   Output 1150 ml   Net -690 ml       Physical Exam Performed:    /79   Pulse 101   Temp 99 °F (37.2 °C) (Oral)   Resp 18   Ht 5' 3\" (1.6 m)   Wt 221 lb 1.6 oz (100.3 kg)   SpO2 90%   BMI 39.17 kg/m² General: No distress  awake alert and well oriented . On high flow NC oxygen. Mucous Membranes:  Pink , anicteric  Neck: No JVD, no carotid bruit, no thyromegaly  Chest:  Diminished breath sounds . No wheezes rales or rhonchi. Cardiovascular:  RRR  S1S2 heard, no murmurs or gallops  Abdomen:  Soft, undistended, non tender, no organomegaly, BS present  Extremities: No edema or cyanosis. Distal pulses well felt. Left foot drop  Neurological : non focal       Labs:   No results for input(s): WBC, HGB, HCT, PLT in the last 72 hours. Recent Labs     05/16/19  0530 05/17/19  0520 05/18/19  0533    137 136   K 3.7 3.6 3.5   CL 88* 87* 85*   CO2 40* 39* 40*   BUN 13 13 12   CREATININE <0.5* <0.5* <0.5*   CALCIUM 10.1 10.0 10.0     No results for input(s): AST, ALT, BILIDIR, BILITOT, ALKPHOS in the last 72 hours. Recent Labs     05/16/19  0530 05/17/19  0520 05/18/19  0533   INR 2.83* 2.39* 2.13*     No results for input(s): Shawn Milch in the last 72 hours. Vitamin D level low at less than 0.5      Urinalysis:      Lab Results   Component Value Date    NITRU Negative 04/28/2019    WBCUA 0-2 04/28/2019    BACTERIA 1+ 04/28/2019    RBCUA 0-2 04/28/2019    BLOODU TRACE-INTACT 04/28/2019    SPECGRAV <=1.005 04/28/2019    GLUCOSEU Negative 04/28/2019       Radiology:  XR CHEST STANDARD (2 VW)   Final Result   Small bilateral pleural effusions. New nodular region the mid to inferior left lung. This may be related to   overlapping bony or soft tissue structures however recommend further   evaluation with CT of the chest.         XR CHEST PORTABLE   Final Result   Stable life support device positioning. Persistent layering bilateral effusions and basilar opacities. Slightly   improved right lung expansion. XR CHEST PORTABLE   Final Result   Improved pleural fluid and aeration in the left lung with worsening fluid and   aeration on the right.   Pattern would favor pulmonary edema/ARDS. XR CHEST PORTABLE   Final Result   1. Pulmonary edema. 2. Improving right basilar and grossly stable left basilar atelectasis and/or   pneumonia. XR CHEST PORTABLE   Final Result   Worsening bibasilar atelectasis and/or pneumonia. XR CHEST PORTABLE   Final Result   Improving bilateral airspace disease and pleural effusions. XR CHEST PORTABLE   Final Result   Increasing right basilar and new bilateral upper lobe airspace disease could   represent edema or pneumonia. Left basilar pleuroparenchymal disease is   stable. XR CHEST PORTABLE   Final Result   1. No significant change. IR PICC WO SQ PORT/PUMP > 5 YEARS   Final Result   Successful placement of PICC line. XR CHEST PORTABLE   Final Result   Supportive devices are positioned appropriately. There is increasing   pulmonary edema since prior exam.         VL Extremity Venous Bilateral   Final Result      CT Chest WO Contrast   Final Result   1. Stable small left pleural effusion with a new small right pleural effusion. 2. Interval worsening of dependent consolidative opacity within bilateral   lower lobe since the study of 04/28/2019. In combination with worsening   bibasilar predominant mucous plugging, this likely represents either   atelectasis or aspiration. Clinical correlation is advised. 3. Slight interval improvement in appearance of lingular airspace   consolidation, likely representing resolving pneumonia. XR CHEST PORTABLE   Final Result   Mild cardiomegaly and chronic pulmonary change without definite acute   pulmonary process. CT Chest Pulmonary Embolism W Contrast   Final Result   Suboptimal contrast timing although there is no evidence for large or central   pulmonary embolism. The segmental and subsegmental branches are not well   evaluated.       Small left basilar effusion with adjacent consolidation and there are also   infiltrates within the lingula and right lung base that may represent   atelectasis versus pneumonia. Small amount of free fluid in the visualized upper abdomen. XR CHEST PORTABLE   Final Result   Cardiomegaly and mild pulmonary edema. LE doppler on 5/1/19  Summary      1. There is evidence of isolated acute deep venous thrombosis involving the   peroneal vein below the knee in the left lower extremity.   2. There is no other evidence of deep or superficial venous thrombosis   involving the lower extremities bilaterally. ECHO 4/29/19   Summary   Left ventricular systolic function is normal with ejection fraction   estimated at 55 %.   No regional wall motion abnormality.   Left ventricle size is normal.   There is moderate concentric left ventricular hypertrophy.   Normal left ventricular diastolic filling pressure.   The aortic valve leaflets are not well visualized.   Aortic valve appears sclerotic but opens adequately.   No evidence of aortic valve stenosis.   Trivial aortic regurgitation is present.   Right ventricular systolic function is normal.   The right ventricle is moderately enlarged.   Systolic pulmonary artery pressure (SPAP) estimated at 54 mmHg (RA pressure   15 mmHg), consistent with moderate pulmonary hypertension.           Assessment/Plan:    Principal Problem:    Acute respiratory failure with hypoxia (HCC)  Active Problems:    Rheumatoid arthritis (HCC)    Tobacco abuse    Anxiety state    SOB (shortness of breath)    Moderate persistent asthma with acute exacerbation    New onset of congestive heart failure (HCC)    Morbid obesity (HCC)    Obesity hypoventilation syndrome (HCC)    Erythrocytosis    Acute deep vein thrombosis (DVT) of left peroneal vein (HCC)    Pulmonary embolus (HCC)    Moderate protein-calorie malnutrition (HCC)    Bacterial pneumonia    Acute deep vein thrombosis (DVT) of distal end of left lower extremity (HCC)    Acute cor pulmonale (HCC)    Disorder of electrolytes Anasarca  Resolved Problems:    * No resolved hospital problems. *         1. Acute hypoxic resp failure. - Patient came with severe hypoxia . Possibly secondary to acute pulmonary edema and acute congestive heart failure. Also likely  acute PE.  - Status post mechanical ventilation  - Echocardiogram shows normal LV function does not show diastolic heart failure. shows moderate pulm HTN. - Venous doppler , positive for  DVT  Left LE, below knee. CTA was suboptimal.   - Continued on IV Lasix- > switch to PO Lasix today  - Started anticoagulation with heparin gtt. Her DVT and PE were likely present at the time of her admission. - > switched to Eliquis now   Intubated 5/3 for worsening hypercarbia. Bronchoscopy was done on 5/7/19. Extubated on 5/11/19.   - on High flow NC oxygen. - > wean  Off O2 as tolerated. Currently on oxygen 8 L . Encourage incentive spirometry. - Transferred to PCU on 5/15/19.    - Continue diuresis and anticoagulation . Continue nebulizer treatments. Bronch washings positive for Candida, on Diflucan     2.  Chronic hypercarbic respiratory failure likely obesity hypoventilation syndrome.  - mod pulm HTN on ECHO.     3.  Acute bronchitis.    - No fevers.  Has mildly elevated leukocytosis.  Pro-calcitonin however is negative. - completed abxc -  Vancomycin day 7/7 and Levaquin day 7/7.     4.  Morbid Obesity  - Body mass index is 39.17 kg/m². - Complicating assessment and treatment. Placing patient at risk for multiple co-morbidities as well as early death and contributing to the patient's presentation. 5. Anasarca   - monitor I/O   - net  ~ 6.8 L   - ?nutritional  - Has been on IV Lasix,  will switch to oral Lasix today ,      6. Debility   - cont PT,OT    7. Vitamin D deficiency  - levels low at < 0.5  - replace       Code Status: Full Code  Dietary Nutrition Supplements: Low Calorie High Protein Supplement  DIET DYSPHAGIA III ADVANCED;      Patient improving.    Wean off O2 as tolerated. . Plan - DC to rehab ARU ,  once oxygen saturations improved,    Dilia Toribio 5/18/2019 10:15 AM

## 2019-05-18 NOTE — PROGRESS NOTES
and forth 6 times)  Deviations (firm surface/linoleum): decreased kelli, Decreased step length, Decreased step height, narrow DHIRAJ, step-to pattern and steppage gait on L , L foot drop    Assistive Device Used:  rolling walker (RW)  Level of Assist: Min A progressing to CGA  Comment: No chair follow required, total assist O2 line. No shoes donned; only wearing non skid socks.      Stair Training deferred, pt unsafe/not appropriate to complete stairs at this time  Pt ascended/descended  stairs with N/A with N/A, and use of N/A. Pattern: N/A     Therapeutic Exercise all completed bilaterally unless indicated  Ankle pumps: x 10  Quad sets:   Glut sets:   Heel slides:   SLR:   Hip abd/add:   LAQ: x 10  Marching: x 10 in sitting and x 10 in standing at RW  DF stretching/AAROM L DF: performed x 3 reps, pt inquiring about this activity and discussed use of sheet or towel to aid in L ankle DF while in bed     Balance  Static Sitting:             Good at EOB  Dynamic Sitting:        Good - at EOB  Static Standing:         Fair + at [employer]  Dynamic Standing:   Fair - at [employer]     Patient Education      Role of PT, POC, Discharge recommendations, transfer techniques with safe hand placement, HEP and calling for assist with mobility.      Positioning Needs       Pt returned to supine in bed, call light and needs in reach and alarm set.     Activity Tolerance   Pt completed therapy session with mild Pain noted. No SOB/dizziness complaints. SpO2: Maintained 93%-95% on 6LO2 throughout session during exercises, standing activities, and walking. HR: At rest 111bpm.  With standing activity: 124 bpm.  BP: NT     Other  None.     Assessment :  Patient demonstrated increased tolerance to activity and increased ambulation distance while maintaining spO2 levels on decreased supplemental O2 levels (now on 6L, previously 8L). Pt continues to demonstrate lack of active DF- causing instability when ambulating and transferring.  Pt currently functioning below baseline and would benefit from ARU to improve independence and safety with functional mobility.       Goals (all goals ongoing unless otherwise indicated)  1). Independent with LE Ex x 10 reps - Ongoing     To be met in 6 visits:  1).  Supine to/from sit: tolerate assessment Met 5/14  2).  Sit to/from stand: tolerate assessment Met 5/14  3).  Bed to chair: tolerate assessment Met 5/16  4).  Gait: Ambulate to be assessed Met 5/16  5).  Tolerate B LE exercises 3 sets of 10-15 reps - Ongoing  6).  Ascend/descend 3 steps to be assessed   7.)  NEW goal 5/16: Sit to/from stand: min A. Met 5/17  8.)  NEW goal 5/16: Bed to chair: Transfer from bed to chair with RW and min A. Met 5/17  9.)  Gait: Ambulate 25 ft with RW and mod A. - Met 5/18  10.) NEW goal 5/18: Sit to/from stand: SBA.  11.) NEW goal 5/18: Ambulate 150 ft with RW with min A.     Plan   Continue with plan of care. Kanchan Adler, PT, DPT   #[phone]       If patient discharges from this facility prior to next visit, this note will serve as the Discharge Summary.

## 2019-05-18 NOTE — FLOWSHEET NOTE
05/18/19 1432   Vitals   Temp 98.6 °F (37 °C)   Temp Source Oral   Pulse 103   Heart Rate Source Monitor   Resp 18   /78   Level of Consciousness 0   MEWS Score 2   Patient Currently in Pain Denies   Oxygen Therapy   SpO2 93 %   O2 Flow Rate (L/min) 6 L/min     Patient rests in bed watching television. Denies pain, needs.

## 2019-05-18 NOTE — PLAN OF CARE
Problem: Nutrition  Goal: Optimal nutrition therapy  Outcome: Ongoing     Problem: Falls - Risk of:  Goal: Will remain free from falls  Description  Will remain free from falls  Outcome: Ongoing     Problem: Risk for Impaired Skin Integrity  Goal: Tissue integrity - skin and mucous membranes  Description  Structural intactness and normal physiological function of skin and  mucous membranes.   Outcome: Ongoing     Problem: Pain:  Goal: Control of acute pain  Description  Control of acute pain  Outcome: Ongoing     Problem: Discharge Planning:  Goal: Discharged to appropriate level of care  Description  Discharged to appropriate level of care  Outcome: Ongoing  Goal: Participates in care planning  Description  Participates in care planning  Outcome: Ongoing     Problem: Airway Clearance - Ineffective:  Goal: Clear lung sounds  Description  Clear lung sounds  Outcome: Ongoing     Problem: Fluid Volume - Deficit:  Goal: Achieves intake and output within specified parameters  Description  Achieves intake and output within specified parameters  Outcome: Ongoing     Problem: Gas Exchange - Impaired:  Goal: Levels of oxygenation will improve  Description  Levels of oxygenation will improve  Outcome: Ongoing

## 2019-05-18 NOTE — PROGRESS NOTES
Patient awake and quiet in bed. No s/s of distress noted. Patient denies pain when assessed. Call light within reach. Shift assessment completed. Will continue to monitor.

## 2019-05-18 NOTE — PROGRESS NOTES
Patient's EF (Ejection Fraction) is greater than 40%    Patient's weights and intake/output reviewed:    Patient's Last Weight: 100.3 kg obtained by bed scale. Today's weight is noted to be 1.35kg less than last documented weight. Intake/Output Summary (Last 24 hours) at 5/18/2019 1517  Last data filed at 5/18/2019 1228  Gross per 24 hour   Intake 460 ml   Output 1050 ml   Net -590 ml       Patient's current functional capacity:  Slight limitation of physical activity. Comfortable at rest. Ordinary physical activity results in fatigue, palpitation, dyspnea. Pt resting in bed at this time on 6 L O2. Pt denies shortness of breath. Pt with pitting lower extremity edema. Patient and family's stated goal of care: reduce shortness of breath, increase activity tolerance, better understand heart failure and disease management, be more comfortable and reduce lower extremity edema prior to discharge        Patient has a past medical history of Anxiety state, unspecified, Impacted cerumen, and Rheumatoid arthritis(714.0). Comorbidities reviewed and education provided.     >>For CHF and Comorbidity documentation on Education Time and Topics, please see Education Tab

## 2019-05-18 NOTE — PROGRESS NOTES
RESPIRATORY THERAPY ASSESSMENT    Name:  Magalie [facility] Record Number:  [id number]  Age: 64 y.o. Gender: female  : 1960  Today's Date:  2019  Room:  /0326-01    Assessment     Is the patient being admitted for a COPD or Asthma exacerbation? Yes   (If yes the patient will be seen every 4 hours for the first 24 hours and then reassessed)    Patient Admission Diagnosis      Allergies  No Known Allergies    Minimum Predicted Vital Capacity:     782          Actual Vital Capacity:      782            Pulmonary History:COPD  Home Oxygen Therapy:  room air  Home Respiratory Therapy:Albuterol   Current Respiratory Therapy:  Albuterol HHN Q4 W/A - EZPAP Q4 W/A  Treatment Type: HHN, Positive expiratory pressure therapy  Medications: Albuterol    Respiratory Severity Index(RSI)   Patients with orders for inhalation medications, oxygen, or any therapeutic treatment modality will be placed on Respiratory Protocol. They will be assessed with the first treatment and at least every 72 hours thereafter. The following severity scale will be used to determine frequency of treatment intervention.     Smoking History: Mild Exacerbation = 3    Social History  Social History     Tobacco Use    Smoking status: Current Every Day Smoker     Packs/day: 0.10     Years: 30.00     Pack years: 3.00     Types: Cigarettes    Smokeless tobacco: Never Used   Substance Use Topics    Alcohol use: No     Alcohol/week: 0.0 oz    Drug use: No       Recent Surgical History: None = 0  Past Surgical History  Past Surgical History:   Procedure Laterality Date    BRONCHOSCOPY N/A 2019    BRONCHOSCOPY ALVEOLAR LAVAGE performed by Williams Saba MD at [address] [place] [address]      both hands     LUMBAR SPINE SURGERY         Level of Consciousness: Alert, Oriented, and Cooperative = 0    Level of Activity: Walking with assistance = 1    Respiratory Pattern: Regular Pattern; RR 8-20 = 0    Breath Sounds: Absent bilaterally and/or with wheezes = 3    Sputum  Sputum Color: Yellow, Tenacity: Thick(per patient), Sputum How Obtained: Spontaneous cough  Cough: Strong, productive = 1    Vital Signs   /79   Pulse 101   Temp 99 °F (37.2 °C) (Oral)   Resp 18   Ht 5' 3\" (1.6 m)   Wt 221 lb 1.6 oz (100.3 kg)   SpO2 95%   BMI 39.17 kg/m²   SPO2 (COPD values may differ): Less than 86% on room air or greater than 92% on FiO2 greater than 50% = 4    Peak Flow (asthma only): not applicable = 0    RSI: 01-85 = Q6H or QID and Q4HPRN for dyspnea        Plan       Goals: medication delivery, mobilize retained secretions, volume expansion and improve oxygenation    Patient/caregiver was educated on the proper method of use for Respiratory Care Devices:  Yes      Level of patient/caregiver understanding able to:   ? Verbalize understanding   ? Demonstrate understanding       ? Teach back        ? Needs reinforcement       ? No available caregiver               ? Other:     Response to education:  Good     Is patient being placed on Home Treatment Regimen? No     Does the patient have everything they need prior to discharge? NA     Comments: Patient assessed and magaly reviewed    Plan of Care: Continue scheduled therapy      Electronically signed by Yuki Saleem RCP on 5/18/2019 at 11:04 AM    Respiratory Protocol Guidelines     1. Assessment and treatment by Respiratory Therapy will be initiated for medication and therapeutic interventions upon initiation of aerosolized medication. 2. Physician will be contacted for respiratory rate (RR) greater than 35 breaths per minute. Therapy will be held for heart rate (HR) greater than 140 beats per minute, pending direction from physician. 3. Bronchodilators will be administered via Metered Dose Inhaler (MDI) with spacer when the following criteria are met:  a.  Alert and cooperative     b. HR < 140 bpm  c. RR < 30 bpm                d. Can demonstrate a 2-3 second inspiratory hold  4. Bronchodilators will be administered via Hand Held Nebulizer [facility]) to patients when ANY of the following criteria are met  a. Incognizant or uncooperative          b. Patients treated with HHN at Home        c. Unable to demonstrate proper use of MDI with spacer     d. RR > 30 bpm   5. Bronchodilators will be delivered via Metered Dose Inhaler (MDI), HHN, Aerogen to intubated patients on mechanical ventilation. 6. Inhalation medication orders will be delivered and/or substituted as outlined below. Aerosolized Medications Ordering and Administration Guidelines:    1. All Medications will be ordered by a physician, and their frequency and/or modality will be adjusted as defined by the patients Respiratory Severity Index (RSI) score. 2. If the patient does not have documented COPD, consider discontinuing anticholinergics when RSI is less than 9.  3. If the bronchospasm worsens (increased RSI), then the bronchodilator frequency can be increased to a maximum of every 4 hours. If greater than every 4 hours is required, the physician will be contacted. 4. If the bronchospasm improves, the frequency of the bronchodilator can be decreased, based on the patient's RSI, but not less than home treatment regimen frequency. 5. Bronchodilator(s) will be discontinued if patient has a RSI less than 9 and has received no scheduled or as needed treatment for 72  Hrs. Patients Ordered on a Mucolytic Agent:    1. Must always be administered with a bronchodilator. 2. Discontinue if patient experiences worsened bronchospasm, or secretions have lessened to the point that the patient is able to clear them with a cough. Anti-inflammatory and Combination Medications:    1.  If the patient lacks prior history of lung disease, is not using inhaled anti-inflammatory medication at home, and lacks wheezing by examination or by history for at least 24 hours, contact physician for possible discontinuation.

## 2019-05-18 NOTE — PROGRESS NOTES
Pulmonary & Critical Care Medicine ICU Progress Note    CC: Acute hypoxemic respiratory failure    Events of Last 24 hours:   Clinically improving  Down to 6 L FiO2 saturation mid 90s        Invasive Lines: IV: PICC      MV:  5/3-      Vent Mode: AC/VC Rate Set: 0 bmp/Vt Ordered: 400 mL/ /FiO2 : 40 %  No results for input(s): PHART, BGC3YYV, PO2ART in the last 72 hours. IV:   dextrose         Vitals:  Blood pressure (!) 146/80, pulse 104, temperature 97.7 °F (36.5 °C), temperature source Oral, resp. rate 20, height 5' 3\" (1.6 m), weight 221 lb 1.6 oz (100.3 kg), SpO2 90 %, not currently breastfeeding. on 6 LPM   Temp  Av.2 °F (36.8 °C)  Min: 97.7 °F (36.5 °C)  Max: 99 °F (37.2 °C)    Intake/Output Summary (Last 24 hours) at 2019 0743  Last data filed at 2019 0350  Gross per 24 hour   Intake 370 ml   Output 1150 ml   Net -780 ml     EXAM:  General: ill appearing. Eyes: PERRL. No sclera icterus. No conjunctival injection. ENT: No discharge. Pharynx clear. Neck: Trachea midline. Normal thyroid. Resp: No accessory muscle use. Basilar crackles. No wheezing. No rhonchi. No dullness on percussion. CV: Regular rate. Regular rhythm. No mumur or rub. No edema. GI: Non-tender. Non-distended. No masses. No organomegaly. Normal bowel sounds. No hernia. Skin: Warm and dry. No nodule on exposed extremities. No rash on exposed extremities. Lymph: No cervical LAD. No supraclavicular LAD. M/S: No cyanosis. No joint deformity. No clubbing. Neuro: AAOx3. Followed commands.    Psych: No agitation, no anxiety, affect is full     Scheduled Meds:   vitamin D3  5,000 Units Oral Daily    vitamin D  50,000 Units Oral Weekly    furosemide  40 mg Oral Daily    fluconazole  100 mg Oral Daily    apixaban  10 mg Oral BID    [START ON 2019] apixaban  5 mg Oral BID    albuterol  2.5 mg Nebulization Q4H WA    sennosides-docusate sodium  2 tablet Oral BID    diclofenac sodium  2 g Topical BID    citalopram  20 mg Oral Daily    sodium chloride flush  10 mL Intravenous 2 times per day     PRN Meds:  ALPRAZolam, albuterol, magnesium sulfate, potassium chloride, glucose, dextrose, glucagon (rDNA), dextrose, carboxymethylcellulose PF, acetaminophen, perflutren lipid microspheres, ibuprofen, sodium chloride flush, magnesium hydroxide, ondansetron    Results:  CBC:   No results for input(s): WBC, HGB, HCT, MCV, PLT in the last 72 hours. BMP:   Recent Labs     05/16/19  0530 05/17/19  0520 05/18/19  0533    137 136   K 3.7 3.6 3.5   CL 88* 87* 85*   CO2 40* 39* 40*   BUN 13 13 12   CREATININE <0.5* <0.5* <0.5*     LIVER PROFILE: No results for input(s): AST, ALT, LIPASE, BILIDIR, BILITOT, ALKPHOS in the last 72 hours. Invalid input(s):   AMYLASE,  ALB    Cultures:  5/3 sputum normal respiratory estiven   5/6 BAL Candida          Films:  Chest x-ray 5/17/19 imaging reviewed by me and showed  New left midlung opacity  Improved basilar airspace disease/effusion    ASSESSMENT:  · Acute hypoxemic respiratory failure  · Abnormal chest x-ray 5/17/19 with a new left midlung opacity-probably atelectasis  · Bacterial pneumonia  · Acute left lower extremity DVT  · Acute cor pulmonale  · Chronic hypercapnic respiratory failure/OHS       PLAN:  Continue BiPAP PRN   Supplemental oxygen to maintain SaO2 >92%; wean as tolerated  Completed 7 days of vancomycin and Levaquin  Eliquis BID   Off steroids  Home Xanax  PTOT  I recommend CXR in 4-6 week to document resolution of abnormalities (Office is  notified)

## 2019-05-18 NOTE — PLAN OF CARE
Problem: OXYGENATION/RESPIRATORY FUNCTION  Goal: Patient will achieve/maintain normal respiratory rate/effort  Description  Respiratory rate and effort will be within normal limits for the patient  Note:   Patient on 5L O2; POX WNL. Problem: HEMODYNAMIC STATUS  Goal: Patient has stable vital signs and fluid balance  Note:   Patient has stable vital signs.        Problem: Tobacco Use:  Goal: Will participate in inpatient tobacco-use cessation counseling  Description  Will participate in inpatient tobacco-use cessation counseling  Note:   Patient verbalizes that she is ready to quit smoking tobacco.

## 2019-05-19 LAB
ANION GAP SERPL CALCULATED.3IONS-SCNC: 13 MMOL/L (ref 3–16)
BUN BLDV-MCNC: 12 MG/DL (ref 7–20)
CALCIUM SERPL-MCNC: 10 MG/DL (ref 8.3–10.6)
CHLORIDE BLD-SCNC: 86 MMOL/L (ref 99–110)
CO2: 37 MMOL/L (ref 21–32)
CREAT SERPL-MCNC: <0.5 MG/DL (ref 0.6–1.1)
GFR AFRICAN AMERICAN: >60
GFR NON-AFRICAN AMERICAN: >60
GLUCOSE BLD-MCNC: 107 MG/DL (ref 70–99)
GLUCOSE BLD-MCNC: 112 MG/DL (ref 70–99)
GLUCOSE BLD-MCNC: 113 MG/DL (ref 70–99)
GLUCOSE BLD-MCNC: 114 MG/DL (ref 70–99)
GLUCOSE BLD-MCNC: 95 MG/DL (ref 70–99)
INR BLD: 1.81 (ref 0.86–1.14)
PERFORMED ON: ABNORMAL
PERFORMED ON: NORMAL
POTASSIUM REFLEX MAGNESIUM: 3.7 MMOL/L (ref 3.5–5.1)
PROTHROMBIN TIME: 20.6 SEC (ref 9.8–13)
SODIUM BLD-SCNC: 136 MMOL/L (ref 136–145)

## 2019-05-19 PROCEDURE — 6370000000 HC RX 637 (ALT 250 FOR IP): Performed by: INTERNAL MEDICINE

## 2019-05-19 PROCEDURE — 99232 SBSQ HOSP IP/OBS MODERATE 35: CPT | Performed by: INTERNAL MEDICINE

## 2019-05-19 PROCEDURE — 2580000003 HC RX 258: Performed by: INTERNAL MEDICINE

## 2019-05-19 PROCEDURE — 94640 AIRWAY INHALATION TREATMENT: CPT

## 2019-05-19 PROCEDURE — 2060000000 HC ICU INTERMEDIATE R&B

## 2019-05-19 PROCEDURE — 94761 N-INVAS EAR/PLS OXIMETRY MLT: CPT

## 2019-05-19 PROCEDURE — 85610 PROTHROMBIN TIME: CPT

## 2019-05-19 PROCEDURE — 80048 BASIC METABOLIC PNL TOTAL CA: CPT

## 2019-05-19 PROCEDURE — 2700000000 HC OXYGEN THERAPY PER DAY

## 2019-05-19 PROCEDURE — 94669 MECHANICAL CHEST WALL OSCILL: CPT

## 2019-05-19 PROCEDURE — 36592 COLLECT BLOOD FROM PICC: CPT

## 2019-05-19 PROCEDURE — 6360000002 HC RX W HCPCS: Performed by: INTERNAL MEDICINE

## 2019-05-19 RX ORDER — CARBOXYMETHYLCELLULOSE SODIUM 10 MG/ML
1 GEL OPHTHALMIC PRN
DISCHARGE
Start: 2019-05-19 | End: 2019-05-31

## 2019-05-19 RX ORDER — ALPRAZOLAM 0.25 MG/1
0.25 TABLET ORAL 2 TIMES DAILY PRN
Qty: 6 TABLET | Refills: 0 | Status: ON HOLD | OUTPATIENT
Start: 2019-05-19 | End: 2019-05-29

## 2019-05-19 RX ORDER — FLUCONAZOLE 100 MG/1
100 TABLET ORAL DAILY
Qty: 7 TABLET | Refills: 0 | DISCHARGE
Start: 2019-05-20 | End: 2019-05-20 | Stop reason: HOSPADM

## 2019-05-19 RX ORDER — ERGOCALCIFEROL (VITAMIN D2) 1250 MCG
50000 CAPSULE ORAL WEEKLY
Qty: 7 CAPSULE | Refills: 0 | DISCHARGE
Start: 2019-05-24 | End: 2019-11-18 | Stop reason: ALTCHOICE

## 2019-05-19 RX ORDER — IBUPROFEN 600 MG/1
600 TABLET ORAL EVERY 8 HOURS PRN
Refills: 3 | DISCHARGE
Start: 2019-05-19 | End: 2019-05-31 | Stop reason: ALTCHOICE

## 2019-05-19 RX ORDER — FUROSEMIDE 40 MG/1
40 TABLET ORAL DAILY
DISCHARGE
Start: 2019-05-20 | End: 2019-05-31 | Stop reason: SDUPTHER

## 2019-05-19 RX ORDER — SENNA AND DOCUSATE SODIUM 50; 8.6 MG/1; MG/1
2 TABLET, FILM COATED ORAL 2 TIMES DAILY
DISCHARGE
Start: 2019-05-19 | End: 2019-05-31

## 2019-05-19 RX ADMIN — CHOLECALCIFEROL TAB 125 MCG (5000 UNIT) 5000 UNITS: 125 TAB at 09:21

## 2019-05-19 RX ADMIN — SENNOSIDES AND DOCUSATE SODIUM 2 TABLET: 8.6; 5 TABLET ORAL at 21:45

## 2019-05-19 RX ADMIN — ALBUTEROL SULFATE 2.5 MG: 2.5 SOLUTION RESPIRATORY (INHALATION) at 19:13

## 2019-05-19 RX ADMIN — ALBUTEROL SULFATE 2.5 MG: 2.5 SOLUTION RESPIRATORY (INHALATION) at 11:38

## 2019-05-19 RX ADMIN — Medication 10 ML: at 21:46

## 2019-05-19 RX ADMIN — APIXABAN 10 MG: 5 TABLET, FILM COATED ORAL at 09:21

## 2019-05-19 RX ADMIN — IBUPROFEN 600 MG: 600 TABLET ORAL at 13:43

## 2019-05-19 RX ADMIN — Medication 10 ML: at 09:22

## 2019-05-19 RX ADMIN — SENNOSIDES AND DOCUSATE SODIUM 2 TABLET: 8.6; 5 TABLET ORAL at 09:21

## 2019-05-19 RX ADMIN — CITALOPRAM HYDROBROMIDE 20 MG: 20 TABLET ORAL at 09:21

## 2019-05-19 RX ADMIN — FUROSEMIDE 40 MG: 40 TABLET ORAL at 09:21

## 2019-05-19 RX ADMIN — ALBUTEROL SULFATE 2.5 MG: 2.5 SOLUTION RESPIRATORY (INHALATION) at 15:42

## 2019-05-19 RX ADMIN — ALBUTEROL SULFATE 2.5 MG: 2.5 SOLUTION RESPIRATORY (INHALATION) at 23:10

## 2019-05-19 RX ADMIN — APIXABAN 10 MG: 5 TABLET, FILM COATED ORAL at 21:46

## 2019-05-19 RX ADMIN — ALBUTEROL SULFATE 2.5 MG: 2.5 SOLUTION RESPIRATORY (INHALATION) at 07:54

## 2019-05-19 RX ADMIN — FLUCONAZOLE 100 MG: 100 TABLET ORAL at 09:21

## 2019-05-19 ASSESSMENT — PAIN SCALES - GENERAL: PAINLEVEL_OUTOF10: 4

## 2019-05-19 NOTE — PROGRESS NOTES
Patient ambulated to the BR with a front wheeled walker on 6Lpm O2, she tolerated the activity well with assistance.

## 2019-05-19 NOTE — FLOWSHEET NOTE
05/18/19 2030   Vital Signs   Temp 97.8 °F (36.6 °C)   Pulse 95   Resp 18   /79   BP Location Left upper arm   Patient Position Semi fowlers   Level of Consciousness 0   MEWS Score 1   Oxygen Therapy   SpO2 93 %   O2 Device High flow nasal cannula   O2 Flow Rate (L/min) 5 L/min   Shift assessment complete; see flow sheet. Scheduled medications administered; see MAR. Call light and bedside table within reach, bed in low, locked position, side rails up, pt encouraged to call for assistance with ambulation or transfer. Pt denies further needs at this time. Nurse and staff will continue to monitor throughout shift.

## 2019-05-19 NOTE — CARE COORDINATION
INTERDISCIPLINARY PLAN OF CARE CONFERENCE    Date/Time: 5/19/2019 10:59 AM  Completed by: Aftab Bond, Case Management      Patient Name:  Prieto Chacon  YOB: 1960  Admitting Diagnosis: Acute respiratory failure ([address] [J96.00]     Admit Date/Time:  4/28/2019  1:37 PM    Chart reviewed. Interdisciplinary team met to discuss patient progress and discharge plans. Disciplines included Case Management, Nursing, and Dietitian. Current Status:ongoing    PT/OT recommendation: ARU    Anticipated Discharge Date: tbd  Expected D/C Disposition:  ARU  Confirmed plan with patient and/or family Yes  Discharge Plan Comments: Reviewed chart. Pt is from home and plans to go to [employer]. CM called and left a voicemail for [facility] with [employer] to inquire if precert was back that was started on 5/16/19. Awaiting call back from Renata Baer with [facility]. Pt is currently on 5L O2. +bed. +eCOC. Addendum 5/19/19 1128: CM received call back from [facility] with [employer] and he stated that precert is not back yet for pt. CM informed pt's RN, Mary Roman of this, and she is informing Dr. Stan aGspar.        Home O2 in place on admit: No  Pt informed of need to bring portable home O2 tank on day of discharge for nursing to connect prior to leaving:  Not Indicated  Verbalized agreement/Understanding:  Not Indicated

## 2019-05-19 NOTE — PROGRESS NOTES
Hospitalist Progress Note        PCP: Maria Luisa Saldana MD    Date of Admission: 4/28/2019     Interval history  Admitted with acute respiratory failure  S/P mechanical ventilation . Extubated on 5/11 5/7- Bronchoscopy was done. No endobronchial lesion seen. Mucosa appears normal. She had yellow and white secretions were thick. Transferred out of ICU to PCU on 5/15/19  Bronchial washings with candida      Subjective:   She is improving daily, up in chair working with therapy. Strength is improving. Looks much better. Has been Off BiPAP. Hypoxia slowly improving,  Was on high flow oxygen 10 L - > down to 8 L - > 5 L  today. Weakness slowly improving  Diet advanced. Tolerating dysphagia 2 diet     Awaiting  precert to go to rehab     Medications:  Reviewed    Infusion Medications    dextrose       Scheduled Medications    vitamin D3  5,000 Units Oral Daily    vitamin D  50,000 Units Oral Weekly    furosemide  40 mg Oral Daily    fluconazole  100 mg Oral Daily    apixaban  10 mg Oral BID    [START ON 5/21/2019] apixaban  5 mg Oral BID    albuterol  2.5 mg Nebulization Q4H WA    sennosides-docusate sodium  2 tablet Oral BID    diclofenac sodium  2 g Topical BID    citalopram  20 mg Oral Daily    sodium chloride flush  10 mL Intravenous 2 times per day     PRN Meds: ALPRAZolam, albuterol, magnesium sulfate, potassium chloride, glucose, dextrose, glucagon (rDNA), dextrose, carboxymethylcellulose PF, acetaminophen, perflutren lipid microspheres, ibuprofen, sodium chloride flush, magnesium hydroxide, ondansetron      Intake/Output Summary (Last 24 hours) at 5/19/2019 1058  Last data filed at 5/19/2019 1041  Gross per 24 hour   Intake 600 ml   Output 600 ml   Net 0 ml       Physical Exam Performed:    /79   Pulse 119   Temp 97.6 °F (36.4 °C) (Oral)   Resp 18   Ht 5' 3\" (1.6 m)   Wt 220 lb 6.4 oz (100 kg)   SpO2 92%   BMI 39.04 kg/m²       General: No distress.    awake alert and well oriented .   Mucous Membranes:  Pink , anicteric  Neck: No JVD, no carotid bruit, no thyromegaly  Chest:  Diminished breath sounds . No wheezes rales or rhonchi. Cardiovascular:  RRR  S1S2 heard, no murmurs or gallops  Abdomen:  Soft, undistended, non tender, no organomegaly, BS present  Extremities: No edema or cyanosis. Distal pulses well felt. Left foot drop  Neurological : non focal       Labs:   No results for input(s): WBC, HGB, HCT, PLT in the last 72 hours. Recent Labs     05/17/19  0520 05/18/19  0533 05/19/19  0607    136 136   K 3.6 3.5 3.7   CL 87* 85* 86*   CO2 39* 40* 37*   BUN 13 12 12   CREATININE <0.5* <0.5* <0.5*   CALCIUM 10.0 10.0 10.0     No results for input(s): AST, ALT, BILIDIR, BILITOT, ALKPHOS in the last 72 hours. Recent Labs     05/17/19  0520 05/18/19  0533 05/19/19  0607   INR 2.39* 2.13* 1.81*     No results for input(s): CKTOTAL, TROPONINI in the last 72 hours. Vitamin D level low at less than 0.5      Urinalysis:      Lab Results   Component Value Date    NITRU Negative 04/28/2019    WBCUA 0-2 04/28/2019    BACTERIA 1+ 04/28/2019    RBCUA 0-2 04/28/2019    BLOODU TRACE-INTACT 04/28/2019    SPECGRAV <=1.005 04/28/2019    GLUCOSEU Negative 04/28/2019       Radiology:  XR CHEST STANDARD (2 VW)   Final Result   Small bilateral pleural effusions. New nodular region the mid to inferior left lung. This may be related to   overlapping bony or soft tissue structures however recommend further   evaluation with CT of the chest.         XR CHEST PORTABLE   Final Result   Stable life support device positioning. Persistent layering bilateral effusions and basilar opacities. Slightly   improved right lung expansion. XR CHEST PORTABLE   Final Result   Improved pleural fluid and aeration in the left lung with worsening fluid and   aeration on the right. Pattern would favor pulmonary edema/ARDS. XR CHEST PORTABLE   Final Result   1. Pulmonary edema.    2. Improving right basilar and grossly stable left basilar atelectasis and/or   pneumonia. XR CHEST PORTABLE   Final Result   Worsening bibasilar atelectasis and/or pneumonia. XR CHEST PORTABLE   Final Result   Improving bilateral airspace disease and pleural effusions. XR CHEST PORTABLE   Final Result   Increasing right basilar and new bilateral upper lobe airspace disease could   represent edema or pneumonia. Left basilar pleuroparenchymal disease is   stable. XR CHEST PORTABLE   Final Result   1. No significant change. IR PICC WO SQ PORT/PUMP > 5 YEARS   Final Result   Successful placement of PICC line. XR CHEST PORTABLE   Final Result   Supportive devices are positioned appropriately. There is increasing   pulmonary edema since prior exam.         VL Extremity Venous Bilateral   Final Result      CT Chest WO Contrast   Final Result   1. Stable small left pleural effusion with a new small right pleural effusion. 2. Interval worsening of dependent consolidative opacity within bilateral   lower lobe since the study of 04/28/2019. In combination with worsening   bibasilar predominant mucous plugging, this likely represents either   atelectasis or aspiration. Clinical correlation is advised. 3. Slight interval improvement in appearance of lingular airspace   consolidation, likely representing resolving pneumonia. XR CHEST PORTABLE   Final Result   Mild cardiomegaly and chronic pulmonary change without definite acute   pulmonary process. CT Chest Pulmonary Embolism W Contrast   Final Result   Suboptimal contrast timing although there is no evidence for large or central   pulmonary embolism. The segmental and subsegmental branches are not well   evaluated. Small left basilar effusion with adjacent consolidation and there are also   infiltrates within the lingula and right lung base that may represent   atelectasis versus pneumonia.       Small amount of 1.Acute hypoxic resp failure. - Patient came with severe hypoxia . Possibly secondary to acute pulmonary edema and acute congestive heart failure. Also likely  acute PE.  - Status post mechanical ventilation  - Echocardiogram shows normal LV function does not show diastolic heart failure. shows moderate pulm HTN. - Venous doppler , positive for  DVT  Left LE, below knee. CTA was suboptimal.   - Continued on IV Lasix- > switched to PO Lasix now  - Started anticoagulation with heparin gtt. Her DVT and PE were likely present at the time of her admission. - > switched to Eliquis now   Intubated 5/3 for worsening hypercarbia. Bronchoscopy was done on 5/7/19. Extubated on 5/11/19.   - on High flow NC oxygen. - > wean  Off O2 as tolerated. Currently on oxygen 5 L . Encourage incentive spirometry. - Transferred to PCU on 5/15/19.    - Continue diuresis and anticoagulation . Continue nebulizer treatments. Bronch washings positive for Candida, on Diflucan     2.  Chronic hypercarbic respiratory failure likely obesity hypoventilation syndrome.  - mod pulm HTN on ECHO.     3.  Acute bronchitis.    - No fevers.  Has mildly elevated leukocytosis.  Pro-calcitonin however is negative. - completed abxc -  Vancomycin day 7/7 and Levaquin day 7/7.     4.  Morbid Obesity  - Body mass index is 39.04 kg/m². - Complicating assessment and treatment. Placing patient at risk for multiple co-morbidities as well as early death and contributing to the patient's presentation. 5. Anasarca   - monitor I/O   - net  ~ 6.3 L   - ?nutritional  -cont lasix      6. Debility   - cont PT,OT    7. Vitamin D deficiency  - levels low at < 0.5  - replace       Code Status: Full Code  Dietary Nutrition Supplements: Low Calorie High Protein Supplement  DIET DYSPHAGIA III ADVANCED;      Patient improving. Wean off O2 as tolerated.   Plan - DC to rehab ARU , once oxygen saturations improved,  Oxygen saturation is improved today she is down to 5 L. Still awaiting precert to go to the rehab unit.        Cuong Stock 5/19/2019 10:58 AM

## 2019-05-19 NOTE — PROGRESS NOTES
Pulmonary & Critical Care Medicine ICU Progress Note    CC: Acute hypoxemic respiratory failure    Events of Last 24 hours:   Continues to improve   Down to 5 L         Invasive Lines: IV: PICC      MV:  5/3-      Vent Mode: AC/VC Rate Set: 0 bmp/Vt Ordered: 400 mL/ /FiO2 : 40 %  No results for input(s): PHART, CNK5MXI, PO2ART in the last 72 hours. IV:   dextrose         Vitals:  Blood pressure 136/86, pulse 99, temperature 97.6 °F (36.4 °C), resp. rate 18, height 5' 3\" (1.6 m), weight 220 lb 6.4 oz (100 kg), SpO2 94 %, not currently breastfeeding. on 5 LPM   Temp  Av.3 °F (36.8 °C)  Min: 97.6 °F (36.4 °C)  Max: 99 °F (37.2 °C)    Intake/Output Summary (Last 24 hours) at 2019 0816  Last data filed at 2019 0230  Gross per 24 hour   Intake 690 ml   Output 850 ml   Net -160 ml     EXAM:  General: ill appearing. Eyes: PERRL. No sclera icterus. No conjunctival injection. ENT: No discharge. Pharynx clear. Neck: Trachea midline. Normal thyroid. Resp: No accessory muscle use. Few crackles. No wheezing. No rhonchi. No dullness on percussion. CV: Regular rate. Regular rhythm. No mumur or rub. No edema. GI: Non-tender. Non-distended. No masses. No organomegaly. Normal bowel sounds. No hernia. Skin: Warm and dry. No nodule on exposed extremities. No rash on exposed extremities. Lymph: No cervical LAD. No supraclavicular LAD. M/S: No cyanosis. No joint deformity. No clubbing. Neuro: AAOx3. Followed commands.    Psych: No agitation, no anxiety, affect is full     Scheduled Meds:   vitamin D3  5,000 Units Oral Daily    vitamin D  50,000 Units Oral Weekly    furosemide  40 mg Oral Daily    fluconazole  100 mg Oral Daily    apixaban  10 mg Oral BID    [START ON 2019] apixaban  5 mg Oral BID    albuterol  2.5 mg Nebulization Q4H WA    sennosides-docusate sodium  2 tablet Oral BID    diclofenac sodium  2 g Topical BID    citalopram  20 mg Oral Daily    sodium chloride flush  10 mL Intravenous 2 times per day     PRN Meds:  ALPRAZolam, albuterol, magnesium sulfate, potassium chloride, glucose, dextrose, glucagon (rDNA), dextrose, carboxymethylcellulose PF, acetaminophen, perflutren lipid microspheres, ibuprofen, sodium chloride flush, magnesium hydroxide, ondansetron    Results:  CBC:   No results for input(s): WBC, HGB, HCT, MCV, PLT in the last 72 hours. BMP:   Recent Labs     05/17/19  0520 05/18/19  0533 05/19/19  0607    136 136   K 3.6 3.5 3.7   CL 87* 85* 86*   CO2 39* 40* 37*   BUN 13 12 12   CREATININE <0.5* <0.5* <0.5*     LIVER PROFILE: No results for input(s): AST, ALT, LIPASE, BILIDIR, BILITOT, ALKPHOS in the last 72 hours. Invalid input(s):   AMYLASE,  ALB    Cultures:  5/3 sputum normal respiratory estiven   5/6 BAL Candida          Films:  Chest x-ray 5/17/19 imaging reviewed by me and showed  New left midlung opacity  Improved basilar airspace disease/effusion    ASSESSMENT:  · Acute hypoxemic respiratory failure  · Abnormal chest x-ray 5/17/19 with a new left midlung opacity-probably atelectasis  · Bacterial pneumonia  · Acute left lower extremity DVT  · Acute cor pulmonale  · Chronic hypercapnic respiratory failure/OHS       PLAN:  Continue BiPAP PRN   Supplemental oxygen to maintain SaO2 >92%; wean as tolerated  Completed 7 days of vancomycin and Levaquin  Eliquis BID   Off steroids  Home Xanax  PTOT  I recommend CXR in 4-6 week to document resolution of abnormalities (Office is  notified)

## 2019-05-19 NOTE — FLOWSHEET NOTE
05/19/19 0919   Vitals   Temp 97.6 °F (36.4 °C)   Temp Source Oral   Pulse 119   Heart Rate Source Monitor   Resp 18   /79   Level of Consciousness 0   MEWS Score 3   Patient Currently in Pain Denies   Cardiac Rhythm NSR   Oxygen Therapy   SpO2 92 %   O2 Device High flow nasal cannula   O2 Flow Rate (L/min) 5 L/min       Patient up in bedside chair bathing. Denies pain, denies shortness of breath.

## 2019-05-20 ENCOUNTER — TELEPHONE (OUTPATIENT)
Dept: PULMONOLOGY | Age: 59
End: 2019-05-20

## 2019-05-20 ENCOUNTER — HOSPITAL ENCOUNTER (INPATIENT)
Age: 59
LOS: 10 days | Discharge: HOME HEALTH | DRG: 947 | End: 2019-05-30
Attending: PHYSICAL MEDICINE & REHABILITATION | Admitting: PHYSICAL MEDICINE & REHABILITATION
Payer: COMMERCIAL

## 2019-05-20 VITALS
BODY MASS INDEX: 39.04 KG/M2 | DIASTOLIC BLOOD PRESSURE: 74 MMHG | WEIGHT: 220.3 LBS | OXYGEN SATURATION: 95 % | SYSTOLIC BLOOD PRESSURE: 108 MMHG | TEMPERATURE: 97.9 F | RESPIRATION RATE: 18 BRPM | HEIGHT: 63 IN | HEART RATE: 109 BPM

## 2019-05-20 DIAGNOSIS — R93.89 ABNORMAL CHEST X-RAY: Primary | ICD-10-CM

## 2019-05-20 DIAGNOSIS — F41.1 ANXIETY STATE: ICD-10-CM

## 2019-05-20 PROBLEM — R53.81 DEBILITY: Status: ACTIVE | Noted: 2019-05-20

## 2019-05-20 LAB
ANION GAP SERPL CALCULATED.3IONS-SCNC: 11 MMOL/L (ref 3–16)
BUN BLDV-MCNC: 10 MG/DL (ref 7–20)
CALCIUM SERPL-MCNC: 9.9 MG/DL (ref 8.3–10.6)
CHLORIDE BLD-SCNC: 90 MMOL/L (ref 99–110)
CO2: 39 MMOL/L (ref 21–32)
CREAT SERPL-MCNC: <0.5 MG/DL (ref 0.6–1.1)
GFR AFRICAN AMERICAN: >60
GFR NON-AFRICAN AMERICAN: >60
GLUCOSE BLD-MCNC: 103 MG/DL (ref 70–99)
GLUCOSE BLD-MCNC: 105 MG/DL (ref 70–99)
GLUCOSE BLD-MCNC: 113 MG/DL (ref 70–99)
GLUCOSE BLD-MCNC: 93 MG/DL (ref 70–99)
INR BLD: 1.75 (ref 0.86–1.14)
PERFORMED ON: ABNORMAL
PERFORMED ON: ABNORMAL
PERFORMED ON: NORMAL
POTASSIUM REFLEX MAGNESIUM: 3.8 MMOL/L (ref 3.5–5.1)
PROTHROMBIN TIME: 19.9 SEC (ref 9.8–13)
SODIUM BLD-SCNC: 140 MMOL/L (ref 136–145)

## 2019-05-20 PROCEDURE — 80048 BASIC METABOLIC PNL TOTAL CA: CPT

## 2019-05-20 PROCEDURE — 97530 THERAPEUTIC ACTIVITIES: CPT

## 2019-05-20 PROCEDURE — 6370000000 HC RX 637 (ALT 250 FOR IP): Performed by: INTERNAL MEDICINE

## 2019-05-20 PROCEDURE — 97116 GAIT TRAINING THERAPY: CPT

## 2019-05-20 PROCEDURE — 2700000000 HC OXYGEN THERAPY PER DAY

## 2019-05-20 PROCEDURE — 97110 THERAPEUTIC EXERCISES: CPT

## 2019-05-20 PROCEDURE — 6370000000 HC RX 637 (ALT 250 FOR IP): Performed by: PHYSICAL MEDICINE & REHABILITATION

## 2019-05-20 PROCEDURE — 94640 AIRWAY INHALATION TREATMENT: CPT

## 2019-05-20 PROCEDURE — 85610 PROTHROMBIN TIME: CPT

## 2019-05-20 PROCEDURE — 36592 COLLECT BLOOD FROM PICC: CPT

## 2019-05-20 PROCEDURE — 97535 SELF CARE MNGMENT TRAINING: CPT

## 2019-05-20 PROCEDURE — 94669 MECHANICAL CHEST WALL OSCILL: CPT

## 2019-05-20 PROCEDURE — 99239 HOSP IP/OBS DSCHRG MGMT >30: CPT | Performed by: INTERNAL MEDICINE

## 2019-05-20 PROCEDURE — 2580000003 HC RX 258: Performed by: INTERNAL MEDICINE

## 2019-05-20 PROCEDURE — 99232 SBSQ HOSP IP/OBS MODERATE 35: CPT | Performed by: INTERNAL MEDICINE

## 2019-05-20 PROCEDURE — 6360000002 HC RX W HCPCS: Performed by: PHYSICAL MEDICINE & REHABILITATION

## 2019-05-20 PROCEDURE — 1280000000 HC REHAB R&B

## 2019-05-20 PROCEDURE — 6360000002 HC RX W HCPCS: Performed by: INTERNAL MEDICINE

## 2019-05-20 PROCEDURE — 94761 N-INVAS EAR/PLS OXIMETRY MLT: CPT

## 2019-05-20 RX ORDER — DEXTROSE MONOHYDRATE 25 G/50ML
12.5 INJECTION, SOLUTION INTRAVENOUS PRN
Status: DISCONTINUED | OUTPATIENT
Start: 2019-05-20 | End: 2019-05-30 | Stop reason: HOSPADM

## 2019-05-20 RX ORDER — SODIUM CHLORIDE 0.9 % (FLUSH) 0.9 %
10 SYRINGE (ML) INJECTION PRN
Status: CANCELLED | OUTPATIENT
Start: 2019-05-20

## 2019-05-20 RX ORDER — ACETAMINOPHEN 160 MG/5ML
650 SOLUTION ORAL EVERY 4 HOURS PRN
Status: DISCONTINUED | OUTPATIENT
Start: 2019-05-20 | End: 2019-05-30 | Stop reason: HOSPADM

## 2019-05-20 RX ORDER — FUROSEMIDE 40 MG/1
40 TABLET ORAL DAILY
Status: DISCONTINUED | OUTPATIENT
Start: 2019-05-21 | End: 2019-05-30 | Stop reason: HOSPADM

## 2019-05-20 RX ORDER — TRAZODONE HYDROCHLORIDE 50 MG/1
50 TABLET ORAL NIGHTLY PRN
Status: CANCELLED | OUTPATIENT
Start: 2019-05-20

## 2019-05-20 RX ORDER — MAGNESIUM SULFATE 1 G/100ML
1 INJECTION INTRAVENOUS PRN
Status: CANCELLED | OUTPATIENT
Start: 2019-05-20

## 2019-05-20 RX ORDER — IBUPROFEN 400 MG/1
600 TABLET ORAL EVERY 8 HOURS PRN
Status: DISCONTINUED | OUTPATIENT
Start: 2019-05-20 | End: 2019-05-30 | Stop reason: HOSPADM

## 2019-05-20 RX ORDER — ONDANSETRON 4 MG/1
8 TABLET, ORALLY DISINTEGRATING ORAL EVERY 8 HOURS PRN
Status: CANCELLED | OUTPATIENT
Start: 2019-05-20

## 2019-05-20 RX ORDER — CARBOXYMETHYLCELLULOSE SODIUM 10 MG/ML
1 GEL OPHTHALMIC PRN
Status: DISCONTINUED | OUTPATIENT
Start: 2019-05-20 | End: 2019-05-30 | Stop reason: HOSPADM

## 2019-05-20 RX ORDER — ONDANSETRON 4 MG/1
8 TABLET, ORALLY DISINTEGRATING ORAL EVERY 8 HOURS PRN
Status: DISCONTINUED | OUTPATIENT
Start: 2019-05-20 | End: 2019-05-30 | Stop reason: HOSPADM

## 2019-05-20 RX ORDER — ONDANSETRON 2 MG/ML
4 INJECTION INTRAMUSCULAR; INTRAVENOUS EVERY 6 HOURS PRN
Status: CANCELLED | OUTPATIENT
Start: 2019-05-20

## 2019-05-20 RX ORDER — ALBUTEROL SULFATE 2.5 MG/3ML
2.5 SOLUTION RESPIRATORY (INHALATION)
Status: CANCELLED | OUTPATIENT
Start: 2019-05-20

## 2019-05-20 RX ORDER — NICOTINE POLACRILEX 4 MG
15 LOZENGE BUCCAL PRN
Status: CANCELLED | OUTPATIENT
Start: 2019-05-20

## 2019-05-20 RX ORDER — ALPRAZOLAM 0.25 MG/1
0.25 TABLET ORAL 2 TIMES DAILY PRN
Status: DISCONTINUED | OUTPATIENT
Start: 2019-05-20 | End: 2019-05-30 | Stop reason: HOSPADM

## 2019-05-20 RX ORDER — SODIUM CHLORIDE 0.9 % (FLUSH) 0.9 %
10 SYRINGE (ML) INJECTION EVERY 12 HOURS SCHEDULED
Status: CANCELLED | OUTPATIENT
Start: 2019-05-20

## 2019-05-20 RX ORDER — CITALOPRAM 20 MG/1
20 TABLET ORAL DAILY
Status: DISCONTINUED | OUTPATIENT
Start: 2019-05-21 | End: 2019-05-30 | Stop reason: HOSPADM

## 2019-05-20 RX ORDER — TRAZODONE HYDROCHLORIDE 50 MG/1
50 TABLET ORAL NIGHTLY PRN
Status: DISCONTINUED | OUTPATIENT
Start: 2019-05-20 | End: 2019-05-30 | Stop reason: HOSPADM

## 2019-05-20 RX ORDER — MAGNESIUM SULFATE 1 G/100ML
1 INJECTION INTRAVENOUS PRN
Status: DISCONTINUED | OUTPATIENT
Start: 2019-05-20 | End: 2019-05-30 | Stop reason: HOSPADM

## 2019-05-20 RX ORDER — SENNA AND DOCUSATE SODIUM 50; 8.6 MG/1; MG/1
2 TABLET, FILM COATED ORAL 2 TIMES DAILY
Status: CANCELLED | OUTPATIENT
Start: 2019-05-20

## 2019-05-20 RX ORDER — CITALOPRAM 20 MG/1
20 TABLET ORAL DAILY
Status: CANCELLED | OUTPATIENT
Start: 2019-05-21

## 2019-05-20 RX ORDER — ALBUTEROL SULFATE 2.5 MG/3ML
1.25 SOLUTION RESPIRATORY (INHALATION) EVERY 4 HOURS PRN
Status: DISCONTINUED | OUTPATIENT
Start: 2019-05-20 | End: 2019-05-30 | Stop reason: HOSPADM

## 2019-05-20 RX ORDER — DEXTROSE MONOHYDRATE 50 MG/ML
100 INJECTION, SOLUTION INTRAVENOUS PRN
Status: CANCELLED | OUTPATIENT
Start: 2019-05-20

## 2019-05-20 RX ORDER — ERGOCALCIFEROL 1.25 MG/1
50000 CAPSULE ORAL WEEKLY
Status: DISCONTINUED | OUTPATIENT
Start: 2019-05-24 | End: 2019-05-30 | Stop reason: HOSPADM

## 2019-05-20 RX ORDER — IBUPROFEN 600 MG/1
600 TABLET ORAL EVERY 8 HOURS PRN
Status: CANCELLED | OUTPATIENT
Start: 2019-05-20

## 2019-05-20 RX ORDER — DEXTROSE MONOHYDRATE 50 MG/ML
100 INJECTION, SOLUTION INTRAVENOUS PRN
Status: DISCONTINUED | OUTPATIENT
Start: 2019-05-20 | End: 2019-05-30 | Stop reason: HOSPADM

## 2019-05-20 RX ORDER — ACETAMINOPHEN 325 MG/1
650 TABLET ORAL EVERY 4 HOURS PRN
Status: CANCELLED | OUTPATIENT
Start: 2019-05-20

## 2019-05-20 RX ORDER — ALBUTEROL SULFATE 2.5 MG/3ML
1.25 SOLUTION RESPIRATORY (INHALATION) EVERY 4 HOURS PRN
Status: CANCELLED | OUTPATIENT
Start: 2019-05-20

## 2019-05-20 RX ORDER — ACETAMINOPHEN 160 MG/5ML
650 SOLUTION ORAL EVERY 4 HOURS PRN
Status: CANCELLED | OUTPATIENT
Start: 2019-05-20

## 2019-05-20 RX ORDER — SODIUM CHLORIDE 0.9 % (FLUSH) 0.9 %
10 SYRINGE (ML) INJECTION EVERY 12 HOURS SCHEDULED
Status: DISCONTINUED | OUTPATIENT
Start: 2019-05-20 | End: 2019-05-29

## 2019-05-20 RX ORDER — DEXTROSE MONOHYDRATE 25 G/50ML
12.5 INJECTION, SOLUTION INTRAVENOUS PRN
Status: CANCELLED | OUTPATIENT
Start: 2019-05-20

## 2019-05-20 RX ORDER — ACETAMINOPHEN 325 MG/1
650 TABLET ORAL EVERY 4 HOURS PRN
Status: DISCONTINUED | OUTPATIENT
Start: 2019-05-20 | End: 2019-05-30 | Stop reason: HOSPADM

## 2019-05-20 RX ORDER — CARBOXYMETHYLCELLULOSE SODIUM 10 MG/ML
1 GEL OPHTHALMIC PRN
Status: CANCELLED | OUTPATIENT
Start: 2019-05-20

## 2019-05-20 RX ORDER — HYDRALAZINE HYDROCHLORIDE 25 MG/1
50 TABLET, FILM COATED ORAL EVERY 6 HOURS PRN
Status: DISCONTINUED | OUTPATIENT
Start: 2019-05-20 | End: 2019-05-30 | Stop reason: HOSPADM

## 2019-05-20 RX ORDER — ALBUTEROL SULFATE 2.5 MG/3ML
2.5 SOLUTION RESPIRATORY (INHALATION)
Status: DISCONTINUED | OUTPATIENT
Start: 2019-05-21 | End: 2019-05-20

## 2019-05-20 RX ORDER — HYDRALAZINE HYDROCHLORIDE 50 MG/1
50 TABLET, FILM COATED ORAL EVERY 6 HOURS PRN
Status: CANCELLED | OUTPATIENT
Start: 2019-05-20

## 2019-05-20 RX ORDER — FUROSEMIDE 40 MG/1
40 TABLET ORAL DAILY
Status: CANCELLED | OUTPATIENT
Start: 2019-05-21

## 2019-05-20 RX ORDER — SODIUM CHLORIDE 0.9 % (FLUSH) 0.9 %
10 SYRINGE (ML) INJECTION PRN
Status: DISCONTINUED | OUTPATIENT
Start: 2019-05-20 | End: 2019-05-25

## 2019-05-20 RX ORDER — NICOTINE POLACRILEX 4 MG
15 LOZENGE BUCCAL PRN
Status: DISCONTINUED | OUTPATIENT
Start: 2019-05-20 | End: 2019-05-25

## 2019-05-20 RX ORDER — ONDANSETRON 2 MG/ML
4 INJECTION INTRAMUSCULAR; INTRAVENOUS EVERY 6 HOURS PRN
Status: DISCONTINUED | OUTPATIENT
Start: 2019-05-20 | End: 2019-05-30 | Stop reason: HOSPADM

## 2019-05-20 RX ORDER — ALBUTEROL SULFATE 2.5 MG/3ML
2.5 SOLUTION RESPIRATORY (INHALATION) 2 TIMES DAILY
Status: DISCONTINUED | OUTPATIENT
Start: 2019-05-21 | End: 2019-05-26

## 2019-05-20 RX ORDER — SENNA AND DOCUSATE SODIUM 50; 8.6 MG/1; MG/1
2 TABLET, FILM COATED ORAL 2 TIMES DAILY
Status: DISCONTINUED | OUTPATIENT
Start: 2019-05-20 | End: 2019-05-30 | Stop reason: HOSPADM

## 2019-05-20 RX ORDER — FLUCONAZOLE 100 MG/1
100 TABLET ORAL DAILY
Status: CANCELLED | OUTPATIENT
Start: 2019-05-21

## 2019-05-20 RX ORDER — ERGOCALCIFEROL 1.25 MG/1
50000 CAPSULE ORAL WEEKLY
Status: CANCELLED | OUTPATIENT
Start: 2019-05-24 | End: 2019-07-12

## 2019-05-20 RX ORDER — ALPRAZOLAM 0.25 MG/1
0.25 TABLET ORAL 2 TIMES DAILY PRN
Status: CANCELLED | OUTPATIENT
Start: 2019-05-20

## 2019-05-20 RX ADMIN — APIXABAN 10 MG: 5 TABLET, FILM COATED ORAL at 08:21

## 2019-05-20 RX ADMIN — SENNOSIDES AND DOCUSATE SODIUM 2 TABLET: 8.6; 5 TABLET ORAL at 08:21

## 2019-05-20 RX ADMIN — CHOLECALCIFEROL TAB 125 MCG (5000 UNIT) 5000 UNITS: 125 TAB at 08:21

## 2019-05-20 RX ADMIN — FLUCONAZOLE 100 MG: 100 TABLET ORAL at 08:21

## 2019-05-20 RX ADMIN — APIXABAN 10 MG: 5 TABLET, FILM COATED ORAL at 22:57

## 2019-05-20 RX ADMIN — Medication 10 ML: at 08:21

## 2019-05-20 RX ADMIN — ALBUTEROL SULFATE 2.5 MG: 2.5 SOLUTION RESPIRATORY (INHALATION) at 11:26

## 2019-05-20 RX ADMIN — ALBUTEROL SULFATE 2.5 MG: 2.5 SOLUTION RESPIRATORY (INHALATION) at 07:45

## 2019-05-20 RX ADMIN — ALBUTEROL SULFATE 2.5 MG: 2.5 SOLUTION RESPIRATORY (INHALATION) at 20:48

## 2019-05-20 RX ADMIN — CITALOPRAM HYDROBROMIDE 20 MG: 20 TABLET ORAL at 08:21

## 2019-05-20 RX ADMIN — FUROSEMIDE 40 MG: 40 TABLET ORAL at 08:21

## 2019-05-20 ASSESSMENT — PAIN SCALES - GENERAL
PAINLEVEL_OUTOF10: 0
PAINLEVEL_OUTOF10: 0

## 2019-05-20 NOTE — FLOWSHEET NOTE
05/19/19 2145   Vital Signs   Temp 97.4 °F (36.3 °C)   Pulse 96   Resp 18   BP (!) 161/78   Patient Position Semi fowlers   Level of Consciousness 0   MEWS Score 1   Oxygen Therapy   SpO2 94 %   O2 Device High flow nasal cannula   O2 Flow Rate (L/min) 4 L/min   Shift assessment complete; see flow sheet. Scheduled medications administered; see MAR. Call light and bedside table within reach, bed in low, locked position, side rails up, pt encouraged to call for assistance with ambulation or transfer. Pt denies further needs at this time. Nurse and staff will continue to monitor throughout shift.

## 2019-05-20 NOTE — PROGRESS NOTES
Pulmonary Progress Note  CC: Acute respiratory failure with hypoxemia    Subjective:    Patient feels better. EXAM:   /74   Pulse 109   Temp 97.9 °F (36.6 °C) (Oral)   Resp 18   Ht 5' 3\" (1.6 m)   Wt 220 lb 4.8 oz (99.9 kg)   SpO2 95%   BMI 39.02 kg/m²  on 4l NC  Constitutional:  No acute distress, obese. HEENT: no scleral icterus  Neck: No tracheal deviation present. Cardiovascular: Normal heart sounds. Pulmonary/Chest: No wheezes. No rhonchi. No rales. No decreased breath sounds. No accessory muscle usage or stridor. Abdominal: Soft. Musculoskeletal: No cyanosis. No clubbing. Skin: Skin is warm and dry. Scheduled Meds:   vitamin D3  5,000 Units Oral Daily    vitamin D  50,000 Units Oral Weekly    furosemide  40 mg Oral Daily    apixaban  10 mg Oral BID    [START ON 5/21/2019] apixaban  5 mg Oral BID    albuterol  2.5 mg Nebulization Q4H WA    sennosides-docusate sodium  2 tablet Oral BID    diclofenac sodium  2 g Topical BID    citalopram  20 mg Oral Daily    sodium chloride flush  10 mL Intravenous 2 times per day     Continuous Infusions:   dextrose       PRN Meds:  ALPRAZolam, albuterol, magnesium sulfate, potassium chloride, glucose, dextrose, glucagon (rDNA), dextrose, carboxymethylcellulose PF, acetaminophen, perflutren lipid microspheres, ibuprofen, sodium chloride flush, magnesium hydroxide, ondansetron    Labs:  CBC: No results for input(s): WBC, HGB, HCT, MCV, PLT in the last 72 hours.   BMP:   Recent Labs     05/18/19  0533 05/19/19  0607 05/20/19  0615    136 140   K 3.5 3.7 3.8   CL 85* 86* 90*   CO2 40* 37* 39*   BUN 12 12 10   CREATININE <0.5* <0.5* <0.5*           Films:  No new    ASSESSMENT:  · Acute respiratory failure with hypoxemia  · Abnormal cxr 5/17/19- new left midlung opacities  · Bacterial pneumonia  · Acute cor pulmonale  · Chronic hypercapnic resp failue  · OHS    PLAN:  · Continue bipap  · Supplemental oxygen to maintain SaO2 >92%; wean as tolerated   · Eliquis bid  · PT/OT  · Recommend CXR in 6 weeks- dr. Tresa Ramirez notified office

## 2019-05-20 NOTE — DISCHARGE SUMMARY
Name:  Kym Anthony  Room:  /0327-00  MRN:    [id number]    Discharge Summary      This discharge summary is in conjunction with a complete physical exam done on the day of discharge. Discharging Physician: Dr. Liang Mc: 4/28/2019  Discharge:      HPI taken from admission H&P:      The patient is a 64 y.o. female with history of RA, HLD, GERD, CLBP who has been unwell for the past few weeks with progressively worsening SOB and associated bilateral ankle/leg swelling with orthopnea but no PND. No chest pains, palpitations, nausea/vomiting. On presentation to ER she was desaturating to 77% room with cyanosis. She improved on BiPAP with lasix IV. She is seen in ICU on NC with sats 92%     Diagnoses this Admission and Hospital Course     Acute hypoxic resp failure  - came w/ severe hypoxia . Poss 2/2 acute pulmonary edema & acute CHF, Also likely acute PE.  - S/p mech vent  - Echo showed normal LV function; does not show diastolic HF; shows mod pulm HTN. - Venous doppler - + LLE DVT, below knee. CTA was suboptimal.   - Continued on IV Lasix- > switched to PO Lasix  - Started on AC w/ heparin gtt; Her DVT & PE were likely present at the time of her admission - > switched to Eliquis  - Intubated 5/3 for worsening hypercarbia. S/p Bronch on  5/7/19.  - Extubated on 5/11/19.   - on HF NC O2 - > wean off O2 as tolerated. Currently on oxygen 5 --> 4L; Encouraged IS  - Transferred to PCU on 5/15/19.    - Cont'd diuresis & AC. Cont'd nebulizer treatments. Bronch washings positive for candida only  - discharged with Lasix and Eliquis on 4L to ARU     Chronic hypercarbic respiratory failure   - likely obesity hypoventilation syndrome  - mod pulm HTN on ECHO.     Acute tracheobronchitis  - No fevers.  Has mildly elevated leukocytosis. Pro-calcitonin neg.  - completed abxc - Vancomycin day 7/7 and Levaquin day 7/7.      Anasarca   - monitor I/O   - net  ~ 6.3 L   - ?nutritional  - cont'd PO lasix     Debility   - cont'd PT,OT  - discharged to ARU     Left Foot Drop  - new on admission  - worked with PT/OT  - added brace  - discharged to ARU     Vitamin D deficiency  - levels low at < 0.5  - replaced     Morbid Obesity  - Body mass index is 39.02 kg/m². - Complicating assessment and treatment. Placing patient at risk for multiple co-morbidities as well as early death and contributing to the patient's presentation.     Procedures (Please Review Full Report for Details)  PICC    Consults    Pulmonology  Hematology/Oncology  Cardiology  CHF Clinic    Physical Exam at Discharge:    /74   Pulse 109   Temp 97.9 °F (36.6 °C) (Oral)   Resp 18   Ht 5' 3\" (1.6 m)   Wt 220 lb 4.8 oz (99.9 kg)   SpO2 95%   BMI 39.02 kg/m²   General:  Awake, alert and oriented. Appears to be not in any distress  Mucous Membranes:  Pink , anicteric  Neck: No JVD, no carotid bruit, no thyromegaly  Chest:  Clear to auscultation bilaterally, no added sounds  Cardiovascular:  RRR S1S2 heard, no murmurs or gallops  Abdomen:  Soft, undistended, non tender, no organomegaly, BS present  Extremities: No edema or cyanosis. Distal pulses well felt  Neurological : grossly normal    BMP:   Recent Labs     05/18/19  0533 05/19/19  0607 05/20/19  0615    136 140   K 3.5 3.7 3.8   CL 85* 86* 90*   CO2 40* 37* 39*   BUN 12 12 10   CREATININE <0.5* <0.5* <0.5*       Recent Labs     05/18/19  0533 05/19/19  0607 05/20/19  0615   PROTIME 24.3* 20.6* 19.9*   INR 2.13* 1.81* 1.75*     CULTURES    Fungal cx- 5/9-5/15: moderate growth Candida    Blood cx x2: NGTD     Urine cx: NGTD    RADIOLOGY    XR CHEST STANDARD (2 VW) 5/17/2019   Final Result   Small bilateral pleural effusions. New nodular region the mid to inferior left lung.   This may be related to   overlapping bony or soft tissue structures however recommend further   evaluation with CT of the chest.         XR CHEST PORTABLE 5/11/2019   Final Result   Stable life support device positioning. Persistent layering bilateral effusions and basilar opacities. Slightly   improved right lung expansion. XR CHEST PORTABLE 5/10/2019   Final Result   Improved pleural fluid and aeration in the left lung with worsening fluid and   aeration on the right. Pattern would favor pulmonary edema/ARDS. XR CHEST PORTABLE 5/9/2019   Final Result   1. Pulmonary edema. 2. Improving right basilar and grossly stable left basilar atelectasis and/or   pneumonia. XR CHEST PORTABLE 5/8/2019   Final Result   Worsening bibasilar atelectasis and/or pneumonia. XR CHEST PORTABLE 5/6/2019   Final Result   Improving bilateral airspace disease and pleural effusions. XR CHEST PORTABLE 5/6/2019   Final Result   Increasing right basilar and new bilateral upper lobe airspace disease could   represent edema or pneumonia. Left basilar pleuroparenchymal disease is   stable. XR CHEST PORTABLE 5/5/2019   Final Result   1. No significant change. IR PICC WO SQ PORT/PUMP > 5 YEARS 5/6/2019   Final Result   Successful placement of PICC line. XR CHEST PORTABLE 5/3/2019   Final Result   Supportive devices are positioned appropriately. There is increasing   pulmonary edema since prior exam.         VL Extremity Venous Bilateral 5/1/2019   Final Result   1. There is evidence of isolated acute deep venous thrombosis involving the   peroneal vein below the knee in the left lower extremity.   2. There is no other evidence of deep or superficial venous thrombosis   involving the lower extremities bilaterally. CT Chest WO Contrast 5/1/2019   Final Result   1. Stable small left pleural effusion with a new small right pleural effusion. 2. Interval worsening of dependent consolidative opacity within bilateral   lower lobe since the study of 04/28/2019.   In combination with worsening   bibasilar predominant mucous plugging, this likely represents either   atelectasis or You can get these medications from any pharmacy    Bring a paper prescription for each of these medications  · ALPRAZolam 0.25 MG tablet     Information about where to get these medications is not yet available    Ask your nurse or doctor about these medications  · apixaban 5 MG Tabs tablet  · apixaban 5 MG Tabs tablet  · carboxymethylcellulose PF 1 % Gel ophthalmic gel  · ergocalciferol 85071 units capsule  · furosemide 40 MG tablet  · ibuprofen 600 MG tablet  · sennosides-docusate sodium 8.6-50 MG tablet  · vitamin D3 5000 units Tabs tablet           Discharged in stable condition to ARU. Follow Up: Follow up with physician at [facility].         Tyler Blair MD 5/22/2019 10:07 PM

## 2019-05-20 NOTE — TELEPHONE ENCOUNTER
Inpatient Notes   Received: 2 days ago   Message Contents   MD Román Ring MA             Chest x-ray 4 weeks

## 2019-05-20 NOTE — CARE COORDINATION
DISCHARGE ORDER  Date/Time 2019 2:53 PM  Completed by: Marion Pearson, Case Management    Patient Name: Jeffery Carrillo    : 1960  Admitting Diagnosis: Acute respiratory failure ([address] [J96.00]  Admit Date/Time: 2019  1:37 PM    Noted discharge order. Confirmed discharge plan with patient / family (pt): Yes   Discharge Plan: Reviewed chart. Role of discharge planner explained and patient verbalized understanding. Discharge order is noted. Pt is being d/c'd to [address] [place] [address] today. Transportation was arranged with Erik Colon at [facility] with ETA for  scheduled for 17:30 pm. Pt notified and is agreeable. Spoke Sandeep schaeffer (ARU liaison), notified of  time, Pt's O2 sats are 96% on 4 liters. Discharge timeout done with trino RN. All discharge needs and concerns addressed.

## 2019-05-20 NOTE — PROGRESS NOTES
Hospitalist Progress Note        PCP: Cindy Beach MD    Date of Admission: 4/28/2019     Interval history  Admitted with acute respiratory failure  S/P mechanical ventilation . Extubated on 5/11 5/7- Bronchoscopy was done. No endobronchial lesion seen. Mucosa appears normal. She had yellow and white secretions were thick. Transferred out of ICU to PCU on 5/15/19  Bronchial washings with candida      Subjective: feels well, up working with PT/OT. Down to 4L NC (previously on 10L high flow), awaiting precert for rehab. Medications:  Reviewed    Infusion Medications    dextrose       Scheduled Medications    vitamin D3  5,000 Units Oral Daily    vitamin D  50,000 Units Oral Weekly    furosemide  40 mg Oral Daily    fluconazole  100 mg Oral Daily    apixaban  10 mg Oral BID    [START ON 5/21/2019] apixaban  5 mg Oral BID    albuterol  2.5 mg Nebulization Q4H WA    sennosides-docusate sodium  2 tablet Oral BID    diclofenac sodium  2 g Topical BID    citalopram  20 mg Oral Daily    sodium chloride flush  10 mL Intravenous 2 times per day     PRN Meds: ALPRAZolam, albuterol, magnesium sulfate, potassium chloride, glucose, dextrose, glucagon (rDNA), dextrose, carboxymethylcellulose PF, acetaminophen, perflutren lipid microspheres, ibuprofen, sodium chloride flush, magnesium hydroxide, ondansetron      Intake/Output Summary (Last 24 hours) at 5/20/2019 1034  Last data filed at 5/20/2019 0833  Gross per 24 hour   Intake 730 ml   Output 850 ml   Net -120 ml       Physical Exam Performed:    /74   Pulse 109   Temp 97.9 °F (36.6 °C) (Oral)   Resp 18   Ht 5' 3\" (1.6 m)   Wt 220 lb 4.8 oz (99.9 kg)   SpO2 90%   BMI 39.02 kg/m²   General: No distress. awake alert and well oriented . Mucous Membranes:  Pink , anicteric  Neck: No JVD, no carotid bruit, no thyromegaly  Chest:  Diminished breath sounds . No wheezes rales or rhonchi.  On 4L NC  Cardiovascular:  RRR  S1S2 heard, no murmurs or gallops  Abdomen:  Soft, undistended, non tender, no organomegaly, BS present  Extremities: No edema or cyanosis. Distal pulses well felt. Left foot drop  Neurological : non focal         I Miriam Seymour have reviewed the chart on [facility] and personally interviewed and examined patient, reviewed the data (labs and imaging) and after discussion with my PA formulated the plan. Agree with note with the following edits. HPI:     I reviewed the patient's Past Medical History, Past Surgical History, Medications, and Allergies. No acute issues  Feels fine today on 4 L     General:  Awake, alert and oriented. Appears to be not in any distress  Mucous Membranes:  Pink , anicteric  Neck: No JVD, no carotid bruit, no thyromegaly  Chest:  Clear to auscultation bilaterally, no added sounds  Cardiovascular:  RRR S1S2 heard, no murmurs or gallops  Abdomen:  Soft, undistended, non tender, no organomegaly, BS present  Extremities: No edema or cyanosis. Distal pulses well felt  Neurological : grossly normal      Labs:     Recent Labs     05/18/19  0533 05/19/19  0607 05/20/19  0615    136 140   K 3.5 3.7 3.8   CL 85* 86* 90*   CO2 40* 37* 39*   BUN 12 12 10   CREATININE <0.5* <0.5* <0.5*   CALCIUM 10.0 10.0 9.9       Recent Labs     05/18/19  0533 05/19/19  0607 05/20/19  0615   INR 2.13* 1.81* 1.75*     Vitamin D level low at less than 0.5    Urinalysis:      Lab Results   Component Value Date    NITRU Negative 04/28/2019    WBCUA 0-2 04/28/2019    BACTERIA 1+ 04/28/2019    RBCUA 0-2 04/28/2019    BLOODU TRACE-INTACT 04/28/2019    SPECGRAV <=1.005 04/28/2019    GLUCOSEU Negative 04/28/2019       Radiology:  XR CHEST STANDARD (2 VW)   Final Result   Small bilateral pleural effusions. New nodular region the mid to inferior left lung.   This may be related to   overlapping bony or soft tissue structures however recommend further   evaluation with CT of the chest.         XR CHEST PORTABLE Final Result   Stable life support device positioning. Persistent layering bilateral effusions and basilar opacities. Slightly   improved right lung expansion. XR CHEST PORTABLE   Final Result   Improved pleural fluid and aeration in the left lung with worsening fluid and   aeration on the right. Pattern would favor pulmonary edema/ARDS. XR CHEST PORTABLE   Final Result   1. Pulmonary edema. 2. Improving right basilar and grossly stable left basilar atelectasis and/or   pneumonia. XR CHEST PORTABLE   Final Result   Worsening bibasilar atelectasis and/or pneumonia. XR CHEST PORTABLE   Final Result   Improving bilateral airspace disease and pleural effusions. XR CHEST PORTABLE   Final Result   Increasing right basilar and new bilateral upper lobe airspace disease could   represent edema or pneumonia. Left basilar pleuroparenchymal disease is   stable. XR CHEST PORTABLE   Final Result   1. No significant change. IR PICC WO SQ PORT/PUMP > 5 YEARS   Final Result   Successful placement of PICC line. XR CHEST PORTABLE   Final Result   Supportive devices are positioned appropriately. There is increasing   pulmonary edema since prior exam.         VL Extremity Venous Bilateral   Final Result      CT Chest WO Contrast   Final Result   1. Stable small left pleural effusion with a new small right pleural effusion. 2. Interval worsening of dependent consolidative opacity within bilateral   lower lobe since the study of 04/28/2019. In combination with worsening   bibasilar predominant mucous plugging, this likely represents either   atelectasis or aspiration. Clinical correlation is advised. 3. Slight interval improvement in appearance of lingular airspace   consolidation, likely representing resolving pneumonia. XR CHEST PORTABLE   Final Result   Mild cardiomegaly and chronic pulmonary change without definite acute   pulmonary process. CT Chest Pulmonary Embolism W Contrast   Final Result   Suboptimal contrast timing although there is no evidence for large or central   pulmonary embolism. The segmental and subsegmental branches are not well   evaluated. Small left basilar effusion with adjacent consolidation and there are also   infiltrates within the lingula and right lung base that may represent   atelectasis versus pneumonia. Small amount of free fluid in the visualized upper abdomen. XR CHEST PORTABLE   Final Result   Cardiomegaly and mild pulmonary edema. LE doppler on 5/1/19  Summary      1. There is evidence of isolated acute deep venous thrombosis involving the   peroneal vein below the knee in the left lower extremity.   2. There is no other evidence of deep or superficial venous thrombosis   involving the lower extremities bilaterally. ECHO 4/29/19   Summary   Left ventricular systolic function is normal with ejection fraction   estimated at 55 %.   No regional wall motion abnormality.   Left ventricle size is normal.   There is moderate concentric left ventricular hypertrophy.   Normal left ventricular diastolic filling pressure.   The aortic valve leaflets are not well visualized.   Aortic valve appears sclerotic but opens adequately.   No evidence of aortic valve stenosis.   Trivial aortic regurgitation is present.   Right ventricular systolic function is normal.   The right ventricle is moderately enlarged.   Systolic pulmonary artery pressure (SPAP) estimated at 54 mmHg (RA pressure   15 mmHg), consistent with moderate pulmonary hypertension.     Assessment/Plan:    Principal Problem:    Acute respiratory failure with hypoxia (HCC)  Active Problems:    Rheumatoid arthritis (HCC)    Tobacco abuse    Anxiety state    SOB (shortness of breath)    Moderate persistent asthma with acute exacerbation    New onset of congestive heart failure (HCC)    Morbid obesity ([address]    Obesity hypoventilation syndrome (HCC)    Erythrocytosis    Acute deep vein thrombosis (DVT) of left peroneal vein (HCC)    Pulmonary embolus (HCC)    Moderate protein-calorie malnutrition (HCC)    Bacterial pneumonia    Acute deep vein thrombosis (DVT) of distal end of left lower extremity (HCC)    Acute cor pulmonale (HCC)    Disorder of electrolytes    Anasarca    Abnormal chest x-ray  Resolved Problems:    * No resolved hospital problems. *       Acute hypoxic resp failure  - came w/ severe hypoxia . Poss 2/2 acute pulmonary edema & acute CHF, Also likely acute PE.  - S/p mech vent  - Echo showed normal LV function; does not show diastolic HF; shows mod pulm HTN. - Venous doppler - + LLE DVT, below knee. CTA was suboptimal.   - Continued on IV Lasix- > switched to PO Lasix  - Started on [facility] w/ heparin gtt; Her DVT & PE were likely present at the time of her admission - > switched to Eliquis  - Intubated 5/3 for worsening hypercarbia. S/p Bronch on  5/7/19.  - Extubated on 5/11/19.   - on HF NC O2 - > wean off O2 as tolerated. Currently on oxygen 5 --> 4L; Encourage IS  - Transferred to PCU on 5/15/19.    - Cont diuresis & AC. Cont nebulizer treatments. Bronch washings positive for candida only     Chronic hypercarbic respiratory failure   - likely obesity hypoventilation syndrome  - mod pulm HTN on ECHO.     Acute tracheobronchitis  - No fevers.  Has mildly elevated leukocytosis.  Pro-calcitonin neg.  - completed abxc -  Vancomycin day 7/7 and Levaquin day 7/7. Anasarca   - monitor I/O   - net  ~ 6.3 L   - ?nutritional  - cont PO lasix      Debility   - cont PT,OT    Left Foot Drop  - new on admission  - working with PT/OT  - add brace    Vitamin D deficiency  - levels low at < 0.5  - replace    Morbid Obesity  - Body mass index is 39.02 kg/m². - Complicating assessment and treatment.  Placing patient at risk for multiple co-morbidities as well as early death and contributing to the patient's presentation.     Code Status: Full 74

## 2019-05-20 NOTE — PROGRESS NOTES
Occupational Therapy Daily Treatment Note    Unit: PCU  Date:  5/20/2019  Patient Name:    Loni Sutton  Admitting diagnosis:  Acute respiratory failure ([facility] [J96.00]  Admit Date:  4/28/2019  Precautions/Restrictions:  fall risk, IV, bed/chair alarm, supplemental o2 (4L), telemetry and continuous pulse ox      Discharge Recommendations: Acute Rehab (ARU)/ Inpatient Rehab Facility (IRF)  DME needs for discharge: defer to facility       Therapy recommendations for staff:   Assist of 1 with use of rolling walker (RW) for all ambulation to/from bathroom    AM-PAC Score: [employer] S4 Level: NA       Treatment Time: 4819-6522  Treatment number: 3  Total Treatment Time:   45 minutes      Subjective:  Pt supine in bed, agreeable to therapy; just completed PT and fatigued, but eager to work with therapist    Pain   No  Rating: NA  Location:  Pain Medicine Status: Denies need      Bed Mobility:   Supine to Sit:  SBA  Sit to Supine:  SBA  Rolling:           Not Tested  Scooting:        SBA    Transfer Training:   Sit to stand:   CGA  Stand to sit:  CGA  Bed to Chair:  Not Tested  Bed to [facility]:   Not Tested  Standard toilet:   CGA    Activity Tolerance   Pt completed therapy session with No adverse symptoms  SpO2: 91-94% on 4L   HR: 110-125 throughout      ADL Training:   Upper body dressing:  CGA  Upper body bathing:  CGA  Lower body dressing:  Min A  Lower body bathing: Max A for efficiency for juan luis area; SBA for LEs to knees and buttocks  Toileting:   Supervision  Grooming/Hygiene:  Supervision    Therapeutic Exercise:   N/A    Patient Education:   Role of OT  Recommendations for DC    Positioning Needs: In bed, call light and needs in reach. Alarm Set    Family Present:  No    Assessment: Pt progressing well with activity tolerance and ADLs. Limted by weakness, foot drop, and increased O2 requirements.  Pt continues to fatigue quickly and would benefit from intensive therapies in ARU setting at [address] [place] [address] (upgraded on 5/20). 1). Bed to toilet/BSC: SBA with RW       To be met in 5 Visits:  1). Supine to Sit: IND  2). Upper Body Bathing: Sup  3). Lower Body Bathing:Min A  4). Upper Body Dressing: SBA  5). Lower Body Dressing: CGA  6).  Pt to des UE exs x 10 reps         Plan: cont with POC      CHANCE Berumen/L  Lic # [id number]        If patient discharges from this facility prior to next visit, this note will serve as the Discharge Summary

## 2019-05-21 PROBLEM — E44.0 MODERATE MALNUTRITION (HCC): Status: ACTIVE | Noted: 2019-05-21

## 2019-05-21 LAB
A/G RATIO: 1 (ref 1.1–2.2)
ALBUMIN SERPL-MCNC: 3.7 G/DL (ref 3.4–5)
ALP BLD-CCNC: 83 U/L (ref 40–129)
ALT SERPL-CCNC: 79 U/L (ref 10–40)
ANION GAP SERPL CALCULATED.3IONS-SCNC: 13 MMOL/L (ref 3–16)
AST SERPL-CCNC: 41 U/L (ref 15–37)
BILIRUB SERPL-MCNC: 0.5 MG/DL (ref 0–1)
BUN BLDV-MCNC: 8 MG/DL (ref 7–20)
CALCIUM SERPL-MCNC: 10.2 MG/DL (ref 8.3–10.6)
CHLORIDE BLD-SCNC: 89 MMOL/L (ref 99–110)
CO2: 38 MMOL/L (ref 21–32)
CREAT SERPL-MCNC: <0.5 MG/DL (ref 0.6–1.1)
GFR AFRICAN AMERICAN: >60
GFR NON-AFRICAN AMERICAN: >60
GLOBULIN: 3.8 G/DL
GLUCOSE BLD-MCNC: 102 MG/DL (ref 70–99)
HCT VFR BLD CALC: 55.2 % (ref 36–48)
HEMOGLOBIN: 17.7 G/DL (ref 12–16)
MCH RBC QN AUTO: 28.3 PG (ref 26–34)
MCHC RBC AUTO-ENTMCNC: 32 G/DL (ref 31–36)
MCV RBC AUTO: 88.4 FL (ref 80–100)
PDW BLD-RTO: 17 % (ref 12.4–15.4)
PLATELET # BLD: 267 K/UL (ref 135–450)
PMV BLD AUTO: 8.3 FL (ref 5–10.5)
POTASSIUM REFLEX MAGNESIUM: 3.7 MMOL/L (ref 3.5–5.1)
RBC # BLD: 6.24 M/UL (ref 4–5.2)
SODIUM BLD-SCNC: 140 MMOL/L (ref 136–145)
TOTAL PROTEIN: 7.5 G/DL (ref 6.4–8.2)
WBC # BLD: 7.2 K/UL (ref 4–11)

## 2019-05-21 PROCEDURE — 97110 THERAPEUTIC EXERCISES: CPT

## 2019-05-21 PROCEDURE — 97530 THERAPEUTIC ACTIVITIES: CPT

## 2019-05-21 PROCEDURE — 92526 ORAL FUNCTION THERAPY: CPT

## 2019-05-21 PROCEDURE — 6360000002 HC RX W HCPCS: Performed by: PHYSICAL MEDICINE & REHABILITATION

## 2019-05-21 PROCEDURE — 1280000000 HC REHAB R&B

## 2019-05-21 PROCEDURE — 2700000000 HC OXYGEN THERAPY PER DAY

## 2019-05-21 PROCEDURE — 92523 SPEECH SOUND LANG COMPREHEN: CPT

## 2019-05-21 PROCEDURE — 97166 OT EVAL MOD COMPLEX 45 MIN: CPT

## 2019-05-21 PROCEDURE — 6370000000 HC RX 637 (ALT 250 FOR IP): Performed by: PHYSICAL MEDICINE & REHABILITATION

## 2019-05-21 PROCEDURE — 97162 PT EVAL MOD COMPLEX 30 MIN: CPT

## 2019-05-21 PROCEDURE — 94761 N-INVAS EAR/PLS OXIMETRY MLT: CPT

## 2019-05-21 PROCEDURE — 92610 EVALUATE SWALLOWING FUNCTION: CPT

## 2019-05-21 PROCEDURE — 97535 SELF CARE MNGMENT TRAINING: CPT

## 2019-05-21 PROCEDURE — 94640 AIRWAY INHALATION TREATMENT: CPT

## 2019-05-21 PROCEDURE — 36415 COLL VENOUS BLD VENIPUNCTURE: CPT

## 2019-05-21 PROCEDURE — 97116 GAIT TRAINING THERAPY: CPT

## 2019-05-21 PROCEDURE — 94669 MECHANICAL CHEST WALL OSCILL: CPT

## 2019-05-21 PROCEDURE — 80053 COMPREHEN METABOLIC PANEL: CPT

## 2019-05-21 PROCEDURE — 85027 COMPLETE CBC AUTOMATED: CPT

## 2019-05-21 RX ADMIN — ALBUTEROL SULFATE 2.5 MG: 2.5 SOLUTION RESPIRATORY (INHALATION) at 07:37

## 2019-05-21 RX ADMIN — ALBUTEROL SULFATE 2.5 MG: 2.5 SOLUTION RESPIRATORY (INHALATION) at 20:29

## 2019-05-21 RX ADMIN — VITAMIN D, TAB 1000IU (100/BT) 5000 UNITS: 25 TAB at 07:59

## 2019-05-21 RX ADMIN — SENNOSIDES,DOCUSATE SODIUM 2 TABLET: 8.6; 5 TABLET, FILM COATED ORAL at 20:22

## 2019-05-21 RX ADMIN — CITALOPRAM HYDROBROMIDE 20 MG: 20 TABLET ORAL at 08:00

## 2019-05-21 RX ADMIN — APIXABAN 5 MG: 5 TABLET, FILM COATED ORAL at 20:22

## 2019-05-21 RX ADMIN — SENNOSIDES,DOCUSATE SODIUM 2 TABLET: 8.6; 5 TABLET, FILM COATED ORAL at 08:00

## 2019-05-21 RX ADMIN — APIXABAN 5 MG: 5 TABLET, FILM COATED ORAL at 08:00

## 2019-05-21 RX ADMIN — ACETAMINOPHEN 650 MG: 325 TABLET ORAL at 08:00

## 2019-05-21 RX ADMIN — FUROSEMIDE 40 MG: 40 TABLET ORAL at 07:59

## 2019-05-21 RX ADMIN — TRAZODONE HYDROCHLORIDE 50 MG: 50 TABLET ORAL at 20:22

## 2019-05-21 ASSESSMENT — 9 HOLE PEG TEST
TESTTIME_SECONDS: 25
TEST_RESULT: FUNCTIONAL
TESTTIME_SECONDS: 25
TEST_RESULT: FUNCTIONAL

## 2019-05-21 ASSESSMENT — PAIN - FUNCTIONAL ASSESSMENT
PAIN_FUNCTIONAL_ASSESSMENT: ACTIVITIES ARE NOT PREVENTED
PAIN_FUNCTIONAL_ASSESSMENT: ACTIVITIES ARE NOT PREVENTED

## 2019-05-21 ASSESSMENT — PAIN SCALES - GENERAL
PAINLEVEL_OUTOF10: 0
PAINLEVEL_OUTOF10: 2
PAINLEVEL_OUTOF10: 0
PAINLEVEL_OUTOF10: 3
PAINLEVEL_OUTOF10: 0
PAINLEVEL_OUTOF10: 3

## 2019-05-21 ASSESSMENT — PAIN DESCRIPTION - ORIENTATION
ORIENTATION: MID;LOWER
ORIENTATION: RIGHT;LEFT

## 2019-05-21 ASSESSMENT — PAIN DESCRIPTION - ONSET
ONSET: ON-GOING
ONSET: ON-GOING

## 2019-05-21 ASSESSMENT — PAIN DESCRIPTION - PAIN TYPE
TYPE: ACUTE PAIN
TYPE: ACUTE PAIN

## 2019-05-21 ASSESSMENT — PAIN DESCRIPTION - DESCRIPTORS
DESCRIPTORS: ACHING
DESCRIPTORS: ACHING

## 2019-05-21 ASSESSMENT — PAIN DESCRIPTION - LOCATION
LOCATION: BACK
LOCATION: BACK

## 2019-05-21 ASSESSMENT — PAIN DESCRIPTION - FREQUENCY
FREQUENCY: INTERMITTENT
FREQUENCY: INTERMITTENT

## 2019-05-21 ASSESSMENT — PAIN DESCRIPTION - PROGRESSION: CLINICAL_PROGRESSION: NOT CHANGED

## 2019-05-21 NOTE — PROGRESS NOTES
Speech Language Pathology  Facility/Department: [facility]   CLINICAL BEDSIDE SWALLOW EVALUATION    NAME: Ann Marie De Leon  : 1960  MRN: [id number]    ADMISSION DATE: 2019  ADMITTING DIAGNOSIS: has Anxiety state; Rheumatoid arthritis ([address] Tobacco abuse; SOB (shortness of breath); Moderate persistent asthma with acute exacerbation; Acute respiratory failure with hypoxia ([address] New onset of congestive heart failure ([address] Morbid obesity ([address] Obesity hypoventilation syndrome ([address] Erythrocytosis; Acute deep vein thrombosis (DVT) of left peroneal vein ([address] Pulmonary embolus ([address] Moderate protein-calorie malnutrition ([address] Bacterial pneumonia; Acute deep vein thrombosis (DVT) of distal end of left lower extremity ([address] Acute cor pulmonale ([address] Disorder of electrolytes; Anasarca; Abnormal chest x-ray; Debility; and Moderate malnutrition ([address] on their problem list.  ONSET DATE: 19    Recent Chest Xray/CT of Chest: 19  Impression   Small bilateral pleural effusions.       New nodular region the mid to inferior left lung.  This may be related to   overlapping bony or soft tissue structures however recommend further   evaluation with CT of the chest.         Date of Eval: 2019  Evaluating Therapist: Lawrence Lott    Current Diet level:  Current Diet : Soft regular  Current Liquid Diet : Thin      Primary Complaint  Patient Complaint: No complaints regarding chewing or swallowing     Pain:  Pain Assessment  Pain Assessment: 0-10    Reason for Referral  Ann Marie De Leon was referred for a bedside swallow evaluation to assess the efficiency of her swallow function, identify signs and symptoms of aspiration and make recommendations regarding safe dietary consistencies, effective compensatory strategies, and safe eating environment.     Impression  Dysphagia Diagnosis: Mild pharyngeal stage dysphagia  Dysphagia Outcome Severity Scale: Level 6: Within functional limits/Modified independence     Pt alert and oriented, cooperative and agreeable to participate in evaluation. Pt had a BSE completed on 05/13/19 recommending a Dysphagia III Advanced diet with thin liquids. Pt was intubated from 05/03-05/11. Pt currently on 4L O2 via nasal cannula. Pt reported tolerating Dysphagia III advanced diet and thin liquids without any difficulty or s/s of aspiration. Pt observed with thin liquids via cup and straw demonstrating with minimally delayed swallow initiation, reduced laryngeal elevation upon manual palpation of the anterior neck, and no overt s/s of aspiration. Pt observed with several trials of regular solid trials demonstrating with adequate mastication, A-P transit, minimal to no residue in oral cavity observed post swallow, and no s/s of aspiration. No coughing, wet vocal quality, or throat clearing observed at any time. SLP recommending pt's diet be upgraded to regular texture solids, thin liquids, no straws, meds whole with water. SLP will continue to monitor diet tolerance. Treatment Plan  Requires SLP Intervention: Yes  Duration/Frequency of Treatment: 5x/wk during LOS    D/C Recommendations: Home independently       Recommended Diet and Intervention  Diet Solids Recommendation: Regular  Liquid Consistency Recommendation: Thin  Recommended Form of Meds: Whole with water  Recommendations: Dysphagia treatment  Therapeutic Interventions: Diet tolerance monitoring;Patient/Family education; Therapeutic PO trials with SLP    Compensatory Swallowing Strategies  Compensatory Swallowing Strategies: Alternate solids and liquids;Small bites/sips;Eat/Feed slowly; No straws;Remain upright for 30-45 minutes after meals;Upright as possible for all oral intake    Treatment/Goals  Short-term Goals  Timeframe for Short-term Goals: 3 days (05/24/19)  Long-term Goals  Timeframe for Long-term Goals: 5 days (05/26/19)  Goal 1: Pt will tolerate safest and least restrictive diet precautions in place; Patient at risk for falls; Left in chair;Call light within reach; Chair alarm in place;Nurse notified         Therapy Time  SLP Individual Minutes  Time In: 1030  Time Out: 1100  Minutes: [address] BR[address] [facility]SLP  Speech-Language Pathologist  [id number]

## 2019-05-21 NOTE — PLAN OF CARE
Problem: Nutrition  Goal: Optimal nutrition therapy  Outcome: Ongoing  Note:   Nutrition Problem: Inadequate oral intake  Intervention: Food and/or Nutrient Delivery: Continue current diet, Discontinue ONS  Nutritional Goals: Pt will tolerate diet and have po intakes 50% or greater during ARU stay

## 2019-05-21 NOTE — CARE COORDINATION
[address] Transitions Interview     2019    Patient: Elisa Gonsales Patient : 1960   MRN: [id number]  Reason for Admission: Critical Illness Myopathy Debility   RARS: Readmission Risk Score: 15         Spoke with: Elisa Gonsales        Readmission Risk  Patient Active Problem List   Diagnosis    Anxiety state    Rheumatoid arthritis ([facility] [address]    Tobacco abuse    SOB (shortness of breath)    Moderate persistent asthma with acute exacerbation    Acute respiratory failure with hypoxia (HCC)    New onset of congestive heart failure (HCC)    Morbid obesity (HCC)    Obesity hypoventilation syndrome (HCC)    Erythrocytosis    Acute deep vein thrombosis (DVT) of left peroneal vein (HCC)    Pulmonary embolus (HCC)    Moderate protein-calorie malnutrition (HCC)    Bacterial pneumonia    Acute deep vein thrombosis (DVT) of distal end of left lower extremity (HCC)    Acute cor pulmonale (HCC)    Disorder of electrolytes    Anasarca    Abnormal chest x-ray    Debility       Inpatient Assessment  Care Transitions Summary    Care Transitions Inpatient Review  Medication Review  Are you able to afford your medications?:  Yes  How often do you have difficulty taking your medications?:  I always take them as prescribed. Housing Review  Who do you live with?:  Alone  Are you an active caregiver in your home?:  No  Social Support  Do you have a ?:  No  Do you have a [address] [place] [address]?:  No  Durable Medical Equipment  Patient Home Equipment:  Nebulizer  Functional Review  Ability to seek help/take action for Emergent/Urgent situations i.e. fire, crime, inclement weather or health crisis. :  Independent  Ability handle personal hygiene needs (bathing/dressing/grooming): Independent  Ability to manage medications: Independent  Ability to prepare food:  Independent  Ability to maintain home (clean home, laundry):   Independent  Ability to drive and/or has transportation:  Independent  Ability to do shopping:  Independent  Ability to manage finances: Independent  Is patient able to live independently?:  Yes  Hearing and Vision  Visual Impairment:  Reading glasses  Hearing Impairment:  None  Care Transitions Interventions     Met with patient to discuss care transition. Role of CTC and care transition program explained to patient. Spoke with patient who reports she resides alone in a private residence. Patient states she is independent with ADL/IADL's and works full time. Patient denied the need for any home care services post discharge but is open to services if recommended. If meets criteria, patient agreeable to participate in care transition program post discharge. Encouraged patient to ask to speak with  on the unit should any needs arise. Notified TORRIE Ernandez MSW of the above.       Follow Up  Future Appointments   Date Time Provider Simran Thornton   5/21/2019 10:30 AM SUMEET Fine   5/21/2019 12:30 PM SCHEDULE, PRN 2 PT THERESA ORO None       Health Maintenance  Health Maintenance Due   Topic Date Due    Breast cancer screen  03/19/2015       Tristan Jones RN, BSN, [facility] Transitions Coordinator    [phone]

## 2019-05-21 NOTE — PROGRESS NOTES
functional limits    Written Expression  Dominant Hand: Right(reports increased shaking with writing- will inform OT)  Written Expression: Within Functional Limits    Motor Speech  Motor Speech: Within Functional Limits    Pragmatics/Social Functioning  Pragmatics: Within functional limits    Cognition:   Orientation  Overall Orientation Status: Within Normal Limits  Attention  Attention: Within Functional Limits  Memory  Memory: Within Funtional Limits  Problem Solving  Problem Solving: Within Functional Limits  Numeric Reasoning  Numeric Reasoning: Within Functional Limits  Abstract Reasoning  Abstract Reasoning: Within Functional Limits  Safety/Judgement  Safety/Judgement: Within Functional Limits    Additional Assessments:  Voice Evaluation  Vocal Quality: Exceptions to [facility]  Breath Support: Adequate for speech  Hoarse: Moderate  Dysphonic: Mild  Vocal Intensity: Moderately decreased  Maximum Phonation Time: 7 seconds          Prognosis:  Speech Therapy Prognosis  Prognosis: Good  Individuals consulted  Consulted and agree with results and recommendations: Patient; Family member;RN    Education:  Patient Education: Education provided to pt re: role of SLP, results of assessment, current recommendations. Patient Education Response: Verbalizes understanding  Safety Devices in place: Yes  Type of devices: All fall risk precautions in place; Patient at risk for falls; Left in chair;Call light within reach; Chair alarm in place;Nurse notified           Therapy Time:   Individual Concurrent Group Co-treatment   Time In 1100         Time Out 1120         Minutes [facility] Ne A CCC-SLP  Speech-Language Pathologist  [phone]

## 2019-05-21 NOTE — H&P
Department of Physical Medicine & Rehabilitation  History & Physical      Patient Identification:  Elvis Hercules  : 1960  Admit date: 2019   Attending provider: Donnie Ambrosio MD        Primary care provider: Mercedes Flores MD     Chief Complaint: weakness, fatigue    History of Present Illness/Hospital Course:  Ms. Fransisco Briones is a 63 yo F with pmh RA, obesity, anxiety who initially presented 19 with acute hypoxic respiratory failure. She required intubation and mechanical ventilation -. Bronchoscopy () revealed thick secretions. Bronchial washings with candida. Etiology thought to be acute PE (LE doppler + DVT, CTA chest suboptimal), pulmonary edema, cor pulmonale, OHS. She was treated with anticoagulation, diuresis, bronchodilators. Also treated with vancomycin and levofloxacin for acute tracheobronchitis. Now presents to ARU with impaired mobility and self-care below her baseline. Currently, patient reports weakness and fatigue. She has shortness of breath with exertion. She denies chest pain. Has new onset left ankle weakness and dorsal foot numbness this admission.  She is motivated to start inpatient rehab program.     Prior Level of Function:  Independent for mobility, ADLs, and IADLs    Current Level of Function:  CGA-maxA    Pertinent Social History:  Support: Lives alone, may stay with parents temporarily  Home set-up: 2 story home with 2-3 steps to enter, parents home 1 level with 3-4 steps to enter    Past Medical History:   Diagnosis Date    Anxiety state, unspecified 2010    Impacted cerumen 2010    Rheumatoid arthritis(714.0) 2010       Past Surgical History:   Procedure Laterality Date    BRONCHOSCOPY N/A 2019    BRONCHOSCOPY ALVEOLAR LAVAGE performed by Jose Becerra MD at [address] [place] [address]      both hands     LUMBAR SPINE SURGERY         Family History   Problem Relation Age of Onset    Stroke Paternal Grandmother     Heart Attack Paternal Grandfather 62    Diabetes Paternal Grandfather     Other Mother         Factor 5 Lieden        Social History     Socioeconomic History    Marital status: Single     Spouse name: Not on file    Number of children: 0    Years of education: Not on file    Highest education level: Not on file   Occupational History    Not on file   Social Needs    Financial resource strain: Not on file    Food insecurity:     Worry: Not on file     Inability: Not on file    Transportation needs:     Medical: Not on file     Non-medical: Not on file   Tobacco Use    Smoking status: Current Every Day Smoker     Packs/day: 0.10     Years: 30.00     Pack years: 3.00     Types: Cigarettes    Smokeless tobacco: Never Used   Substance and Sexual Activity    Alcohol use: No     Alcohol/week: 0.0 oz    Drug use: No    Sexual activity: Not on file   Lifestyle    Physical activity:     Days per week: Not on file     Minutes per session: Not on file    Stress: Not on file   Relationships    Social connections:     Talks on phone: Not on file     Gets together: Not on file     Attends Latter day service: Not on file     Active member of club or organization: Not on file     Attends meetings of clubs or organizations: Not on file     Relationship status: Not on file    Intimate partner violence:     Fear of current or ex partner: Not on file     Emotionally abused: Not on file     Physically abused: Not on file     Forced sexual activity: Not on file   Other Topics Concern    Not on file   Social History Narrative    Not on file       No Known Allergies      Current Facility-Administered Medications   Medication Dose Route Frequency Provider Last Rate Last Dose    magnesium hydroxide (MILK OF MAGNESIA) 400 MG/5ML suspension 30 mL  30 mL Oral Daily PRTITO Latham MD        ondansetron [facility] injection 4 mg  4 mg Intravenous Q6H PRN Remi Latham MD        sodium chloride mg  50 mg Oral Nightly PRN Toni Urbano MD        [START ON 5/24/2019] vitamin D (ERGOCALCIFEROL) capsule 50,000 Units  50,000 Units Oral Weekly Toni Urbano MD        albuterol (PROVENTIL) nebulizer solution 2.5 mg  2.5 mg Nebulization BID Toni Urbano MD   2.5 mg at 05/21/19 0737    vitamin D (CHOLECALCIFEROL) tablet 5,000 Units  5,000 Units Oral Daily Toni Urbano MD   5,000 Units at 05/21/19 0759         REVIEW OF SYSTEMS:   CONSTITUTIONAL: negative for fevers, chills, diaphoresis, appetite change, night sweats, unexpected weight change, +fatigue. EYES: negative for blurred vision, eye discharge, visual disturbance and icterus. HEENT: negative for hearing loss, tinnitus, ear drainage, sinus pressure, nasal congestion, epistaxis and snoring. RESPIRATORY: Negative for hemoptysis, cough, sputum production. +MACKEY  CARDIOVASCULAR: negative for chest pain, palpitations, exertional chest pressure/discomfort, syncope, edema   GASTROINTESTINAL: negative for nausea, vomiting, diarrhea, blood in stool, abdominal pain, constipation. GENITOURINARY: negative for frequency, dysuria, urinary incontinence, decreased urine volume, and hematuria. HEMATOLOGIC/LYMPHATIC: negative for easy bruising, bleeding and lymphadenopathy. ALLERGIC/IMMUNOLOGIC: negative for recurrent infections, angioedema, anaphylaxis and drug reactions. ENDOCRINE: negative for weight changes and diabetic symptoms including polyuria, polydipsia and polyphagia. MUSCULOSKELETAL: negative for pain, joint swelling, decreased range of motion. NEUROLOGICAL: negative for headaches, slurred speech. +left ankle DF weakness and sensory deficit  PSYCHIATRIC/BEHAVIORAL: negative for hallucinations, behavioral problems, confusion and agitation. All pertinent positives are noted in the HPI.     Physical Examination:  Vitals:   Patient Vitals for the past 24 hrs:   BP Temp Temp src Pulse Resp SpO2 Height Weight   05/21/19 0745 125/70 98.5 °F (36.9 °C) Oral 107 20 91 % -- --   05/21/19 0739 -- -- -- -- -- 92 % -- --   05/21/19 0343 -- -- -- -- -- -- 5' 3\" (1.6 m) 220 lb 14.4 oz (100.2 kg)   05/21/19 0341 -- -- -- -- -- 92 % -- --   05/21/19 0330 -- -- -- -- -- (!) 88 % -- --   05/20/19 2133 -- -- -- -- -- -- 5' 2.99\" (1.6 m) 220 lb 3.8 oz (99.9 kg)   05/20/19 2000 137/82 98.5 °F (36.9 °C) Oral 104 18 90 % -- --       Const: Alert. WDWN. No distress  Eyes: Conjunctiva noninjected, no icterus noted; pupils equal, round, and reactive to light. HENT: Atraumatic, normocephalic; Oral mucosa moist  Neck: Trachea midline, neck supple. No thyromegaly noted. CV: Tachycardic to low 100s, regular rhythm. No murmur rub or gallop noted  Resp: Lungs with bibasilar crackles. No wheezes or ronchi, no retractions. Respirations unlabored. GI: Soft, nontender, nondistended. Normal bowel sounds. No palpable masses. Skin: Normal temperature and turgor. No rashes or breakdown noted. Ext: No significant edema appreciated. No varicosities. MSK: No joint tenderness, erythema, warmth noted. AROM intact. Neuro:   -Mental status: Alert. Oriented to person, place, time, situation.  -Language: Speech fluent, repetition and naming intact  -Cranial nerves: VFF, PERRL, EOMI, Facial sensation intact, Face symmetric, Hearing intact, Palate elevation symmetric, Shoulder shrug intact. Tongue midline.   -Sensation intact to light touch except decreased in left superficial fibular nerve distribution.   -Motor examination reveals normal strength in all four limbs diffusely except 0/5 left ankle DF and eversion.   -No abnormalities with finger/nose noted. -Reflexes diminished, symmetric.  Negative Shahrzad  Psych: Stable mood, normal judgement, normal affect     Lab Results   Component Value Date    WBC 7.2 05/21/2019    HGB 17.7 (H) 05/21/2019    HCT 55.2 (H) 05/21/2019    MCV 88.4 05/21/2019     05/21/2019     Lab Results   Component Value Date    INR 1.75 (H) 05/20/2019    INR 1.81 (H) 05/19/2019    INR 2.13 (H) 05/18/2019    PROTIME 19.9 (H) 05/20/2019    PROTIME 20.6 (H) 05/19/2019    PROTIME 24.3 (H) 05/18/2019     Lab Results   Component Value Date    CREATININE <0.5 (L) 05/21/2019    BUN 8 05/21/2019     05/21/2019    K 3.7 05/21/2019    CL 89 (L) 05/21/2019    CO2 38 (H) 05/21/2019     Lab Results   Component Value Date    ALT 79 (H) 05/21/2019    AST 41 (H) 05/21/2019    ALKPHOS 83 05/21/2019    BILITOT 0.5 05/21/2019       Most recent echocardiogram revealed:  Left ventricular systolic function is normal with ejection fraction   estimated at 55 %.   No regional wall motion abnormality.   Left ventricle size is normal.   There is moderate concentric left ventricular hypertrophy.   Normal left ventricular diastolic filling pressure.   The aortic valve leaflets are not well visualized.   Aortic valve appears sclerotic but opens adequately.   No evidence of aortic valve stenosis.   Trivial aortic regurgitation is present.   Right ventricular systolic function is normal.   The right ventricle is moderately enlarged.   Systolic pulmonary artery pressure (SPAP) estimated at 54 mmHg (RA pressure   15 mmHg), consistent with moderate pulmonary hypertension. Most recent EKG revealed:  Sinus tachycardia with Premature supraventricular complexesPossible Left atrial enlargementLeft posterior fascicular blockAbnormal ECGNo previous ECGs availableConfirmed by Becky Campa MD, [facility] ([id number]) on 4/28/2019 8:04:56 PM    Most recent imaging studies revealed:    LE Doppler  1. There is evidence of isolated acute deep venous thrombosis involving the    peroneal vein below the knee in the left lower extremity.    2. There is no other evidence of deep or superficial venous thrombosis    involving the lower extremities bilaterally.        CTA chest  Suboptimal contrast timing although there is no evidence for large or central   pulmonary embolism.  The segmental and subsegmental branches are not well   evaluated.       Small left basilar effusion with adjacent consolidation and there are also   infiltrates within the lingula and right lung base that may represent   atelectasis versus pneumonia.       Small amount of free fluid in the visualized upper abdomen. Repeat CT Chest  1. Stable small left pleural effusion with a new small right pleural effusion. 2. Interval worsening of dependent consolidative opacity within bilateral   lower lobe since the study of 04/28/2019.  In combination with worsening   bibasilar predominant mucous plugging, this likely represents either   atelectasis or aspiration.  Clinical correlation is advised. 3. Slight interval improvement in appearance of lingular airspace   consolidation, likely representing resolving pneumonia. On my review, CXR displays small bilateral pleural effusions, nodular region in left inferior lung. The above laboratory data have been reviewed. The above imaging data have been reviewed. The above medical testing have been reviewed. Body mass index is 39.13 kg/m². POST ADMISSION PHYSICIAN EVALUATION  The patient has agreed to being admitted to our comprehensive inpatient  rehabilitation facility consisting of at least 180 minutes of therapy a day,  5 out of 7 days a week. The patient/family has a good understanding of our discharge process. The  patient has potential to make improvement and is in need of at least two of  the following multidisciplinary therapies including but not limited to  physical, occupational, respiratory, and speech, nutritional services, wound care, and prosthetics and orthotics. Given the patients complex condition  and risk of further medical complications, rehabilitation services cannot be  safely provided at a lower level of care such as a skilled nursing facility.   I have compared the patients medical and functional status at the time of the  preadmission screening and the same on this date, and there are no significant changes. By signing this document, I acknowledge that I have personally performed a  full physical examination on this patient within 24 hours of admission to  this inpatient rehabilitation facility and have determined the patient to be  able to tolerate the above course of treatment at an intensive level for a  reasonable period of time. I will be completing a detailed individualized  Plan of Care for this patient by day four of the patients stay based upon the  Preadmission Screen, this Post-Admission Evaluation, and the therapy  evaluations. Barriers: weakness, endurance, balance, medical comorbidities  Services Required: PT, OT  Goals: Jo for mobility and ADLs  Prognosis: Good  Anticipated Dispo: home  ELOS: 10 days    Rehabilitation Diagnosis:   10.9, Pulmonary, Other Pulmonary      Assessment and Plan:    Impairments: weakness, endurance, balance    Debility - PT/OT to address endurance, strength, compensatory strategies, equipment    Acute hypoxic respiratory failure - Thought to be due to PE and core pulmonale. On anticoagulation as below. Continue lasix and prn albuterol. Wean O2 as tolerated. Suspected acute PE with LLE DVT - Eliquis    Chronic hypercapnic respiratory failure - Likely due to OHS. BiPAP qhs. Tracheobronchitis - Completed antibiotics. Left foot drop - New this admission. 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 Consider EMG as outpatient. PT. Trial AFO. Morbid Obesity - Complicating assessment and treatment. Placing patient at risk for multiple co-morbidities as well as early death and contributing to the patient's presentation. Dietician consult. Counseling and education. Bladder - high risk retention - Monitor PVRs, SC prn >300cc    Bowel - high risk constipation - colace+senna BID, PRN miralax and MoM. follow bowel movements. Enema or suppository if needed.      Safety - fall and aspiration precautions    FULL CODE    Javan Castillo MD 5/21/2019, 2:01 PM

## 2019-05-21 NOTE — PROGRESS NOTES
4 Eyes Skin Assessment     The patient is being assess for   Admission    I agree that 2 RN's have performed a thorough Head to Toe Skin Assessment on the patient. ALL assessment sites listed below have been assessed. Areas assessed by both nurses:   [x]   Head, Face, and Ears   [x]   Shoulders, Back, and Chest, Abdomen  [x]   Arms, Elbows, and Hands   [x]   Coccyx, Sacrum, and Ischium  [x]   Legs, Feet, and Heels        Scattered bruising over body and excoriation on inner gluteal cleft from scratching per patient      Co-signer eSignature: {Esignature:[id number]}    Does the Patient have Skin Breakdown?   No          Yo Prevention initiated:  No   Wound Care Orders initiated:  NA      [facility] nurse consulted for Pressure Injury (Stage 3,4, Unstageable, DTI, NWPT, Complex wounds)and New or Established Ostomies:  NA      Primary Nurse eSignature: Electronically signed by Christina Leahy RN on 5/21/19 at 1:39 AM

## 2019-05-21 NOTE — CARE COORDINATION
Case Management ARU Admission Assessment     Objective:  met with the patient to complete initial assessment and review the role of Case Management while on the ARU. Patient educated on team conferences. Discussed family training with the patient/family on how it is encouraged on the unit. Order for \"discharge planning\" has been addressed. Family Present: None  Primary : Mother Tania Mccullough [phone] or Sister Ravindra Doran [phone]    Admit date: 05-    Insurance: [employer]     Admitting diagnosis: Debility     Current home situation: Patient lives at home alone, independent with her ADL's, drives and works full time as a  at [employer]. DME at home: She has access to a walker. No home oxygen; watch for this as she is requiring it at bedside. Services prior to admission: None     Patient's goal(s): \"to return home and back to [employer]"    Working or Volunteering prior to admission:  __Yes __No full time as a                        Accessibility to community resources/transportation:  She was driving and working full time       Active with: n/a  __Senior Services      __Council on Aging  __Other    Has patient experienced a recent loss or significant life event that would impact their care or ability to participate? _x_No  __Yes, please explain:    Has patient ever been treated for emotional disorder(s)? __No  _x_Yes, how does this affect current situation? She takes medications for anxiety. She reported her mood is good at this time. Will follow    Is patient and family coping appropriately with stressful events and this hospitalization? _x_Yes  __No, please explain:    Support system at home and in the community: She stated her family and friends     Caregiver on discharge: None she lives alone. 24 hour care on discharge: If needed, she could stay with her parents to have 24 hour supervision.      What patient needs to be at to return home alone or with family: She is hopeful to return home alone but if needed, she could go to her parents and they could provide assistance. She was encouraged to think about this incase it is a recommendation from the treatment team.     Discharge plan: She plans to return home and back to work     Summary: Patient is a 64year old female presenting to the ARU. She maintained appropriate eye contact and conversation throughout the initial assessment. She stated that her mother or sister should be the main contact if needed. She was educated on team conferences and family training. She denied any questions or concerns. Electronic continuity of care form is on the chart. Case Management (CM) will continue to follow for recommendations from the treatment team. Please notify Case Management if needs or concerns arise.      Agustina Mosquera MSW, LSW

## 2019-05-21 NOTE — PLAN OF CARE
ARU PATIENT TREATMENT PLAN  [facility], [address]   [phone]    Luis A Helm    : 1960  Acct #: [[id number]]  MRN: [id number]   PHYSICIAN:  Judy Arreguin MD  Primary Problem    Patient Active Problem List   Diagnosis    Anxiety state    Rheumatoid arthritis ([facility] [address]    Tobacco abuse    SOB (shortness of breath)    Moderate persistent asthma with acute exacerbation    Acute respiratory failure with hypoxia (HCC)    New onset of congestive heart failure (HCC)    Morbid obesity (HCC)    Obesity hypoventilation syndrome (HCC)    Erythrocytosis    Acute deep vein thrombosis (DVT) of left peroneal vein (HCC)    Pulmonary embolus (HCC)    Moderate protein-calorie malnutrition (HCC)    Bacterial pneumonia    Acute deep vein thrombosis (DVT) of distal end of left lower extremity (HCC)    Acute cor pulmonale (HCC)    Disorder of electrolytes    Anasarca    Abnormal chest x-ray    Debility    Moderate malnutrition (HCC)       Rehabilitation Diagnosis:     Debility [R53.81]       ADMIT DATE:2019    Patient Goals: To safely return home where she lives alone in a 2 story-home with 2 ALCIRA    Admitting Impairments: Decreased ability to perform her ADLs, decreased ability to ambulate - now using a walker. Decreased activity tolerate and increased O2 requirements. Barriers: Patient lives alone in a 2-story home with 2 ALCIRA. She is able to live on the 1st floor if necessary.   If she needs 24 hour supervision at D/C, she is able to live with her parents or sister    Participation: Good     CARE PLAN     NURSING:  Luis A Helm while on this unit will:     [x] Be continent of bowel and bladder     [x] Have an adequate number of bowel movements  [x] Urinate with no urinary retention >300ml in bladder  [] Complete bladder protocol with andujar removal  [x] Maintain O2 SATs at 90%  [x] Have pain managed while on ARU       [] Be pain free by discharge   [x] Have no skin breakdown while on ARU  [] Have improved skin integrity via wound measurements  [] Have no signs/symptoms of infection at the wound site  [x] Be free from injury during hospitalization   [x] Complete education with patient/family with understanding demonstrated for:  [] Adjustment   [] Other:   Nursing interventions may include bowel/bladder training, education for medical assistive devices, medication education, O2 saturation management, energy conservation, stress management techniques, fall prevention, alarms protocol, seating and positioning, skin/wound care, pressure relief instruction,dressing changes,  infection protection, DVT prophylaxis, and/or assistance with in room safety with transfers to bed, toilet, wheelchair, shower as well as bathroom activities and hygiene. Patient/caregiver education for:   [x] Disease/sustained injury/management      [x] Medication Use   [] Surgical intervention   [x] Safety   [x] Body mechanics and or joint protection   [x] Health maintenance         PHYSICAL THERAPY:  Goals:                  Short term goals  Time Frame for Short term goals: 5 days (5/25)  Short term goal 1: Pt will be indep with bed mobility. Short term goal 2: Pt will be SBA for transfers with no device. Short term goal 3: Pt will ambulate 150 ft with SBA and LRAD. Short term goal 4: Pt will negotiate curb step x 2 reps with SBA and LRAD. Long term goals  Time Frame for Long term goals : 10 days (5/30)  Long term goal 1: Pt will be indep with transfers. Long term goal 2: Pt will be indep with ambulation 100 ft. Long term goal 3: pt will negotiate curb step x 2 reps indep. Long term goal 4: Pt will improve Workman Balance Score to >47/56 to reflect decreased fall risk. Long term goal 5: Pt will be indep with BLE HEP to facilitate continued strengthening and activity tolerance at d/c.   These goals were reviewed with this patient at the time of assessment and Tita Weston is in agreement. Plan of Care: Pt to be seen 5 out of 7 days per week, 90   mins (exact) per day for 10 days (exact)                   Current Treatment Recommendations: Strengthening, Gait Training, Patient/Caregiver Education & Training, ROM, Stair training, Balance Training, Neuromuscular Re-education, Equipment Evaluation, Education, & procurement, Functional Mobility Training, Endurance Training, Transfer Training, Safety Education & Training, Home Exercise Program, Modalities      OCCUPATIONAL THERAPY:  Goals:             Short term goals  Time Frame for Short term goals: 5 days by 5/24/19  Short term goal 1: Pt will be Supervision with toileting. Short term goal 2: Pt will be SBA for LE dressing. Short term goal 3: Pt will be Supervision for toilet transfers with LRD. Short term goal 4: Pt will be Min A for bathing. Short term goal 5: SBA for light meal prep :  Long term goals  Time Frame for Long term goals : 10 days by 5/29/19  Long term goal 1: Pt will be Independent with toileting. Long term goal 2: Pt will be Modified Independent with dressing. Long term goal 3: Pt will be Modified Independent with ADL transfers. Long term goal 4: Pt will be Modified Independent for shower task. Long term goal 5: Mod I with light meal prep. :    These goals were reviewed with this patient at the time of assessment and Tita Weston is in agreement    Plan of Care:  Pt to be seen 5 out of 7 days per week,    mins (exact) per day for  days (exact)  Current Treatment Recommendations: Strengthening, Endurance Training, Neuromuscular Re-education, Patient/Caregiver Education & Training, Self-Care / ADL, Balance Training, Home Management Training, Functional Mobility Training, Safety Education & Training, Positioning      SPEECH THERAPY: Goals will be left blank if speech is not following this patient.   Goals:             Short-term Goals  Timeframe for Short-term Goals: 3 days (05/24/19) will be seen 15 hours per week (900 minutes within a 7 day period). Treatments may include therapeutic exercises, gait training, neuromuscular re-ed, transfer training, community reintegration, bed mobility, w/c mobility and training, self care, home mgmt, cognitive training, energy conservation,dysphagia tx, speech/language/communication therapy, group therapy, and patient/family education. In addition, dietician/nutritionist may monitor calorie count as well as intake and collaboratively work with SLP on dietary upgrades. Neuropsychology/Psychology may evaluate and provide necessary support. Medical issues being managed closely and that require 24 hour availability of a physician:   [] Swallowing Precautions  [x] Bowel/Bladder Fx  [] Weight bearing precautions   [] Wound Care    [x] Pain Mgmt   [x] Infection Protection   [x] DVT Prophylaxis   [x] Fall Precautions  [x] Fluid/Electrolyte/Nutrition Balance   [] Voice Protection   [x] Respiratory  [] Other:    Medical Prognosis: [x] Good  [] Fair    [] Guarded   Total expected IRF days 10  Anticipated discharge destination:    [] Home Independently   [x] Home Modified Independent  [] Home with supervision    []SNF     [] Other                                           Physician anticipated functional outcomes:  Patient to safely return back home. IPOC brief synthesis: 60-year-old female admitted to inpatient rehabilitation to address strengthening, endurance, balance, gait, activities of daily living, assistive/adaptive device needs, respiratory status, energy conservation. This plan has been reviewed with Eladia Angel on ____________  in a language the patient understands. Eladia Angel has had the opportunity to include input with the therapy team.      I have reviewed this initial plan of care and agree with its contents:    Title   Name    Date    Time    Physician: Shannan Crespo.  Perla Kelly MD 5/22/2019, 3:56 PM     Case Mgmt: Agustina Demetri MSW, LSW    OT: Otoniel Krause OT    PT: Zeinab Lopez, PT, DPT     RN: Tomás Diaz RN    ST: Kesha Baker MA, [facility] SLP    :  Arturo Poole    Other:

## 2019-05-21 NOTE — PROGRESS NOTES
Patient admitted from [facility]. Transferred to bed. Vital signs obtained. Orders reviewed and acknowledged. Admission completed. Oriented to room, call light and environment. Questions answered. Bed placed in low position. Call light explained and within reach.

## 2019-05-21 NOTE — PROGRESS NOTES
RESPIRATORY THERAPY ASSESSMENT    Name:  [facility] Record Number:  [id number]  Age: 64 y.o. Gender: female  : 1960  Today's Date:  2019  Room:  0168/0168-01    Assessment     Is the patient being admitted for a COPD or Asthma exacerbation? No   (If yes the patient will be seen every 4 hours for the first 24 hours and then reassessed)    Patient Admission Diagnosis      Allergies  No Known Allergies    Minimum Predicted Vital Capacity:     NA          Actual Vital Capacity:      NA              Pulmonary History:No history  Home Oxygen Therapy:  room air  Home Respiratory Therapy:Albuterol/Ipratropium Bromide HHN PRN  Current Respiratory Therapy:  Albuterol PRN  Treatment Type: Vibratory mucous clearing therapy or intervention  Medications: Albuterol    Respiratory Severity Index(RSI)   Patients with orders for inhalation medications, oxygen, or any therapeutic treatment modality will be placed on Respiratory Protocol. They will be assessed with the first treatment and at least every 72 hours thereafter. The following severity scale will be used to determine frequency of treatment intervention.     Smoking History: Pulmonary Disease or Smoking History, Greater than 15 pack year = 2    Social History  Social History     Tobacco Use    Smoking status: Current Every Day Smoker     Packs/day: 0.10     Years: 30.00     Pack years: 3.00     Types: Cigarettes    Smokeless tobacco: Never Used   Substance Use Topics    Alcohol use: No     Alcohol/week: 0.0 oz    Drug use: No       Recent Surgical History: None = 0  Past Surgical History  Past Surgical History:   Procedure Laterality Date    BRONCHOSCOPY N/A 2019    BRONCHOSCOPY ALVEOLAR LAVAGE performed by Alondra Rodrigues MD at [address] [place] [address]      both hands     LUMBAR SPINE SURGERY         Level of Consciousness: Alert, Oriented, and Cooperative = 0    Level of Activity: Mostly sedentary, minimal walking = 2    Respiratory Pattern: Regular Pattern; RR 8-20 = 0    Breath Sounds: Diminshed bilaterally and/or crackles = 2    Sputum   ,  ,    Cough: Strong, spontaneous, non-productive = 0    Vital Signs   /82   Pulse 104   Temp 98.5 °F (36.9 °C) (Oral)   Resp 18   SpO2 90%   SPO2 (COPD values may differ): 90-91% on room air or greater than 92% on FiO2 24- 28% = 1    Peak Flow (asthma only): not applicable = 7    RSI: 7-8 = BID and Q4HPRN (every four hours as needed) for dyspnea        Plan       Goals: medication delivery, mobilize retained secretions, volume expansion and improve oxygenation    Patient/caregiver was educated on the proper method of use for Respiratory Care Devices:  Yes      Level of patient/caregiver understanding able to:   ? Verbalize understanding   ? Demonstrate understanding       ? Teach back        ? Needs reinforcement       ? No available caregiver               ? Other:     Response to education:  Excellent     Is patient being placed on Home Treatment Regimen? Yes     Does the patient have everything they need prior to discharge? Yes     Comments: pt assessed and chart reviewed    Plan of Care: RE -EVAL IN 3 days    Electronically signed by Fina Vasques RCP on 5/20/2019 at 9:03 PM    Respiratory Protocol Guidelines     1. Assessment and treatment by Respiratory Therapy will be initiated for medication and therapeutic interventions upon initiation of aerosolized medication. 2. Physician will be contacted for respiratory rate (RR) greater than 35 breaths per minute. Therapy will be held for heart rate (HR) greater than 140 beats per minute, pending direction from physician. 3. Bronchodilators will be administered via Metered Dose Inhaler (MDI) with spacer when the following criteria are met:  a. Alert and cooperative     b. HR < 140 bpm  c. RR < 30 bpm                d. Can demonstrate a 2-3 second inspiratory hold  4.  Bronchodilators will be administered via Hand Held Nebulizer [facility]) to patients when ANY of the following criteria are met  a. Incognizant or uncooperative          b. Patients treated with HHN at Home        c. Unable to demonstrate proper use of MDI with spacer     d. RR > 30 bpm   5. Bronchodilators will be delivered via Metered Dose Inhaler (MDI), HHN, Aerogen to intubated patients on mechanical ventilation. 6. Inhalation medication orders will be delivered and/or substituted as outlined below. Aerosolized Medications Ordering and Administration Guidelines:    1. All Medications will be ordered by a physician, and their frequency and/or modality will be adjusted as defined by the patients Respiratory Severity Index (RSI) score. 2. If the patient does not have documented COPD, consider discontinuing anticholinergics when RSI is less than 9.  3. If the bronchospasm worsens (increased RSI), then the bronchodilator frequency can be increased to a maximum of every 4 hours. If greater than every 4 hours is required, the physician will be contacted. 4. If the bronchospasm improves, the frequency of the bronchodilator can be decreased, based on the patient's RSI, but not less than home treatment regimen frequency. 5. Bronchodilator(s) will be discontinued if patient has a RSI less than 9 and has received no scheduled or as needed treatment for 72  Hrs. Patients Ordered on a Mucolytic Agent:    1. Must always be administered with a bronchodilator. 2. Discontinue if patient experiences worsened bronchospasm, or secretions have lessened to the point that the patient is able to clear them with a cough. Anti-inflammatory and Combination Medications:    1. If the patient lacks prior history of lung disease, is not using inhaled anti-inflammatory medication at home, and lacks wheezing by examination or by history for at least 24 hours, contact physician for possible discontinuation.

## 2019-05-21 NOTE — PROGRESS NOTES
Occupational Therapy   Occupational Therapy Initial Assessment  Date: 2019   Patient Name: Jeff Vásquez  MRN: [id number]     : 1960    Date of Service: 2019    Discharge Recommendations:  Home with Home health OT  OT Equipment Recommendations  Other: To be determined    Assessment   Performance deficits / Impairments: Decreased functional mobility ; Decreased safe awareness;Decreased ADL status; Decreased endurance;Decreased high-level IADLs;Decreased strength;Decreased balance;Decreased vision/visual deficit  Assessment: Pt presents at [address] [place] [address] with a diagnosis of debility after being hospitalized for respiratory failure with SpO2 at 77%. She ended up with septic shock/pneumonia, a pulmonary embolism and LLE DVT. She has a dx of Lung CA and new L foot drop. She is currently on 4L O2, and is eager to participate with therapy so that she may return home to her independent living situation. Currently pt needs CGA with toilet and shower transfers using a RW, and mod A for bathing and LE dressing. UB dressing and grooming are performed with setup. She will benefit from skilled inpatient OT tx to maximize her independence and improve her endurance/strength. Prognosis: Good  Decision Making: Medium Complexity  Patient Education: Insructed re: energy conservation techniques including pursed lip breathing and rest breaks, as well as Role of OT and ADL retraining. Barriers to Learning: Impaired endurance, physical limitations, impaired vision  REQUIRES OT FOLLOW UP: Yes  Activity Tolerance  Activity Tolerance: Patient limited by fatigue  Activity Tolerance: Impaired endurance as evidenced by need for 4L of O2 during tx. Safety Devices  Safety Devices in place: Yes  Type of devices: Call light within reach; Chair alarm in place; Patient at risk for falls  Restraints  Initially in place: No           Patient Diagnosis(es): There were no encounter diagnoses.      has a past medical history of Anxiety state, unspecified, Impacted cerumen, and Rheumatoid arthritis(714.0). has a past surgical history that includes Carpal tunnel release; Lumbar spine surgery; and bronchoscopy (N/A, 5/6/2019).            Restrictions  Restrictions/Precautions  Restrictions/Precautions: General Precautions, Fall Risk  Required Braces or Orthoses?: No  Position Activity Restriction  Other position/activity restrictions: Constant O2 at 4L, attempting to wean    Subjective   General  Chart Reviewed: Yes  Patient assessed for rehabilitation services?: Yes  Family / Caregiver Present: No  Subjective  Subjective: Pt found in bed, receptive to OT tx  Pain Assessment  Pain Level: 0  Social/Functional History  Social/Functional History  Lives With: Alone  Type of Home: House  Home Layout: Two level, Able to Live on Main level with bedroom/bathroom  Home Access: Stairs to enter with rails  Entrance Stairs - Number of Steps: 1+1  Bathroom Shower/Tub: Tub/Shower unit, Doors  Bathroom Toilet: Standard  Bathroom Equipment: Grab bars in shower, Hand-held shower  Bathroom Accessibility: Accessible  Home Equipment: Grab bars  ADL Assistance: Independent  Homemaking Assistance: Independent  Homemaking Responsibilities: Yes  Meal Prep Responsibility: Primary  Laundry Responsibility: Primary  Cleaning Responsibility: Primary  Bill Paying/Finance Responsibility: Primary  Shopping Responsibility: Primary  Health Care Management: Primary  Ambulation Assistance: Independent  Transfer Assistance: Independent  Active : Yes  Mode of Transportation: Car  Occupation: Full time employment  Type of occupation:  at [employer]: Reading, be outside (pool)       Objective   Vision: Impaired  Vision Exceptions: Wears glasses for reading;Wears glasses for distance  Hearing: Within functional limits       Observation/Palpation  Posture: Good  Balance  Sitting Balance: Contact guard assistance(for dynamic balance)  Standing Balance: Contact guard assistance  Standing Balance  Time: 4 mins. Activity: grooming at sink  Comment: Stood at sink with unilateral support at times  Functional Mobility  Functional - Mobility Device: Rolling Walker  Activity: To/from bathroom  Assist Level: Contact guard assistance  Toilet Transfers  Toilet - Technique: Ambulating  Equipment Used: Standard bedside commode  Toilet Transfer: Contact guard assistance  Toilet Transfers Comments: With RW and assist for O2  Tub Transfers  Tub Transfers: Not tested  Shower Transfers  Shower - Transfer From: Florina Rios - Transfer Type: To and From  Shower - Transfer To: Transfer tub bench  Shower - Technique: Ambulating  Shower Transfers: Contact Guard  Shower Transfers Comments: With RW, used grab bar for sit to  shower while bathing   Wheelchair Bed Transfers  Wheelchair/Bed - Technique: Ambulating  Equipment Used: Wheelchair  Level of Asssistance: Contact guard assistance  ADL  Feeding: Modified independent (Modified diet)  Grooming: Setup;Supervision(STood at sink to perform brushing hair and teeth)  UE Bathing: Setup(Seated in shower)  LE Bathing: Moderate assistance(Assist with B feet and buttocks)  UE Dressing: Setup  LE Dressing: Moderate assistance(Assist to thread L foot into pants and B socks (no shoes); CGA while standing using RW.)  Toileting: Moderate assistance(Assist to clean buttocks for thoroughness)  Additional Comments: Pt's SpO2 remained above 90 throughout ADL tasks, with HR at 130-133.   Pt reports high HR is normal for her  Tone RUE  RUE Tone: Not applicable  Tone LUE  LUE Tone: Not applicable  Coordination  Movements Are Fluid And Coordinated: Yes     Bed mobility  Bridging: Unable to assess  Rolling to Left: Unable to assess  Rolling to Right: Unable to assess  Supine to Sit: Unable to assess  Sit to Supine: Unable to assess  Scooting: Stand by assistance  Comment: Pt was found sitting on EOB today  Transfers  Stand Step Transfers: Contact guard assistance  Sit to stand: Contact guard assistance  Stand to sit: Contact guard assistance  Transfer Comments: Used RW for support  Vision - Basic Assessment  Prior Vision: Wears glasses all the time(glasses for reading and distance, but does not wear them all the time)  Visual History: No significant visual history  Patient Visual Report: No visual complaint reported. 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 Pt did not feel she needed to address it at the time.   Perception  Overall Perceptual Status: WFL     Sensation  Overall Sensation Status: Impaired(reports numbness left foot; intact to light touch but decreased compared to right)        LUE PROM (degrees)  LUE PROM: WNL  LUE AROM (degrees)  LUE AROM : WNL  Right Hand PROM (degrees)  Right Hand PROM: WNL  Right Hand AROM (degrees)  Right Hand AROM: WNL  LUE Strength  Gross LUE Strength: Exceptions to [facility](B shoulder/elbow strength 4+/5)  LUE Strength Comment: 40#  RUE Strength  Gross RUE Strength: Exceptions to [facility]  RUE Strength Comment: 48#     Left 9-Hole Peg Test  Left 9-Hole Peg Test: Functional  Right 9-Hole Peg Test  Right 9-Hole Peg Test: Functional  Fine Motor Skills  Left 9-Hole Peg Test: Functional  Left 9 Hole Peg Test Time (secs): 25  Right 9-Hole Peg Test: Functional  Right 9 Hole Peg Test Time (secs): 25  Other Assessment: Box and Blocks test: R49, L48  Car Transfers  Car Transfers: Not tested             Plan   Plan  Times per week: 5/7 per week  Times per day: Daily  Current Treatment Recommendations: Strengthening, Endurance Training, Neuromuscular Re-education, Patient/Caregiver Education & Training, Self-Care / ADL, Balance Training, Home Management Training, Functional Mobility Training, Safety Education & Training, Positioning    Goals  Short term goals  Time Frame for Short term goals: 5 days by 5/24/19  Short term goal 1: Pt will be Supervision with toileting. Short term goal 2: Pt will be SBA for LE dressing. Short term goal 3: Pt will be Supervision for toilet transfers with LRD. Short term goal 4: Pt will be Min A for bathing. Short term goal 5: SBA for light meal prep  Long term goals  Time Frame for Long term goals : 10 days by 5/29/19  Long term goal 1: Pt will be Independent with toileting. Long term goal 2: Pt will be Modified Independent with dressing. Long term goal 3: Pt will be Modified Independent with ADL transfers. Long term goal 4: Pt will be Modified Independent for shower task. Long term goal 5: Mod I with light meal prep. Patient Goals   Patient goals : To get home.        Therapy Time   Individual Concurrent Group Co-treatment   Time In 0800         Time Out 0930         Minutes 90         Timed Code Treatment Minutes: 75 Minutes(15 min. eval)       Sherita Smith OT

## 2019-05-21 NOTE — PLAN OF CARE
Problem: Nutrition  Intervention: Swallowing evaluation  Note:   SLP completed evaluation. Please refer to notes in EMR. Problem: Nutrition  Intervention: Aspiration precautions  Note:   SLP completed evaluation. Please refer to notes in EMR. Problem: Neurological  Intervention: Speech Evaluation/treatment  Note:   SLP completed evaluation. Please refer to notes in EMR.

## 2019-05-21 NOTE — PROGRESS NOTES
Physical Therapy    Facility/Department: [facility] ARU  Initial Assessment    NAME: Bharat Green  : 1960  MRN: [id number]    Date of Service: 2019    Discharge Recommendations:  Home with assist PRN, Home with Home health PT   PT Equipment Recommendations  Other: CTA for RW need    Assessment   Body structures, Functions, Activity limitations: Decreased functional mobility ; Decreased endurance;Decreased balance;Decreased strength;Decreased sensation;Decreased ROM  Assessment: Pt is 65 yo female who presents with ARU with diagnosis of debility. Pt with prolonged ICU stay including requiring intubation. Pt indep with mobility and working full time at baseline. Pt CGA for mobility with RW and min A for mobility without device this date. Pt scored 41/56 on Workman Balance Test. Lacks left DF which is not baseline. Discussed ace wrap to assist and potential AFO. HR increased during mobility but pt states resting HR is high at baseline. Pt would benefit from continued skilled therapy to address deficits. Recommend home with assist prn and OP PT at d/c. Treatment Diagnosis: decreased (I) with mobility  Specific instructions for Next Treatment: progress mobility as tolerated  Prognosis: Good  Decision Making: Medium Complexity  Patient Education: Role of PT, safety with mobility, POC, d/c recs; pt verbalized understanding  Barriers to Learning: none  REQUIRES PT FOLLOW UP: Yes  Activity Tolerance  Activity Tolerance: Patient Tolerated treatment well;Patient limited by fatigue;Patient limited by endurance  Activity Tolerance: SpO2 >93% throughout session on 3L; -127 with mobility. HR recovers quickly with seated rest.       Patient Diagnosis(es): There were no encounter diagnoses. has a past medical history of Anxiety state, unspecified, Impacted cerumen, and Rheumatoid arthritis(714.0).    has a past surgical history that includes Carpal tunnel release; Lumbar spine surgery; and bronchoscopy (N/A, 5/6/2019).     Restrictions  Restrictions/Precautions  Restrictions/Precautions: General Precautions, Fall Risk  Required Braces or Orthoses?: No  Position Activity Restriction  Other position/activity restrictions: Constant O2 at 4L, attempting to wean  Vision/Hearing  Vision: Impaired  Vision Exceptions: Wears glasses for reading;Wears glasses for distance  Hearing: Within functional limits     Subjective  General  Chart Reviewed: Yes  Patient assessed for rehabilitation services?: Yes  Response To Previous Treatment: Not applicable  Family / Caregiver Present: No  Referring Practitioner: Dr. Abiola Mariee MD  Referral Date : 05/21/19  Diagnosis: Debility  Follows Commands: Within Functional Limits  General Comment  Comments: Pt resting in bed on approach; RN cleared pt for therapy  Subjective  Subjective: pt agreeable to therapy; States she is fatigued from prior therapy this morning  Pain Screening  Patient Currently in Pain: Denies       Orientation  Orientation  Overall Orientation Status: Within Functional Limits  Social/Functional History  Social/Functional History  Lives With: Alone  Type of Home: House  Home Layout: Two level, Able to Live on Main level with bedroom/bathroom  Home Access: Stairs to enter with rails  Entrance Stairs - Number of Steps: 1+1  Bathroom Shower/Tub: Tub/Shower unit, Doors  Bathroom Toilet: Standard  Bathroom Equipment: Grab bars in shower, Hand-held shower  Bathroom Accessibility: Accessible  Home Equipment: Grab bars  ADL Assistance: Independent  Homemaking Assistance: Independent  Homemaking Responsibilities: Yes  Meal Prep Responsibility: Primary  Laundry Responsibility: Primary  Cleaning Responsibility: Primary  Bill Paying/Finance Responsibility: Primary  Shopping Responsibility: Primary  Health Care Management: Primary  Ambulation Assistance: Independent  Transfer Assistance: Independent  Active : Yes  Mode of Transportation: Car  Occupation: Full time employment  Type of occupation:  at [employer]: Reading, be outside (pool)    Objective     RLE PROM: WFL  RLE AROM: WFL  LLE PROM: WFL  LLE AROM : WFL  LLE General AROM: ankle limited by strength actively  Strength RLE  Strength RLE: Exception  R Hip Flexion: 4/5  R Knee Flexion: 5/5  R Knee Extension: 4+/5  R Ankle Dorsiflexion: 5/5  R Ankle Plantar flexion: 5/5  Strength LLE  Strength LLE: Exception  L Hip Flexion: 4/5  L Knee Flexion: 5/5  L Knee Extension: 4+/5  L Ankle Dorsiflexion: 1/5  L Ankle Plantar Flexion: 5/5     Sensation  Overall Sensation Status: Impaired(reports numbness left foot; intact to light touch but decreased compared to right)     Bed mobility  Rolling to Left: Supervision  Rolling to Right: Supervision  Supine to Sit: Supervision  Sit to Supine: Supervision  Scooting: Supervision     Transfers  Sit to Stand: Contact guard assistance  Stand to sit: Contact guard assistance  Bed to Chair: Contact guard assistance(no device; SBA with RW)  Car Transfer: Contact guard assistance     Ambulation  Ambulation?: Yes  More Ambulation?: Yes  Ambulation 1  Surface: level tile  Device: No Device  Other Apparatus: O2(3L)  Assistance: Minimal assistance  Quality of Gait: Demonstrates narrow DHIRAJ, unsteady with no overt LOB. Antalgic with decreased stance LLE. Lacks left DF and compensates with increased hip and knee flexion during swing phase  Distance: 24 ft  Ambulation 2  Surface - 2: level tile  Device 2: Rolling Walker  Other Apparatus 2: O2(3L)  Assistance 2: Contact guard assistance  Quality of Gait 2: Improved balance with use of RW compared to no device. Continues to demonstrate narrow DHIRAJ.  Cues for transition to carpet  Distance: 172 ft  Comments: Pt completed multiple turns and negotiated over carpet    Stairs/Curb  Stairs?: Yes  Stairs  # Steps : 4  Stairs Height: 6\"  Rails: Bilateral  Curbs: 6\"  Device: Rolling walker  Assistance: Contact guard assistance  Comment: CGA to 150 ft with SBA and LRAD. Short term goal 4: Pt will negotiate curb step x 2 reps with SBA and LRAD. Long term goals  Time Frame for Long term goals : 10 days (5/30)  Long term goal 1: Pt will be indep with transfers. Long term goal 2: Pt will be indep with ambulation 100 ft. Long term goal 3: pt will negotiate curb step x 2 reps indep. Long term goal 4: Pt will improve Workman Balance Score to >47/56 to reflect decreased fall risk. Long term goal 5: Pt will be indep with BLE HEP to facilitate continued strengthening and activity tolerance at d/c.   Patient Goals   Patient goals : Macho Carmona able to go home and do things for myself\"       Therapy Time   Individual Concurrent Group Co-treatment   Time In 1230         Time Out 1400         Minutes 90         Timed Code Treatment Minutes: [facility]

## 2019-05-21 NOTE — CONSULTS
energy/nutrient consumption     Signs and symptoms:  as evidenced by Diet history of poor intake    Objective Information:  · Nutrition-Focused Physical Findings: +1 non-pitting BLE and trace BUE edema  · Wound Type: None  · Current Nutrition Therapies:  · Oral Diet Orders: General   · Oral Diet intake: Unable to assess  · Oral Nutrition Supplement (ONS) Orders: Low Calorie High Protein Supplement  · ONS intake: Unable to assess  · Anthropometric Measures:  · Ht: 5' 3\" (160 cm)   · Current Body Wt: 220 lb 14.4 oz (100.2 kg)  · Admission Body Wt: 220 lb 14.4 oz (100.2 kg)  · % Weight Change:  ,  Noted 46 lb weight loss (17.3% loss) x 1 month per EMR - possibly 2/2 fluid shifts   · Ideal Body Wt: 115 lb (52.2 kg),   · BMI Classification: BMI 35.0 - 39.9 Obese Class II    Nutrition Interventions:   Continue current diet, Discontinue ONS  Continued Inpatient Monitoring    Nutrition Evaluation:   · Evaluation: Goals set   · Goals: Pt will tolerate diet and have po intakes 50% or greater during ARU stay    · Monitoring: Meal Intake, Diet Tolerance, Weight      Electronically signed by Areli Bai RD, LD on 5/21/19 at 1:31 PM    Contact Number: Office: [phone]; [address]: [phone]

## 2019-05-22 LAB
FINAL REPORT: NORMAL
PRELIMINARY: NORMAL

## 2019-05-22 PROCEDURE — 94761 N-INVAS EAR/PLS OXIMETRY MLT: CPT

## 2019-05-22 PROCEDURE — 97530 THERAPEUTIC ACTIVITIES: CPT

## 2019-05-22 PROCEDURE — 94640 AIRWAY INHALATION TREATMENT: CPT

## 2019-05-22 PROCEDURE — 94669 MECHANICAL CHEST WALL OSCILL: CPT

## 2019-05-22 PROCEDURE — 6360000002 HC RX W HCPCS: Performed by: PHYSICAL MEDICINE & REHABILITATION

## 2019-05-22 PROCEDURE — 1280000000 HC REHAB R&B

## 2019-05-22 PROCEDURE — 97116 GAIT TRAINING THERAPY: CPT

## 2019-05-22 PROCEDURE — 97110 THERAPEUTIC EXERCISES: CPT

## 2019-05-22 PROCEDURE — 92507 TX SP LANG VOICE COMM INDIV: CPT

## 2019-05-22 PROCEDURE — 2700000000 HC OXYGEN THERAPY PER DAY

## 2019-05-22 PROCEDURE — 6370000000 HC RX 637 (ALT 250 FOR IP): Performed by: PHYSICAL MEDICINE & REHABILITATION

## 2019-05-22 RX ADMIN — ALBUTEROL SULFATE 2.5 MG: 2.5 SOLUTION RESPIRATORY (INHALATION) at 20:33

## 2019-05-22 RX ADMIN — CITALOPRAM HYDROBROMIDE 20 MG: 20 TABLET ORAL at 09:03

## 2019-05-22 RX ADMIN — SENNOSIDES,DOCUSATE SODIUM 2 TABLET: 8.6; 5 TABLET, FILM COATED ORAL at 20:17

## 2019-05-22 RX ADMIN — APIXABAN 5 MG: 5 TABLET, FILM COATED ORAL at 09:03

## 2019-05-22 RX ADMIN — FUROSEMIDE 40 MG: 40 TABLET ORAL at 09:03

## 2019-05-22 RX ADMIN — APIXABAN 5 MG: 5 TABLET, FILM COATED ORAL at 20:17

## 2019-05-22 RX ADMIN — SENNOSIDES,DOCUSATE SODIUM 2 TABLET: 8.6; 5 TABLET, FILM COATED ORAL at 09:02

## 2019-05-22 RX ADMIN — VITAMIN D, TAB 1000IU (100/BT) 5000 UNITS: 25 TAB at 09:03

## 2019-05-22 RX ADMIN — TRAZODONE HYDROCHLORIDE 50 MG: 50 TABLET ORAL at 20:17

## 2019-05-22 ASSESSMENT — PAIN SCALES - GENERAL
PAINLEVEL_OUTOF10: 0
PAINLEVEL_OUTOF10: 0

## 2019-05-22 NOTE — PROGRESS NOTES
Speech Language Pathology  [facility]: ACUTE REHAB UNIT  SPEECH-LANGUAGE PATHOLOGY      [x] Daily  [] Weekly Care Conference Note  [] Discharge    Patient:Karolina Gonsales      :1960  PGJ:[id number]  Rehab Dx/Hx: Debility [R53.81]    Precautions: falls  Home situation: lives at home independently  ST Dx: [] Aphasia  [] Dysarthria  [] Apraxia   [x] Oropharyngeal dysphagia [] Cognitive Impairment  [x] Other: voice  Date of Admit: 2019  Room #: 3447/9975-57    Current functional status (updated daily):         Pt being seen for : [x] Speech/Language Treatment  [x] Dysphagia Treatment [] Cognitive Treatment  [] Other:  Communication: [x]WFL  [] Aphasia  [] Dysarthria  [] Apraxia  [] Pragmatic Impairment [] Non-verbal  [] Hearing Loss  [] Other:   Cognition: [x] WFL  [] Mild  [] Moderate  [] Severe [] Unable to Assess  [] Other:  Memory: [x] WFL  [] Mild  [] Moderate  [] Severe [] Unable to Assess  [] Other:  Behavior: [x] Alert  [x] Cooperative  []  Pleasant  [] Confused  [] Agitated  [] Uncooperative  [] Distractible [] Motivated  [] Self-Limiting [] Anxious  [] Other:  Endurance:  [x] Adequate for participation in SLP sessions  [] Reduced overall  [] Lethargic  [] Other:  Safety: [x] No concerns at this time  [] Reduced insight into deficits  []  Reduced safety awareness [] Not following call light procedures  [] Unable to Assess  [] Other:    Current Diet Order:DIET GENERAL; No Drinking Straw  Swallowing Precautions: Sit up for all meals and thereafter for 30 minutes, Eat with small bites (1/2 tsp; 1 tsp) and Alternate solids with liquids       Date: 2019      Tx session 1  [id number] Tx session 2  All tx needs met in Session 1   Total Timed Code Min 0    Total Treatment Minutes 30    Individual Treatment Minutes 30    Group Treatment Minutes 0 0   Co-Treat Minutes 0 0   Variance/Reason:      Pain None    Pain Intervention [] RN notified  [] Repositioned  [] Intervention offered and patient declined  [x] N/A  [] Other: [] RN notified  [] Repositioned  [] Intervention offered and patient declined  [] N/A  [] Other:   Subjective     Pt alert and oriented, cooperative and agreeable to participate in therapy. Pt seen at bedside. Objective:  Goals     Dysphagia Goals:  1. The patient will tolerate recommended diet without observed clinical signs of aspiration    Did not target. Pt reported no difficulties with diet upgrade. 2. The patient/caregiver will demonstrate understanding of compensatory strategies for improved swallowing safety. Did not target. Voice Exercises:  Goal 1: Patient will increase maximum phonation time to 12 seconds. Sustained vowels: \"Ah\": average of 7.7 seconds    \"Ee\": average of 7.8 seconds    Pt continues to demonstrate with overall hoarse and reduced vocal quality, but improved compared to yesterday's initial evaluation. Goal 2: Patient will participate in vocal strengthening exercises for improved vocal quality with min cues. Sustained vowels: two sets of 5, see goal 1 above. Syllable repetition: \"ah, ah, ah\" 10x    Glides: \"whoop\" from low to high 10x    Glides: \"boom\" from high to low 10x    Diaphragmatic breathing exercises: 10x    Other areas targeted:     Education:   Education provided to pt re: vocal strengthening exercises and hygiene. Safety Devices: [x] Call light within reach  [] Chair alarm activated  [x] Bed alarm activated  [x] Other: pt left in bed all belongings within reach [] Call light within reach  [] Chair alarm activated  [] Bed alarm activated  [] Other:    Assessment: Pt pleasant and cooperative, agreeable to participate in therapy. Pt continues to demonstrate with overall reduced and hoarse vocal quality but demonstrating improvement compared to yesterday's initial evaluation. Pt did well with all vocal strengthening exercises, encouraged pt to complete at least 3x/daily. Continue goals as listed above.     Plan: Continue as per plan of care. Additional Information:     Barriers toward progress: None  Discharge recommendations:  [x] Home independently  [] Home with assistance []  24 hour supervision  [] ECF [] Other:  Continued Tx Upon Discharge: ? [] Yes [] No [x] TBD based on progress while on ARU [] Vital Stim indicated [] Other:   Estimated discharge date: TBD    Interventions used this date:  [x] Speech/Language Treatment  [] Instruction in HEP [] Group [] Dysphagia Treatment [] Cognitive Treatment   [] Other:    Admitting SLP FIM scores:  Comprehension: 7 - Patient understands complex ideas (math/planning)  Expression: 6 - Device used to express complex ideas/needs  Social Interaction: 7 - Patient has appropriate behavior/relations 100% of the time  Problem Solvin - Patient independent with complex tasks  Memory: 7 - Patient independent with meds/people/schedule    Current SLP FIM scores:  Comprehension: 7 - Patient understands complex ideas (math/planning)  Expression: 6 - Device used to express complex ideas/needs  Social Interaction: 7 - Patient has appropriate behavior/relations 100% of the time  Problem Solvin - Patient independent with complex tasks  Memory: 7 - Patient independent with meds/people/schedule    Total Time Breakdown / Charges    Time in Time out Total Time / units   Cognitive Tx -- -- --   Speech Tx 1230 1300 30 mins/ 2 units   Dysphagia Tx -- -- --     Electronically Signed by     Osiris Temple. A CCC-SLP  Speech-Language Pathologist  [id number]

## 2019-05-22 NOTE — PROGRESS NOTES
Physical Therapy  Facility/Department: [facility]  Daily Treatment Note  NAME: Prieto Chacon  : 1960  MRN: [id number]    Date of Service: 2019    Discharge Recommendations:  Home with assist PRN, Home with Home health PT   PT Equipment Recommendations  Other: CTA for RW need    Patient Diagnosis(es): There were no encounter diagnoses. has a past medical history of Anxiety state, unspecified, Impacted cerumen, and Rheumatoid arthritis(714.0). has a past surgical history that includes Carpal tunnel release; Lumbar spine surgery; and bronchoscopy (N/A, 2019). Restrictions  Restrictions/Precautions  Restrictions/Precautions: General Precautions, Fall Risk  Required Braces or Orthoses?: No  Position Activity Restriction  Other position/activity restrictions: Constant O2 at 3L, attempting to wean  Subjective   General  Chart Reviewed: Yes  Family / Caregiver Present: No  Referring Practitioner: Dr. Joselyn Perry MD  Subjective  Subjective: Pt agreeable to therapy. Reports mild back pain but does not rate  General Comment  Comments: Pt supine in bed upon arrival.          Objective   Bed mobility  Supine to Sit: Modified independent  Transfers  Sit to Stand: Stand by assistance  Stand to sit: Stand by assistance  Ambulation  Ambulation?: Yes  Ambulation 1  Surface: level tile  Device: Rolling Walker  Other Apparatus: O2(3L)  Assistance: Stand by assistance  Quality of Gait: step through gait pattern, fair kelli, steady with turns, no loss of balance  Distance: 180' x2 and 61'  Comments: use of DF assist ace wrap on 2nd ambulation distance with improved foot clearance on L; attempted to don trial AFO, however unable to fit AFO in pt's shoe     Balance  Comments: Alternating toe taps on 6\" step 10 reps x2 sets with UE support on walker  Exercises  Comments: Repetitive sit-stands from wheelchair x10 reps     2nd session  Pt sitting on EOB upon arrival. Agreeable to therapy.   Sit-stand SBA  Ambulation: 160' with RW and SBA with DF assist ace wrap on L, step through gait pattern, improved heel strike on L, steady with turns  Balance: static standing on blue airex foam 30 seconds feet together, normal stance EO and EC without UE support and SBA; dynamic stepping onto colored dots on floor without UE support and SBA x10 reps to various spots on floor outside DHIRAJ  SciFit stepper: 5 minutes using BUE/LEs for improved activity tolerance and LE strengthening. -120 and SpO2 93-95% on 3L  Pt left supine in bed with all needs in reach and bed alarm on. Assessment   Body structures, Functions, Activity limitations: Decreased functional mobility ; Decreased endurance;Decreased balance;Decreased strength;Decreased sensation;Decreased ROM  Assessment: Pt continues to show good progress with ambulation distances and is requiring less assist for transfrs and ambulation. Initiated use of ace wrap for DF assist to improve foot clearance on L. Attempted to don AFO on LLE however unable to fit in patient's shoe. Will continue to progress mobility as pt tolerates. Treatment Diagnosis: decreased (I) with mobility  Prognosis: Good  Patient Education: Educated on safety with transfers and use of RW  REQUIRES PT FOLLOW UP: Yes  Activity Tolerance  Activity Tolerance: SpO2 93-95% on 3L, -130     Goals  Short term goals  Time Frame for Short term goals: 5 days (5/25)  Short term goal 1: Pt will be indep with bed mobility. Short term goal 2: Pt will be SBA for transfers with no device. Short term goal 3: Pt will ambulate 150 ft with SBA and LRAD. Short term goal 4: Pt will negotiate curb step x 2 reps with SBA and LRAD. Long term goals  Time Frame for Long term goals : 10 days (5/30)  Long term goal 1: Pt will be indep with transfers. Long term goal 2: Pt will be indep with ambulation 100 ft. Long term goal 3: pt will negotiate curb step x 2 reps indep.   Long term goal 4: Pt will improve Workman Balance Score to >47/56 to

## 2019-05-22 NOTE — PROGRESS NOTES
5/6/2019). Restrictions  Restrictions/Precautions  Restrictions/Precautions: General Precautions, Fall Risk  Required Braces or Orthoses?: No  Position Activity Restriction  Other position/activity restrictions: Constant O2 at 3L, attempting to wean  Subjective   General  Chart Reviewed: Yes  Patient assessed for rehabilitation services?: Yes  Family / Caregiver Present: No  Subjective  Subjective: Pt found in bed, receptive to OT tx  Vital Signs  Patient Currently in Pain: Denies   Orientation  Orientation  Overall Orientation Status: Within Normal Limits  Objective             Balance  Sitting Balance: Independent  Standing Balance: Contact guard assistance  Standing Balance  Time: 4 mins. Activity: tabeltop task      Transfers  Stand Step Transfers: Contact guard assistance  Sit to stand: Contact guard assistance  Stand to sit: Contact guard assistance        Coordination  Gross Motor: fair for handwriting skills with triangle foam on pen, accuracy and fluidity improved with practice  Fine Motor: pt with fair FMC for retrieving small items from bin of rice, fair for retrieving small beans from red theraputty              Cognition  Overall Cognitive Status: Exceptions  Arousal/Alertness: Appropriate responses to stimuli  Following Commands: Follows multistep commands consistently  Attention Span: Appears intact  Memory: Appears intact  Safety Judgement: Decreased awareness of need for assistance  Problem Solving: Assistance required to generate solutions  Insights: Decreased awareness of deficits  Initiation: Requires cues for some  Sequencing: Does not require cues  Cognition Comment: 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 Pt did not feel she needed to address it at the time.      Perception  Overall Perceptual Status: WFL              Type of ROM/Therapeutic Exercise  Type of ROM/Therapeutic Exercise: AROM  Comment: 2# weight and

## 2019-05-22 NOTE — PATIENT CARE CONFERENCE
[facility]  Inpatient Rehabilitation  Weekly Team Conference Note    Date:   Patient Name: Prieto Chacon        MRN: [id number]    : 1960  (64 y.o.)  Gender: female   Referring Practitioner: Dr. Joselyn Perry MD  Diagnosis: Debility      Interventions to be utilized toward barriers to discharge, per discipline:  [address] observed barriers to dc: New medication and dosage changes, endurance  Nursing interventions: educate patient on new medication and any dosage changes  Family Education: no  Fall Risk:  No      Physical therapy observed barriers to dc:    Baseline: independent without device   Current level: SBA/CGA ambulating with ',    Barriers to DC: decreased endurance, decreased balance   Needs in order to achieve dc home/next level of care: mod I for transfers and ambulation      Physical therapy interventions:   Current Treatment Recommendations: Strengthening, Gait Training, Patient/Caregiver Education & Training, ROM, Stair training, Balance Training, Neuromuscular Re-education, Equipment Evaluation, Education, & procurement, Functional Mobility Training, Endurance Training, Transfer Training, Safety Education & Training, Home Exercise Program, Modalities      PHYSICAL THERAPY    FIMS current conference:  Bed, Chair, Wheel Chair: 4 - [address] assistance only <25% assist  and/or requires assist with one leg only  Walk: 1 - Total Assistance Walks < 50 feet OR requires two or more people OR patient performs < 25% of locomotion effort  Distance Walked: 24 ft  Wheel Chair: 2 - Maximal Assistance Requires up to [facility] 91 requires assistance of one person to operate wheelchair between [facility] in [address] [place] [address] Street: 128 ft  Stairs: 2- Maximal Assistance Performs 25-49% of the effort to go up and down 4 to 6 stairs and requires the assistance of one person only    PT Equipment Recommendations  Other: CTA for RW need    Assessment: Pt is 65 yo female who presents with ARU with diagnosis of debility. Pt with prolonged ICU stay including requiring intubation. Pt indep with mobility and working full time at baseline. Pt CGA for mobility with RW and min A for mobility without device this date. Pt scored 41/56 on Workman Balance Test. HR increased during mobility but pt states resting HR is high at baseline. Pt would benefit from continued skilled therapy to address deficits. Recommend home with assist prn and OP PT at d/c. Occupational therapy observed barriers to dc:    Baseline: pt lived alone, independent with ADLs, transfers and mobility, worked full time   Current level: mod A LB ADLs, min A transfers   Barriers to DC: endurance, standing/activity tolerance, desats with min activity   Needs in order to achieve dc home/next level of care:  Mod I for ADLs and transfers    Occupational Therapy interventions:  Current Treatment Recommendations: Strengthening, Endurance Training, Neuromuscular Re-education, Patient/Caregiver Education & Training, Self-Care / ADL, Balance Training, Home Management Training, Functional Mobility Training, Safety Education & Training, Positioning      OCCUPATIONAL THERAPY  FIMs last conference N/A      FIMS: current conference  Eatin - Patient feeds self  Groomin - Requires setup/cues to do all tasks  Bathing: 3 - Able to bathe 5-7 areas  Dressing-Upper: 5 - Requires setup/supervision/cues and/or requires assist with presthesis/brace only  Dressing-Lower: 3 - Requires assist with 2-3 parts of dressing  Toiletin - Requires device (grab bar/walker/etc.)  Toilet Transfer: 4 - Requires steadying assistance only < 25% assist  Tub Transfer: 4 - Minimal contact assistance, pt. expends 75% or more effort  Shower Transfer: 4 - Minimal contact assistance, pt. expends 75% or more effort      Assessment: Pt presents at [address] [place] [address] with a diagnosis of debility after being hospitalized for respiratory failure with SpO2 at 77%. She ended up with septic shock/pneumonia, a pulmonary embolism and LLE DVT. She has a dx of Lung CA and new L foot drop. She is currently on 4L O2, and is eager to participate with therapy so that she may return home to her independent living situation. Currently pt needs CGA with toilet and shower transfers using a RW, and mod A for bathing and LE dressing. UB dressing and grooming are performed with setup. She will benefit from skilled inpatient OT tx to maximize her independence and improve her endurance/strength. Speech therapy observed barriers to dc:    Baseline: lives at home independently, works full time, manages own finances and medications   Current level: mild oropharyngeal dysphagia, mild voice   Barriers to DC: None   Needs in order to achieve dc home/next level of care: carryover of vocal strategies to improve overall vocal quality    Speech Therapy interventions:  Dysphagia: Therapeutic Interventions: Diet tolerance monitoring, Patient/Family education, Therapeutic PO trials with SLP  Speech/Language/Cognition: vocal strengthening exercises to improve overall vocal quality      SPEECH THERAPY:  FIMs last conference    FIMS: current conference  Comprehension: 7 - Patient understands complex ideas (math/planning)  Expression: 6 - Device used to express complex ideas/needs  Social Interaction: 7 - Patient has appropriate behavior/relations 100% of the time  Problem Solvin - Patient independent with complex tasks  Memory: 7 - Patient independent with meds/people/schedule    Assessment: Pt pleasant and cooperative, tolerating diet upgrade to D3/Thin liquids without any difficulty. Vocal exercises introduced today, pt completed with great demonstration and accuracy. NUTRITION  Weight: 221 lb 6.4 oz (100.4 kg) / Body mass index is 39.22 kg/m².   Diet Order: DIET GENERAL; No Drinking Straw  PO Meals Eaten (%): 26 - 50%  Education: pt declined          CASE MANAGEMENT  Assessment: Patient lives at home alone. Works full time. Will follow         Interdisciplinary Goals:   1.)Pt will be Supervision with toileting.  2.) Pt will perform transfers mod I  3.)  4.)  5.)    Discharge Plan   Estimated discharge date: 05-  Destination: home health   Pass:No  Services at Discharge: [address], Occupational Therapy and Nursing Other pending eval   Equipment at Discharge: Rolling walker, AFO, possible home oxygen, shower chair vs tub transfer bench     Team Members Present at Conference:  : Daylin Larios MSW, LSW    Occupational Therapist: Sydnee Rubinstein, OT  Physical Therapist: Fredi Singletary, PT  Speech Therapist: Jayson Crane, SLP  Nurse: Debby Sung RN BSN   Dietician: Maddi Torres RD/MONICA  : Evelyne Bonilla   Psychiatry: n/a    Family members present at conference: n/a      I led this team conference and I approve the established interdisciplinary plan of care as documented within the medical record of Miranda Garcia. MD: Jillian Murphy.  Diego Rice MD 5/23/2019, 3:21 PM

## 2019-05-22 NOTE — PROGRESS NOTES
Alexis Alberto Block  5/22/2019  [id number]    Chief Complaint: Debility    Subjective:   No issues overnight. No new complaints. Breathing at baseline. Denies pain. ROS: No chest pain, shortness of breath, nausea, vomiting, diarrhea, fever, chills  Objective:  Patient Vitals for the past 24 hrs:   BP Temp Temp src Pulse Resp SpO2 Weight   05/22/19 0900 120/84 98 °F (36.7 °C) Oral 116 18 93 % --   05/22/19 0647 -- -- -- -- -- -- 221 lb 6.4 oz (100.4 kg)   05/21/19 2030 -- -- -- -- -- 93 % --   05/21/19 2000 136/76 98.8 °F (37.1 °C) Oral 102 18 94 % --     Gen: No distress, pleasant. HEENT: Normocephalic, atraumatic. CV: Regular rate and rhythm. Resp: No respiratory distress. Abd: Soft, nontender   Ext: No edema. Neuro: Alert, oriented, appropriately interactive. Wt Readings from Last 3 Encounters:   05/22/19 221 lb 6.4 oz (100.4 kg)   05/20/19 220 lb 4.8 oz (99.9 kg)   04/16/19 266 lb (120.7 kg)       Laboratory data:   Lab Results   Component Value Date    WBC 7.2 05/21/2019    HGB 17.7 (H) 05/21/2019    HCT 55.2 (H) 05/21/2019    MCV 88.4 05/21/2019     05/21/2019       Lab Results   Component Value Date     05/21/2019    K 3.7 05/21/2019    CL 89 05/21/2019    CO2 38 05/21/2019    BUN 8 05/21/2019    CREATININE <0.5 05/21/2019    GLUCOSE 102 05/21/2019    CALCIUM 10.2 05/21/2019        Therapy progress:  PT  Position Activity Restriction  Other position/activity restrictions: Constant O2 at 3L, attempting to wean  Objective     Sit to Stand: Stand by assistance  Stand to sit: Stand by assistance  Bed to Chair: Contact guard assistance(no device; SBA with RW)  Device: Rolling Walker  Other Apparatus: O2(3L)  Assistance: Stand by assistance  Distance: 180' x2 and 60'  OT  PT Equipment Recommendations  Other: CTA for RW need  Toilet - Technique: Ambulating  Equipment Used: Standard bedside commode  Toilet Transfers Comments:  With RW and assist for O2  Assessment        SLP  Current Diet : Soft regular  Current Liquid Diet : Thin  Diet Solids Recommendation: Regular  Liquid Consistency Recommendation: Thin    Body mass index is 39.22 kg/m². Rehabilitation Diagnosis:   10.9, Pulmonary, Other Pulmonary        Assessment and Plan:     Impairments: weakness, endurance, balance     Debility - PT/OT to address endurance, strength, compensatory strategies, equipment     Acute hypoxic respiratory failure - Thought to be due to PE and core pulmonale. On anticoagulation as below. Continue lasix and prn albuterol. Wean O2 as tolerated.      Suspected acute PE with LLE DVT - Eliquis     Chronic hypercapnic respiratory failure - Likely due to OHS. BiPAP qhs.      Tracheobronchitis - Completed antibiotics.      Left foot drop - New this admission. 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 Consider EMG as outpatient. PT. Trial AFO.    Morbid Obesity - Complicating assessment and treatment. Placing patient at risk for multiple co-morbidities as well as early death and contributing to the patient's presentation. Dietician consult. Counseling and education.     Bladder - high risk retention - Monitor PVRs, SC prn >300cc     Bowel - high risk constipation - colace+senna BID, PRN miralax and MoM. follow bowel movements. Enema or suppository if needed.      Safety - fall and aspiration precautions     FULL CODE        Aristides Gonzalez MD 5/22/2019, 3:54 PM

## 2019-05-23 LAB
ANION GAP SERPL CALCULATED.3IONS-SCNC: 12 MMOL/L (ref 3–16)
BASOPHILS ABSOLUTE: 0.1 K/UL (ref 0–0.2)
BASOPHILS RELATIVE PERCENT: 0.8 %
BUN BLDV-MCNC: 9 MG/DL (ref 7–20)
CALCIUM SERPL-MCNC: 10.1 MG/DL (ref 8.3–10.6)
CHLORIDE BLD-SCNC: 90 MMOL/L (ref 99–110)
CO2: 34 MMOL/L (ref 21–32)
CREAT SERPL-MCNC: <0.5 MG/DL (ref 0.6–1.1)
EOSINOPHILS ABSOLUTE: 0.3 K/UL (ref 0–0.6)
EOSINOPHILS RELATIVE PERCENT: 3 %
GFR AFRICAN AMERICAN: >60
GFR NON-AFRICAN AMERICAN: >60
GLUCOSE BLD-MCNC: 103 MG/DL (ref 70–99)
HCT VFR BLD CALC: 52.4 % (ref 36–48)
HEMOGLOBIN: 17 G/DL (ref 12–16)
LYMPHOCYTES ABSOLUTE: 2.1 K/UL (ref 1–5.1)
LYMPHOCYTES RELATIVE PERCENT: 24.1 %
MAGNESIUM: 1.6 MG/DL (ref 1.8–2.4)
MCH RBC QN AUTO: 28.5 PG (ref 26–34)
MCHC RBC AUTO-ENTMCNC: 32.5 G/DL (ref 31–36)
MCV RBC AUTO: 87.5 FL (ref 80–100)
MONOCYTES ABSOLUTE: 1 K/UL (ref 0–1.3)
MONOCYTES RELATIVE PERCENT: 11.7 %
NEUTROPHILS ABSOLUTE: 5.1 K/UL (ref 1.7–7.7)
NEUTROPHILS RELATIVE PERCENT: 60.4 %
PDW BLD-RTO: 17.1 % (ref 12.4–15.4)
PLATELET # BLD: 229 K/UL (ref 135–450)
PMV BLD AUTO: 7.8 FL (ref 5–10.5)
POTASSIUM REFLEX MAGNESIUM: 3.4 MMOL/L (ref 3.5–5.1)
RBC # BLD: 5.98 M/UL (ref 4–5.2)
SODIUM BLD-SCNC: 136 MMOL/L (ref 136–145)
WBC # BLD: 8.5 K/UL (ref 4–11)

## 2019-05-23 PROCEDURE — 1280000000 HC REHAB R&B

## 2019-05-23 PROCEDURE — 94669 MECHANICAL CHEST WALL OSCILL: CPT

## 2019-05-23 PROCEDURE — 6370000000 HC RX 637 (ALT 250 FOR IP): Performed by: PHYSICAL MEDICINE & REHABILITATION

## 2019-05-23 PROCEDURE — 94640 AIRWAY INHALATION TREATMENT: CPT

## 2019-05-23 PROCEDURE — 97116 GAIT TRAINING THERAPY: CPT

## 2019-05-23 PROCEDURE — 6360000002 HC RX W HCPCS: Performed by: PHYSICAL MEDICINE & REHABILITATION

## 2019-05-23 PROCEDURE — 97110 THERAPEUTIC EXERCISES: CPT

## 2019-05-23 PROCEDURE — 94761 N-INVAS EAR/PLS OXIMETRY MLT: CPT

## 2019-05-23 PROCEDURE — 92507 TX SP LANG VOICE COMM INDIV: CPT

## 2019-05-23 PROCEDURE — 97535 SELF CARE MNGMENT TRAINING: CPT

## 2019-05-23 PROCEDURE — 36415 COLL VENOUS BLD VENIPUNCTURE: CPT

## 2019-05-23 PROCEDURE — 85025 COMPLETE CBC W/AUTO DIFF WBC: CPT

## 2019-05-23 PROCEDURE — 83735 ASSAY OF MAGNESIUM: CPT

## 2019-05-23 PROCEDURE — 97530 THERAPEUTIC ACTIVITIES: CPT

## 2019-05-23 PROCEDURE — 2700000000 HC OXYGEN THERAPY PER DAY

## 2019-05-23 PROCEDURE — 80048 BASIC METABOLIC PNL TOTAL CA: CPT

## 2019-05-23 RX ORDER — POTASSIUM CHLORIDE 20 MEQ/1
20 TABLET, EXTENDED RELEASE ORAL 2 TIMES DAILY WITH MEALS
Status: DISCONTINUED | OUTPATIENT
Start: 2019-05-23 | End: 2019-05-30 | Stop reason: HOSPADM

## 2019-05-23 RX ORDER — POTASSIUM CHLORIDE 20 MEQ/1
40 TABLET, EXTENDED RELEASE ORAL ONCE
Status: COMPLETED | OUTPATIENT
Start: 2019-05-23 | End: 2019-05-23

## 2019-05-23 RX ADMIN — VITAMIN D, TAB 1000IU (100/BT) 5000 UNITS: 25 TAB at 07:57

## 2019-05-23 RX ADMIN — CITALOPRAM HYDROBROMIDE 20 MG: 20 TABLET ORAL at 07:57

## 2019-05-23 RX ADMIN — MAGNESIUM GLUCONATE 500 MG ORAL TABLET 400 MG: 500 TABLET ORAL at 14:35

## 2019-05-23 RX ADMIN — APIXABAN 5 MG: 5 TABLET, FILM COATED ORAL at 20:49

## 2019-05-23 RX ADMIN — FUROSEMIDE 40 MG: 40 TABLET ORAL at 07:57

## 2019-05-23 RX ADMIN — POTASSIUM CHLORIDE 40 MEQ: 1500 TABLET, EXTENDED RELEASE ORAL at 14:35

## 2019-05-23 RX ADMIN — SENNOSIDES,DOCUSATE SODIUM 2 TABLET: 8.6; 5 TABLET, FILM COATED ORAL at 07:56

## 2019-05-23 RX ADMIN — MAGNESIUM GLUCONATE 500 MG ORAL TABLET 400 MG: 500 TABLET ORAL at 20:49

## 2019-05-23 RX ADMIN — ALBUTEROL SULFATE 2.5 MG: 2.5 SOLUTION RESPIRATORY (INHALATION) at 20:11

## 2019-05-23 RX ADMIN — ALBUTEROL SULFATE 2.5 MG: 2.5 SOLUTION RESPIRATORY (INHALATION) at 07:29

## 2019-05-23 RX ADMIN — ACETAMINOPHEN 650 MG: 325 TABLET ORAL at 20:48

## 2019-05-23 RX ADMIN — SENNOSIDES,DOCUSATE SODIUM 2 TABLET: 8.6; 5 TABLET, FILM COATED ORAL at 20:48

## 2019-05-23 RX ADMIN — ACETAMINOPHEN 650 MG: 325 TABLET ORAL at 08:00

## 2019-05-23 RX ADMIN — APIXABAN 5 MG: 5 TABLET, FILM COATED ORAL at 07:57

## 2019-05-23 RX ADMIN — TRAZODONE HYDROCHLORIDE 50 MG: 50 TABLET ORAL at 20:49

## 2019-05-23 ASSESSMENT — PAIN DESCRIPTION - PAIN TYPE: TYPE: CHRONIC PAIN

## 2019-05-23 ASSESSMENT — PAIN DESCRIPTION - ORIENTATION: ORIENTATION: MID

## 2019-05-23 ASSESSMENT — PAIN DESCRIPTION - FREQUENCY: FREQUENCY: INTERMITTENT

## 2019-05-23 ASSESSMENT — PAIN SCALES - GENERAL
PAINLEVEL_OUTOF10: 0
PAINLEVEL_OUTOF10: 3
PAINLEVEL_OUTOF10: 4
PAINLEVEL_OUTOF10: 0
PAINLEVEL_OUTOF10: 1

## 2019-05-23 ASSESSMENT — PAIN DESCRIPTION - PROGRESSION: CLINICAL_PROGRESSION: NOT CHANGED

## 2019-05-23 ASSESSMENT — PAIN DESCRIPTION - DESCRIPTORS: DESCRIPTORS: ACHING

## 2019-05-23 ASSESSMENT — PAIN DESCRIPTION - ONSET: ONSET: ON-GOING

## 2019-05-23 ASSESSMENT — PAIN - FUNCTIONAL ASSESSMENT: PAIN_FUNCTIONAL_ASSESSMENT: ACTIVITIES ARE NOT PREVENTED

## 2019-05-23 ASSESSMENT — PAIN DESCRIPTION - LOCATION: LOCATION: BACK

## 2019-05-23 NOTE — PROGRESS NOTES
Speech Language Pathology  [facility]: ACUTE REHAB UNIT  SPEECH-LANGUAGE PATHOLOGY      [x] Daily  [] Weekly Care Conference Note  [] Discharge    Patient:Karolina Gonsales      :1960  JORGE:[id number]  Rehab Dx/Hx: Debility [R53.81]    Precautions: falls  Home situation: lives at home independently  ST Dx: [] Aphasia  [] Dysarthria  [] Apraxia   [x] Oropharyngeal dysphagia [] Cognitive Impairment  [x] Other: voice  Date of Admit: 2019  Room #: 4552/9990-85    Current functional status (updated daily):         Pt being seen for : [x] Speech/Language Treatment  [x] Dysphagia Treatment [] Cognitive Treatment  [] Other:  Communication: [x]WFL  [] Aphasia  [] Dysarthria  [] Apraxia  [] Pragmatic Impairment [] Non-verbal  [] Hearing Loss  [] Other:   Cognition: [x] WFL  [] Mild  [] Moderate  [] Severe [] Unable to Assess  [] Other:  Memory: [x] WFL  [] Mild  [] Moderate  [] Severe [] Unable to Assess  [] Other:  Behavior: [x] Alert  [x] Cooperative  []  Pleasant  [] Confused  [] Agitated  [] Uncooperative  [] Distractible [] Motivated  [] Self-Limiting [] Anxious  [] Other:  Endurance:  [x] Adequate for participation in SLP sessions  [] Reduced overall  [] Lethargic  [] Other:  Safety: [x] No concerns at this time  [] Reduced insight into deficits  []  Reduced safety awareness [] Not following call light procedures  [] Unable to Assess  [] Other:    Current Diet Order:DIET GENERAL; No Drinking Straw  Swallowing Precautions: Sit up for all meals and thereafter for 30 minutes, Eat with small bites (1/2 tsp; 1 tsp) and Alternate solids with liquids       Date: 2019      Tx session 1  7640-1530 Tx session 2  All tx needs met in Session 1   Total Timed Code Min 0    Total Treatment Minutes 30    Individual Treatment Minutes 30    Group Treatment Minutes 0 0   Co-Treat Minutes 0 0   Variance/Reason:      Pain None    Pain Intervention [] RN notified  [] Repositioned  [] Intervention offered and patient declined  [x] N/A  [] Other: [] RN notified  [] Repositioned  [] Intervention offered and patient declined  [] N/A  [] Other:   Subjective     Pt seen in room at bedside. The patient is alert, pleasant, and cooperative. Objective:  Goals     Dysphagia Goals:  1. The patient will tolerate recommended diet without observed clinical signs of aspiration    The patient is tolerating regular solids and thin liquids with no clinical s/s of aspiration. No coughing, choking, throat clearing, or wet vocal quality. 2. The patient/caregiver will demonstrate understanding of compensatory strategies for improved swallowing safety. The patient is able to verbalize all recommended compensatory strategies at this time      Voice Exercises:  Goal 1: Patient will increase maximum phonation time to 12 seconds. Did not target        Goal 2: Patient will participate in vocal strengthening exercises for improved vocal quality with min cues. Syllable repetition: \"ah, ah, ah\" 5x    Glides: ee, oo from low to high 5x each         Other areas targeted:     Education:   Education provided to pt re: vocal strengthening exercises and vocal  hygiene. Handout given    Safety Devices: [x] Call light within reach  [] Chair alarm activated  [x] Bed alarm activated  [x] Other: pt left in bed all belongings within reach [] Call light within reach  [] Chair alarm activated  [] Bed alarm activated  [] Other:    Assessment: The patient is alert, pleasant, and cooperative. The patient is able to recall suggested compensatory strategies for swallowing. No s/s of aspiration with thin liquids or regular solids at this time (see above). The patient is unable to recall vocal hygiene techniques. SLP making pt visual aid/handout with vocal hygiene techniques. Continued improvement is noted with both swallowing and vocal quality. SLPt  Continue goals as listed above. Plan: Continue as per plan of care.       Additional Information:     Barriers

## 2019-05-23 NOTE — PROGRESS NOTES
Supplement (ONS) Orders: None  · Anthropometric Measures:  · Ht: 5' 3\" (160 cm)   · Current Body Wt: 222 lb (100.7 kg)  · Admission Body Wt: 220 lb 14.4 oz (100.2 kg)  · % Weight Change:  Noted 46 lb weight loss (17.3% loss) x 1 month per EMR - possibly 2/2 fluid shifts   · Ideal Body Wt: 115 lb (52.2 kg)   · BMI Classification: BMI 35.0 - 39.9 Obese Class II    Nutrition Interventions:   Continue current diet  Continued Inpatient Monitoring    Nutrition Evaluation:   · Evaluation: Progressing toward goals   · Goals: Pt will tolerate diet and have po intakes 50% or greater during ARU stay    · Monitoring: Meal Intake, Diet Tolerance, Weight      Electronically signed by Sina Boothe RD, LD on 5/23/19 at 3:21 PM    Contact Number: Office: [phone]; [address]: [address]

## 2019-05-23 NOTE — PROGRESS NOTES
Physical Therapy  Facility/Department: [facility]  Daily Treatment Note  NAME: Elvis Hercules  : 1960  MRN: [id number]    Date of Service: 2019    Discharge Recommendations:  Home with assist PRN, Home with Home health PT   PT Equipment Recommendations  Other: CTA for RW need    Patient Diagnosis(es): There were no encounter diagnoses. has a past medical history of Anxiety state, unspecified, Impacted cerumen, and Rheumatoid arthritis(714.0). has a past surgical history that includes Carpal tunnel release; Lumbar spine surgery; and bronchoscopy (N/A, 2019). Restrictions  Restrictions/Precautions  Restrictions/Precautions: General Precautions, Fall Risk  Required Braces or Orthoses?: No  Position Activity Restriction  Other position/activity restrictions: Continuous O2 at 3L, attempting to wean     Subjective   General  Chart Reviewed: Yes  Additional Pertinent Hx: LLE foot drop with 1/5 DF  Response To Previous Treatment: Patient with no complaints from previous session. Family / Caregiver Present: No  Referring Practitioner: Dr. Jaspreet Ingram MD  General Comment  Comments: Pt presents seated on EOB, provided verbal consent to PT treatment  Pain Screening  Patient Currently in Pain: Denies       Orientation  Orientation  Overall Orientation Status: Within Normal Limits    Objective   Transfers  Sit to Stand: Supervision  Stand to sit: Supervision    Ambulation 1  Surface: level tile  Device: Rolling Walker  Other Apparatus: O2(3LPM)  Assistance: Stand by assistance  Quality of Gait: Without ace wrap DF assist: step through gait pattern, fair kelli, steady with turns, steppage gait with LLE to clear L foot during swing. With ace wrap DF assist: similar as without DF assist but with normalized degree of hip flexion  Distance:  Without ace wrap DF assist: 300'    with ace wrap DF assist: 300'    Ambulation 2  Surface - 2: level tile  Device 2: No device  Other Apparatus 2: O2(3LPM)  Assistance 2: Contact guard assistance  Quality of Gait 2: Reciprocal pattern, short step length, decreased kelli and gait speed, increased lateral weight shifting/sway though no LOB  Distance: 300' with one standing rest break of 15 seconds half-way through      Lateral Stepping: 15' right + 15' left initially with BUE support and progressed to without UE support with CGA  Retrowalking: 15' with RUE support  Pt ambulated over 10 dynamic balance discs spaced varying distances apart from one another with use of RW with SBA to CGA, appropriate use of ankle strategy for balance, no LOB. Pt then repeated the task without an AD, demonstrates reduced kelli, increased concentration and some hesitancy/anxiety with stepping, but no overt LOB. Standing Toe Taps: 10 BLE's, alternating R and L, onto 6\" step, initially with BUE support and progressed to without UE support  Other exercises 2: Standing on A[employer] Balance Pad: static stance x30 seconds with BUE support + static stance x30 seconds without UE support + horizontal head rotations 5 both directions without UE support + vertical head turns 5 both directions without UE support + static stance with eyes closed x30 seconds + static stance with narrow DHIRAJ/feet together x30s. Pt completed each with SBA to CGA, occasional increase in postural sway, two instances of mild posterior LOB with narrow DHIRAJ requiring min assist      Assessment   Body structures, Functions, Activity limitations: Decreased functional mobility ; Decreased endurance;Decreased balance;Decreased strength;Decreased sensation;Decreased ROM  Assessment: Pt continues to improve with therapy, ambulating up to 300' with and without RW, SBA with RW and CGA with decreased gait speed without. Still 1/5 ankle DF strength, using ace wrap DF assist for normalization of gait. Other limitations include impaired balance and decreased activity tolerance.  Pt participated in dynamic standing tasks for balance and gait training and tolerated well. SpO2 and HR WNL while on 3L O2 per nc. Will continue to progress mobility as appropriate. Treatment Diagnosis: decreased (I) with mobility  Prognosis: Good  Patient Education: Role and goals of PT, gait training, ther ex, POC  REQUIRES PT FOLLOW UP: Yes  Activity Tolerance: Patient Tolerated treatment well  Activity Tolerance: SpO2 low 90's on 3LPM, HR 111bpm at rest, up to 120bpm after ambulation     Goals  Short term goals  Time Frame for Short term goals: 5 days (5/25)  Short term goal 1: Pt will be indep with bed mobility. Short term goal 2: Pt will be SBA for transfers with no device. Short term goal 3: Pt will ambulate 150 ft with SBA and LRAD. (goal met 5/23/19 with RW)  Short term goal 4: Pt will negotiate curb step x 2 reps with SBA and LRAD. Long term goals  Time Frame for Long term goals : 10 days (5/30)  Long term goal 1: Pt will be indep with transfers. Long term goal 2: Pt will be indep with ambulation 100 ft. Long term goal 3: pt will negotiate curb step x 2 reps indep. Long term goal 4: Pt will improve Workman Balance Score to >47/56 to reflect decreased fall risk. Long term goal 5: Pt will be indep with BLE HEP to facilitate continued strengthening and activity tolerance at d/c.   Patient Goals   Patient goals : [place] able to go home and do things for myself\"    Plan    Plan  Times per week: 5 out of 7 days/wk  Specific instructions for Next Treatment: progress mobility as tolerated  Current Treatment Recommendations: Strengthening, Gait Training, Patient/Caregiver Education & Training, ROM, Stair training, Balance Training, Neuromuscular Re-education, Equipment Evaluation, Education, & procurement, Functional Mobility Training, Endurance Training, Transfer Training, Safety Education & Training, Home Exercise Program, Modalities  Safety Devices  Type of devices: Call light within reach, Gait belt, Patient at risk for falls, Left in bed, Bed alarm in place     Therapy Time Individual Concurrent Group Co-treatment   Time In 1030         Time Out 1130         Minutes 60         Timed Code Treatment Minutes: [address] [facility] Delay, PT

## 2019-05-23 NOTE — CARE COORDINATION
Team conference/Weekly Summary     Team conference held today. SW met with patient at floor bedside to provide team conference update and confirm anticipated dc date of May 30th pending her insurance with home health care, Rolling Walker, Tub Transfer Bench vs Shower Chair, AFO and possible home oxygen. Offered a home health care and DME list but she declined both stating no preference on the companies. She is okay with referrals to [address] [place] [address]. She stated she has a Rolling Walker and a Shower Chair at home. Offered to call family but she declined, stating she would update them. Spoke with Ranjit Gracia with [facility] for the referral. [facility] aware of need for possible Home o2.      Angie Gould MSW, LSW

## 2019-05-23 NOTE — PLAN OF CARE
Assess patients skin integrity every shift. Encourage turning every two hours in bed. Keep heels elevated off bed. Use cushions in recliner and wheelchair and pillows in bed for protection. Use barrier wipes in the event of incontinence.

## 2019-05-23 NOTE — PROGRESS NOTES
Equipment Recommendations  Other: CTA for RW need  Toilet - Technique: Ambulating  Equipment Used: Standard bedside commode  Toilet Transfers Comments: With RW and assist for O2  Assessment        SLP  Current Diet : Soft regular  Current Liquid Diet : Thin  Diet Solids Recommendation: Regular  Liquid Consistency Recommendation: Thin    Body mass index is 39.4 kg/m². Rehabilitation Diagnosis:   10.9, Pulmonary, Other Pulmonary        Assessment and Plan:     Impairments: weakness, endurance, balance     Debility - PT/OT to address endurance, strength, compensatory strategies, equipment     Acute hypoxic respiratory failure - Thought to be due to PE and core pulmonale. On anticoagulation as below. Continue lasix and prn albuterol. Wean O2 as tolerated.      Suspected acute PE with LLE DVT - Eliquis     Chronic hypercapnic respiratory failure - Likely due to OHS. BiPAP qhs.      Tracheobronchitis - Completed antibiotics.      Left foot drop - New this admission. 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 Consider EMG as outpatient. PT. Trial AFO.    Morbid Obesity - Complicating assessment and treatment. Placing patient at risk for multiple co-morbidities as well as early death and contributing to the patient's presentation. Dietician consult. Counseling and education. Hypokalemia/Hypomagnesemia - Replace     Bladder - high risk retention - Monitor PVRs, SC prn >300cc     Bowel - high risk constipation - colace+senna BID, PRN miralax and MoM. follow bowel movements. Enema or suppository if needed.      Safety - fall and aspiration precautions     Interdisciplinary team conference was held today with entire rehab treatment team including PT, OT, SLP, Dietician, RN, and SW. Discussion focused on progress toward rehab goals and discharge planning. Making progress with mobility, endurance, strength. Limited by O2 requirement and left foot drop.  Separate conference then held with patient/family, questions answered and concerns addressed. Total treatment time >35 min with greater than 50% spent in care coordination. Anticipated dispo: home alone vs to parents home  Services: HH PT, OT, RN  DME: TBD (shower chair vs TTB, possible RW, left AFO)  ELOS: 5/30    [address]  Peg Hung MD 5/23/2019, 11:31 AM

## 2019-05-23 NOTE — PLAN OF CARE
Nutrition Problem: Inadequate oral intake  Intervention: Food and/or Nutrient Delivery: Continue current diet  Nutritional Goals: Pt will tolerate diet and have po intakes 50% or greater during ARU stay

## 2019-05-23 NOTE — CARE COORDINATION
[facility]    Referral received from [address] [place] [employer] MSW with request for SN/PT/OT. Writer is aware the pt's anticipated DC is 5/30. Writer has reviewed H&P and most recent MD progress note. Pt is part of the [facility].   I will continue to follow patient for needs, and arrange [place] [address] services prior to DC. Should pt dc over the weekend please fax facesheet, order, GUILLERMO, and H&P to [facility].      Kerwin Like RN CTN with [address]  XPC:[phone]

## 2019-05-23 NOTE — PROGRESS NOTES
Occupational Therapy  Facility/Department: [facility]  Daily Treatment Note  NAME: Eladia Angel  : 1960  MRN: [id number]    Date of Service: 2019    Discharge Recommendations:  Outpatient OT(pulmonary rehab)       Assessment   Performance deficits / Impairments: Decreased functional mobility ; Decreased safe awareness;Decreased ADL status; Decreased endurance;Decreased high-level IADLs;Decreased strength;Decreased balance;Decreased vision/visual deficit  Assessment: pt very pleasant and motivated, completes full ADL, transfers and mobility with SPV this date. Pt with increased [facility] and standing tolerance. Pt with no LOB with ADL tasks and bean bag toss. Pt c/o \"being sore in my arms and legs from yesterday\", but participates well. Pt progressing well with all tasks, cont POC. Patient Education: Insructed re: energy conservation techniques including pursed lip breathing and rest breaks, as well as Role of OT and ADL retraining. Activity Tolerance  Activity Tolerance: Patient Tolerated treatment well;Patient limited by fatigue  Activity Tolerance: SPO2 drops to 84% with min activity on 3L O2, rebounds quickly to 92% with PLB, fatigues easily with activity  Safety Devices  Safety Devices in place: Yes  Type of devices: Call light within reach; Patient at risk for falls;Gait belt;Bed alarm in place; Left in bed         Patient Diagnosis(es): There were no encounter diagnoses. has a past medical history of Anxiety state, unspecified, Impacted cerumen, and Rheumatoid arthritis(714.0). has a past surgical history that includes Carpal tunnel release; Lumbar spine surgery; and bronchoscopy (N/A, 2019).     Restrictions  Restrictions/Precautions  Restrictions/Precautions: General Precautions, Fall Risk  Required Braces or Orthoses?: No  Position Activity Restriction  Other position/activity restrictions: Constant O2 at 3L, attempting to wean  Subjective   General  Chart Reviewed: Yes  Patient assessed for rehabilitation services?: Yes  Family / Caregiver Present: No  Subjective  Subjective: Pt found in bed, receptive to OT tx  Vital Signs  Patient Currently in Pain: Denies   Orientation  Orientation  Overall Orientation Status: Within Normal Limits  Objective    ADL  Feeding: Modified independent (modified diet)  Grooming: Supervision  UE Bathing: Supervision  LE Bathing: Supervision(with LH sponge)  UE Dressing: Supervision  LE Dressing: Supervision(seated)  Additional Comments: Pt's SpO2 drops to 84% on 3L after ADL tasks, with HR at 130-137, rebounds to 92% in <30 sec with PLB. Pt reports high HR is normal for her        Balance  Sitting Balance: Independent  Standing Balance: Supervision  Standing Balance  Time: 5 min, >20 min, 3-4 min  Activity: standing at sink for grooming, functional mobility to/from gym, standing for bean bag toss  Comment: RW  Functional Mobility  Functional - Mobility Device: Rolling Walker  Activity: To/from bathroom  Assist Level: Supervision  Shower Transfers  Shower - Transfer From: [facility] - Transfer Type: To and From  Shower - Transfer To: Transfer tub bench  Shower - Technique: Ambulating  Shower Transfers: Supervision  Shower Transfers Comments: With RW, used grab bar for sit to  shower while bathing      Transfers  Stand Step Transfers: Supervision  Stand Pivot Transfers: Supervision  Sit to stand: Supervision  Stand to sit: Supervision        Coordination  Gross Motor: good for ADLs, bean bag toss  Fine Motor: fair for parquetry, [facility] functional board and shfflng/dealing cards, pt with tremors that make [facility] tasks more difficult              Cognition  Overall Cognitive Status: WFL  Arousal/Alertness: Appropriate responses to stimuli  Following Commands:  Follows all commands without difficulty  Attention Span: Appears intact  Memory: Appears intact        Plan   Plan  Times per week: 5/7 per week  Times per day: Daily  Current Treatment Recommendations: Strengthening, Endurance Training, Neuromuscular Re-education, Patient/Caregiver Education & Training, Self-Care / ADL, Balance Training, Home Management Training, Functional Mobility Training, Safety Education & Training, Positioning    Goals  Short term goals  Time Frame for Short term goals: 5 days by 5/24/19  Short term goal 1: Pt will be Supervision with toileting. Short term goal 2: Pt will be SBA for LE dressing.-met 5/23  Short term goal 3: Pt will be Supervision for toilet transfers with LRD. Short term goal 4: Pt will be Min A for bathing.-met 5/23  Short term goal 5: SBA for light meal prep  Long term goals  Time Frame for Long term goals : 10 days by 5/29/19  Long term goal 1: Pt will be Independent with toileting. Long term goal 2: Pt will be Modified Independent with dressing. Long term goal 3: Pt will be Modified Independent with ADL transfers. Long term goal 4: Pt will be Modified Independent for shower task. Long term goal 5: Mod I with light meal prep. Patient Goals   Patient goals : To get home.          Therapy Time   Individual Concurrent Group Co-treatment   Time In 0800         Time Out 0900         Minutes 60         Timed Code Treatment Minutes: 60 Minutes      Therapy Time   Individual Concurrent Group Co-treatment   Time In 1430         Time Out 1500         Minutes 30         Timed Code Treatment Minutes: [address], OT

## 2019-05-24 LAB
ANION GAP SERPL CALCULATED.3IONS-SCNC: 11 MMOL/L (ref 3–16)
BUN BLDV-MCNC: 7 MG/DL (ref 7–20)
CALCIUM SERPL-MCNC: 10.1 MG/DL (ref 8.3–10.6)
CHLORIDE BLD-SCNC: 92 MMOL/L (ref 99–110)
CO2: 35 MMOL/L (ref 21–32)
CREAT SERPL-MCNC: <0.5 MG/DL (ref 0.6–1.1)
GFR AFRICAN AMERICAN: >60
GFR NON-AFRICAN AMERICAN: >60
GLUCOSE BLD-MCNC: 121 MG/DL (ref 70–99)
MAGNESIUM: 1.9 MG/DL (ref 1.8–2.4)
POTASSIUM SERPL-SCNC: 3.7 MMOL/L (ref 3.5–5.1)
SODIUM BLD-SCNC: 138 MMOL/L (ref 136–145)

## 2019-05-24 PROCEDURE — 36415 COLL VENOUS BLD VENIPUNCTURE: CPT

## 2019-05-24 PROCEDURE — 6370000000 HC RX 637 (ALT 250 FOR IP): Performed by: PHYSICAL MEDICINE & REHABILITATION

## 2019-05-24 PROCEDURE — 94640 AIRWAY INHALATION TREATMENT: CPT

## 2019-05-24 PROCEDURE — 83735 ASSAY OF MAGNESIUM: CPT

## 2019-05-24 PROCEDURE — 1280000000 HC REHAB R&B

## 2019-05-24 PROCEDURE — 80048 BASIC METABOLIC PNL TOTAL CA: CPT

## 2019-05-24 PROCEDURE — 97535 SELF CARE MNGMENT TRAINING: CPT

## 2019-05-24 PROCEDURE — 94761 N-INVAS EAR/PLS OXIMETRY MLT: CPT

## 2019-05-24 PROCEDURE — 97110 THERAPEUTIC EXERCISES: CPT

## 2019-05-24 PROCEDURE — 97530 THERAPEUTIC ACTIVITIES: CPT

## 2019-05-24 PROCEDURE — 2700000000 HC OXYGEN THERAPY PER DAY

## 2019-05-24 PROCEDURE — 97116 GAIT TRAINING THERAPY: CPT

## 2019-05-24 PROCEDURE — 92526 ORAL FUNCTION THERAPY: CPT

## 2019-05-24 PROCEDURE — 92507 TX SP LANG VOICE COMM INDIV: CPT

## 2019-05-24 PROCEDURE — 6360000002 HC RX W HCPCS: Performed by: PHYSICAL MEDICINE & REHABILITATION

## 2019-05-24 RX ADMIN — ACETAMINOPHEN 650 MG: 325 TABLET ORAL at 06:51

## 2019-05-24 RX ADMIN — CITALOPRAM HYDROBROMIDE 20 MG: 20 TABLET ORAL at 07:46

## 2019-05-24 RX ADMIN — TRAZODONE HYDROCHLORIDE 50 MG: 50 TABLET ORAL at 22:43

## 2019-05-24 RX ADMIN — APIXABAN 5 MG: 5 TABLET, FILM COATED ORAL at 07:45

## 2019-05-24 RX ADMIN — ONDANSETRON 8 MG: 4 TABLET, ORALLY DISINTEGRATING ORAL at 14:18

## 2019-05-24 RX ADMIN — VITAMIN D, TAB 1000IU (100/BT) 5000 UNITS: 25 TAB at 07:45

## 2019-05-24 RX ADMIN — APIXABAN 5 MG: 5 TABLET, FILM COATED ORAL at 22:34

## 2019-05-24 RX ADMIN — SENNOSIDES,DOCUSATE SODIUM 2 TABLET: 8.6; 5 TABLET, FILM COATED ORAL at 07:46

## 2019-05-24 RX ADMIN — MAGNESIUM OXIDE TAB 400 MG (241.3 MG ELEMENTAL MG) 400 MG: 400 (241.3 MG) TAB at 22:44

## 2019-05-24 RX ADMIN — FUROSEMIDE 40 MG: 40 TABLET ORAL at 07:46

## 2019-05-24 RX ADMIN — ONDANSETRON 8 MG: 4 TABLET, ORALLY DISINTEGRATING ORAL at 22:32

## 2019-05-24 RX ADMIN — ALBUTEROL SULFATE 2.5 MG: 2.5 SOLUTION RESPIRATORY (INHALATION) at 07:33

## 2019-05-24 RX ADMIN — POTASSIUM CHLORIDE 20 MEQ: 20 TABLET, EXTENDED RELEASE ORAL at 07:45

## 2019-05-24 RX ADMIN — ALPRAZOLAM 0.25 MG: 0.25 TABLET ORAL at 22:43

## 2019-05-24 RX ADMIN — ERGOCALCIFEROL 50000 UNITS: 1.25 CAPSULE ORAL at 07:45

## 2019-05-24 RX ADMIN — MAGNESIUM GLUCONATE 500 MG ORAL TABLET 400 MG: 500 TABLET ORAL at 07:46

## 2019-05-24 ASSESSMENT — PAIN SCALES - GENERAL
PAINLEVEL_OUTOF10: 0
PAINLEVEL_OUTOF10: 0
PAINLEVEL_OUTOF10: 3
PAINLEVEL_OUTOF10: 0
PAINLEVEL_OUTOF10: 0

## 2019-05-24 NOTE — PROGRESS NOTES
[facility]: ACUTE REHAB UNIT  SPEECH-LANGUAGE PATHOLOGY      [x] Daily  [] Weekly Care Conference Note  [] Discharge    Patient:Karolina Gonsales      :1960  JII:[id number]  Rehab Dx/Hx: Debility [R53.81]    Precautions: falls  Home situation: lives at home independently  [address]  Dx: [] Aphasia  [] Dysarthria  [] Apraxia   [x] Oropharyngeal dysphagia [] Cognitive Impairment  [x] Other: voice  Date of Admit: 2019  Room #: 7453/4622-36    Current functional status (updated daily):         Pt being seen for : [x] Speech/Language Treatment  [x] Dysphagia Treatment [] Cognitive Treatment  [] Other:  Communication: [x]WFL  [] Aphasia  [] Dysarthria  [] Apraxia  [] Pragmatic Impairment [] Non-verbal  [] Hearing Loss  [] Other:   Cognition: [x] WFL  [] Mild  [] Moderate  [] Severe [] Unable to Assess  [] Other:  Memory: [x] WFL  [] Mild  [] Moderate  [] Severe [] Unable to Assess  [] Other:  Behavior: [x] Alert  [x] Cooperative  []  Pleasant  [] Confused  [] Agitated  [] Uncooperative  [] Distractible [] Motivated  [] Self-Limiting [] Anxious  [] Other:  Endurance:  [x] Adequate for participation in SLP sessions  [] Reduced overall  [] Lethargic  [] Other:  Safety: [x] No concerns at this time  [] Reduced insight into deficits  []  Reduced safety awareness [] Not following call light procedures  [] Unable to Assess  [] Other:    Current Diet Order:DIET GENERAL; No Drinking Straw  Swallowing Precautions: Sit up for all meals and thereafter for 30 minutes, Eat with small bites (1/2 tsp; 1 tsp) and Alternate solids with liquids       Date: 2019      Tx session 1  [id number] Tx session 2  All tx needs met Session 1   Total Timed Code Min 0 --   Total Treatment Minutes 30 --   Individual Treatment Minutes 30 --   Group Treatment Minutes 0 0   Co-Treat Minutes 0 0   Variance/Reason:  N/A    Pain Some back pain - stated already had tylenol.      Pain Intervention [] RN notified  [] Repositioned  [] Intervention offered and patient declined  [x] N/A  [] Other: [] RN notified  [] Repositioned  [] Intervention offered and patient declined  [] N/A  [] Other:   Subjective     Pt upright in chair, alert and agreeable to all tasks. Just finished with OT. Objective:     Dysphagia Goals:  1. The patient will tolerate recommended diet without observed clinical signs of aspiration. No reported difficulty with regular/TL diet. PO trials completed:  Kathlean Columbia cracker with peanut butter  - Water via cup sips    No signs of aspiration observed with PO trials. 2. The patient/caregiver will demonstrate understanding of compensatory strategies for improved swallowing safety. Reviewed compensatory and safe swallowing precautions with patient;  - Pt did not recall strategies independently. Education provided re: rationale for no straws  - Discussed increased rate and size of liquid presentation with use of straws  - Also discussed how VF dysfunction may increase risk for aspiration at this time. Pt verbalized understanding. Voice Goals:  Goal 1: Patient will increase maximum phonation time to 12 seconds. Diaphragmatic breathing exercises  - Education and training provided to pt  - Discussed physiology of voice production and impact breath support can  Have on vocal quality and intensity    Reviewed completion of diaphragmatic breathing exercises:  - Pt completed 10/10x with tactile and verbal/visual cues from SLP. MPT   Trial 1: 7.3 (pt noted to use clavicular breathing, education provided)  Trial 2: 6.5 (exhaled small amount of air before phonation, cueing provided re: importance for phonation right at the point of exhalation)  Tral 3: 7.0 seconds (with use of head turn to the left). Goal 2: Patient will participate in vocal strengthening exercises for improved vocal quality with min cues. Attempted head turn left to improve quality (pt stated while she was intubated, she was lying on her left).   - Minimally improved quality noted with head turn left when compared to neutral, or right. Discussed rationale for this strategy. Reviewed recommendation to see ENT for designated assessment of VF movement. - Pt verbalized understanding, stated she plans to f/u with ENT after discharge. Reviewed vocal hygiene practices;  - Minimal cueing provided, pt recalled majority of voice hygiene guidelines. Other areas targeted: N/A    Education:   Education provided re: vocal hygiene practices, breath support, diaphragmatic breathing, VF function and production, safe swallowing precautions, and recommendation for no straws. Safety Devices: [x] Call light within reach  [] Chair alarm activated  [x] Bed alarm activated  [x] Other: pt left in bed all belongings within reach [] Call light within reach  [] Chair alarm activated  [] Bed alarm activated  [] Other:    Assessment: Pt motivated to participate in dysphagia and voice therapy. Pt verbalized understanding and demonstrated use of breathing exercises. MPT and functional breath support continue to be moderately reduced. Safe swallowing strategies reviewed, pt verbalized understanding. No s/s of aspiration with TL and regular solid. Continue goals above. Plan: Continue as per plan of care. Additional Information: N/A    Barriers toward progress: None  Discharge recommendations:  [x] Home independently  [] Home with assistance []  24 hour supervision  [] ECF [x] Other: See PT/OT  Continued Tx Upon Discharge: ? [] Yes [x] No further ST recommended - recommend pt be seen by ENT for designated evaluation of vocal fold movement.   [] TBD based on progress while on ARU [] Vital Stim indicated [] Other:   Estimated discharge date: 5/30/19    Interventions used this date:  [x] Speech/Language Treatment  [] Instruction in HEP [] Group [x] Dysphagia Treatment [] Cognitive Treatment   [] Other:    Admitting SLP FIM scores:  Comprehension: 7 - Patient understands complex ideas (math/planning)  Expression: 6 - Device used to express complex ideas/needs  Social Interaction: 7 - Patient has appropriate behavior/relations 100% of the time  Problem Solvin - Patient independent with complex tasks  Memory: 7 - Patient independent with meds/people/schedule    Current SLP FIM scores:  Comprehension: 7 - Patient understands complex ideas (math/planning)  Expression: 6 - Device used to express complex ideas/needs  Social Interaction: 7 - Patient has appropriate behavior/relations 100% of the time  Problem Solvin - Patient independent with complex tasks  Memory: 7 - Patient independent with meds/people/schedule    Total Time Breakdown / Charges    Time in Time out Total Time / units   Cognitive Tx -- -- --   Speech Tx 0930 0950 20 min / 1 unit   Dysphagia Tx 0950 1000 10 min / 1 unit     Electronically Signed by     Mckenzie Jones [id number]  Speech-Language Pathologist    Yani Nguyen,  [facility]

## 2019-05-24 NOTE — PLAN OF CARE
Fall precautions in place, bed alarm on, nonskid foot wear applied, bed in lowest position, and call light within reach. Will continue to monitor.

## 2019-05-24 NOTE — PROGRESS NOTES
(N/A, 5/6/2019).     Restrictions  Restrictions/Precautions  Restrictions/Precautions: General Precautions, Fall Risk  Required Braces or Orthoses?: No  Position Activity Restriction  Other position/activity restrictions: Continuous O2 at 2L, attempting to wean  Subjective   General  Chart Reviewed: Yes  Patient assessed for rehabilitation services?: Yes  Family / Caregiver Present: No  Subjective  Subjective: Pt found in bed, receptive to OT tx      Orientation  Orientation  Overall Orientation Status: Within Normal Limits  Objective    ADL  Toileting: Supervision  Additional Comments: not addressed this date        Balance  Sitting Balance: Independent  Standing Balance: Supervision  Standing Balance  Time: 5 min, >20 min, 3-4 min  Activity: standing at sink for grooming, functional mobility to/from gym, standing for bean bag toss  Comment: RW  Functional Mobility  Functional - Mobility Device: Rolling Walker  Activity: To/from bathroom  Assist Level: Supervision  Toilet Transfers  Toilet - Technique: Ambulating  Equipment Used: Standard toilet  Toilet Transfer: Supervision(grab bar)     Transfers  Stand Step Transfers: Supervision  Stand Pivot Transfers: Supervision  Sit to stand: Supervision  Stand to sit: Supervision  Transfer Comments: Used RW for support        Coordination  Gross Motor: good for ADLs, bean bag toss              Cognition  Overall Cognitive Status: WFL                    Type of ROM/Therapeutic Exercise  Type of ROM/Therapeutic Exercise: AROM  Comment: 2# weights  Exercises  Scapular Protraction: x15  Scapular Retraction: x15  Shoulder Flexion: x15  Shoulder ABduction: x15  Shoulder ADduction: x15  Elbow Flexion: x15  Elbow Extension: x15  Other: chest press, ceiling press, trunk roatation and diagonals with 2# weighted ball x 2 sets of 15 seated EOM                    Plan   Plan  Times per week: 5/7 per week  Times per day: Daily  Current Treatment Recommendations: Strengthening, Endurance Training, Neuromuscular Re-education, Patient/Caregiver Education & Training, Self-Care / ADL, Balance Training, Home Management Training, Functional Mobility Training, Safety Education & Training, Positioning    Goals  Short term goals  Time Frame for Short term goals: 5 days by 5/24/19  Short term goal 1: Pt will be Supervision with toileting.-met 5/24  Short term goal 2: Pt will be SBA for LE dressing.-met 5/23  Short term goal 3: Pt will be Supervision for toilet transfers with LRD. -met 5/24  Short term goal 4: Pt will be Min A for bathing.-met 5/23  Short term goal 5: SBA for light meal prep  Long term goals  Time Frame for Long term goals : 10 days by 5/29/19  Long term goal 1: Pt will be Independent with toileting. Long term goal 2: Pt will be Modified Independent with dressing. Long term goal 3: Pt will be Modified Independent with ADL transfers. Long term goal 4: Pt will be Modified Independent for shower task. Long term goal 5: Mod I with light meal prep. Patient Goals   Patient goals : To get home.        Therapy Time   Individual Concurrent Group Co-treatment   Time In 0800         Time Out 0930         Minutes 90         Timed Code Treatment Minutes: Brigido Oliva OT

## 2019-05-24 NOTE — PROGRESS NOTES
Tawanda Gonsales  5/24/2019  [id number]    Chief Complaint: Debility    Subjective:   No issues overnight. No new complaints. Patient notes improvement in her tolerance of therapies, but is still limited by her morbid obesity. No pain complaints are noted by the patient at this time. Repeat labs yesterday reveal low potassium and mag levels, and patient was started on supplements. Labs values are now normal this AM on repeat. ROS: No chest pain, shortness of breath, nausea, vomiting, diarrhea, fever, chills  Objective:  Patient Vitals for the past 24 hrs:   BP Temp Temp src Pulse Resp SpO2   05/24/19 0745 115/74 97.8 °F (36.6 °C) Oral 94 18 93 %   05/24/19 0735 -- -- -- -- 20 91 %   05/23/19 2048 113/74 97.9 °F (36.6 °C) Oral 98 18 92 %   05/23/19 2013 -- -- -- -- 18 92 %     Gen: No distress, pleasant. HEENT: Normocephalic, atraumatic. CV: Regular rate and rhythm. Resp: No respiratory distress. Abd: Soft, nontender   Ext: No edema. Neuro: Alert, oriented, appropriately interactive.      Wt Readings from Last 3 Encounters:   05/23/19 222 lb 7.1 oz (100.9 kg)   05/20/19 220 lb 4.8 oz (99.9 kg)   04/16/19 266 lb (120.7 kg)       Laboratory data:   Lab Results   Component Value Date    WBC 8.5 05/23/2019    HGB 17.0 (H) 05/23/2019    HCT 52.4 (H) 05/23/2019    MCV 87.5 05/23/2019     05/23/2019       Lab Results   Component Value Date     05/24/2019    K 3.7 05/24/2019    K 3.4 05/23/2019    CL 92 05/24/2019    CO2 35 05/24/2019    BUN 7 05/24/2019    CREATININE <0.5 05/24/2019    GLUCOSE 121 05/24/2019    CALCIUM 10.1 05/24/2019        Therapy progress:  PT  Position Activity Restriction  Other position/activity restrictions: Continuous O2 at 2L, attempting to wean  Objective     Sit to Stand: Supervision  Stand to sit: Supervision  Bed to Chair: Contact guard assistance(no device; SBA with RW)  Device: Rolling Walker  Other Apparatus: O2(2L)  Assistance: Stand by assistance  Distance: Without ace wrap DF assist: 100' and 200'; with ace wrap DF assist: 250'  OT  PT Equipment Recommendations  Other: CTA for RW need  Toilet - Technique: Ambulating  Equipment Used: Standard toilet  Toilet Transfers Comments: With RW and assist for O2  Assessment        SLP  Current Diet : Soft regular  Current Liquid Diet : Thin  Diet Solids Recommendation: Regular  Liquid Consistency Recommendation: Thin    Body mass index is 39.4 kg/m². Rehabilitation Diagnosis:   10.9, Pulmonary, Other Pulmonary        Assessment and Plan:     Impairments: weakness, endurance, balance     Debility - PT/OT to address endurance, strength, compensatory strategies, equipment     Acute hypoxic respiratory failure - Thought to be due to PE and core pulmonale. On anticoagulation as below. Continue lasix and prn albuterol. Wean O2 as tolerated.      Suspected acute PE with LLE DVT - Eliquis     Chronic hypercapnic respiratory failure - Likely due to OHS. BiPAP qhs.      Tracheobronchitis - Completed antibiotics.      Left foot drop - New this admission. 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 Consider EMG as outpatient. PT. Trial AFO.    Morbid Obesity - Complicating assessment and treatment. Placing patient at risk for multiple co-morbidities as well as early death and contributing to the patient's presentation. Dietician consult. Counseling and education.     Bladder - high risk retention - Monitor PVRs, SC prn >300cc     Bowel - high risk constipation - colace+senna BID, PRN miralax and MoM. follow bowel movements. Enema or suppository if needed.      Safety - fall and aspiration precautions       Savita Bajwa.  MD [facility] 5/24/2019, 12:49 PM

## 2019-05-24 NOTE — PROGRESS NOTES
RESPIRATORY THERAPY ASSESSMENT    Name:  Magalie [facility] Record Number:  [id number]  Age: 64 y.o. Gender: female  : 1960  Today's Date:  2019  Room:  0168/0168-01    Assessment     Is the patient being admitted for a COPD or Asthma exacerbation? Yes   (If yes the patient will be seen every 4 hours for the first 24 hours and then reassessed)    Patient Admission Diagnosis      Allergies  No Known Allergies    Minimum Predicted Vital Capacity:     782          Actual Vital Capacity:      na              Pulmonary History:COPD  Home Oxygen Therapy:  3 liters/min via nasal cannula  Home Respiratory Therapy:Albuterol/Ipratropium Bromide HHN prn  Current Respiratory Therapy:  duoneb bid  Treatment Type: HHN, Vibratory mucous clearing therapy or intervention  Medications: Albuterol    Respiratory Severity Index(RSI)   Patients with orders for inhalation medications, oxygen, or any therapeutic treatment modality will be placed on Respiratory Protocol. They will be assessed with the first treatment and at least every 72 hours thereafter. The following severity scale will be used to determine frequency of treatment intervention.     Smoking History: Pulmonary Disease or Smoking History, Greater than 15 pack year = 2    Social History  Social History     Tobacco Use    Smoking status: Current Every Day Smoker     Packs/day: 0.10     Years: 30.00     Pack years: 3.00     Types: Cigarettes    Smokeless tobacco: Never Used   Substance Use Topics    Alcohol use: No     Alcohol/week: 0.0 oz    Drug use: No       Recent Surgical History: None = 0  Past Surgical History  Past Surgical History:   Procedure Laterality Date    BRONCHOSCOPY N/A 2019    BRONCHOSCOPY ALVEOLAR LAVAGE performed by Jean Carlos Chandler MD at [address] [place] [address]      both hands     LUMBAR SPINE SURGERY         Level of Consciousness: Alert, Oriented, and Cooperative = 0    Level of Activity: Walking unassisted = 0    Respiratory Pattern: Regular Pattern; RR 8-20 = 0    Breath Sounds: Diminished unilaterally = 1    Sputum   ,  , Sputum How Obtained: None  Cough: Strong, spontaneous, non-productive = 0    Vital Signs   BP (!) 170/83   Pulse 103   Temp 98.1 °F (36.7 °C) (Oral)   Resp 18   Ht 5' 3\" (1.6 m)   Wt 222 lb 7.1 oz (100.9 kg)   SpO2 92%   BMI 39.40 kg/m²   SPO2 (COPD values may differ): 88-89% on room air or greater than 92% on FiO2 28- 35% = 2    Peak Flow (asthma only): not applicable = 0    RSI: 7-8 = BID and Q4HPRN (every four hours as needed) for dyspnea        Plan       Goals: medication delivery, mobilize retained secretions, volume expansion and improve oxygenation    Patient/caregiver was educated on the proper method of use for Respiratory Care Devices:  Yes      Level of patient/caregiver understanding able to:   ? Verbalize understanding   ? Demonstrate understanding       ? Teach back        ? Needs reinforcement       ? No available caregiver               ? Other:     Response to education:  Excellent     Is patient being placed on Home Treatment Regimen? Yes     Does the patient have everything they need prior to discharge? Yes     Comments: patient assessed and chart reviewed     Plan of Care: patient will remain on current therapy unless condition worsens     Electronically signed by David Do RCP on 5/23/2019 at 9:07 PM    Respiratory Protocol Guidelines     1. Assessment and treatment by Respiratory Therapy will be initiated for medication and therapeutic interventions upon initiation of aerosolized medication. 2. Physician will be contacted for respiratory rate (RR) greater than 35 breaths per minute. Therapy will be held for heart rate (HR) greater than 140 beats per minute, pending direction from physician. 3. Bronchodilators will be administered via Metered Dose Inhaler (MDI) with spacer when the following criteria are met:  a.  Alert and cooperative b. HR < 140 bpm  c. RR < 30 bpm                d. Can demonstrate a 2-3 second inspiratory hold  4. Bronchodilators will be administered via Hand Held Nebulizer AILYN [facility]) to patients when ANY of the following criteria are met  a. Incognizant or uncooperative          b. Patients treated with HHN at Home        c. Unable to demonstrate proper use of MDI with spacer     d. RR > 30 bpm   5. Bronchodilators will be delivered via Metered Dose Inhaler (MDI), HHN, Aerogen to intubated patients on mechanical ventilation. 6. Inhalation medication orders will be delivered and/or substituted as outlined below. Aerosolized Medications Ordering and Administration Guidelines:    1. All Medications will be ordered by a physician, and their frequency and/or modality will be adjusted as defined by the patients Respiratory Severity Index (RSI) score. 2. If the patient does not have documented COPD, consider discontinuing anticholinergics when RSI is less than 9.  3. If the bronchospasm worsens (increased RSI), then the bronchodilator frequency can be increased to a maximum of every 4 hours. If greater than every 4 hours is required, the physician will be contacted. 4. If the bronchospasm improves, the frequency of the bronchodilator can be decreased, based on the patient's RSI, but not less than home treatment regimen frequency. 5. Bronchodilator(s) will be discontinued if patient has a RSI less than 9 and has received no scheduled or as needed treatment for 72  Hrs. Patients Ordered on a Mucolytic Agent:    1. Must always be administered with a bronchodilator. 2. Discontinue if patient experiences worsened bronchospasm, or secretions have lessened to the point that the patient is able to clear them with a cough. Anti-inflammatory and Combination Medications:    1.  If the patient lacks prior history of lung disease, is not using inhaled anti-inflammatory medication at home, and lacks wheezing by examination or by

## 2019-05-24 NOTE — PROGRESS NOTES
Physical Therapy  Facility/Department: [facility]  Daily Treatment Note  NAME: Pilar Childers  : 1960  MRN: [id number]    Date of Service: 2019    Discharge Recommendations:  Home with assist PRN, Home with Home health PT   PT Equipment Recommendations  Other: CTA for RW need    Patient Diagnosis(es): There were no encounter diagnoses. has a past medical history of Anxiety state, unspecified, Impacted cerumen, and Rheumatoid arthritis(714.0). has a past surgical history that includes Carpal tunnel release; Lumbar spine surgery; and bronchoscopy (N/A, 2019). Restrictions  Restrictions/Precautions  Restrictions/Precautions: General Precautions, Fall Risk  Required Braces or Orthoses?: No  Position Activity Restriction  Other position/activity restrictions: Continuous O2 at 2L, attempting to wean  Subjective   General  Chart Reviewed: Yes  Additional Pertinent Hx: LLE foot drop with 1/5 DF  Family / Caregiver Present: No  Referring Practitioner: Dr. Dao Finley MD  Subjective  Subjective: Pt agreeable to therapy. No c/o pain throughout session. General Comment  Comments: Pt sitting on couch upon arrival.  Pain Screening  Patient Currently in Pain: No  Vital Signs  Patient Currently in Pain: No     Objective      Transfers  Sit to Stand: Supervision  Stand to sit: Supervision  Ambulation  Ambulation?: Yes  Ambulation 1  Surface: level tile  Device: Rolling Walker  Other Apparatus: O2(2L)  Assistance: Stand by assistance  Quality of Gait: Without ace wrap DF assist: step through gait pattern, fair kelli, steady with turns, steppage gait with LLE to clear L foot during swing. With ace wrap DF assist: similar as without DF assist but with normalized degree of hip flexion  Distance:  Without ace wrap DF assist: 100' and 200'; with ace wrap DF assist: 250'     Balance  Comments: Static standing on blue airex foam without UE support while performing forward chest press and diagonal chops 2x10 reps  Other exercises  Other exercises 1: Step ups on 6\" step leading with each foot x7 reps each with unilateral UE support  Other exercises 2: Standing ther ex in parallel bars: heel raises x10, hip flexion x10, hip abduction x10, hip extension x10, hamstring curls x10, mini squats x10     Assessment   Body structures, Functions, Activity limitations: Decreased functional mobility ; Decreased endurance;Decreased balance;Decreased strength;Decreased sensation;Decreased ROM  Assessment: Pt continues to demonstrate improvement with activity tolerance and ambulation. Pt able to tolerate entire session on 2L O2 with O2 sats >92% (RN notified). Will continue to progress mobility and ambulation without device as pt tolerates. Treatment Diagnosis: decreased (I) with mobility  Prognosis: Good  Patient Education: safety with mobility, O2 weaning  REQUIRES PT FOLLOW UP: Yes  Activity Tolerance  Activity Tolerance: Patient Tolerated treatment well  Activity Tolerance: SpO2 92-96% on 2L, -126 with activity     Goals  Short term goals  Time Frame for Short term goals: 5 days (5/25)  Short term goal 1: Pt will be indep with bed mobility. Short term goal 2: Pt will be SBA for transfers with no device. Short term goal 3: Pt will ambulate 150 ft with SBA and LRAD. (goal met 5/23/19 with RW)  Short term goal 4: Pt will negotiate curb step x 2 reps with SBA and LRAD. Long term goals  Time Frame for Long term goals : 10 days (5/30)  Long term goal 1: Pt will be indep with transfers. Long term goal 2: Pt will be indep with ambulation 100 ft. Long term goal 3: pt will negotiate curb step x 2 reps indep. Long term goal 4: Pt will improve Workman Balance Score to >47/56 to reflect decreased fall risk. Long term goal 5: Pt will be indep with BLE HEP to facilitate continued strengthening and activity tolerance at d/c.   Patient Goals   Patient goals : Sherly Tilley able to go home and do things for myself\"    Plan    Plan  Times per week: 5 out of 7 days/wk  Specific instructions for Next Treatment: progress mobility as tolerated  Current Treatment Recommendations: Strengthening, Gait Training, Patient/Caregiver Education & Training, ROM, Stair training, Balance Training, Neuromuscular Re-education, Equipment Evaluation, Education, & procurement, Functional Mobility Training, Endurance Training, Transfer Training, Safety Education & Training, Home Exercise Program, Modalities  Safety Devices  Type of devices: Call light within reach, Gait belt, Patient at risk for falls, Left in bed, Bed alarm in place     Therapy Time   Individual Concurrent Group Co-treatment   Time In 1030         Time Out 1130         Minutes 60         Timed Code Treatment Minutes: William 1 Kalia,   [facility]

## 2019-05-25 PROCEDURE — 97530 THERAPEUTIC ACTIVITIES: CPT

## 2019-05-25 PROCEDURE — 94669 MECHANICAL CHEST WALL OSCILL: CPT

## 2019-05-25 PROCEDURE — 94761 N-INVAS EAR/PLS OXIMETRY MLT: CPT

## 2019-05-25 PROCEDURE — 97110 THERAPEUTIC EXERCISES: CPT

## 2019-05-25 PROCEDURE — 2700000000 HC OXYGEN THERAPY PER DAY

## 2019-05-25 PROCEDURE — 6360000002 HC RX W HCPCS: Performed by: PHYSICAL MEDICINE & REHABILITATION

## 2019-05-25 PROCEDURE — 6370000000 HC RX 637 (ALT 250 FOR IP): Performed by: PHYSICAL MEDICINE & REHABILITATION

## 2019-05-25 PROCEDURE — 97116 GAIT TRAINING THERAPY: CPT

## 2019-05-25 PROCEDURE — 92507 TX SP LANG VOICE COMM INDIV: CPT

## 2019-05-25 PROCEDURE — 1280000000 HC REHAB R&B

## 2019-05-25 PROCEDURE — 94640 AIRWAY INHALATION TREATMENT: CPT

## 2019-05-25 RX ADMIN — ONDANSETRON 8 MG: 4 TABLET, ORALLY DISINTEGRATING ORAL at 10:23

## 2019-05-25 RX ADMIN — SENNOSIDES,DOCUSATE SODIUM 2 TABLET: 8.6; 5 TABLET, FILM COATED ORAL at 21:12

## 2019-05-25 RX ADMIN — APIXABAN 5 MG: 5 TABLET, FILM COATED ORAL at 21:12

## 2019-05-25 RX ADMIN — SENNOSIDES,DOCUSATE SODIUM 2 TABLET: 8.6; 5 TABLET, FILM COATED ORAL at 09:34

## 2019-05-25 RX ADMIN — MAGNESIUM OXIDE TAB 400 MG (241.3 MG ELEMENTAL MG) 400 MG: 400 (241.3 MG) TAB at 09:34

## 2019-05-25 RX ADMIN — TRAZODONE HYDROCHLORIDE 50 MG: 50 TABLET ORAL at 21:12

## 2019-05-25 RX ADMIN — POTASSIUM CHLORIDE 20 MEQ: 20 TABLET, EXTENDED RELEASE ORAL at 09:34

## 2019-05-25 RX ADMIN — APIXABAN 5 MG: 5 TABLET, FILM COATED ORAL at 09:34

## 2019-05-25 RX ADMIN — ALBUTEROL SULFATE 2.5 MG: 2.5 SOLUTION RESPIRATORY (INHALATION) at 19:15

## 2019-05-25 RX ADMIN — MAGNESIUM OXIDE TAB 400 MG (241.3 MG ELEMENTAL MG) 400 MG: 400 (241.3 MG) TAB at 21:12

## 2019-05-25 RX ADMIN — ACETAMINOPHEN 650 MG: 325 TABLET ORAL at 21:12

## 2019-05-25 RX ADMIN — VITAMIN D, TAB 1000IU (100/BT) 5000 UNITS: 25 TAB at 09:34

## 2019-05-25 RX ADMIN — FUROSEMIDE 40 MG: 40 TABLET ORAL at 09:34

## 2019-05-25 RX ADMIN — POTASSIUM CHLORIDE 20 MEQ: 20 TABLET, EXTENDED RELEASE ORAL at 18:08

## 2019-05-25 RX ADMIN — CITALOPRAM HYDROBROMIDE 20 MG: 20 TABLET ORAL at 09:34

## 2019-05-25 ASSESSMENT — PAIN SCALES - GENERAL
PAINLEVEL_OUTOF10: 0
PAINLEVEL_OUTOF10: 2
PAINLEVEL_OUTOF10: 0

## 2019-05-25 ASSESSMENT — PAIN DESCRIPTION - PAIN TYPE: TYPE: CHRONIC PAIN

## 2019-05-25 ASSESSMENT — PAIN - FUNCTIONAL ASSESSMENT: PAIN_FUNCTIONAL_ASSESSMENT: ACTIVITIES ARE NOT PREVENTED

## 2019-05-25 ASSESSMENT — PAIN DESCRIPTION - ORIENTATION: ORIENTATION: MID

## 2019-05-25 ASSESSMENT — PAIN DESCRIPTION - ONSET: ONSET: ON-GOING

## 2019-05-25 ASSESSMENT — PAIN DESCRIPTION - FREQUENCY: FREQUENCY: INTERMITTENT

## 2019-05-25 ASSESSMENT — PAIN DESCRIPTION - LOCATION: LOCATION: BACK

## 2019-05-25 ASSESSMENT — PAIN DESCRIPTION - PROGRESSION: CLINICAL_PROGRESSION: NOT CHANGED

## 2019-05-25 ASSESSMENT — PAIN DESCRIPTION - DESCRIPTORS: DESCRIPTORS: ACHING

## 2019-05-25 NOTE — PROGRESS NOTES
notified;Gait belt(passed off to PT)         Patient Diagnosis(es): There were no encounter diagnoses. has a past medical history of Anxiety state, unspecified, Impacted cerumen, and Rheumatoid arthritis(714.0). has a past surgical history that includes Carpal tunnel release; Lumbar spine surgery; and bronchoscopy (N/A, 5/6/2019). Restrictions  Restrictions/Precautions  Restrictions/Precautions: General Precautions, Fall Risk  Required Braces or Orthoses?: No  Position Activity Restriction  Other position/activity restrictions: Continuous O2 at 2L, attempting to wean  Subjective   General  Chart Reviewed: Yes  Patient assessed for rehabilitation services?: Yes  Family / Caregiver Present: No  Subjective  Subjective: pt agreeable to OT this am; reports increased nausea - medicated via RN   Vital Signs  Patient Currently in Pain: Denies   Orientation  Orientation  Overall Orientation Status: Within Normal Limits  Objective    ADL  Grooming: Supervision(in stance level at sink-side )  Toileting: Modified independent         Balance  Sitting Balance: Independent  Standing Balance: Supervision  Functional Mobility  Functional - Mobility Device: Rolling Walker  Activity: To/from bathroom  Assist Level: Supervision  Toilet Transfers  Toilet - Technique: Ambulating  Equipment Used: Standard toilet  Toilet Transfer: Supervision  Toilet Transfers Comments: with RW     Transfers  Sit to stand: Supervision  Stand to sit: Supervision  Transfer Comments: Used RW for support     Cognition  Overall Cognitive Status: WFL  Arousal/Alertness: Appropriate responses to stimuli  Following Commands:  Follows all commands without difficulty  Attention Span: Appears intact  Memory: Appears intact  Safety Judgement: Decreased awareness of need for assistance  Problem Solving: Assistance required to generate solutions  Insights: Decreased awareness of deficits  Initiation: Requires cues for some  Sequencing: Does not require cues Type of ROM/Therapeutic Exercise  Type of ROM/Therapeutic Exercise: Free weights  Comment: 2# weights, seated in chair   Exercises  Scapular Protraction: 4X10 BUEs   Scapular Retraction: 4X10 BUEs   Horizontal ABduction: 4X10 BUEs   Horizontal ADduction: 4X10 BUEs   Elbow Flexion: 4X10 BUEs   Elbow Extension: [address]  Times per week: 5/7 per week  Times per day: Daily  Current Treatment Recommendations: Strengthening, Endurance Training, Neuromuscular Re-education, Patient/Caregiver Education & Training, Self-Care / ADL, Balance Training, Home Management Training, Functional Mobility Training, Safety Education & Training, Positioning    Goals  Short term goals  Time Frame for Short term goals: 5 days by 5/24/19  Short term goal 1: Pt will be Supervision with toileting.-met 5/24  Short term goal 2: Pt will be SBA for LE dressing.-met 5/23  Short term goal 3: Pt will be Supervision for toilet transfers with LRD. -met 5/24  Short term goal 4: Pt will be Min A for bathing.-met 5/23  Short term goal 5: SBA for light meal prep  Long term goals  Time Frame for Long term goals : 10 days by 5/29/19  Long term goal 1: Pt will be Independent with toileting. Long term goal 2: Pt will be Modified Independent with dressing. Long term goal 3: Pt will be Modified Independent with ADL transfers. Long term goal 4: Pt will be Modified Independent for shower task. Long term goal 5: Mod I with light meal prep. Patient Goals   Patient goals : To get home.        Therapy Time   Individual Concurrent Group Co-treatment   Time In 0930         Time Out 1030         Minutes 60         Timed Code Treatment Minutes: 61 Minutes     Second Session Therapy Time:   Individual Concurrent Group Co-treatment   Time In 1300         Time Out 1330         Minutes 30           Timed Code Treatment Minutes:  30 Minutes    Total Treatment Minutes:  90 min  Rossy Marcus OT

## 2019-05-25 NOTE — PROGRESS NOTES
[facility]: ACUTE REHAB UNIT  SPEECH-LANGUAGE PATHOLOGY      [x] Daily  [] Weekly Care Conference Note  [] Discharge    Patient:Karolina Gonsales      :1960  SDB:[id number]  Rehab Dx/Hx: Debility [R53.81]    Precautions: falls  Home situation: lives at home independently  [address]  Dx: [] Aphasia  [] Dysarthria  [] Apraxia   [x] Oropharyngeal dysphagia [] Cognitive Impairment  [x] Other: voice  Date of Admit: 2019  Room #: 1413/3971-78    Current functional status (updated daily):         Pt being seen for : [x] Speech/Language Treatment  [x] Dysphagia Treatment [] Cognitive Treatment  [] Other:  Communication: [x]WFL  [] Aphasia  [] Dysarthria  [] Apraxia  [] Pragmatic Impairment [] Non-verbal  [] Hearing Loss  [] Other:   Cognition: [x] WFL  [] Mild  [] Moderate  [] Severe [] Unable to Assess  [] Other:  Memory: [x] WFL  [] Mild  [] Moderate  [] Severe [] Unable to Assess  [] Other:  Behavior: [x] Alert  [x] Cooperative  []  Pleasant  [] Confused  [] Agitated  [] Uncooperative  [] Distractible [] Motivated  [] Self-Limiting [] Anxious  [] Other:  Endurance:  [x] Adequate for participation in SLP sessions  [] Reduced overall  [] Lethargic  [] Other:  Safety: [x] No concerns at this time  [] Reduced insight into deficits  []  Reduced safety awareness [] Not following call light procedures  [] Unable to Assess  [] Other:    Current Diet Order:DIET GENERAL; No Drinking Straw  Swallowing Precautions: Sit up for all meals and thereafter for 30 minutes, Eat with small bites (1/2 tsp; 1 tsp) and Alternate solids with liquids       Date: 2019      Tx session 1  1338-3506 Tx session 2  All tx needs met Session 1   Total Timed Code Min 0 --   Total Treatment Minutes 30 --   Individual Treatment Minutes 30 --   Group Treatment Minutes 0 0   Co-Treat Minutes 0 0   Variance/Reason:  N/A    Pain Some back pain - stated already had tylenol.      Pain Intervention [] RN notified  [] Repositioned  [] Intervention offered and patient declined  [x] N/A  [x] Other: RN aware of Nausea [] RN notified  [] Repositioned  [] Intervention offered and patient declined  [] N/A  [] Other:   Subjective     Patient agreeable to voice tx. Patient c/o of nausea and reported vomiting yesterday. Objective:     Dysphagia Goals:  1. The patient will tolerate recommended diet without observed clinical signs of aspiration. No directly targeted. No reported difficulty with regular/TL diet. x3 sips of thin, no s/s of aspiration. Patient declined PO d/t nausea      2. The patient/caregiver will demonstrate understanding of compensatory strategies for improved swallowing safety. Reviewed compensatory and safe swallowing precautions with patient;        Voice Goals:  Goal 1: Patient will increase maximum phonation time to 12 seconds. Diaphragmatic breathing exercises  - Education and training provided to pt  - Discussed physiology of voice production and impact breath support can  Have on vocal quality and intensity    Reviewed completion of diaphragmatic breathing exercises:  - Pt completed 10/10x with tactile and verbal/visual cues from SLP. Including supine positioning demonstration for visual feedback 10/10     Completed reading progressively longer sentence to increase breath support and practice abdominal breathing    MPT   Trial 1 : 6.4  seconds  Educated regarding visualization of filling bottom of the lungs and working up. Trial 2: 7.3 seconds (improved abdominal breathing  Tral 3: 6.8 seconds  Tral 4: 6.6 seconds   (with use of head turn to the left). -MPT:required multiple attempts for vocal onset and breathing/voice coordination. Noted minimal and intermittent but improve vocal quality with head turn left      Goal 2: Patient will participate in vocal strengthening exercises for improved vocal quality with min cues.    Head turn left to improve quality Noted minimal and intermittent but improve vocal quality with head turn left    - vocal glides high 10/10  -vocal glide low 10/10     Reviewed recommendation to see ENT for designated assessment of VF movement. Patient reported she was scheduled to see ENT prior to hospitalization for \"lower\" voice quality. Reviewed vocal hygiene practices;  - Minimal cueing provided, pt recalled majority of voice hygiene guidelines. Other areas targeted: N/A    Education:   Education provided re: vocal hygiene practices, breath support, diaphragmatic breathing, VF function and production, safe swallowing precautions    Safety Devices: [x] Call light within reach  [] Chair alarm activated  [x] Bed alarm activated  [x] Other: pt left in bed all belongings within reach [] Call light within reach  [] Chair alarm activated  [] Bed alarm activated  [] Other:    Assessment: Pt motivated to participate in dysphagia and voice therapy. Patient reported voice quality sounds and feels worse today d/t vomiting that occurred day before. Pt verbalized understanding and demonstrated use of breathing exercises. Patient would benefit from continue MPT and training for abdominal breathing to improve breath support. Encouraged patient to completed abdominal breathing exercises sitting up and laying down on her own. Discussed good vocal hygiene practices as well as positioning in order to increase breath support. Safe swallowing strategies reviewed, pt verbalized understanding. Dysphagia goals not directly targeted today d/t nausea. Plan: Continue as per plan of care. Additional Information: N/A    Barriers toward progress: None  Discharge recommendations:  [x] Home independently  [] Home with assistance []  24 hour supervision  [] ECF [x] Other: See PT/OT  Continued Tx Upon Discharge: ? [] Yes [x] No further ST recommended - recommend pt be seen by ENT for designated evaluation of vocal fold movement.   [] TBD based on progress while on ARU [] Vital Stim indicated [] Other:   Estimated discharge date: 19    Interventions used this date:  [x] Speech/Language Treatment  [] Instruction in HEP [] Group [x] Dysphagia Treatment [] Cognitive Treatment   [] Other:    Admitting SLP FIM scores:  Comprehension: 7 - Patient understands complex ideas (math/planning)  Expression: 6 - Device used to express complex ideas/needs  Social Interaction: 7 - Patient has appropriate behavior/relations 100% of the time  Problem Solvin - Patient independent with complex tasks  Memory: 7 - Patient independent with meds/people/schedule    Current SLP FIM scores:  Comprehension: 7 - Patient understands complex ideas (math/planning)  Expression: 6 - Device used to express complex ideas/needs  Social Interaction: 7 - Patient has appropriate behavior/relations 100% of the time  Problem Solvin - Patient independent with complex tasks  Memory: 7 - Patient independent with meds/people/schedule    Total Time Breakdown / Charges    Time in Time out Total Time / units   Cognitive Tx -- -- --   Speech Tx 1230 1300  30min / 1 unit   Dysphagia Tx    min / unit     Electronically Signed by       Barbara Flores MS CCC-SLP  Suri [phone]  Speech Language Pathologist

## 2019-05-25 NOTE — PROGRESS NOTES
Physical Therapy  Facility/Department: [facility]  Daily Treatment Note  NAME: Prieto Chacon  : 1960  MRN: [id number]    Date of Service: 2019    Discharge Recommendations:  Home with assist PRN, Home with Home health PT   PT Equipment Recommendations  Other: CTA for RW need    Patient Diagnosis(es): There were no encounter diagnoses. has a past medical history of Anxiety state, unspecified, Impacted cerumen, and Rheumatoid arthritis(714.0). has a past surgical history that includes Carpal tunnel release; Lumbar spine surgery; and bronchoscopy (N/A, 2019). Restrictions  Restrictions/Precautions  Restrictions/Precautions: General Precautions, Fall Risk  Required Braces or Orthoses?: No  Position Activity Restriction  Other position/activity restrictions: Continuous O2 at 2L, attempting to wean  Subjective   General  Chart Reviewed: Yes  Additional Pertinent Hx: LLE foot drop with 1/5 DF  Response To Previous Treatment: Patient with no complaints from previous session. (But note nausea/vomitting since yesterday afternoon - did not sleep well.)  Family / Caregiver Present: No  Referring Practitioner: Dr. Joselyn Perry MD  Subjective  Subjective: Pt agreeable to therapy but reports not sleeping well as she has had n/v since yesterday afternoon - thinks it is food related.   General Comment  Comments: Pt sitting on EOB upon arrival.  Pain Screening  Patient Currently in Pain: Denies  Vital Signs  Patient Currently in Pain: Denies       Cognition   Cognition  Overall Cognitive Status: WFL  Objective   Bed mobility  Sit to Supine: Modified independent(HOB Elevated)  Transfers  Sit to Stand: Supervision(sup w/o AD, mod I w/ RW)  Stand to sit: Supervision(sup w/o AD, mod I w/ RW)  Bed to Chair: Stand by assistance(w/o AD)  Ambulation  Ambulation?: Yes  Ambulation 1  Surface: level tile  Device: Rolling Walker  Other Apparatus: O2(2L)  Assistance: Supervision;Stand by assistance  Quality of Gait: Without ace wrap DF assist: step through gait pattern, fair kelli, steady with turns, steppage gait with LLE to clear L foot during swing. Distance: 320 ft  Stairs/Curb  Stairs?: Yes  Stairs  # Steps : 4  Stairs Height: 6\"  Rails: Bilateral(attempting to simulate door jam support at home)  Curbs: 6\"(ascend/descend x 2 reps)  Device: Rolling walker  Assistance: Contact guard assistance  Comment: CGA to negotiate 4 stairs with bilateral HR (simulating door jam). Step to pattern with cues for technique. CGA to negotiate curb step with RW. Wheelchair Activities  Wheelchair Type: Standard  Wheelchair Cushion: Standard  Wheelchair Parts Management: Yes  Left Brakes Level of Assistance: Stand by assistance  Right Brakes Level of Assistance: Stand by Assist  Propulsion: Yes  Propulsion 1  Propulsion: Manual  Level: Level Tile  Method: RLE;LLE(targeted LEs only for strengthening purposes)  Level of Assistance: Stand by assistance  Description/ Details: Pt able to negotiate through doorways and completed 2 turns. Distance: 80 ft        Exercises  Quad Sets: x20 BLE with 5-count hold  Heelslides: x20 BLE purple Tband resistance in supine  Gluteal Sets: x20 BLE with 5-count hold  Hip Flexion: Seated marches x30 BLE  Hip Abduction: x 20 BLE seated then x 20 BLE supine clamshells with purple Tband  Knee Long Arc Quad: x30 BLE  Ankle Pumps: x20 BLE, also ankle circles x 40 BLE counter/clockwise  Comments: LE coordination exercises with rapid alternating movements                        Assessment   Body structures, Functions, Activity limitations: Decreased functional mobility ; Decreased endurance;Decreased balance;Decreased strength;Decreased sensation;Decreased ROM  Assessment: Pt feeling tired and nauseous this date therefore limiting activity tolerance this AM.  Pt required CGA for balance support during curb and stair management. Will continue to progress mobility and ambulation without device as pt tolerates.   Treatment Diagnosis: decreased (I) with mobility  Patient Education: safety with mobility, O2 weaning  REQUIRES PT FOLLOW UP: Yes  Activity Tolerance  Activity Tolerance: Patient Tolerated treatment well  Activity Tolerance: SpO2 drops briefly to 88% with activity but with rest and PLB able to recover to 94% on 2L in less than 1 min, HR 110s to 120s with activity     Goals  Short term goals  Time Frame for Short term goals: 5 days (5/25)  Short term goal 1: Pt will be indep with bed mobility. 5/25/19 GOAL not met - modified independent, continue goal  Short term goal 2: Pt will be SBA for transfers with no device. 5/25/19 - GOAL MET  Short term goal 3: Pt will ambulate 150 ft with SBA and LRAD. (goal met 5/23/19 with RW)  Short term goal 4: Pt will negotiate curb step x 2 reps with SBA and LRAD.  5/25/19 GOAL not met - requires CGA at [address] [place] [address], continue goal  Long term goals  Time Frame for Long term goals : 10 days (5/30)  Long term goal 1: Pt will be indep with transfers. Long term goal 2: Pt will be indep with ambulation 100 ft. Long term goal 3: pt will negotiate curb step x 2 reps indep. Long term goal 4: Pt will improve Workman Balance Score to >47/56 to reflect decreased fall risk. Long term goal 5: Pt will be indep with BLE HEP to facilitate continued strengthening and activity tolerance at d/c.   Patient Goals   Patient goals : Lele Lamas able to go home and do things for myself\"    Plan    Plan  Times per week: 5 out of 7 days/wk  Specific instructions for Next Treatment: progress mobility as tolerated  Current Treatment Recommendations: Strengthening, Gait Training, Patient/Caregiver Education & Training, ROM, Stair training, Balance Training, Neuromuscular Re-education, Equipment Evaluation, Education, & procurement, Functional Mobility Training, Endurance Training, Transfer Training, Safety Education & Training, Home Exercise Program, Modalities  Safety Devices  Type of devices: Call light within reach, Gait belt, Patient at risk for falls, Left in bed, Bed alarm in place     Therapy Time   Individual Concurrent Group Co-treatment   Time In 0730         Time Out 0830         Minutes 60         Timed Code Treatment Minutes: [facility]       Pili Ou, PT

## 2019-05-25 NOTE — PROGRESS NOTES
Physical Therapy  Facility/Department: [facility]  Daily Treatment Note  NAME: Jacquelin Lewis  : 1960  MRN: [id number]    Date of Service: 2019    Discharge Recommendations:  Home with assist PRN, Home with Home health PT   PT Equipment Recommendations  Other: CTA for RW need    Patient Diagnosis(es): There were no encounter diagnoses. has a past medical history of Anxiety state, unspecified, Impacted cerumen, and Rheumatoid arthritis(714.0). has a past surgical history that includes Carpal tunnel release; Lumbar spine surgery; and bronchoscopy (N/A, 2019). Restrictions  Restrictions/Precautions  Restrictions/Precautions: General Precautions, Fall Risk  Required Braces or Orthoses?: No  Position Activity Restriction  Other position/activity restrictions: Continuous O2 at 2L, attempting to wean  Subjective   General  Chart Reviewed: Yes  Additional Pertinent Hx: LLE foot drop with 1/5 DF  Response To Previous Treatment: Patient with no complaints from previous session.   Family / Caregiver Present: No  Referring Practitioner: Dr. Gisselle Feliciano MD  Subjective  Subjective: pt c/o nausea, agreeable to OT  Pain Screening  Patient Currently in Pain: Denies  Vital Signs  Patient Currently in Pain: Denies       Objective   Bed mobility  Sit to Supine: Unable to assess  Scooting: Modified independent  Transfers  Sit to Stand: Supervision  Stand to sit: Supervision  Bed to Chair: Stand by assistance(without AD)       Balance   standing on blue air ex X1 min;   rebounder ball toss/catch with 1 kg ball 1 min X2 while standing on air ex;   standing on air ex olique twists Left/Right with 4# weight;   standing on air ex performing back extension with 4# weight with arms extended 2X10;   standing marches on air ex X1 min; occasional UE support of wall for balance required ;  heel raises to toe raises on level surface X20 reps with unilateral support (decreased range on Left with limtied DF);   forward stepping in tandem to visual target  LLE/RLE X10 each, willie frances stepping to lateral/contralateral targets X10 LLE/RLE, increased difficlty with LLE placement. O2 sats in 90s throughout on 2L, requires seated rest breaks between sets of exercises      Assessment   Body structures, Functions, Activity limitations: Decreased functional mobility ; Decreased endurance;Decreased balance;Decreased strength;Decreased sensation;Decreased ROM  Assessment: Pt performs various standing balance tasks on level ground and on uneven air ex surface with dynamic movements + simultaneous core strengthening. Pt occasionally requiring 1 UE support to regain balance, able to self correct minimal LOBs. Pt performs exercises to improve ankle, hip and stepping strategies. Pt with foot drop of LLE resulting in decreased balance strategies. Pt encouraged to continue performing active DF/PF on Left LE in order to improve strength. Treatment Diagnosis: decreased (I) with mobility  Specific instructions for Next Treatment: progress mobility as tolerated  Prognosis: Good  Patient Education: balance strategies  Barriers to Learning: none  REQUIRES PT FOLLOW UP: Yes  Activity Tolerance  Activity Tolerance: Patient Tolerated treatment well         Goals  Short term goals  Time Frame for Short term goals: 5 days (5/25)  Short term goal 1: Pt will be indep with bed mobility. 5/25/19 GOAL not met - modified independent, continue goal  Short term goal 2: Pt will be SBA for transfers with no device. 5/25/19 - GOAL MET  Short term goal 3: Pt will ambulate 150 ft with SBA and LRAD. (goal met 5/23/19 with RW)  Short term goal 4: Pt will negotiate curb step x 2 reps with SBA and LRAD.  5/25/19 GOAL not met - requires CGA at [address] [place] [address], continue goal  Long term goals  Time Frame for Long term goals : 10 days (5/30)  Long term goal 1: Pt will be indep with transfers. Long term goal 2: Pt will be indep with ambulation 100 ft.   Long term goal 3: pt will negotiate curb step x 2 reps indep. Long term goal 4: Pt will improve Workman Balance Score to >47/56 to reflect decreased fall risk. Long term goal 5: Pt will be indep with BLE HEP to facilitate continued strengthening and activity tolerance at d/c.   Patient Goals   Patient goals : Maricel Savage able to go home and do things for myself\"    Plan    Plan  Times per week: 5 out of 7 days/wk  Specific instructions for Next Treatment: progress mobility as tolerated  Current Treatment Recommendations: Strengthening, Gait Training, Patient/Caregiver Education & Training, ROM, Stair training, Balance Training, Neuromuscular Re-education, Equipment Evaluation, Education, & procurement, Functional Mobility Training, Endurance Training, Transfer Training, Safety Education & Training, Home Exercise Program, Modalities  Safety Devices  Type of devices: Bed alarm in place, Call light within reach, Gait belt, Patient at risk for falls, Nurse notified     Therapy Time   Individual Concurrent Group Co-treatment   Time In 1030         Time Out 1100         Minutes 30         Timed Code Treatment Minutes: PIERRE Alberto

## 2019-05-26 PROCEDURE — 94761 N-INVAS EAR/PLS OXIMETRY MLT: CPT

## 2019-05-26 PROCEDURE — 2700000000 HC OXYGEN THERAPY PER DAY

## 2019-05-26 PROCEDURE — 1280000000 HC REHAB R&B

## 2019-05-26 PROCEDURE — 6370000000 HC RX 637 (ALT 250 FOR IP): Performed by: PHYSICAL MEDICINE & REHABILITATION

## 2019-05-26 RX ADMIN — TRAZODONE HYDROCHLORIDE 50 MG: 50 TABLET ORAL at 20:52

## 2019-05-26 RX ADMIN — CITALOPRAM HYDROBROMIDE 20 MG: 20 TABLET ORAL at 07:40

## 2019-05-26 RX ADMIN — ONDANSETRON 8 MG: 4 TABLET, ORALLY DISINTEGRATING ORAL at 18:13

## 2019-05-26 RX ADMIN — MAGNESIUM OXIDE TAB 400 MG (241.3 MG ELEMENTAL MG) 400 MG: 400 (241.3 MG) TAB at 20:52

## 2019-05-26 RX ADMIN — VITAMIN D, TAB 1000IU (100/BT) 5000 UNITS: 25 TAB at 07:41

## 2019-05-26 RX ADMIN — ONDANSETRON 8 MG: 4 TABLET, ORALLY DISINTEGRATING ORAL at 09:33

## 2019-05-26 RX ADMIN — MAGNESIUM OXIDE TAB 400 MG (241.3 MG ELEMENTAL MG) 400 MG: 400 (241.3 MG) TAB at 07:40

## 2019-05-26 RX ADMIN — APIXABAN 5 MG: 5 TABLET, FILM COATED ORAL at 20:52

## 2019-05-26 RX ADMIN — SENNOSIDES,DOCUSATE SODIUM 2 TABLET: 8.6; 5 TABLET, FILM COATED ORAL at 07:40

## 2019-05-26 RX ADMIN — POTASSIUM CHLORIDE 20 MEQ: 20 TABLET, EXTENDED RELEASE ORAL at 16:41

## 2019-05-26 RX ADMIN — POTASSIUM CHLORIDE 20 MEQ: 20 TABLET, EXTENDED RELEASE ORAL at 07:40

## 2019-05-26 RX ADMIN — SENNOSIDES,DOCUSATE SODIUM 2 TABLET: 8.6; 5 TABLET, FILM COATED ORAL at 20:52

## 2019-05-26 RX ADMIN — APIXABAN 5 MG: 5 TABLET, FILM COATED ORAL at 07:41

## 2019-05-26 RX ADMIN — FUROSEMIDE 40 MG: 40 TABLET ORAL at 07:41

## 2019-05-26 ASSESSMENT — PAIN SCALES - GENERAL: PAINLEVEL_OUTOF10: 0

## 2019-05-26 NOTE — PROGRESS NOTES
Karen Simons Block  5/26/2019  [id number]    Chief Complaint: Debility    Subjective:   No issues overnight. Oxygen requirement decreasing. ROS: No chest pain, shortness of breath, nausea, vomiting, diarrhea, fever, chills  Objective:  Patient Vitals for the past 24 hrs:   BP Temp Temp src Pulse Resp SpO2   05/26/19 0730 (!) 145/86 98 °F (36.7 °C) Oral 124 18 91 %   05/25/19 2112 (!) 158/93 99 °F (37.2 °C) Oral 118 16 --   05/25/19 1918 -- -- -- -- -- 91 %   05/25/19 1430 -- 98.2 °F (36.8 °C) Axillary -- -- 93 %     Gen: No distress, pleasant. HEENT: Normocephalic, atraumatic. CV: Regular rate and rhythm. Resp: No respiratory distress. Decreased at bases. Abd: Soft, nontender   Ext: No edema. Neuro: Alert, oriented, appropriately interactive. Left foot drop.    Wt Readings from Last 3 Encounters:   05/25/19 222 lb 14.4 oz (101.1 kg)   05/20/19 220 lb 4.8 oz (99.9 kg)   04/16/19 266 lb (120.7 kg)       Laboratory data:   Lab Results   Component Value Date    WBC 8.5 05/23/2019    HGB 17.0 (H) 05/23/2019    HCT 52.4 (H) 05/23/2019    MCV 87.5 05/23/2019     05/23/2019       Lab Results   Component Value Date     05/24/2019    K 3.7 05/24/2019    K 3.4 05/23/2019    CL 92 05/24/2019    CO2 35 05/24/2019    BUN 7 05/24/2019    CREATININE <0.5 05/24/2019    GLUCOSE 121 05/24/2019    CALCIUM 10.1 05/24/2019        Therapy progress:  PT  Position Activity Restriction  Other position/activity restrictions: Continuous O2 at 2L, attempting to wean  Objective     Sit to Stand: Supervision  Stand to sit: Supervision  Bed to Chair: Stand by assistance(without AD)  Device: Rolling Walker  Other Apparatus: O2(2L)  Assistance: Supervision, Stand by assistance  Distance: 320 ft  OT  PT Equipment Recommendations  Other: CTA for RW need  Toilet - Technique: Ambulating  Equipment Used: Standard toilet  Toilet Transfers Comments: with RW  Assessment        SLP  Current Diet : Soft regular  Current Liquid Diet : Thin  Diet Solids Recommendation: Regular  Liquid Consistency Recommendation: Thin    Body mass index is 39.48 kg/m². Rehabilitation Diagnosis:   10.9, Pulmonary, Other Pulmonary        Assessment and Plan:     Impairments: weakness, endurance, balance     Debility - PT/OT to address endurance, strength, compensatory strategies, equipment     Acute hypoxic respiratory failure - Thought to be due to PE and core pulmonale. On anticoagulation as below. Continue lasix and prn albuterol. Wean O2 as tolerated.      Suspected acute PE with LLE DVT - Eliquis     Chronic hypercapnic respiratory failure - Likely due to OHS. BiPAP qhs.      Tracheobronchitis - Completed antibiotics.      Left foot drop - New this admission. 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 Consider EMG as outpatient. PT. Trial AFO.    Morbid Obesity - Complicating assessment and treatment. Placing patient at risk for multiple co-morbidities as well as early death and contributing to the patient's presentation. Dietician consult. Counseling and education. Hypokalemia/Hypomagnesemia - Replace prn     Bladder - high risk retention - Monitor PVRs, SC prn >300cc     Bowel - high risk constipation - colace+senna BID, PRN miralax and MoM. follow bowel movements. Enema or suppository if needed.      Safety - fall and aspiration precautions     Anticipated dispo: home alone vs to parents home  Services: [facility] PT, OT, RN  DME: TBD (shower chair vs TTB, possible RW, left AFO)  ELOS: 5/30    Laurence Garcia MD 5/26/2019, 12:48 PM

## 2019-05-26 NOTE — PROGRESS NOTES
Pattern: Regular Pattern; RR 8-20 = 0    Breath Sounds: Clear = 0    Sputum   ,  , Sputum How Obtained: Cough on request  Cough: Strong, spontaneous, non-productive = 0    Vital Signs   BP (!) 145/86   Pulse 124   Temp 98 °F (36.7 °C) (Oral)   Resp 18   Ht 5' 3\" (1.6 m)   Wt 222 lb 14.4 oz (101.1 kg)   SpO2 91%   BMI 39.48 kg/m²   SPO2 (COPD values may differ): 90-91% on room air or greater than 92% on FiO2 24- 28% = 1    Peak Flow (asthma only): not applicable = 0    RSI: 0-4 = See once and convert to home regimen or discontinue        Plan       Goals: , medication delivery, mobilize retained secretions, volume expansion and improve oxygenation    Patient/caregiver was educated on the proper method of use for Respiratory Care Devices: yes    Level of patient/caregiver understanding able to:   ? Verbalize understanding   ? Demonstrate understanding       ? Teach back        ? Needs reinforcement       ? No available caregiver               ? Other:     Response to education:  Good     Is patient being placed on Home Treatment Regimen? Yes     Does the patient have everything they need prior to discharge? Yes     Comments: pt assessed and chart reviewed    Plan of Care: HOME REG    Electronically signed by Kaiden Canales RCP on 5/26/2019 at 6:54 PM    Respiratory Protocol Guidelines     1. Assessment and treatment by Respiratory Therapy will be initiated for medication and therapeutic interventions upon initiation of aerosolized medication. 2. Physician will be contacted for respiratory rate (RR) greater than 35 breaths per minute. Therapy will be held for heart rate (HR) greater than 140 beats per minute, pending direction from physician. 3. Bronchodilators will be administered via Metered Dose Inhaler (MDI) with spacer when the following criteria are met:  a. Alert and cooperative     b. HR < 140 bpm  c. RR < 30 bpm                d. Can demonstrate a 2-3 second inspiratory hold  4.  Bronchodilators

## 2019-05-27 LAB
ANION GAP SERPL CALCULATED.3IONS-SCNC: 12 MMOL/L (ref 3–16)
BASOPHILS ABSOLUTE: 0.1 K/UL (ref 0–0.2)
BASOPHILS RELATIVE PERCENT: 0.9 %
BUN BLDV-MCNC: 7 MG/DL (ref 7–20)
CALCIUM SERPL-MCNC: 9.7 MG/DL (ref 8.3–10.6)
CHLORIDE BLD-SCNC: 90 MMOL/L (ref 99–110)
CO2: 34 MMOL/L (ref 21–32)
CREAT SERPL-MCNC: <0.5 MG/DL (ref 0.6–1.1)
EOSINOPHILS ABSOLUTE: 0.2 K/UL (ref 0–0.6)
EOSINOPHILS RELATIVE PERCENT: 2 %
GFR AFRICAN AMERICAN: >60
GFR NON-AFRICAN AMERICAN: >60
GLUCOSE BLD-MCNC: 100 MG/DL (ref 70–99)
HCT VFR BLD CALC: 56 % (ref 36–48)
HEMOGLOBIN: 18.3 G/DL (ref 12–16)
LYMPHOCYTES ABSOLUTE: 1.9 K/UL (ref 1–5.1)
LYMPHOCYTES RELATIVE PERCENT: 18.1 %
MCH RBC QN AUTO: 28.3 PG (ref 26–34)
MCHC RBC AUTO-ENTMCNC: 32.6 G/DL (ref 31–36)
MCV RBC AUTO: 87 FL (ref 80–100)
MONOCYTES ABSOLUTE: 1 K/UL (ref 0–1.3)
MONOCYTES RELATIVE PERCENT: 9.5 %
NEUTROPHILS ABSOLUTE: 7.4 K/UL (ref 1.7–7.7)
NEUTROPHILS RELATIVE PERCENT: 69.5 %
PDW BLD-RTO: 16.9 % (ref 12.4–15.4)
PLATELET # BLD: 222 K/UL (ref 135–450)
PMV BLD AUTO: 8.2 FL (ref 5–10.5)
POTASSIUM REFLEX MAGNESIUM: 3.8 MMOL/L (ref 3.5–5.1)
RBC # BLD: 6.44 M/UL (ref 4–5.2)
SODIUM BLD-SCNC: 136 MMOL/L (ref 136–145)
WBC # BLD: 10.7 K/UL (ref 4–11)

## 2019-05-27 PROCEDURE — 1280000000 HC REHAB R&B

## 2019-05-27 PROCEDURE — 6370000000 HC RX 637 (ALT 250 FOR IP): Performed by: PHYSICAL MEDICINE & REHABILITATION

## 2019-05-27 PROCEDURE — 80048 BASIC METABOLIC PNL TOTAL CA: CPT

## 2019-05-27 PROCEDURE — 85025 COMPLETE CBC W/AUTO DIFF WBC: CPT

## 2019-05-27 PROCEDURE — 36415 COLL VENOUS BLD VENIPUNCTURE: CPT

## 2019-05-27 RX ADMIN — CITALOPRAM HYDROBROMIDE 20 MG: 20 TABLET ORAL at 09:11

## 2019-05-27 RX ADMIN — TRAZODONE HYDROCHLORIDE 50 MG: 50 TABLET ORAL at 19:57

## 2019-05-27 RX ADMIN — MAGNESIUM OXIDE TAB 400 MG (241.3 MG ELEMENTAL MG) 400 MG: 400 (241.3 MG) TAB at 19:57

## 2019-05-27 RX ADMIN — FUROSEMIDE 40 MG: 40 TABLET ORAL at 09:10

## 2019-05-27 RX ADMIN — ACETAMINOPHEN 650 MG: 325 TABLET ORAL at 19:56

## 2019-05-27 RX ADMIN — SENNOSIDES,DOCUSATE SODIUM 2 TABLET: 8.6; 5 TABLET, FILM COATED ORAL at 09:11

## 2019-05-27 RX ADMIN — APIXABAN 5 MG: 5 TABLET, FILM COATED ORAL at 19:57

## 2019-05-27 RX ADMIN — POTASSIUM CHLORIDE 20 MEQ: 20 TABLET, EXTENDED RELEASE ORAL at 09:10

## 2019-05-27 RX ADMIN — MAGNESIUM OXIDE TAB 400 MG (241.3 MG ELEMENTAL MG) 400 MG: 400 (241.3 MG) TAB at 09:11

## 2019-05-27 RX ADMIN — APIXABAN 5 MG: 5 TABLET, FILM COATED ORAL at 09:10

## 2019-05-27 RX ADMIN — VITAMIN D, TAB 1000IU (100/BT) 5000 UNITS: 25 TAB at 09:11

## 2019-05-27 RX ADMIN — POTASSIUM CHLORIDE 20 MEQ: 20 TABLET, EXTENDED RELEASE ORAL at 18:13

## 2019-05-27 RX ADMIN — SENNOSIDES,DOCUSATE SODIUM 2 TABLET: 8.6; 5 TABLET, FILM COATED ORAL at 19:56

## 2019-05-27 ASSESSMENT — PAIN - FUNCTIONAL ASSESSMENT: PAIN_FUNCTIONAL_ASSESSMENT: ACTIVITIES ARE NOT PREVENTED

## 2019-05-27 ASSESSMENT — PAIN DESCRIPTION - FREQUENCY: FREQUENCY: INTERMITTENT

## 2019-05-27 ASSESSMENT — PAIN DESCRIPTION - PAIN TYPE: TYPE: CHRONIC PAIN

## 2019-05-27 ASSESSMENT — PAIN DESCRIPTION - ORIENTATION: ORIENTATION: MID

## 2019-05-27 ASSESSMENT — PAIN DESCRIPTION - DESCRIPTORS: DESCRIPTORS: ACHING

## 2019-05-27 ASSESSMENT — PAIN DESCRIPTION - ONSET: ONSET: ON-GOING

## 2019-05-27 ASSESSMENT — PAIN DESCRIPTION - PROGRESSION: CLINICAL_PROGRESSION: NOT CHANGED

## 2019-05-27 ASSESSMENT — PAIN SCALES - GENERAL
PAINLEVEL_OUTOF10: 2
PAINLEVEL_OUTOF10: 0

## 2019-05-27 ASSESSMENT — PAIN DESCRIPTION - LOCATION: LOCATION: BACK

## 2019-05-27 NOTE — PATIENT CARE CONFERENCE
[facility]  Inpatient Rehabilitation  Weekly Team Conference Note    Date:   Patient Name: Lowell Delcid        MRN: [id number]    : 1960  (64 y.o.)  Gender: female   Referring Practitioner: Dr. Marc Servin MD  Diagnosis: Debility      Interventions to be utilized toward barriers to discharge, per discipline:  [address] observed barriers to dc: Upper extremity weakness and Lower extremity weakness, new medication and dosage changes  Nursing interventions: Educate patient on new medication and dosage changes, give patient breaks in between activity to gain strength   Family Education: no  Fall Risk:  No      Physical therapy observed barriers to dc:    Baseline: Independent with ADL's and functional mobility without an AD   Current level: Independent with bed mobility, transfers, ambulation, and modified independent with stair climbing   Barriers to DC: Impaired balance, new drop foot   Needs in order to achieve dc home/next level of care:  Independent to modified independent with ADL's and functional mobility      Physical therapy interventions:   Current Treatment Recommendations: Strengthening, Gait Training, Patient/Caregiver Education & Training, ROM, Stair training, Balance Training, Neuromuscular Re-education, Equipment Evaluation, Education, & procurement, Functional Mobility Training, Endurance Training, Transfer Training, Safety Education & Training, Home Exercise Program, [address] last conference: N/A      FIMS current conference:  Bed, Chair, Wheel Chair: 6 - Requires assistive device (slide rail)  Walk: 7- Complete [place]  Walks at least 150 feet Independently with no assistive device  Distance Walked: 220'  Wheel Chair: 2 - [address] up to [facility] 91 requires assistance of one person to operate wheelchair between [facility] [address] [place] [address] Street: 128 ft  Stairs: 6 - [facility] goes up and down at least one flight of stairs but requries a side support, handrail, cane, or portable supports, or the activity takes more than a reasonable amount of times, or there are safety considerations    PT Equipment Recommendations  Equipment Needed: No  Other: CTA for RW need    Assessment: Pt remains motivate to work to increase her safety and independence with functional mobility to return to her PLOF and eventually back to work. She has met 4/5 long term PT goals, demonstrates independence with bed mobility, transfers, ambulation and modified independence with stair negotiation. SpO2 and HR remained within acceptable levels while on RA throughout treatment session. Plan for patient to discharge home to her parents' tomorrow. She expresses no concerns about discharge plans and is excited to continue with therapy at to progress functional mobility.        Occupational therapy observed barriers to dc:    Baseline: lived alone, IND with ADLs, transfers and mobility   Current level: SPV to Mod I for ADLs and transfers   Barriers to DC: limited assist, desats with activity   Needs in order to achieve dc home/next level of care: MOd I for ADls and transfers    Occupational Therapy interventions:  Current Treatment Recommendations: Strengthening, Endurance Training, Neuromuscular Re-education, Patient/Caregiver Education & Training, Self-Care / ADL, Balance Training, Home Management Training, Functional Mobility Training, Safety Education & Training, Positioning      OCCUPATIONAL THERAPY  FIMs last conference  Eatin - Patient feeds self  Groomin - Requires setup/cues to do all tasks  Bathing: 3 - Able to bathe 5-7 areas  Dressing-Upper: 5 - Requires setup/supervision/cues and/or requires assist with presthesis/brace only  Dressing-Lower: 3 - Requires assist with 2-3 parts of dressing  Toiletin - Requires device (grab bar/walker/etc.)  Toilet Transfer: 4 - [address] assistance only < 25% assist  Tub Transfer: 4 - Minimal contact assistance, pt. expends 75% or more effort  Shower Transfer: 4 - Minimal contact assistance, pt. expends 75% or more effort            FIMS: current conference  Eatin - Patient feeds self  Groomin - Patient independent with all grooming tasks  Bathin - Able to bathe all 10 areas with device  Dressing-Upper: 7 - Patient independently dresses upper body  Dressing-Lower: 6 - Independent with device/prosthesis  Toiletin - Requires device (grab bar/walker/etc.)  Toilet Transfer: 6 - Independent with device (grab bar/walker/slide bar)  Tub Transfer: 6 - Modified independence  Shower Transfer: 6 - Modified independence      Assessment: Pt very pleasant and cooperative, completes ADls, transfers and mobility mod I including item retrieval and transport. Pt able to perform tub transfer mod I stepping in/out of tub, clears B LEs over tub leading with R LE in and out. Pt with difficulty clearing L LE over tub, but problem solves independently to step in/out with R LE. Issued and reviewed written HEP for free weights and theraband, pt able to perform with visual cues from handout. Pt able to maintain SPO2 >93% on RA with ADL and mobility. Pt has met all goals and scheduled for d/c next date. Speech therapy observed barriers to dc:               Baseline:  Independent              Current level: Dysphonia; mild dysphagia              Barriers to DC: None              Needs in order to achieve dc home/next level of care: carryover of vocal strategies to improve overall vocal quality     Speech Therapy interventions:  Dysphagia: Therapeutic Interventions: Diet tolerance monitoring, Patient/Family education, Therapeutic PO trials with SLP  Speech/Language/Cognition: vocal strengthening exercises to improve overall vocal quality      SPEECH THERAPY   FIMs last conference  Comprehension: 7 - Patient understands complex ideas (math/planning)  Expression: 6 - Device used to express complex ideas/needs  Social Interaction: 7 - Patient has appropriate behavior/relations 100% of the time  Problem Solvin - Patient independent with complex tasks  Memory: 7 - Patient independent with meds/people/schedule    FIMS: current conference  Comprehension: 7 - Patient understands complex ideas (math/planning)  Expression: 6 - Device used to express complex ideas/needs  Social Interaction: 7 - Patient has appropriate behavior/relations 100% of the time  Problem Solvin - Patient independent with complex tasks  Memory: 7 - Patient independent with meds/people/schedule    Assessment: Pt motivated to participate in dysphagia and voice therapy. Patient reported voice quality sounds and feels worse today d/t vomiting that occurred day before. Pt verbalized understanding and demonstrated use of breathing exercises. Patient would benefit from continue MPT and training for abdominal breathing to improve breath support. Encouraged patient to completed abdominal breathing exercises sitting up and laying down on her own. Discussed good vocal hygiene practices as well as positioning in order to increase breath support. Safe swallowing strategies reviewed, pt verbalized understanding. NUTRITION  Weight: 218 lb 9.6 oz (99.2 kg) / Body mass index is 38.72 kg/m². Diet Order: DIET GENERAL; No Drinking Straw  PO Meals Eaten (%): 76 - 100%  Education: Provided general healthy eating handout          CASE MANAGEMENT  Assessment: Patient will go home with [facility]. [facility] will provide her possible oxygen       Interdisciplinary Goals:   1.) Pt will carryover vocal hygiene practices independently.    2.) Pt will ambulate to/from bathroom with independence  3.) Patient and family will understand fall preventions  4.)  5.)    Discharge Plan   Estimated discharge date: 2019  Destination: home health  Pass:No  Services at Discharge: [address] [facility], Pending sale to [facility] Other pending eval   Equipment at Discharge: Rolling walker, ttb vs shower chair, afo    Team Members Present at Conference:  : Soto Parada MSW, [facility]   Occupational Therapist: Cristhian Peralta OT  Physical Therapist: Norman Abdi, PT  Speech Therapist: n/a  Nurse: Noemy Lord RN  Dietician: Valerie Penn RD, LD  : Salena Mtz   Psychiatry: n/a    Family members present at conference: n/a      I led this team conference and I approve the established interdisciplinary plan of care as documented within the medical record of [facility]. MD: Corene Hatchet.  Lalo Timmons MD 5/30/2019, 11:04 AM

## 2019-05-27 NOTE — PROGRESS NOTES
Patient spent morning without oxygen, SpO2 in 90-95% on room air. SpO2 dropped to 85-86% when patient lay down for a nap. Nasal canula placed back on, SpO2 95% on 1 L oxygen. Continue monitoring.

## 2019-05-28 PROCEDURE — 97535 SELF CARE MNGMENT TRAINING: CPT

## 2019-05-28 PROCEDURE — 97530 THERAPEUTIC ACTIVITIES: CPT

## 2019-05-28 PROCEDURE — 1280000000 HC REHAB R&B

## 2019-05-28 PROCEDURE — 2700000000 HC OXYGEN THERAPY PER DAY

## 2019-05-28 PROCEDURE — 97110 THERAPEUTIC EXERCISES: CPT

## 2019-05-28 PROCEDURE — 6370000000 HC RX 637 (ALT 250 FOR IP): Performed by: PHYSICAL MEDICINE & REHABILITATION

## 2019-05-28 PROCEDURE — 97127 HC SP THER IVNTJ W/FOCUS COG FUNCJ: CPT

## 2019-05-28 PROCEDURE — 94761 N-INVAS EAR/PLS OXIMETRY MLT: CPT

## 2019-05-28 PROCEDURE — 97116 GAIT TRAINING THERAPY: CPT

## 2019-05-28 RX ADMIN — ACETAMINOPHEN 650 MG: 325 TABLET ORAL at 20:54

## 2019-05-28 RX ADMIN — MAGNESIUM OXIDE TAB 400 MG (241.3 MG ELEMENTAL MG) 400 MG: 400 (241.3 MG) TAB at 20:55

## 2019-05-28 RX ADMIN — POTASSIUM CHLORIDE 20 MEQ: 20 TABLET, EXTENDED RELEASE ORAL at 17:00

## 2019-05-28 RX ADMIN — VITAMIN D, TAB 1000IU (100/BT) 5000 UNITS: 25 TAB at 07:22

## 2019-05-28 RX ADMIN — TRAZODONE HYDROCHLORIDE 50 MG: 50 TABLET ORAL at 20:54

## 2019-05-28 RX ADMIN — APIXABAN 5 MG: 5 TABLET, FILM COATED ORAL at 07:22

## 2019-05-28 RX ADMIN — POTASSIUM CHLORIDE 20 MEQ: 20 TABLET, EXTENDED RELEASE ORAL at 07:22

## 2019-05-28 RX ADMIN — MAGNESIUM OXIDE TAB 400 MG (241.3 MG ELEMENTAL MG) 400 MG: 400 (241.3 MG) TAB at 07:23

## 2019-05-28 RX ADMIN — APIXABAN 5 MG: 5 TABLET, FILM COATED ORAL at 20:55

## 2019-05-28 RX ADMIN — SENNOSIDES,DOCUSATE SODIUM 2 TABLET: 8.6; 5 TABLET, FILM COATED ORAL at 07:22

## 2019-05-28 RX ADMIN — CITALOPRAM HYDROBROMIDE 20 MG: 20 TABLET ORAL at 07:23

## 2019-05-28 RX ADMIN — FUROSEMIDE 40 MG: 40 TABLET ORAL at 07:22

## 2019-05-28 ASSESSMENT — PAIN SCALES - GENERAL
PAINLEVEL_OUTOF10: 0
PAINLEVEL_OUTOF10: 2

## 2019-05-28 ASSESSMENT — PAIN DESCRIPTION - DESCRIPTORS: DESCRIPTORS: ACHING

## 2019-05-28 ASSESSMENT — PAIN DESCRIPTION - PAIN TYPE: TYPE: CHRONIC PAIN

## 2019-05-28 ASSESSMENT — PAIN DESCRIPTION - LOCATION: LOCATION: BACK

## 2019-05-28 NOTE — PROGRESS NOTES
Moises Days Block  5/28/2019  [id number]    Chief Complaint: Debility    Subjective:   No issues overnight. Patient seen this am sitting up in room. O2 weaned down to 1L. No improvement in left foot drop since last week. ROS: No chest pain, shortness of breath, nausea, vomiting, diarrhea, fever, chills  Objective:  Patient Vitals for the past 24 hrs:   BP Temp Temp src Pulse Resp SpO2 Weight   05/28/19 0715 138/78 98.8 °F (37.1 °C) Oral 110 16 96 % --   05/28/19 0640 -- -- -- -- -- -- 217 lb 1.6 oz (98.5 kg)   05/27/19 1956 (!) 141/79 99 °F (37.2 °C) Oral 118 16 94 % --   05/27/19 1630 -- -- -- -- -- 92 % --   05/27/19 1430 -- -- -- -- -- 95 % --   05/27/19 1245 -- -- -- -- -- 95 % --   05/27/19 1230 -- -- -- -- -- (!) 88 % --   05/27/19 1115 -- -- -- -- -- 93 % --   05/27/19 1000 -- -- -- -- -- 93 % --     Gen: No distress, pleasant. HEENT: Normocephalic, atraumatic. CV: Regular rate and rhythm. Resp: No respiratory distress. Decreased at bases. Abd: Soft, nontender   Ext: No edema. Neuro: Alert, oriented, appropriately interactive. Left foot drop.    Wt Readings from Last 3 Encounters:   05/28/19 217 lb 1.6 oz (98.5 kg)   05/20/19 220 lb 4.8 oz (99.9 kg)   04/16/19 266 lb (120.7 kg)       Laboratory data:   Lab Results   Component Value Date    WBC 10.7 05/27/2019    HGB 18.3 (H) 05/27/2019    HCT 56.0 (H) 05/27/2019    MCV 87.0 05/27/2019     05/27/2019       Lab Results   Component Value Date     05/27/2019    K 3.8 05/27/2019    CL 90 05/27/2019    CO2 34 05/27/2019    BUN 7 05/27/2019    CREATININE <0.5 05/27/2019    GLUCOSE 100 05/27/2019    CALCIUM 9.7 05/27/2019        Therapy progress:  PT  Position Activity Restriction  Other position/activity restrictions: Continuous O2 at 2L, attempting to wean  Objective     Sit to Stand: Supervision  Stand to sit: Supervision  Bed to Chair: Stand by assistance(without AD)  Device: Rolling Walker  Other Apparatus: O2(2L)  Assistance:

## 2019-05-28 NOTE — PROGRESS NOTES
light within reach, Gait belt, Patient at risk for falls, Nurse notified     Therapy Time   Individual Concurrent Group Co-treatment   Time In 1230         Time Out 1330         Minutes 60         Timed Code Treatment Minutes: Cl 61 Time:   Individual Concurrent Group Co-treatment   Time In 1430         Time Out 1500         Minutes 30           Timed Code Treatment Minutes:  30    Total Treatment Minutes:  90    Eva Fernandez, PT

## 2019-05-28 NOTE — PROGRESS NOTES
Occupational Therapy  Facility/Department: [facility]  Daily Treatment Note  NAME: Army Boyer  : 1960  MRN: [id number]    Date of Service: 2019    Discharge Recommendations:  Outpatient OT  OT Equipment Recommendations  Other: pt reports having shower chair    Assessment   Performance deficits / Impairments: Decreased safe awareness;Decreased ADL status; Decreased endurance;Decreased high-level IADLs;Decreased strength;Decreased balance;Decreased vision/visual deficit  Assessment: Pt very pleasant and motivated, progressing well. pt completes kitchen tasks MOd I , tub transfers Mod I this date. pt reports he was worried about tub transfer but feels better as she knows she can perform. Pt with increased standing and activity tolerance, performs standing activities on RA with SPO2 >91%. Pt ed on energy conservation tech, verb understanding. Pt progressing well toward all goals, cont POC. Patient Education: Insructed re: energy conservation techniques including pursed lip breathing and rest breaks, as well as Role of OT and ADL retraining. Activity Tolerance  Activity Tolerance: Patient Tolerated treatment well;Patient limited by fatigue  Activity Tolerance: reports fatigue after PT, SPO2 >91% on RA with activity  Safety Devices  Safety Devices in place: Yes  Type of devices: Bed alarm in place;Gait belt;Call light within reach; Left in bed         Patient Diagnosis(es): There were no encounter diagnoses. has a past medical history of Anxiety state, unspecified, Impacted cerumen, and Rheumatoid arthritis(714.0). has a past surgical history that includes Carpal tunnel release; Lumbar spine surgery; and bronchoscopy (N/A, 2019).     Restrictions  Restrictions/Precautions  Restrictions/Precautions: General Precautions, Fall Risk  Required Braces or Orthoses?: No  Position Activity Restriction  Other position/activity restrictions: RA  Subjective   General  Chart Reviewed: Yes  Patient assessed for rehabilitation services?: Yes  Family / Caregiver Present: No  Subjective  Subjective: Pt pleasant and cooperative  Vital Signs  Patient Currently in Pain: No   Orientation  Orientation  Overall Orientation Status: Within Normal Limits  Objective       Instrumental ADL's  Instrumental ADLs: Yes  Meal Prep  Meal Prep Level: Walker  Meal Prep Level of Assistance: Modified independent  Meal Preparation: preparing muffins, reaching in all planes to retrieve items from high and low surfaces  Light Housekeeping  Light Housekeeping Level: [employer] Housekeeping Level of Assistance: Modified independent  Light Housekeeping: washing dishes at sink     Balance  Sitting Balance: Independent  Standing Balance: Modified independent   Standing Balance  Time: 5 min, >20 min, 3-4 min  Activity:  functional mobility to/from gym, kitchen tasks   Comment: RW  Functional Mobility  Functional - Mobility Device: Rolling Walker  Activity: To/From therapy gym;Retrieve items;Transport items  Assist Level: Modified independent   Toilet Transfers  Toilet - Technique: Ambulating  Equipment Used: Standard toilet  Toilet Transfer: Modified independent  Toilet Transfers Comments: with RW  Tub Transfers  Tub - Transfer From: Rolling walker  Tub - Transfer Type: To and From  Tub - Transfer To: Standing  Tub - Technique: Ambulating  Tub Transfers: Modified independence  Tub Transfers Comments: able to lift B LEs over tub in and out, L foot drags slightly but pt michael to clear w/o assist     Transfers  Sit to stand: Modified independent  Stand to sit: Modified independent  Transfer Comments: Used RW for support        Coordination  Gross Motor: good for ADL and IADL tasks   Fine Motor: good for ADL and IADL tasks               Cognition  Overall Cognitive Status: WFL  Arousal/Alertness: Appropriate responses to stimuli  Following Commands:  Follows all commands without difficulty  Attention Span: Appears intact  Memory: Appears

## 2019-05-28 NOTE — CARE COORDINATION
Chart review completed. Patient will go home with [facility]. [facility] will provide possible home oxygen. She has a rolling walker and shower chair at home    Will continue to follow and assist. Please notify SW if needs or concerns arise.      Andrei Saravia MSW, LSW

## 2019-05-28 NOTE — PROGRESS NOTES
[facility]: ACUTE REHAB UNIT  SPEECH-LANGUAGE PATHOLOGY      [x] Daily  [] Weekly Care Conference Note  [] Discharge    Patient:Karolina Gonsales      :1960  MARY ALICE:[id number]  Rehab Dx/Hx: Debility [R53.81]    Precautions: falls  Home situation: lives at home independently  [address]  Dx: [] Aphasia  [] Dysarthria  [] Apraxia   [x] Oropharyngeal dysphagia [] Cognitive Impairment  [x] Other: voice  Date of Admit: 2019  Room #: 7028/2799-24    Current functional status (updated daily):         Pt being seen for : [x] Speech/Language Treatment  [x] Dysphagia Treatment [] Cognitive Treatment  [] Other:  Communication: [x]WFL  [] Aphasia  [] Dysarthria  [] Apraxia  [] Pragmatic Impairment [] Non-verbal  [] Hearing Loss  [] Other:   Cognition: [x] WFL  [] Mild  [] Moderate  [] Severe [] Unable to Assess  [] Other:  Memory: [x] WFL  [] Mild  [] Moderate  [] Severe [] Unable to Assess  [] Other:  Behavior: [x] Alert  [x] Cooperative  []  Pleasant  [] Confused  [] Agitated  [] Uncooperative  [] Distractible [] Motivated  [] Self-Limiting [] Anxious  [] Other:  Endurance:  [x] Adequate for participation in SLP sessions  [] Reduced overall  [] Lethargic  [] Other:  Safety: [x] No concerns at this time  [] Reduced insight into deficits  []  Reduced safety awareness [] Not following call light procedures  [] Unable to Assess  [] Other:    Current Diet Order:DIET GENERAL; No Drinking Straw  Swallowing Precautions: Sit up for all meals and thereafter for 30 minutes, Eat with small bites (1/2 tsp; 1 tsp) and Alternate solids with liquids       Date: 2019      Tx session 1  [id number] Tx session 2  All tx needs met Session 1   Total Timed Code Min 0 --   Total Treatment Minutes 30 --   Individual Treatment Minutes 30 --   Group Treatment Minutes 0 0   Co-Treat Minutes 0 0   Variance/Reason:  N/A    Pain Pt reports some mild back pain - denies need for intervention when offered.      Pain Intervention [] RN notified  []

## 2019-05-28 NOTE — DISCHARGE INSTR - COC
Continuity of Care Form    Patient Name: Lakshmi Gonzalez   :  1960  MRN:  [id number]    Admit date:  2019  Discharge date:  2019    Code Status Order: Full Code   Advance Directives:   [facility] Documentation     Date/Time Healthcare Directive Type of Healthcare Directive Copy in [address] Agent's Name Healthcare Agent's Phone Number    [phone]  Yes, patient has an advance directive for healthcare treatment  Durable power of  for health care;Living will  No, copy requested from family -- -- --          Admitting Physician:  Dahiana Johns MD  PCP: Gopal Johnson MD    Discharging Nurse:  [facility] Unit/Room#: [address] Unit Phone Number: [phone]    Emergency Contact:   Extended Emergency Contact Information  Primary Emergency Contact: [place], [address] [place] [address] [address] Phone: [phone]  Work Phone: [phone]  Relation: Parent  Secondary Emergency Contact: Lanette Funez  Mobile Phone: [phone]  Relation: Brother/Sister    Past Surgical History:  Past Surgical History:   Procedure Laterality Date    BRONCHOSCOPY N/A 2019    BRONCHOSCOPY ALVEOLAR LAVAGE performed by Jeannette Alicia MD at [address] [place] [address] [place]      both hands     LUMBAR SPINE SURGERY         Immunization History:   Immunization History   Administered Date(s) Administered    Pneumococcal 13-valent Conjugate (Lizbeth Johanna) 2019       Active Problems:  Patient Active Problem List   Diagnosis Code    Anxiety state F41.1    Rheumatoid arthritis ([address] M06.9    Tobacco abuse Z72.0    SOB (shortness of breath) R06.02    Moderate persistent asthma with acute exacerbation J45.41    Acute respiratory failure with hypoxia ([address] J96.01    New onset of congestive heart failure ([address] I50.9    Morbid obesity (HCC) E66.01    Obesity hypoventilation syndrome (HCC) E66.2    Erythrocytosis D75.1    Acute deep vein thrombosis (DVT) of left peroneal vein (HCC) I82.492    Pulmonary embolus (HCC) I26.99    Moderate protein-calorie malnutrition (HCC) E44.0    Bacterial pneumonia J15.9    Acute deep vein thrombosis (DVT) of distal end of left lower extremity (HCC) I82.4Z2    Acute cor pulmonale (HCC) I26.09    Disorder of electrolytes E87.8    Anasarca R60.1    Abnormal chest x-ray R93.89    Debility R53.81    Moderate malnutrition (HCC) E44.0       Isolation/Infection:   Isolation          No Isolation            Nurse Assessment:  Last Vital Signs: /78   Pulse 110   Temp 98.8 °F (37.1 °C) (Oral)   Resp 16   Ht 5' 3\" (1.6 m)   Wt 217 lb 1.6 oz (98.5 kg)   SpO2 96%   BMI 38.46 kg/m²     Last documented pain score (0-10 scale): Pain Level: 0  Last Weight:   Wt Readings from Last 1 Encounters:   05/28/19 217 lb 1.6 oz (98.5 kg)     Mental Status:  oriented and alert    IV Access:  - None    Nursing Mobility/ADLs:  Walking   Assisted  Transfer  Assisted  Bathing  Assisted  Dressing  Independent  Toileting  Assisted  Feeding  Independent  Med Admin  Independent  Med Delivery   whole    Wound Care Documentation and Therapy:        Elimination:  Continence:   · Bowel: Yes  · Bladder: Yes  Urinary Catheter: None   Colostomy/Ileostomy/Ileal Conduit: No       Date of Last BM: 5/29/19    Intake/Output Summary (Last 24 hours) at 5/28/2019 0933  Last data filed at 5/28/2019 0732  Gross per 24 hour   Intake 960 ml   Output --   Net 960 ml     I/O last 3 completed shifts: In: 12 [P.O.:960]  Out: -     Safety Concerns: At Risk for Falls    Impairments/Disabilities:      None    Nutrition Therapy:  Current Nutrition Therapy:   - Oral Diet:  General    Routes of Feeding: Oral  Liquids:  Thin Liquids  Daily Fluid Restriction: no  Last Modified Barium Swallow with Video (Video Swallowing Test): not done    Treatments at the Time of Hospital Discharge:

## 2019-05-28 NOTE — PLAN OF CARE
Fall protocol in place. Bed and chair alarms in place. Non skid footwear on at all times. Room clean and clutter free. Call light with in reach. Education on proper call light use and to call before ambulating.

## 2019-05-29 PROCEDURE — 94761 N-INVAS EAR/PLS OXIMETRY MLT: CPT

## 2019-05-29 PROCEDURE — 97110 THERAPEUTIC EXERCISES: CPT

## 2019-05-29 PROCEDURE — 97116 GAIT TRAINING THERAPY: CPT

## 2019-05-29 PROCEDURE — 6370000000 HC RX 637 (ALT 250 FOR IP): Performed by: PHYSICAL MEDICINE & REHABILITATION

## 2019-05-29 PROCEDURE — 1280000000 HC REHAB R&B

## 2019-05-29 PROCEDURE — 97535 SELF CARE MNGMENT TRAINING: CPT

## 2019-05-29 PROCEDURE — 92507 TX SP LANG VOICE COMM INDIV: CPT

## 2019-05-29 PROCEDURE — 2700000000 HC OXYGEN THERAPY PER DAY

## 2019-05-29 PROCEDURE — 97530 THERAPEUTIC ACTIVITIES: CPT

## 2019-05-29 RX ORDER — ALPRAZOLAM 0.25 MG/1
0.25 TABLET ORAL 2 TIMES DAILY PRN
Qty: 6 TABLET | Refills: 0 | Status: SHIPPED | OUTPATIENT
Start: 2019-05-29 | End: 2019-06-01

## 2019-05-29 RX ORDER — POTASSIUM CHLORIDE 20 MEQ/1
20 TABLET, EXTENDED RELEASE ORAL 2 TIMES DAILY WITH MEALS
Qty: 28 TABLET | Refills: 0 | Status: SHIPPED | OUTPATIENT
Start: 2019-05-29 | End: 2019-07-02 | Stop reason: ALTCHOICE

## 2019-05-29 RX ADMIN — APIXABAN 5 MG: 5 TABLET, FILM COATED ORAL at 09:32

## 2019-05-29 RX ADMIN — FUROSEMIDE 40 MG: 40 TABLET ORAL at 09:32

## 2019-05-29 RX ADMIN — POTASSIUM CHLORIDE 20 MEQ: 20 TABLET, EXTENDED RELEASE ORAL at 18:08

## 2019-05-29 RX ADMIN — MAGNESIUM OXIDE TAB 400 MG (241.3 MG ELEMENTAL MG) 400 MG: 400 (241.3 MG) TAB at 21:18

## 2019-05-29 RX ADMIN — MAGNESIUM OXIDE TAB 400 MG (241.3 MG ELEMENTAL MG) 400 MG: 400 (241.3 MG) TAB at 09:32

## 2019-05-29 RX ADMIN — SENNOSIDES,DOCUSATE SODIUM 2 TABLET: 8.6; 5 TABLET, FILM COATED ORAL at 09:32

## 2019-05-29 RX ADMIN — TRAZODONE HYDROCHLORIDE 50 MG: 50 TABLET ORAL at 21:18

## 2019-05-29 RX ADMIN — VITAMIN D, TAB 1000IU (100/BT) 5000 UNITS: 25 TAB at 09:32

## 2019-05-29 RX ADMIN — APIXABAN 5 MG: 5 TABLET, FILM COATED ORAL at 21:18

## 2019-05-29 RX ADMIN — CITALOPRAM HYDROBROMIDE 20 MG: 20 TABLET ORAL at 09:32

## 2019-05-29 RX ADMIN — POTASSIUM CHLORIDE 20 MEQ: 20 TABLET, EXTENDED RELEASE ORAL at 09:32

## 2019-05-29 ASSESSMENT — PAIN SCALES - GENERAL
PAINLEVEL_OUTOF10: 0

## 2019-05-29 NOTE — DISCHARGE SUMMARY
Physical Therapy  Discharge Summary    Name:Karolina Gonsales  [facility]:[id number]  :1960  Treatment Diagnosis: decreased (I) with mobility  Diagnosis: Debility    Restrictions/Precautions  Restrictions/Precautions: General Precautions, Fall Risk  Required Braces or Orthoses?: No           Position Activity Restriction  Other position/activity restrictions: RA     Goals:                  Short term goals  Time Frame for Short term goals: 5 days ()  Short term goal 1: Pt will be indep with bed mobility. 19 GOAL not met - modified independent, continue goal (goal met 19)  Short term goal 2: Pt will be SBA for transfers with no device. 19 - GOAL MET  Short term goal 3: Pt will ambulate 150 ft with SBA and LRAD. (goal met 19 with RW)  Short term goal 4: Pt will negotiate curb step x 2 reps with SBA and LRAD.  19 GOAL not met - requires CGA at [address] [place] [address], continue goal (goal met 19)            Long term goals  Time Frame for Long term goals : 10 days ()  Long term goal 1: Pt will be indep with transfers. (gpa; ,et 19)  Long term goal 2: Pt will be indep with ambulation 100 ft. (goal met 19)  Long term goal 3: pt will negotiate curb step x 2 reps indep. (not met, pt modified independent with RW as of 19)  Long term goal 4: Pt will improve Workman Balance Score to >47/56 to reflect decreased fall risk. (goal met 19)  Long term goal 5: Pt will be indep with BLE HEP to facilitate continued strengthening and activity tolerance at d/c (goal met 19)    Pt. Met / short term goals and  long term goals.      Admitting PT FIM scores:  Bed, Chair, Wheel Chair: 4 - Requires steadying assistance only <25% assist  and/or requires assist with one leg only  Walk: 1 - Total Assistance Walks < 50 feet OR requires two or more people OR patient performs < 25% of locomotion effort  Distance Walked: 24 ft  Wheel Chair: 2 - Maximal Assistance Requires up to [address] requires assistance of one person to operate wheelchair between [address] [place] [address] Street: 128 ft  Stairs: 2- Maximal Assistance Performs 25-49% of the effort to go up and down 4 to 6 stairs and requires the assistance of one person only    Discharge PT FIM scores:  Bed, Chair, Wheel Chair: 7 - Patient independently transfers  Walk: 7- Complete [place]  Walks at least 150 feet Independently with no assistive device  Distance Walked: 220'  Wheel Chair: 2 - Maximal Assistance Requires up to [address] requires assistance of one person to operate wheelchair between [address] in [address] [place] [address] Street: 128 ft  Stairs: 6 - [address] goes up and down at least one flight of stairs but requries a side support, handrail, cane, or portable supports, or the activity takes more than a reasonable amount of times, or there are safety considerations      Pt. Currently ambulates 220 feet with independence  Up/down a flight of 13 steps with modified independence with use of hand rails  Up/down curb step with modified independence with use of RW  Sit to/from stand with independence  Bed mobility with indepenence  Recommend HHPT in order to progress functional mobility and facilitate safety and restoration of PLOF in setting of patient's home  Pt. Safe to return home with assistance from family and friends as she feels is needed.   Electronically signed by Iwona Flores PT on 5/29/2019 at 12:09 PM

## 2019-05-29 NOTE — PLAN OF CARE
Assess patients skin integrity every shift. Encourage turning every two hours in bed. Keep heels elevated off bed. Use cushions in recliner and wheelchair and pillows in bed for protection.

## 2019-05-29 NOTE — PROGRESS NOTES
Independent  Quality of Gait 2: Similar to with RW but slightly decreased step length, kelli, and gait speed with reduced arm swing  Distance: 220'    2nd session:   Lateral Stepping Over Cones: 6 cones each spaced 1.5' apart, completed to the right 2x + to the left 2x with light BUE support  Forward Stepping Over Cones: 6 cones each spaced 1.5' apart, completed x1 stepping over with RLE + x1 stepping over with LLE  Retrowalking: 15' x2 without UE support along length of hand rail in hallway  Ambulation on Uneven Surface: Pt ambulated over 10 dynamic balance discs spaced varying distances apart from one another over a total distance of 40' with use of RW. Discs were then repositioned to be adjacent to one another requiring pt to step directly from one to the next, completed again with RW and CGA. Stairs (1st session)  # Steps : 13  Stairs Height: 6\"  Rails: Bilateral  Device: Rolling walker  Assistance: Modified independent   Comment: Pt ascended and descended a flight of 13 steps with step-to pattern leading with RLE when ascending and with LLE when descending. Pt then negotiated 6\" curb step with RW 2x with modified independence, stepping up with RLE and down with LLE. Exercises (1st session)  Step ups on 6\" step leading with each foot x10 reps each with BUE support on walker  Sit-to-Stands: 10 from 18\" with BUE support from // bars  Standing Hip Extension: 10 BLE's  Dynamic Standing Task: Pt stood 6' from [employer], completed 20 tosses with inflatable ball followed by 20 tosses with 1kg ball. Pt caught a total of 34/40 rebounds, requiring her to react, weight shift, and coordinate UE's in order to complete each catch while maintaining standing balance. Completed with SBA.   Standing on Airex Balance Pad:  Static stance x30s with BUE support + static stance x30s without UE support + horizontal head turns 5x each direction without UE support + vertical head turns 5x each direction without UE support + static stance with eyes closed x30s without UE support  The following exercises each completed while standing on Airex Balance Pad:   Standing Heel Raises: 10 BLE's simultaneously with BUE support on  Standing Knee Curls: 8 BLE's with BUE support   Standing Stationary Marchin BLE's, alternating R and L, with BUE support  Standing Hip Abduction: 6 BLE's with BUE support    Assessment   Body structures, Functions, Activity limitations: Decreased functional mobility ; Decreased endurance;Decreased balance;Decreased strength;Decreased sensation;Decreased ROM  Assessment: Pt remains motivate to work to increase her safety and independence with functional mobility to return to her PLOF and eventually back to work. She has met 4/5 long term PT goals, demonstrates independence with bed mobility, transfers, ambulation and modified independence with stair negotiation. SpO2 and HR remained within acceptable levels while on RA throughout treatment session. Plan for patient to discharge home to her parents' tomorrow. She expresses no concerns about discharge plans and is excited to continue with therapy at to progress functional mobility. Treatment Diagnosis: decreased (I) with mobility  Prognosis: Good  Patient Education: Gait training, ther ex, POC, D/C rec  REQUIRES PT FOLLOW UP: No  Activity Tolerance: Patient Tolerated treatment well  Activity Tolerance: SpO2 at 88% or greater while on RA throughout both treatment sessions, -120bpm      Functional Outcomes  Workman Balance Scale: 50    Goals  Short term goals  Time Frame for Short term goals: 5 days ()  Short term goal 1: Pt will be indep with bed mobility. 19 GOAL not met - modified independent, continue goal (goal met 19)  Short term goal 2: Pt will be SBA for transfers with no device.   19 - GOAL MET  Short term goal 3: Pt will ambulate 150 ft with SBA and LRAD. (goal met 19 with RW)  Short term goal 4: Pt will negotiate curb step x 2 reps with SBA and LRAD.  5/25/19 GOAL not met - requires CGA at [address] [place] [address], continue goal (goal met 5/29/19)  Long term goals  Time Frame for Long term goals : 10 days (5/30)  Long term goal 1: Pt will be indep with transfers. (gpa; ,et 5/29/19)  Long term goal 2: Pt will be indep with ambulation 100 ft. (goal met 5/29/19)  Long term goal 3: pt will negotiate curb step x 2 reps indep.  (not met, pt modified independent with RW as of 5/29/19)  Long term goal 4: Pt will improve Workman Balance Score to >47/56 to reflect decreased fall risk. (goal met 5/29/19)  Long term goal 5: Pt will be indep with BLE HEP to facilitate continued strengthening and activity tolerance at d/c (goal met 5/29/19)  Patient Goals   Patient goals : Rosario Alexis able to go home and do things for myself\"    Plan    Plan  Times per week: D/C PT  Specific instructions for Next Treatment: progress mobility as tolerated  Current Treatment Recommendations: Strengthening, Gait Training, Patient/Caregiver Education & Training, ROM, Stair training, Balance Training, Neuromuscular Re-education, Equipment Evaluation, Education, & procurement, Functional Mobility Training, Endurance Training, Transfer Training, Safety Education & Training, Home Exercise Program, Modalities  Safety Devices  Type of devices: Call light within reach, Gait belt, Left in bed     Therapy Time   Individual Concurrent Group Co-treatment   Time In 0800         Time Out 0900         Minutes 60         Timed Code Treatment Minutes: [address] Time:   Individual Concurrent Group Co-treatment   Time In 1100         Time Out 1130         Minutes 30           Timed Code Treatment Minutes:  30 Minutes    Total Treatment Minutes:  Haris Mohr [id number], PT

## 2019-05-29 NOTE — CARE COORDINATION
Met with patient to go over discharge plans for tomorrow. She stated she is ready to go home with [facility]. She confirmed she has a Rolling Walker and Shower Chair. She is currently on Room air and is hopeful she will not need home O2. If needed, she still wants to use [facility].      She stated she would have a ride home tomorrow from her mother around 1:00pm.    Samira Brought MSW, LSW

## 2019-05-29 NOTE — PROGRESS NOTES
Nutrition Assessment    Type and Reason for Visit: Reassess    Nutrition Recommendations:   · Continue general diet- no drinking straws. · Monitor and encourage PO intake  · Monitor nutrition adequacy, weights, pertinent labs, BMs and clinical progress    Nutrition Assessment: Follow up: Pts nutrition status remains stable as evidenced by PO intakes % of meals. Pt on general diet, no drinking straw. Pt out of room at time of visit. General healthy diet education provided at last nutrition assessment. Plans for d/c tomorrow, 5/30 noted. Malnutrition Assessment:  · Malnutrition Status: Meets the criteria for moderate malnutrition  · Context: Acute illness or injury  · Findings of the 6 clinical characteristics of malnutrition (Minimum of 2 out of 6 clinical characteristics is required to make the diagnosis of moderate or severe Protein Calorie Malnutrition based on AND/ASPEN Guidelines):  1. Energy Intake-Less than or equal to 75% of estimated energy requirement, Greater than or equal to 7 days    2. Weight Loss-10% loss or greater(Possibly 2/2 fluid shifts), in 1 month  3. Fat Loss-No significant subcutaneous fat loss,    4. Muscle Loss-No significant muscle mass loss,    5. Fluid Accumulation-No significant fluid accumulation,    6.  Strength-Not measured    Nutrition Risk Level: Moderate    Nutrient Needs:  · Estimated Daily Total Kcal: 7053-9631(68-41 kcal/kg)  · Estimated Daily Protein (g): 52-63(1.0-1.2 g/kg)  · Estimated Daily Total Fluid (ml/day): 1 ml/kcal    Nutrition Diagnosis:   · Problem: Inadequate oral intake  · Etiology: related to Insufficient energy/nutrient consumption     Signs and symptoms:  as evidenced by Diet history of poor intake    Objective Information:  · Nutrition-Focused Physical Findings: Trace BLE edema.  +BM 5/22- bowel regimen in place  · Wound Type: (bruising to sacrum)  · Current Nutrition Therapies:  · Oral Diet Orders: General   · Oral Diet intake: 51-75%, %  · Oral Nutrition Supplement (ONS) Orders: None  · Anthropometric Measures:  · Ht: 5' 3\" (160 cm)   · Current Body Wt: 218 lb (98.9 kg)  · Admission Body Wt: 220 lb 14.4 oz (100.2 kg)  · % Weight Change:  Noted 46 lb weight loss (17.3% loss) x 1 month per EMR - possibly 2/2 fluid shifts   · Ideal Body Wt: 115 lb (52.2 kg)   · BMI Classification: BMI 35.0 - 39.9 Obese Class II    Nutrition Interventions:   Continue current diet  Continued Inpatient Monitoring, Education Initiated    Nutrition Evaluation:   · Evaluation: Goal achieved   · Goals: Pt will tolerate diet and have po intakes 50% or greater during ARU stay    · Monitoring: Meal Intake, Diet Tolerance, Weight      Electronically signed by Ford Whiteside RD, LD on 5/29/19 at 2:01 PM    Contact Number: Office: [phone]; [address]: [facility]

## 2019-05-29 NOTE — PROGRESS NOTES
[facility]: ACUTE REHAB UNIT  SPEECH-LANGUAGE PATHOLOGY      [x] Daily  [] Weekly Care Conference Note  [] Discharge    Patient:Karolina Gonsales      :1960  [facility]:[id number]  Rehab Dx/Hx: Debility [R53.81]    Precautions: falls  Home situation: lives at home independently  [address]  Dx: [] Aphasia  [] Dysarthria  [] Apraxia   [x] Oropharyngeal dysphagia [] Cognitive Impairment  [x] Other: voice  Date of Admit: 2019  Room #: 9599/8117-55    Current functional status (updated daily):         Pt being seen for : [x] Speech/Language Treatment  [x] Dysphagia Treatment [] Cognitive Treatment  [] Other:  Communication: [x]WFL  [] Aphasia  [] Dysarthria  [] Apraxia  [] Pragmatic Impairment [] Non-verbal  [] Hearing Loss  [] Other:   Cognition: [x] WFL  [] Mild  [] Moderate  [] Severe [] Unable to Assess  [] Other:  Memory: [x] WFL  [] Mild  [] Moderate  [] Severe [] Unable to Assess  [] Other:  Behavior: [x] Alert  [x] Cooperative  []  Pleasant  [] Confused  [] Agitated  [] Uncooperative  [] Distractible [] Motivated  [] Self-Limiting [] Anxious  [] Other:  Endurance:  [x] Adequate for participation in SLP sessions  [] Reduced overall  [] Lethargic  [] Other:  Safety: [x] No concerns at this time  [] Reduced insight into deficits  []  Reduced safety awareness [] Not following call light procedures  [] Unable to Assess  [] Other:    Current Diet Order:DIET GENERAL; No Drinking Straw  Swallowing Precautions: Sit up for all meals and thereafter for 30 minutes, Eat with small bites (1/2 tsp; 1 tsp) and Alternate solids with liquids       Date: 2019      Tx session 1  9099-8028 Tx session 2  D/C Summary    Total Timed Code Min 0 --   Total Treatment Minutes 30 --   Individual Treatment Minutes 30 --   Group Treatment Minutes 0 --   Co-Treat Minutes 0 --   Variance/Reason:  N/A --   Pain     Pain Intervention [] RN notified  [] Repositioned  [] Intervention offered and patient declined  [x] N/A  [x] Other:  [] RN notified  [] met. Pt is able to complete and understands vocal exercises as well as understands vocal hygiene recommendations. Pt aware and in process of scheduling ENT evaluation after d/c. Other areas targeted: N/A n/a   Education:   Pt educated on role of SLP, exercises, ENT f/u recommendation, and vocal hygiene  See above. Safety Devices: [x] Call light within reach  [] Chair alarm activated  [x] Bed alarm activated  [x] Other: pt left in bed all belongings within reach [] Call light within reach  [] Chair alarm activated  [] Bed alarm activated  [] Other:    Assessment: Pt seen this date for tx session to target dysphonia. Pt able to complete all exercises/tasks presented this date. She demonstrates adequate understanding of vocal hygiene and reported she is in the process of schduling ENT evaluation at d/c. Pt with mildly decreased sustained phonation time this date for sustained ah. Pt to d/c tomorrow. D/C Summary: Pt planned to d/c on 5/30. Pt has met above goals, however, unable to sustain phonation for goal of 12 seconds. Pt has good understanding of dysphonia, exercises, and vocal hygiene. Pt aware and in process of scheduling ENT consult for d/c. Recommend further outpatient SLP services after ENT consult to target dysphonia. Plan: Continue as per plan of care. Additional Information: N/A    Barriers toward progress: None  Discharge recommendations:  [x] Home independently  [] Home with assistance []  24 hour supervision  [] ECF [x] Other: See PT/OT  Continued Tx Upon Discharge: ? [] Yes [x] No further ST recommended - recommend pt be seen by ENT for designated evaluation of vocal fold movement.   [] TBD based on progress while on ARU [] Vital Stim indicated [] Other:   Estimated discharge date: 5/30/19    Interventions used this date:  [x] Speech/Language Treatment  [] Instruction in HEP [] Group [x] Dysphagia Treatment [] Cognitive Treatment   [] Other:    Admitting SLP FIM

## 2019-05-29 NOTE — DISCHARGE SUMMARY
Occupational Therapy  Discharge Summary     Name:Karolina Gonsales  [id number]  :1960  Treatment Diagnosis: decreased (I) with mobility  Diagnosis: Debility    Restrictions/Precautions  Restrictions/Precautions: General Precautions, Fall Risk  Required Braces or Orthoses?: No           Position Activity Restriction  Other position/activity restrictions: RA     Goals:   Short term goals  Time Frame for Short term goals: 5 days by 19  Short term goal 1: Pt will be Supervision with toileting.-met   Short term goal 2: Pt will be SBA for LE dressing.-met   Short term goal 3: Pt will be Supervision for toilet transfers with LRD. -met   Short term goal 4: Pt will be Min A for bathing.-met   Short term goal 5: SBA for light meal prep-met    Long term goals  Time Frame for Long term goals : 10 days by 19  Long term goal 1: Pt will be Independent with toileting.-met   Long term goal 2: Pt will be Modified Independent with dressing. -met   Long term goal 3: Pt will be Modified Independent with ADL transfers.-met   Long term goal 4: Pt will be Modified Independent for shower task.-met   Long term goal 5: Mod I with light meal prep.-met     Pt. Met  short term goals and  long term goals. Current Functional Status:   ADL  Feeding: Independent  Grooming: Independent(standing at sink)  UE Bathing: Modified independent   LE Bathing: Modified independent   UE Dressing: Independent  LE Dressing: Modified independent (seated)  Toileting: Modified independent   Additional Comments: SPO2>93% on RA with ADL    Bed mobility  Bridging: Unable to assess  Rolling to Left: Independent  Rolling to Right: Independent  Supine to Sit: Independent  Sit to Supine: Independent  Scooting: Independent  Comment: Pt was found sitting on EOB today    Functional Transfers:   Toilet Transfers  Toilet - Technique: Ambulating  Equipment Used: Standard toilet  Toilet Transfer: Modified independent(grab bars)  Toilet Transfers Comments: with RW    Tub Transfers  Tub - Transfer From: Rolling walker  Tub - Transfer Type: To and From  Tub - Transfer To: Standing  Tub - Technique: Ambulating  Tub Transfers: Modified independence  Tub Transfers Comments: able to lift B LEs over tub in and out, L foot drags slightly but pt able to clear w/o assist, does better leading in/out with R foot    Shower Transfers  Shower - Transfer From: Walker  Shower - Transfer Type: To and From  Shower - Transfer To: Transfer tub bench  Shower - Technique: Ambulating  Shower Transfers: Modified independence  Shower Transfers Comments:  With RW, used grab bar for sit to  shower while bathing     Car Transfers  Car Transfers: Not tested      Functional Mobility  Functional - Mobility Device: Rolling Walker  Activity: To/From therapy gym, Retrieve items, Transport items  Assist Level: Modified independent     Instrumental ADL's  Instrumental ADLs: Yes  Meal Prep  Meal Prep Level: Walker  Meal Prep Level of Assistance: Modified independent  Meal Preparation: preparing muffins, reaching in all planes to retrieve items from high and low surfaces  Light Housekeeping  Light Housekeeping Level: Walker  Light Housekeeping Level of Assistance: Modified independent  Light Housekeeping: washing dishes at sink                Perception  Overall Perceptual Status: WFL         UE Function: [facility]           Fine Motor Skills  Left 9-Hole Peg Test: Functional  Left 9 Hole Peg Test Time (secs): 25  Right 9-Hole Peg Test: Functional  Right 9 Hole Peg Test Time (secs): 25  Other Assessment: Box and Blocks test: R49, L48    Admitting OT FIM scores  Eatin - Feeds self with adaptive equipment/dentures and/or feeds self with modified diet and/or performs own tube feeding  Groomin - Requires setup/cues to do all tasks  Bathing: 3 - Able to bathe 5-7 areas  Dressing-Upper: 5 - Requires setup/supervision/cues and/or requires assist with presthesis/brace only  Dressing-Lower: 3 - Requires assist with 2-3 parts of dressing  Toiletin - Requires device (grab bar/walker/etc.)  Toilet Transfer: 4 - Requires steadying assistance only < 25% assist  Tub Transfer: 4 - Minimal contact assistance, pt. expends 75% or more effort  Shower Transfer: 4 - Minimal contact assistance, pt. expends 75% or more effort    Discharge OT FIM scores  Eatin - Patient feeds self  Groomin - Patient independent with all grooming tasks  Bathin - Able to bathe all 10 areas with device  Dressing-Upper: 7 - Patient independently dresses upper body  Dressing-Lower: 6 - Independent with device/prosthesis  Toiletin - Requires device (grab bar/walker/etc.)  Toilet Transfer: 6 - Independent with device (grab bar/walker/slide bar)  Tub Transfer: 6 - Modified independence  Shower Transfer: 6 - Modified independence    Assessment:   Assessment: Pt very pleasant and cooperative, completes ADls, transfers and mobility mod I including item retrieval and transport. Pt able to perform tub transfer mod I stepping in/out of tub, clears B LEs over tub leading with R LE in and out. Pt with difficulty clearing L LE over tub, but problem solves independently to step in/out with R LE. Issued and reviewed written HEP for free weights and theraband, pt able to perform with visual cues from handout. Pt able to maintain SPO2 >93% on RA with ADL and mobility. Pt has met all goals and scheduled for d/c next date.    Prognosis: Good  Barriers to Learning: Impaired endurance, physical limitations, impaired vision  REQUIRES OT FOLLOW UP: Yes  Discharge Recommendations: Outpatient OT    Equipment Recommendations:  Pt reports having shower chair         Home Exercise Program  Provided Pt with written HEP for free weights and theraband    Electronically signed by Felicia Hanley OT on 2019 at 2:15 PM

## 2019-05-29 NOTE — PROGRESS NOTES
Occupational Therapy  Facility/Department: [facility]  Daily Treatment Note  NAME: Kem Varela  : 1960  MRN: [id number]    Date of Service: 2019    Discharge Recommendations:  Outpatient OT  OT Equipment Recommendations  Other: pt reports having shower chair    Assessment   Performance deficits / Impairments: Decreased safe awareness;Decreased ADL status; Decreased endurance;Decreased high-level IADLs;Decreased strength;Decreased balance;Decreased vision/visual deficit  Assessment: Pt very pleasant and cooperative, completes ADls, transfers and mobility mod I including item retrieval and transport. Pt able to perform tub transfer mod I stepping in/out of tub, clears B LEs over tub leading with R LE in and out. Pt with difficulty clearing L LE over tub, but problem solves independently to step in/out with R LE. Issued and reviewed written HEP for free weights and theraband, pt able to perform with visual cues from handout. Pt able to maintain SPO2 >93% on RA with ADL and mobility. Pt has met all goals and scheduled for d/c next date. Patient Education: Insructed re: energy conservation techniques including pursed lip breathing and rest breaks, as well as Role of OT and ADL retraining. REQUIRES OT FOLLOW UP: Yes  Activity Tolerance  Activity Tolerance: Patient Tolerated treatment well  Activity Tolerance: SPO2 >93% on RA after ADL and activity  Safety Devices  Safety Devices in place: Yes  Type of devices: Bed alarm in place;Gait belt;Call light within reach; Left in bed         Patient Diagnosis(es): The encounter diagnosis was Anxiety state. has a past medical history of Anxiety state, unspecified, Impacted cerumen, and Rheumatoid arthritis(714.0). has a past surgical history that includes Carpal tunnel release; Lumbar spine surgery; and bronchoscopy (N/A, 2019).     Restrictions  Restrictions/Precautions  Restrictions/Precautions: General Precautions, Fall Risk  Required Braces or Coordination  Gross Motor: good for ADL and IADL tasks   Fine Motor: good for ADL and IADL tasks               Cognition  Overall Cognitive Status: WFL  Arousal/Alertness: Appropriate responses to stimuli  Following Commands: Follows all commands without difficulty  Attention Span: Appears intact  Memory: Appears intact  Safety Judgement: Good awareness of safety precautions  Problem Solving: Able to problem solve independently  Insights: Fully aware of deficits  Initiation: Does not require cues  Sequencing: Does not require cues  Cognition Comment:       Perception  Overall Perceptual Status: WFL              Exercises  Other: issued and reviewed written HEP for free weights and theraband                    Plan   Plan  Times per week: 5/7 per week  Times per day: Daily  Current Treatment Recommendations: Strengthening, Endurance Training, Neuromuscular Re-education, Patient/Caregiver Education & Training, Self-Care / ADL, Balance Training, Home Management Training, Functional Mobility Training, Safety Education & Training, Positioning    Goals  Short term goals  Time Frame for Short term goals: 5 days by 5/24/19  Short term goal 1: Pt will be Supervision with toileting.-met 5/24  Short term goal 2: Pt will be SBA for LE dressing.-met 5/23  Short term goal 3: Pt will be Supervision for toilet transfers with LRD. -met 5/24  Short term goal 4: Pt will be Min A for bathing.-met 5/23  Short term goal 5: SBA for light meal prep-met 5/28  Long term goals  Time Frame for Long term goals : 10 days by 5/29/19  Long term goal 1: Pt will be Independent with toileting.-met 5/29  Long term goal 2: Pt will be Modified Independent with dressing. -met 5/29  Long term goal 3: Pt will be Modified Independent with ADL transfers.-met 5/28  Long term goal 4: Pt will be Modified Independent for shower task.-met 5/29  Long term goal 5: Mod I with light meal prep.-met 5/28  Patient Goals   Patient goals : To get home.        Therapy Time   Individual Concurrent Group Co-treatment   Time In 1230         Time Out 1330         Minutes 60         Timed Code Treatment Minutes: [address], OT

## 2019-05-29 NOTE — PROGRESS NOTES
Recommendation: Thin    Body mass index is 38.63 kg/m². Rehabilitation Diagnosis:   10.9, Pulmonary, Other Pulmonary        Assessment and Plan:     Impairments: weakness, endurance, balance     Debility - PT/OT to address endurance, strength, compensatory strategies, equipment     Acute hypoxic respiratory failure - Thought to be due to PE and core pulmonale. On anticoagulation as below. Continue lasix and prn albuterol. Off oxygen.     Suspected acute PE with LLE DVT - Eliquis     Chronic hypercapnic respiratory failure - Likely due to OHS. BiPAP qhs.      Tracheobronchitis - Completed antibiotics.      Left foot drop - New this admission. 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 Consider EMG as outpatient. PT. Trial AFO.    Morbid Obesity - Complicating assessment and treatment. Placing patient at risk for multiple co-morbidities as well as early death and contributing to the patient's presentation. Dietician consult. Counseling and education. Hypokalemia/Hypomagnesemia - Replace prn     Bladder - high risk retention - Monitor PVRs, SC prn >300cc     Bowel - high risk constipation - colace+senna BID, PRN miralax and MoM. follow bowel movements. Enema or suppository if needed.      Safety - fall and aspiration precautions     Anticipated dispo: home, staying with parents temporarily  Services: [facility] PT, OT, RN  DME: TBD (shower chair vs TTB, possible RW, left AFO)  ELOS: 5/30    Marcus Marin MD 5/29/2019, 1:57 PM

## 2019-05-30 VITALS
RESPIRATION RATE: 15 BRPM | TEMPERATURE: 98.5 F | DIASTOLIC BLOOD PRESSURE: 81 MMHG | SYSTOLIC BLOOD PRESSURE: 121 MMHG | HEART RATE: 105 BPM | OXYGEN SATURATION: 92 % | BODY MASS INDEX: 38.73 KG/M2 | WEIGHT: 218.6 LBS | HEIGHT: 63 IN

## 2019-05-30 LAB
ANION GAP SERPL CALCULATED.3IONS-SCNC: 11 MMOL/L (ref 3–16)
BASOPHILS ABSOLUTE: 0.1 K/UL (ref 0–0.2)
BASOPHILS RELATIVE PERCENT: 1 %
BUN BLDV-MCNC: 7 MG/DL (ref 7–20)
CALCIUM SERPL-MCNC: 9.7 MG/DL (ref 8.3–10.6)
CHLORIDE BLD-SCNC: 91 MMOL/L (ref 99–110)
CO2: 33 MMOL/L (ref 21–32)
CREAT SERPL-MCNC: <0.5 MG/DL (ref 0.6–1.1)
EOSINOPHILS ABSOLUTE: 0.6 K/UL (ref 0–0.6)
EOSINOPHILS RELATIVE PERCENT: 5.9 %
GFR AFRICAN AMERICAN: >60
GFR NON-AFRICAN AMERICAN: >60
GLUCOSE BLD-MCNC: 104 MG/DL (ref 70–99)
HCT VFR BLD CALC: 52.2 % (ref 36–48)
HEMOGLOBIN: 17.3 G/DL (ref 12–16)
LYMPHOCYTES ABSOLUTE: 2.4 K/UL (ref 1–5.1)
LYMPHOCYTES RELATIVE PERCENT: 25.1 %
MCH RBC QN AUTO: 29 PG (ref 26–34)
MCHC RBC AUTO-ENTMCNC: 33.1 G/DL (ref 31–36)
MCV RBC AUTO: 87.6 FL (ref 80–100)
MONOCYTES ABSOLUTE: 0.9 K/UL (ref 0–1.3)
MONOCYTES RELATIVE PERCENT: 9.1 %
NEUTROPHILS ABSOLUTE: 5.6 K/UL (ref 1.7–7.7)
NEUTROPHILS RELATIVE PERCENT: 58.9 %
PDW BLD-RTO: 16.7 % (ref 12.4–15.4)
PLATELET # BLD: 202 K/UL (ref 135–450)
PMV BLD AUTO: 8.1 FL (ref 5–10.5)
POTASSIUM REFLEX MAGNESIUM: 3.6 MMOL/L (ref 3.5–5.1)
RBC # BLD: 5.96 M/UL (ref 4–5.2)
SODIUM BLD-SCNC: 135 MMOL/L (ref 136–145)
WBC # BLD: 9.5 K/UL (ref 4–11)

## 2019-05-30 PROCEDURE — 85025 COMPLETE CBC W/AUTO DIFF WBC: CPT

## 2019-05-30 PROCEDURE — 6370000000 HC RX 637 (ALT 250 FOR IP): Performed by: PHYSICAL MEDICINE & REHABILITATION

## 2019-05-30 PROCEDURE — 36415 COLL VENOUS BLD VENIPUNCTURE: CPT

## 2019-05-30 PROCEDURE — 80048 BASIC METABOLIC PNL TOTAL CA: CPT

## 2019-05-30 RX ADMIN — FUROSEMIDE 40 MG: 40 TABLET ORAL at 09:04

## 2019-05-30 RX ADMIN — CITALOPRAM HYDROBROMIDE 20 MG: 20 TABLET ORAL at 09:04

## 2019-05-30 RX ADMIN — MAGNESIUM OXIDE TAB 400 MG (241.3 MG ELEMENTAL MG) 400 MG: 400 (241.3 MG) TAB at 09:04

## 2019-05-30 RX ADMIN — POTASSIUM CHLORIDE 20 MEQ: 20 TABLET, EXTENDED RELEASE ORAL at 09:03

## 2019-05-30 RX ADMIN — APIXABAN 5 MG: 5 TABLET, FILM COATED ORAL at 09:04

## 2019-05-30 RX ADMIN — VITAMIN D, TAB 1000IU (100/BT) 5000 UNITS: 25 TAB at 09:04

## 2019-05-30 ASSESSMENT — PAIN SCALES - GENERAL
PAINLEVEL_OUTOF10: 0
PAINLEVEL_OUTOF10: 0

## 2019-05-30 NOTE — CARE COORDINATION
Received call from Patricia Sandoval with [facility] stating they do not have staffing for patient's zip code but they are working on locating another home care company. Spoke with patient and her mother at bedside. She is okay with another home care agency. She is staying with her parents on discharge; Address: [address] [address]. Her cell phone is [phone] and the home number is [phone]. She requested to leave and us call with the name of the company.  Charity Parada MSW, LSW

## 2019-05-30 NOTE — CARE COORDINATION
[facility], unfortunately [facility] can not provide a SOC for this pt within a timely manner. Writer was advised by [facility] to set pt up with another [facility] agency, MSW updated and aware. MSW discussed with pt, see her previous note. [facility]. accepted the referral for home care. Writer called pt and left and update of the pt's VM, as requested by MSW. HHC to be provided by [facility], agency is aware the pt will be staying with her mother- address and phone numbers have been provided to [place]. Agency to obtain orders via [address] [place] [address] Mikey.   Audrey Murphy RN CTN with [facility]  UOF:[phone]

## 2019-05-30 NOTE — CARE COORDINATION
Faxed discharge summaries to [facility] at this time.  Electronically signed by Hoang Solis RN on 5/30/2019 at 12:10 PM

## 2019-05-30 NOTE — PROGRESS NOTES
Discharge instructions reviewed with patient. Patient stated understanding. Discharge paperwork signed with copy put in patient's chart. All of patient's possessions gathered and with patient. Patient's medications picked up at out patient pharmacy. Patient taken via wheelchair to her mom's car.  Electronically signed by Marisol Bautista RN on 5/30/2019 at 1:14 PM

## 2019-05-30 NOTE — PROGRESS NOTES
Shift assessment completed. Pt A&O, VSS. Pt wearing O2 at night for comfort, 1L. Denies any needs at this time. Bed locked and in lowest position, call light within reach, side rails up 2/4. Will continue to monitor.

## 2019-05-30 NOTE — CARE COORDINATION
Harper Temple with [facility] called stating that [place] [facility] home care accepted patient and she left a message for the patient.     Andrei Saravia MSW, LSW

## 2019-05-30 NOTE — CARE COORDINATION
CASE MANAGEMENT DISCHARGE SUMMARY      Discharge to: Home with [address]. She stated that her mother was going to call an ENT to see if they take her insurance and she will schedule appointment. Provided her with a [address] list to assist if needed and information on Outpatient Pulmonary Rehab per request of Yovani Butler OT. New Durable Medical Equipment ordered/agency: None. She has a Rolling Walker and a Shower Chair. She is on Room air; doesn't need home oxygen.      Transportation:    Family/car: She stated her family     Notified: Patient    Family: she notified family    Facility/Agency: [facility] faxed Flavia Santana with [facility] will fax orders    RN: [address] MSW, LSW

## 2019-05-30 NOTE — DISCHARGE SUMMARY
Physical Medicine & Rehabilitation  Discharge Summary     Patient Identification:  Deidre Busby  : 1960  Admit date: 2019  Discharge date:  2019  Attending provider: Remi Latham MD        Primary care provider: Dee Meade MD     Discharge Diagnoses:   Patient Active Problem List   Diagnosis    Anxiety state    Rheumatoid arthritis ([address]    Tobacco abuse    SOB (shortness of breath)    Moderate persistent asthma with acute exacerbation    Acute respiratory failure with hypoxia ([address]    New onset of congestive heart failure (HCC)    Morbid obesity (HCC)    Obesity hypoventilation syndrome (HCC)    Erythrocytosis    Acute deep vein thrombosis (DVT) of left peroneal vein (HCC)    Pulmonary embolus (HCC)    Moderate protein-calorie malnutrition (HCC)    Bacterial pneumonia    Acute deep vein thrombosis (DVT) of distal end of left lower extremity (HCC)    Acute cor pulmonale (HCC)    Disorder of electrolytes    Anasarca    Abnormal chest x-ray    Debility    Moderate malnutrition ([address]       History of Present Illness/Acute Hospital Course:  Ms. Devon Carter is a 65 yo F with pmh RA, obesity, anxiety who initially presented 19 with acute hypoxic respiratory failure. She required intubation and mechanical ventilation -. Bronchoscopy () revealed thick secretions. Bronchial washings with candida. Etiology thought to be acute PE (LE doppler + DVT, CTA chest suboptimal), pulmonary edema, cor pulmonale, OHS. She was treated with anticoagulation, diuresis, bronchodilators. Also treated with vancomycin and levofloxacin for acute tracheobronchitis. Now presents to ARU with impaired mobility and self-care below her baseline. Inpatient Rehabilitation Course:   Deidre Busby is a 64 y.o. female admitted to inpatient rehabilitation on 2019 with Debility.   The patient participated in an aggressive multidisciplinary inpatient rehabilitation program involving 3 hours of therapy per day, at least 5 days per week. She made good progress with regard to her endurance, strength, and balance. Activity tolerance gradually improved and she was weaned from O2. She is now overall modified independent. Discharging home and will be staying with parents temporarily. Home care services ordered. She will follow-up with PCP and Pulmonology. Can f/u with PM&R for EMG LLE if does not improved over the next few weeks. Medical Management:  Debility - PT/OT     Acute hypoxic respiratory failure - Thought to be due to PE and core pulmonale. On anticoagulation as below. Continue lasix and prn albuterol. Gradually weaned off oxygen.       Suspected acute PE with LLE DVT - Eliquis     Chronic hypercapnic respiratory failure - Likely due to OHS. BiPAP qhs. May benefit from sleep study as outpatient. Plan for f/u with Pulmonology.     Tracheobronchitis - Completed antibiotics.      Left foot drop - New this admission. Possible compression fibular neuropathy from positioning while intubated in ICU. Consider EMG as outpatient. PT. AFO ordered.      Morbid Obesity - Complicating assessment and treatment. Placing patient at risk for multiple co-morbidities as well as early death and contributing to the patient's presentation. Dietician consult. Counseling and education.     Hypokalemia/Hypomagnesemia - Started on supplements.         Discharge Exam:  Gen: No distress, pleasant. HEENT: Normocephalic, atraumatic. CV: Regular rate and rhythm. Resp: No respiratory distress. Decreased at bases. Abd: Soft, nontender   Ext: No edema. Neuro: Alert, oriented, appropriately interactive. Left foot drop. Discharge Functional Status:    Physical therapy:  Bed Mobility: Scooting: Independent  Transfers: Sit to Stand: Independent  Stand to sit:  Independent  Bed to Chair: Independent, Ambulation 1  Surface: level tile  Device: Rolling Walker  Other Apparatus: O2(2L)  Assistance: Modified Independent  Quality of Gait: Step through gait pattern, fair kelli, steady with turns, steppage gait with LLE to clear L foot during swing, DHIRAJ WNL, steady   Distance: 1st session: 12' + 100'          2nd session: 150' x2  Comments: use of DF assist ace wrap on 2nd ambulation distance with improved foot clearance on L; attempted to don trial AFO, however unable to fit AFO in pt's shoe, Stairs  # Steps : 13  Stairs Height: 6\"  Rails: Bilateral  Curbs: 6\"(ascend/descend x 2 reps)  Device: Rolling walker  Assistance: Modified independent   Comment: Pt ascended and descended a flight of 13 steps with step-to pattern leading with RLE when ascending and with LLE when descending. Pt then negotiated 6\" curb step with RW 2x with modified independence, stepping up with RLE and down with LLE. Mobility: Bed, Chair, Wheel Chair: 6 - Requires assistive device (slide rail)  Walk: 7- Complete [place]  Walks at least 150 feet Independently with no assistive device  Distance Walked: 220'  Wheel Chair: 2 - Maximal Assistance Requires up to [address] requires assistance of one person to operate wheelchair between [address] in Wheel Chair: 128 ft  Stairs: [address] goes up and down at least one flight of stairs but requries a side support, handrail, cane, or portable supports, or the activity takes more than a reasonable amount of times, or there are safety considerations, PT Equipment Recommendations  Equipment Needed: No  Other: CTA for RW need, Assessment: Pt remains motivate to work to increase her safety and independence with functional mobility to return to her PLOF and eventually back to work. She has met 4/5 long term PT goals, demonstrates independence with bed mobility, transfers, ambulation and modified independence with stair negotiation. SpO2 and HR remained within acceptable levels while on RA throughout treatment session.  Plan for patient to discharge home to her parents' tomorrow. She expresses no concerns about discharge plans and is excited to continue with therapy at to progress functional mobility. Occupational therapy: Eatin - Patient feeds self  Groomin - Patient independent with all grooming tasks  Bathin - Able to bathe all 10 areas with device  Dressing-Upper: 7 - Patient independently dresses upper body  Dressing-Lower: 6 - Independent with device/prosthesis  Toiletin - Requires device (grab bar/walker/etc.)  Toilet Transfer: 6 - Independent with device (grab bar/walker/slide bar)  Tub Transfer: 6 - Modified independence  Shower Transfer: 6 - Modified independence,  , Assessment: Pt very pleasant and cooperative, completes ADls, transfers and mobility mod I including item retrieval and transport. Pt able to perform tub transfer mod I stepping in/out of tub, clears B LEs over tub leading with R LE in and out. Pt with difficulty clearing L LE over tub, but problem solves independently to step in/out with R LE. Issued and reviewed written HEP for free weights and theraband, pt able to perform with visual cues from handout. Pt able to maintain SPO2 >93% on RA with ADL and mobility. Pt has met all goals and scheduled for d/c next date.      Speech therapy:  Comprehension: 7 - Patient understands complex ideas (math/planning)  Expression: 6 - Device used to express complex ideas/needs  Social Interaction: 7 - Patient has appropriate behavior/relations 100% of the time  Problem Solvin - Patient independent with complex tasks  Memory: 7 - Patient independent with meds/people/schedule      Significant Diagnostics:   Lab Results   Component Value Date    CREATININE <0.5 (L) 2019    BUN 7 2019     (L) 2019    K 3.6 2019    CL 91 (L) 2019    CO2 33 (H) 2019       Lab Results   Component Value Date    WBC 9.5 2019    HGB 17.3 (H) 2019    HCT 52.2 (H) 2019    MCV 87.6 2019  05/30/2019       Disposition:  home, staying with parents temporarily  Services: HH PT, OT, RN  DME: shower chair, RW, left AFO      Discharge Condition: Stable    Follow-up:  See after visit summary from hospitalization    Discharge Medications:     Medication List      START taking these medications    magnesium oxide 400 (241.3 Mg) MG Tabs tablet  Commonly known as:  MAG-OX  Take 1 tablet by mouth 2 times daily     potassium chloride 20 MEQ extended release tablet  Commonly known as:  KLOR-CON M  Take 1 tablet by mouth 2 times daily (with meals)        CHANGE how you take these medications    albuterol sulfate  (90 Base) MCG/ACT inhaler  Inhale 2 puffs into the lungs every 4 hours as needed for Wheezing or Shortness of Breath  What changed:  Another medication with the same name was removed. Continue taking this medication, and follow the directions you see here. apixaban 5 MG Tabs tablet  Commonly known as:  ELIQUIS  Take 1 tablet by mouth 2 times daily  What changed:  Another medication with the same name was removed. Continue taking this medication, and follow the directions you see here. CONTINUE taking these medications    ALPRAZolam 0.25 MG tablet  Commonly known as:  XANAX  Take 1 tablet by mouth 2 times daily as needed for Anxiety for up to 3 days. carboxymethylcellulose PF 1 % Gel ophthalmic gel  Commonly known as:  THERATEARS  Place 1 drop into both eyes as needed for Dry Eyes     citalopram 20 MG tablet  Commonly known as:  CELEXA  Take 1 tablet by mouth daily     diclofenac sodium 1 % Gel  2 gm in upper extremity joint, 3 times a day as needed. Maximum dose 12 gram/day.      ergocalciferol 67360 units capsule  Commonly known as:  ERGOCALCIFEROL  Take 1 capsule by mouth once a week for 7 doses     furosemide 40 MG tablet  Commonly known as:  LASIX  Take 1 tablet by mouth daily     ibuprofen 600 MG tablet  Commonly known as:  ADVIL;MOTRIN  Take 1 tablet by mouth every 8 hours as needed for Pain     sennosides-docusate sodium 8.6-50 MG tablet  Commonly known as:  SENOKOT-S  Take 2 tablets by mouth 2 times daily        STOP taking these medications    vitamin D3 5000 units Tabs tablet  Commonly known as:  CHOLECALCIFEROL           Where to Get Your Medications      These medications were sent to [address], [facility] [address] [phone] - F [phone]  [address] [address]    Phone:  [phone]   · ALPRAZolam 0.25 MG tablet  · apixaban 5 MG Tabs tablet  · magnesium oxide 400 (241.3 Mg) MG Tabs tablet  · potassium chloride 20 MEQ extended release tablet           I spent over 35 minutes on this discharge encounter between counseling, coordination of care, and medication reconciliation.       Angie Ortiz MD

## 2019-05-31 ENCOUNTER — CARE COORDINATION (OUTPATIENT)
Dept: CASE MANAGEMENT | Age: 59
End: 2019-05-31

## 2019-05-31 ENCOUNTER — TELEPHONE (OUTPATIENT)
Dept: INTERNAL MEDICINE CLINIC | Age: 59
End: 2019-05-31

## 2019-05-31 ENCOUNTER — OFFICE VISIT (OUTPATIENT)
Dept: INTERNAL MEDICINE CLINIC | Age: 59
End: 2019-05-31

## 2019-05-31 VITALS
TEMPERATURE: 98 F | HEIGHT: 62 IN | DIASTOLIC BLOOD PRESSURE: 85 MMHG | HEART RATE: 112 BPM | BODY MASS INDEX: 40.85 KG/M2 | SYSTOLIC BLOOD PRESSURE: 120 MMHG | OXYGEN SATURATION: 96 % | WEIGHT: 222 LBS

## 2019-05-31 DIAGNOSIS — Z76.89 ENCOUNTER TO ESTABLISH CARE: Primary | ICD-10-CM

## 2019-05-31 DIAGNOSIS — D75.1 ERYTHROCYTOSIS: ICD-10-CM

## 2019-05-31 DIAGNOSIS — F41.1 GAD (GENERALIZED ANXIETY DISORDER): ICD-10-CM

## 2019-05-31 DIAGNOSIS — R49.0 HOARSENESS OF VOICE: ICD-10-CM

## 2019-05-31 DIAGNOSIS — I82.452 ACUTE DEEP VEIN THROMBOSIS (DVT) OF LEFT PERONEAL VEIN (HCC): ICD-10-CM

## 2019-05-31 DIAGNOSIS — R60.1 ANASARCA: ICD-10-CM

## 2019-05-31 DIAGNOSIS — F17.200 TOBACCO DEPENDENCE: ICD-10-CM

## 2019-05-31 DIAGNOSIS — M21.372 LEFT FOOT DROP: ICD-10-CM

## 2019-05-31 DIAGNOSIS — Z09 HOSPITAL DISCHARGE FOLLOW-UP: ICD-10-CM

## 2019-05-31 PROCEDURE — 99205 OFFICE O/P NEW HI 60 MIN: CPT | Performed by: PHYSICIAN ASSISTANT

## 2019-05-31 RX ORDER — MAGNESIUM OXIDE 400 MG/1
400 TABLET ORAL 2 TIMES DAILY
COMMUNITY
End: 2019-05-31 | Stop reason: SDUPTHER

## 2019-05-31 RX ORDER — FUROSEMIDE 40 MG/1
40 TABLET ORAL DAILY
Qty: 90 TABLET | Refills: 0 | Status: SHIPPED | OUTPATIENT
Start: 2019-05-31 | End: 2019-07-02 | Stop reason: ALTCHOICE

## 2019-05-31 ASSESSMENT — ENCOUNTER SYMPTOMS
NAUSEA: 0
TROUBLE SWALLOWING: 0
WHEEZING: 0
COUGH: 1
CONSTIPATION: 0
VOMITING: 0
ABDOMINAL PAIN: 0
SORE THROAT: 0
BLOOD IN STOOL: 0
SHORTNESS OF BREATH: 0
BACK PAIN: 0

## 2019-05-31 NOTE — CARE COORDINATION
[facility] Transitions Initial Follow Up Call    Call within 2 business days of discharge: Yes    Patient: Alesia Motta Patient : 1960   MRN: [id number]  Reason for Admission: Critical Illness Myopathy debility   Discharge Date: 19 RARS: Readmission Risk Score: 14      Last Discharge [facility]       Complaint Diagnosis Description Type Department Provider    19  Anxiety state Admission (Discharged) Merry Dorado MD           Spoke with: Ivan Benson : [facility]     Non-face-to-face services provided:  Obtained and reviewed discharge summary and/or continuity of care documents    Care Transitions 24 Hour Call    Do you have any ongoing symptoms?:  No  Do you have a copy of your discharge instructions?:  Yes  Do you have all of your prescriptions and are they filled?:  Yes  Have you been contacted by a [address]?:  No  Have you scheduled your follow up appointment?:  Yes  How are you going to get to your appointment?:  Car - family or friend to transport  Were you discharged with any Home Care or Post Acute Services:  Yes  Post Acute Services:  [facility]  Patient Home Equipment:  Nebulizer  Do you have support at home?:  Alone  Do you feel like you have everything you need to keep you well at home?:  Yes  Are you an active caregiver in your home?:  No  Care Transitions Interventions     Spoke with patient who reported she is doing pretty good today. [facility] is at her home now and they have reviewed the discharge instructions and all of her medications. Patient denied any issues or concerns at this time. Patient reminded of apt with PA Jeananne Kehr today at 130 pm. Denies any needs at present time. Agreeable to f/u calls. Gave reminder that if they have any questions/concerns at anytime, they can always call PCP/specialist as they always have an MD on call .   Also advised that they can always contact their home care provider to request a nurse visit even if it isn't their regularly scheduled day for their nurse to visit.                Follow Up  Future Appointments   Date Time Provider Simran Thornton   5/31/2019  1:30 PM [facility] None       Shraddha Sweet RN, BSN, [address] [place] [address] Transitions Coordinator    [phone]

## 2019-05-31 NOTE — PROGRESS NOTES
Chief Complaint   Patient presents with    [facility]        HPI  Ann Marie De Leon is a 64 y.o. female who presents to establish care and follow up hospitalization    She is single and is currently living with her mother since discharge from the ARU  She works for [employer] as    She was a smoker until her admission in April- no longer smoking now  No alcohol or illicit substance use    Previously saw. Dr. Torres Edwards. Was treated for anxiety with celexa which worked well. Had rx for PRN xanax which she has used rarely. No other significant medical problems     -Admitted at [facility] 4/28/19 in acute respiratory failure requiring mechanical ventilation. Reports prior to admission she felt fatigued, short of breath with minimal exertion, and became significantly edematous over a 3 week period. In the ER a CTPA was suboptimal, negative for PE in large or central pulmonary arteries. She was admitted to the ICU and intubated. Concern for CHF, but echo showed normal EF and no DD. An US of the LE revealed a DVT and therefore PE felt like to be cause of her presentation. She was weaned from vent and supplemental O2. She was treated with IV lasix to PO lasix 40 mg daily at discharge. She was continued on Eliquis 5 mg BID at discharge. - She was noted to have polycythemia/erythrocytosis and was seen by hem/onc -DOROTHY-2 kinase negative, Epo- normal.  consider phlebotomy if Hct increasing   - She was seen by cardiology- no evidence of CHF    - Discharged to ARU. Had left foot drop. Using walker. Discharged from ARU on 5/30 and has home PT for the next three weeks    -No prior history of VTE. Her mom has Factor V [place], but patient was tested in 2001 and states she was negative. She has not had a colonoscopy or a mammogram yet. She does not remember her last pap smear. Review of Systems   Constitutional: Positive for fatigue.  Negative for appetite change, chills, diaphoresis and needed for Anxiety for up to 3 days. 6 tablet 0     No current facility-administered medications for this visit.         Past Medical History  Past Medical History:   Diagnosis Date    Anxiety state, unspecified 5/17/2010    Impacted cerumen 5/17/2010    Rheumatoid arthritis(714.0) 5/17/2010       Social History  Social History     Socioeconomic History    Marital status: Single     Spouse name: Not on file    Number of children: 0    Years of education: Not on file    Highest education level: Not on file   Occupational History    Not on file   Social Needs    Financial resource strain: Not on file    Food insecurity:     Worry: Not on file     Inability: Not on file    Transportation needs:     Medical: Not on file     Non-medical: Not on file   Tobacco Use    Smoking status: Current Every Day Smoker     Packs/day: 0.50     Years: 30.00     Pack years: 15.00     Types: Cigarettes    Smokeless tobacco: Never Used   Substance and Sexual Activity    Alcohol use: No     Alcohol/week: 0.0 oz    Drug use: No    Sexual activity: Not on file   Lifestyle    Physical activity:     Days per week: Not on file     Minutes per session: Not on file    Stress: Not on file   Relationships    Social connections:     Talks on phone: Not on file     Gets together: Not on file     Attends Taoist service: Not on file     Active member of club or organization: Not on file     Attends meetings of clubs or organizations: Not on file     Relationship status: Not on file    Intimate partner violence:     Fear of current or ex partner: Not on file     Emotionally abused: Not on file     Physically abused: Not on file     Forced sexual activity: Not on file   Other Topics Concern    Not on file   Social History Narrative    Not on file       Surgical History  Past Surgical History:   Procedure Laterality Date    BRONCHOSCOPY N/A 5/6/2019    BRONCHOSCOPY ALVEOLAR LAVAGE performed by Kasia Pichardo MD at [address] [place] [address] ENDOSCOPY    CARPAL TUNNEL RELEASE      both hands 1990's    LUMBAR SPINE SURGERY         Physical Exam   Constitutional: She is oriented to person, place, and time. She appears well-developed and well-nourished. HENT:   Head: Normocephalic and atraumatic. Eyes: Conjunctivae are normal. Right eye exhibits no discharge. Left eye exhibits no discharge. Neck: Trachea normal and normal range of motion. Neck supple. No JVD present. Carotid bruit is not present. No thyroid mass and no thyromegaly present. Cardiovascular: Regular rhythm. Tachycardia present. Pulmonary/Chest: Effort normal and breath sounds normal.   Abdominal: Soft. Bowel sounds are normal. There is no tenderness. Musculoskeletal: Normal range of motion. Lymphadenopathy:     She has no cervical adenopathy. Neurological: She is alert and oriented to person, place, and time. Skin: Skin is warm and dry. Psychiatric: She has a normal mood and affect. Her speech is normal and behavior is normal. Thought content normal.   Vitals reviewed. Assessment/Plan     1. Encounter to establish care  2. Hospital discharge follow-up      3. Acute deep vein thrombosis (DVT) of left peroneal vein (HCC)  - no provoking factors identified   - has not had any age appropriate cancer screenings- needs colonoscopy, mammogram, and pap smear- referrals and orders as below  - cont on Eliquis for at least 3 months, does not need refills now   - Nidhi Walls MD, Gastroenterology, [facility] DIGITAL SCREEN W CAD BILATERAL; Future  - [facility]    4. Anasarca  - was discharged from hospital on lasix and potassium supplementation and has been doing well. Echo did not show low EF or DD. She may not need lasix long term, will continue for now, repeat labs 1 month, continue K supplementation   - furosemide (LASIX) 40 MG tablet; Take 1 tablet by mouth daily  Dispense: 90 tablet;  Refill: 0  - COMPREHENSIVE METABOLIC PANEL;

## 2019-05-31 NOTE — TELEPHONE ENCOUNTER
----- Message from Yun Melgar PA-C sent at 5/31/2019  3:29 PM EDT -----  I just saw her today for new patient visit/hospital follow up. I sent her a rx for lasix. She needs to be taking potassium with it, does she need a new potassium prescription? Also, pulmonologist recommended she have a repeat CXR 4 weeks after discharge. Has the pulmonology office called her to schedule this?   If not, please give her Dr. Isidra Segovia office number

## 2019-06-03 ENCOUNTER — TELEPHONE (OUTPATIENT)
Dept: INTERNAL MEDICINE CLINIC | Age: 59
End: 2019-06-03

## 2019-06-03 NOTE — TELEPHONE ENCOUNTER
Saniya [facility] Transitions Initial Follow Up Call    Outreach made within 2 business days of discharge: Yes, LVM 19 lvm, [facility] 2019    Patient: Nilton Lange Patient : 1960   MRN: [id number]  Reason for Admission: There are no discharge diagnoses documented for the most recent discharge. Discharge Date: 19       Spoke with: [facility]    Discharge department/facility: [facility]    TCM Interactive Patient Contact:  Was patient able to fill all prescriptions: No: no answer  Was patient instructed to bring all medications to the follow-up visit: No: no answer  Is patient taking all medications as directed in the discharge summary? No answer  Does patient understand their discharge instructions: No: no answer  Does patient have questions or concerns that need addressed prior to 7-14 day follow up office visit: no answer    Scheduled appointment with PCP within 7-14 days    3 attempts. Advising to rtrn call follow up appointment.  Closing the note    Follow Up  Future Appointments   Date Time Provider Simran Thornton   2019  2:40 PM Coirn Garcia MD [address] [place] [address]

## 2019-06-04 ENCOUNTER — CARE COORDINATION (OUTPATIENT)
Dept: CASE MANAGEMENT | Age: 59
End: 2019-06-04

## 2019-06-04 NOTE — CARE COORDINATION
Saniya 45 Transitions Follow Up Call    2019    Patient: Luis A Helm  Patient : 1960   MRN: [id number]  Reason for Admission: Critical Illness Myopathy, Debility  Discharge Date: 19 RARS: Readmission Risk Score: 15         Spoke with: patient's mother [facility], [facility]. Transitions Subsequent and Final Call    Subsequent and Final Calls  Do you have any ongoing symptoms?:  No  Have your medications changed?:  No  Do you have any questions related to your medications?:  No  Do you currently have any active services?:  Yes  Are you currently active with any services?:  Home Health  Do you have any needs or concerns that I can assist you with?:  No  Identified Barriers:  None  Care Transitions Interventions  Other Interventions:        Patient's mother reports patient is \"doing great, you wouldn't believe it\". Said she's walking good, still has a little foot drop in left foot. Said breathing is good, no episodes of SOB even with exertion, 02 sats stay between 92-97%. Said she continues to refrain from smoking, said patient states she's never going to smoke again. Said her energy level is good, said PT is scheduled to come tomorrow. Said patient plans to return to work on . Saw NP at PCP office on , no med changes. Denies any needs at present time. Agreeable to f/u calls. Gave reminder that if they have any questions/concerns at anytime, they can always call PCP/specialist as they always have an MD on call . Also advised that they can always contact their home care provider to request a nurse visit even if it isn't their regularly scheduled day for their nurse to visit.        Follow Up  Future Appointments   Date Time Provider Simran Thornton   2019  4:00 PM Alicia Quiros MD [facility]   2019  2:40 PM Geovanna Do MD [facility] Int None       Paulina Palomo RN

## 2019-06-10 LAB
FUNGUS (MYCOLOGY) CULTURE: ABNORMAL
FUNGUS STAIN: ABNORMAL
FUNGUS STAIN: ABNORMAL
ORGANISM: ABNORMAL
ORGANISM: ABNORMAL

## 2019-06-11 ENCOUNTER — TELEPHONE (OUTPATIENT)
Dept: INTERNAL MEDICINE CLINIC | Age: 59
End: 2019-06-11

## 2019-06-11 ENCOUNTER — CARE COORDINATION (OUTPATIENT)
Dept: CASE MANAGEMENT | Age: 59
End: 2019-06-11

## 2019-06-11 DIAGNOSIS — E83.42 HYPOMAGNESEMIA: Primary | ICD-10-CM

## 2019-06-11 NOTE — CARE COORDINATION
Saniya 45 Transitions Follow Up Call    2019    Patient: Kenzie Castillo  Patient : 1960   MRN: [id number]  Reason for Admission: Critical Illness Myopathy Debility   Discharge Date: 19 RARS: Readmission Risk Score: 15         Spoke with: [place] Humphrey patient's mother       Care Transitions Subsequent and Final Call    Subsequent and Final Calls  Do you have any ongoing symptoms?:  No  Have your medications changed?:  No  Do you have any questions related to your medications?:  No  Do you currently have any active services?:  Yes  Are you currently active with any services?:  Home Health  Do you have any needs or concerns that I can assist you with?:  No  Identified Barriers:  None  Care Transitions Interventions  Other Interventions:          Spoke with patient's mother [place] Humphrey, who stated patient is doing really good. [place] Humphrey reported patient has a couple more therapy visits and she is doing well with them. Patient continues to have drop foot with her left foot but her mother states patient is now able to move her toes up, so things are improving. [place] Humphrey denied any further needs at this time. CTC informed her of final call. Gave reminder that if they have any questions/concerns at anytime, they can always call PCP/specialist as they always have an MD on call . Also advised that they can always contact their home care provider to request a nurse visit even if it isn't their regularly scheduled day for their nurse to visit.            Follow Up  Future Appointments   Date Time Provider Simran Thornton   2019  4:00 PM Laquita Bernal MD [facility]   2019  2:40 PM Carlitos Ramirez MD [facility] Int None       [place] Rowley RN, BSN, [address] Transitions Coordinator    [phone]

## 2019-06-11 NOTE — TELEPHONE ENCOUNTER
----- Message from Boogie Golden PA-C sent at 6/11/2019  9:11 AM EDT -----  Contact: pt- [phone]  The work note to return on 6/24/19 is fine. If someone can print one I'll sign it. I reviewed her labs and she may not need to be on a magnesium supplement. She can stop it now and we can recheck her magnesium when she sees Dr. Tanner Or in July.   I placed the lab order in her chart    ----- Message -----  From: Vineet Bautista  Sent: 6/10/2019   9:03 AM  To: Boogie Golden PA-C    She was a new pt for you on 5-31-19-she called  Requesting a note to return to work on 6-24-19- she also wanted to know if she is to stay on the magnesium -was put on this in rehab but needs a refill if she is to stay on this -she does have an appt with dr santiago on 7-29-19-please let her know at [phone]

## 2019-06-11 NOTE — TELEPHONE ENCOUNTER
----- Message from Cyndi Virk PA-C sent at 6/11/2019  9:11 AM EDT -----  Contact: pt- [phone]  The work note to return on 6/24/19 is fine. If someone can print one I'll sign it. I reviewed her labs and she may not need to be on a magnesium supplement. She can stop it now and we can recheck her magnesium when she sees Dr. Pickett Overall in July.   I placed the lab order in her chart    ----- Message -----  From: Campbell Bautista  Sent: 6/10/2019   9:03 AM  To: Cyndi Virk PA-C    She was a new pt for you on 5-31-19-she called  Requesting a note to return to work on 6-24-19- she also wanted to know if she is to stay on the magnesium -was put on this in rehab but needs a refill if she is to stay on this -she does have an appt with dr santiago on 7-29-19-please let her know at [phone]

## 2019-06-25 LAB
AFB CULTURE (MYCOBACTERIA): NORMAL
AFB CULTURE (MYCOBACTERIA): NORMAL
AFB SMEAR: NORMAL
AFB SMEAR: NORMAL

## 2019-07-02 ENCOUNTER — APPOINTMENT (OUTPATIENT)
Dept: CT IMAGING | Age: 59
End: 2019-07-02
Payer: COMMERCIAL

## 2019-07-02 ENCOUNTER — TELEPHONE (OUTPATIENT)
Dept: PULMONOLOGY | Age: 59
End: 2019-07-02

## 2019-07-02 ENCOUNTER — HOSPITAL ENCOUNTER (EMERGENCY)
Age: 59
Discharge: HOME | End: 2019-07-02
Attending: EMERGENCY MEDICINE
Payer: COMMERCIAL

## 2019-07-02 VITALS
DIASTOLIC BLOOD PRESSURE: 90 MMHG | SYSTOLIC BLOOD PRESSURE: 159 MMHG | TEMPERATURE: 97.8 F | OXYGEN SATURATION: 93 % | HEART RATE: 100 BPM | RESPIRATION RATE: 16 BRPM | WEIGHT: 225 LBS | BODY MASS INDEX: 41.41 KG/M2 | HEIGHT: 62 IN

## 2019-07-02 DIAGNOSIS — S09.90XA INJURY OF HEAD, INITIAL ENCOUNTER: ICD-10-CM

## 2019-07-02 DIAGNOSIS — S02.2XXA CLOSED FRACTURE OF NASAL BONE, INITIAL ENCOUNTER: Primary | ICD-10-CM

## 2019-07-02 LAB
A/G RATIO: 1.1 (ref 1.1–2.2)
ALBUMIN SERPL-MCNC: 3.9 G/DL (ref 3.4–5)
ALP BLD-CCNC: 74 U/L (ref 40–129)
ALT SERPL-CCNC: 44 U/L (ref 10–40)
ANION GAP SERPL CALCULATED.3IONS-SCNC: 13 MMOL/L (ref 3–16)
AST SERPL-CCNC: 36 U/L (ref 15–37)
BASOPHILS ABSOLUTE: 0.1 K/UL (ref 0–0.2)
BASOPHILS RELATIVE PERCENT: 1.3 %
BILIRUB SERPL-MCNC: 0.3 MG/DL (ref 0–1)
BUN BLDV-MCNC: 10 MG/DL (ref 7–20)
CALCIUM SERPL-MCNC: 10 MG/DL (ref 8.3–10.6)
CHLORIDE BLD-SCNC: 98 MMOL/L (ref 99–110)
CO2: 27 MMOL/L (ref 21–32)
CREAT SERPL-MCNC: <0.5 MG/DL (ref 0.6–1.1)
EOSINOPHILS ABSOLUTE: 0.2 K/UL (ref 0–0.6)
EOSINOPHILS RELATIVE PERCENT: 2.1 %
GFR AFRICAN AMERICAN: >60
GFR NON-AFRICAN AMERICAN: >60
GLOBULIN: 3.7 G/DL
GLUCOSE BLD-MCNC: 117 MG/DL (ref 70–99)
HCT VFR BLD CALC: 49.2 % (ref 36–48)
HEMOGLOBIN: 16.1 G/DL (ref 12–16)
LYMPHOCYTES ABSOLUTE: 2.9 K/UL (ref 1–5.1)
LYMPHOCYTES RELATIVE PERCENT: 28.3 %
MCH RBC QN AUTO: 28.5 PG (ref 26–34)
MCHC RBC AUTO-ENTMCNC: 32.7 G/DL (ref 31–36)
MCV RBC AUTO: 87.4 FL (ref 80–100)
MONOCYTES ABSOLUTE: 0.9 K/UL (ref 0–1.3)
MONOCYTES RELATIVE PERCENT: 8.5 %
NEUTROPHILS ABSOLUTE: 6.2 K/UL (ref 1.7–7.7)
NEUTROPHILS RELATIVE PERCENT: 59.8 %
PDW BLD-RTO: 18.4 % (ref 12.4–15.4)
PLATELET # BLD: 268 K/UL (ref 135–450)
PMV BLD AUTO: 7.6 FL (ref 5–10.5)
POTASSIUM SERPL-SCNC: 3.6 MMOL/L (ref 3.5–5.1)
RBC # BLD: 5.63 M/UL (ref 4–5.2)
SODIUM BLD-SCNC: 138 MMOL/L (ref 136–145)
TOTAL PROTEIN: 7.6 G/DL (ref 6.4–8.2)
WBC # BLD: 10.4 K/UL (ref 4–11)

## 2019-07-02 PROCEDURE — 85025 COMPLETE CBC W/AUTO DIFF WBC: CPT

## 2019-07-02 PROCEDURE — 99284 EMERGENCY DEPT VISIT MOD MDM: CPT

## 2019-07-02 PROCEDURE — 80053 COMPREHEN METABOLIC PANEL: CPT

## 2019-07-02 PROCEDURE — 70486 CT MAXILLOFACIAL W/O DYE: CPT

## 2019-07-02 PROCEDURE — 72125 CT NECK SPINE W/O DYE: CPT

## 2019-07-02 PROCEDURE — 70450 CT HEAD/BRAIN W/O DYE: CPT

## 2019-07-02 ASSESSMENT — ENCOUNTER SYMPTOMS
EYE REDNESS: 0
SORE THROAT: 0
ABDOMINAL PAIN: 0
SHORTNESS OF BREATH: 0
EYE ITCHING: 0

## 2019-07-02 ASSESSMENT — PAIN DESCRIPTION - DESCRIPTORS: DESCRIPTORS: CONSTANT

## 2019-07-02 ASSESSMENT — PAIN DESCRIPTION - LOCATION: LOCATION: NOSE

## 2019-07-02 ASSESSMENT — PAIN DESCRIPTION - ONSET: ONSET: ON-GOING

## 2019-07-02 ASSESSMENT — PAIN DESCRIPTION - FREQUENCY: FREQUENCY: CONTINUOUS

## 2019-07-02 ASSESSMENT — PAIN - FUNCTIONAL ASSESSMENT: PAIN_FUNCTIONAL_ASSESSMENT: PREVENTS OR INTERFERES WITH MANY ACTIVE NOT PASSIVE ACTIVITIES

## 2019-07-02 ASSESSMENT — PAIN SCALES - GENERAL: PAINLEVEL_OUTOF10: 8

## 2019-07-02 NOTE — ED NOTES
Pt denies any dizziness. sts she just didn't pick her foot up and tripped.       Brennen Oropeza, On license of [facility]  07/02/19 2102

## 2019-07-02 NOTE — ED PROVIDER NOTES
negative. Positives and Pertinent negatives as per HPI. Except as noted abovein the ROS, all other systems were reviewed and negative. PAST MEDICAL HISTORY     Past Medical History:   Diagnosis Date    Anxiety state, unspecified 5/17/2010    Impacted cerumen 5/17/2010    Rheumatoid arthritis(714.0) 5/17/2010         SURGICAL HISTORY     Past Surgical History:   Procedure Laterality Date    BRONCHOSCOPY N/A 5/6/2019    BRONCHOSCOPY ALVEOLAR LAVAGE performed by Radha Beck MD at [address] [place] [address]      both [facility] 1990's   [address] [place] [address]       Discharge Medication List as of 7/2/2019  6:22 PM      CONTINUE these medications which have NOT CHANGED    Details   apixaban (ELIQUIS) 5 MG TABS tablet Take 1 tablet by mouth 2 times daily, Disp-60 tablet, R-2Normal      vitamin D (ERGOCALCIFEROL) 78757 units capsule Take 1 capsule by mouth once a week for 7 doses, Disp-7 capsule, R-0DC to SNF      citalopram (CELEXA) 20 MG tablet Take 1 tablet by mouth daily, Disp-90 tablet, R-3Normal      albuterol sulfate  (90 Base) MCG/ACT inhaler Inhale 2 puffs into the lungs every 4 hours as needed for Wheezing or Shortness of Breath, Disp-1 Inhaler, R-10Print               ALLERGIES     Patient has no known allergies.     FAMILYHISTORY       Family History   Problem Relation Age of Onset    Stroke Paternal Grandmother     Heart Attack Paternal Grandfather 62    Diabetes Paternal Grandfather     Other Mother         Factor 5 Lieden     Cancer Father           SOCIAL HISTORY       Social History     Socioeconomic History    Marital status: Single     Spouse name: Not on file    Number of children: 0    Years of education: Not on file    Highest education level: Not on file   Occupational History    Not on file   Social Needs    Financial resource strain: Not on file    Food insecurity:     Worry: Not on file     Inability: Not on file   America Petersen Transportation needs:     Medical: Not on file     Non-medical: Not on file   Tobacco Use    Smoking status: Current Every Day Smoker     Packs/day: 0.50     Years: 30.00     Pack years: 15.00     Types: Cigarettes    Smokeless tobacco: Never Used   Substance and Sexual Activity    Alcohol use: No     Alcohol/week: 0.0 oz    Drug use: No    Sexual activity: Not on file   Lifestyle    Physical activity:     Days per week: Not on file     Minutes per session: Not on file    Stress: Not on file   Relationships    Social connections:     Talks on phone: Not on file     Gets together: Not on file     Attends Judaism service: Not on file     Active member of club or organization: Not on file     Attends meetings of clubs or organizations: Not on file     Relationship status: Not on file    Intimate partner violence:     Fear of current or ex partner: Not on file     Emotionally abused: Not on file     Physically abused: Not on file     Forced sexual activity: Not on file   Other Topics Concern    Not on file   Social History Narrative    Not on file       SCREENINGS             PHYSICAL EXAM    (up to 7 for level 4, 8 or more for level 5)     ED Triage Vitals [07/02/19 1534]   BP Temp Temp Source Pulse Resp SpO2 Height Weight   (!) 141/98 97.8 °F (36.6 °C) Oral 102 16 92 % 5' 2\" (1.575 m) 225 lb (102.1 kg)       Physical Exam   Constitutional: She is oriented to person, place, and time. She appears well-developed and well-nourished. No distress. HENT:   Head: Normocephalic. Right Ear: External ear normal.   Left Ear: External ear normal.   Patient fell on the right side of her face. No with swelling and small amount of bleeding. No blood in ear canals. Few scattered abrasions to the right side of her face. Eyes: Pupils are equal, round, and reactive to light. EOM are normal. Right eye exhibits no discharge. Left eye exhibits no discharge. Neck: Normal range of motion. Neck supple.  No tracheal

## 2019-07-11 PROBLEM — S09.90XA HEAD INJURY: Status: ACTIVE | Noted: 2019-07-11

## 2019-07-11 PROBLEM — S02.2XXA CLOSED FRACTURE OF NASAL BONES: Status: ACTIVE | Noted: 2019-07-11

## 2019-07-29 ENCOUNTER — OFFICE VISIT (OUTPATIENT)
Dept: INTERNAL MEDICINE CLINIC | Age: 59
End: 2019-07-29

## 2019-07-29 VITALS
DIASTOLIC BLOOD PRESSURE: 80 MMHG | SYSTOLIC BLOOD PRESSURE: 120 MMHG | HEART RATE: 70 BPM | RESPIRATION RATE: 14 BRPM | WEIGHT: 232 LBS | BODY MASS INDEX: 42.69 KG/M2 | HEIGHT: 62 IN

## 2019-07-29 DIAGNOSIS — J38.1 VOCAL CORD POLYP: ICD-10-CM

## 2019-07-29 DIAGNOSIS — E66.2 OBESITY HYPOVENTILATION SYNDROME (HCC): ICD-10-CM

## 2019-07-29 DIAGNOSIS — I26.99 OTHER ACUTE PULMONARY EMBOLISM WITHOUT ACUTE COR PULMONALE (HCC): Primary | ICD-10-CM

## 2019-07-29 DIAGNOSIS — M21.372 LEFT FOOT DROP: ICD-10-CM

## 2019-07-29 DIAGNOSIS — R49.0 HOARSENESS OF VOICE: ICD-10-CM

## 2019-07-29 DIAGNOSIS — I82.452 ACUTE DEEP VEIN THROMBOSIS (DVT) OF LEFT PERONEAL VEIN (HCC): ICD-10-CM

## 2019-07-29 DIAGNOSIS — Z12.39 BREAST CANCER SCREENING: ICD-10-CM

## 2019-07-29 DIAGNOSIS — F41.1 GAD (GENERALIZED ANXIETY DISORDER): ICD-10-CM

## 2019-07-29 PROBLEM — R53.81 DEBILITY: Status: RESOLVED | Noted: 2019-05-20 | Resolved: 2019-07-29

## 2019-07-29 PROBLEM — J45.41 MODERATE PERSISTENT ASTHMA WITH ACUTE EXACERBATION: Status: RESOLVED | Noted: 2019-04-17 | Resolved: 2019-07-29

## 2019-07-29 PROBLEM — E44.0 MODERATE MALNUTRITION (HCC): Status: RESOLVED | Noted: 2019-05-21 | Resolved: 2019-07-29

## 2019-07-29 PROBLEM — J96.01 ACUTE RESPIRATORY FAILURE WITH HYPOXIA (HCC): Status: RESOLVED | Noted: 2019-04-28 | Resolved: 2019-07-29

## 2019-07-29 PROBLEM — F17.200 TOBACCO DEPENDENCE: Status: RESOLVED | Noted: 2019-05-31 | Resolved: 2019-07-29

## 2019-07-29 PROBLEM — I50.9 NEW ONSET OF CONGESTIVE HEART FAILURE (HCC): Status: RESOLVED | Noted: 2019-04-28 | Resolved: 2019-07-29

## 2019-07-29 PROCEDURE — 90715 TDAP VACCINE 7 YRS/> IM: CPT | Performed by: INTERNAL MEDICINE

## 2019-07-29 PROCEDURE — 90471 IMMUNIZATION ADMIN: CPT | Performed by: INTERNAL MEDICINE

## 2019-07-29 PROCEDURE — 99214 OFFICE O/P EST MOD 30 MIN: CPT | Performed by: INTERNAL MEDICINE

## 2019-07-29 ASSESSMENT — ENCOUNTER SYMPTOMS
NAUSEA: 0
VOMITING: 0
SHORTNESS OF BREATH: 0
RHINORRHEA: 0
WHEEZING: 0
ABDOMINAL PAIN: 0

## 2019-07-29 NOTE — PROGRESS NOTES
Damian. Consider stopping Eliquis. 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 Will need surgery. Wait to see if she can take off her Eliquis. #  DVT see above. #  ZANE may need a sleep study. #  Left foot drop. Improving. #  SARAHI on Celexa.         Timbo Golden MD

## 2019-08-01 ENCOUNTER — HOSPITAL ENCOUNTER (OUTPATIENT)
Age: 59
Discharge: HOME | End: 2019-08-01
Payer: COMMERCIAL

## 2019-08-01 ENCOUNTER — HOSPITAL ENCOUNTER (OUTPATIENT)
Dept: GENERAL RADIOLOGY | Age: 59
Discharge: HOME | End: 2019-08-01
Payer: COMMERCIAL

## 2019-08-01 ENCOUNTER — OFFICE VISIT (OUTPATIENT)
Dept: PULMONOLOGY | Age: 59
End: 2019-08-01
Payer: COMMERCIAL

## 2019-08-01 VITALS
WEIGHT: 236 LBS | RESPIRATION RATE: 18 BRPM | SYSTOLIC BLOOD PRESSURE: 120 MMHG | HEART RATE: 99 BPM | HEIGHT: 62 IN | OXYGEN SATURATION: 95 % | BODY MASS INDEX: 43.43 KG/M2 | DIASTOLIC BLOOD PRESSURE: 62 MMHG

## 2019-08-01 DIAGNOSIS — I27.81 COR PULMONALE (HCC): Primary | ICD-10-CM

## 2019-08-01 DIAGNOSIS — I82.4Z2 DVT, LOWER EXTREMITY, DISTAL, ACUTE, LEFT (HCC): ICD-10-CM

## 2019-08-01 DIAGNOSIS — G47.30 SLEEP APNEA IN ADULT: ICD-10-CM

## 2019-08-01 DIAGNOSIS — R93.89 ABNORMAL CHEST X-RAY: ICD-10-CM

## 2019-08-01 DIAGNOSIS — E87.29 CO2 RETENTION: ICD-10-CM

## 2019-08-01 PROCEDURE — 71046 X-RAY EXAM CHEST 2 VIEWS: CPT

## 2019-08-01 PROCEDURE — 99214 OFFICE O/P EST MOD 30 MIN: CPT | Performed by: INTERNAL MEDICINE

## 2019-08-01 ASSESSMENT — SLEEP AND FATIGUE QUESTIONNAIRES
ESS TOTAL SCORE: 4
HOW LIKELY ARE YOU TO NOD OFF OR FALL ASLEEP WHILE SITTING AND TALKING TO SOMEONE: 0
HOW LIKELY ARE YOU TO NOD OFF OR FALL ASLEEP WHILE SITTING AND READING: 0
NECK CIRCUMFERENCE (INCHES): 15.25
HOW LIKELY ARE YOU TO NOD OFF OR FALL ASLEEP WHEN YOU ARE A PASSENGER IN A CAR FOR AN HOUR WITHOUT A BREAK: 0
HOW LIKELY ARE YOU TO NOD OFF OR FALL ASLEEP WHILE WATCHING TV: 1
HOW LIKELY ARE YOU TO NOD OFF OR FALL ASLEEP WHILE SITTING QUIETLY AFTER LUNCH WITHOUT ALCOHOL: 0
HOW LIKELY ARE YOU TO NOD OFF OR FALL ASLEEP WHILE LYING DOWN TO REST IN THE AFTERNOON WHEN CIRCUMSTANCES PERMIT: 3
HOW LIKELY ARE YOU TO NOD OFF OR FALL ASLEEP IN A CAR, WHILE STOPPED FOR A FEW MINUTES IN TRAFFIC: 0
HOW LIKELY ARE YOU TO NOD OFF OR FALL ASLEEP WHILE SITTING INACTIVE IN A PUBLIC PLACE: 0

## 2019-08-01 NOTE — PATIENT INSTRUCTIONS
· HST, call for result. Will need in lab titration if positive  · Eliquis for total of 6 months, then stop (October 2019), but okay to interrupt eliquis for any needed procedures, hold for 48 hours prior and simply resume after.   · ASA 81 mg daiy once Eliquis completed

## 2019-08-21 ENCOUNTER — HOSPITAL ENCOUNTER (OUTPATIENT)
Dept: SLEEP CENTER | Age: 59
Discharge: HOME | End: 2019-08-23
Payer: COMMERCIAL

## 2019-08-21 DIAGNOSIS — E87.29 CO2 RETENTION: ICD-10-CM

## 2019-08-21 DIAGNOSIS — I27.81 COR PULMONALE (HCC): ICD-10-CM

## 2019-08-21 PROCEDURE — 95806 SLEEP STUDY UNATT&RESP EFFT: CPT

## 2019-08-22 DIAGNOSIS — I27.81 COR PULMONALE (HCC): ICD-10-CM

## 2019-08-22 DIAGNOSIS — E87.29 CO2 RETENTION: Primary | ICD-10-CM

## 2019-08-26 DIAGNOSIS — R60.1 ANASARCA: ICD-10-CM

## 2019-08-26 RX ORDER — FUROSEMIDE 40 MG/1
TABLET ORAL
Qty: 90 TABLET | Refills: 0 | Status: SHIPPED | OUTPATIENT
Start: 2019-08-26 | End: 2019-11-18 | Stop reason: ALTCHOICE

## 2019-08-29 DIAGNOSIS — G47.33 OSA (OBSTRUCTIVE SLEEP APNEA): Primary | ICD-10-CM

## 2019-09-05 ENCOUNTER — TELEPHONE (OUTPATIENT)
Dept: PULMONOLOGY | Age: 59
End: 2019-09-05

## 2019-09-05 DIAGNOSIS — G47.33 OSA (OBSTRUCTIVE SLEEP APNEA): ICD-10-CM

## 2019-09-05 DIAGNOSIS — R06.81 WITNESSED EPISODE OF APNEA: Primary | ICD-10-CM

## 2019-09-06 NOTE — TELEPHONE ENCOUNTER
Patient returned call and would like orders sent to [facility] in [address] [place] [address]. Their phone  # is [phone].         Fax: [phone]

## 2019-09-27 ENCOUNTER — HOSPITAL ENCOUNTER (OUTPATIENT)
Dept: MAMMOGRAPHY | Age: 59
Discharge: HOME | End: 2019-10-02
Payer: COMMERCIAL

## 2019-09-27 DIAGNOSIS — Z12.31 VISIT FOR SCREENING MAMMOGRAM: ICD-10-CM

## 2019-09-27 PROCEDURE — 77063 BREAST TOMOSYNTHESIS BI: CPT

## 2019-11-18 ENCOUNTER — OFFICE VISIT (OUTPATIENT)
Dept: INTERNAL MEDICINE CLINIC | Age: 59
End: 2019-11-18

## 2019-11-18 VITALS
WEIGHT: 252 LBS | BODY MASS INDEX: 46.38 KG/M2 | RESPIRATION RATE: 16 BRPM | HEART RATE: 90 BPM | DIASTOLIC BLOOD PRESSURE: 80 MMHG | HEIGHT: 62 IN | SYSTOLIC BLOOD PRESSURE: 142 MMHG

## 2019-11-18 DIAGNOSIS — F41.1 ANXIETY STATE: ICD-10-CM

## 2019-11-18 DIAGNOSIS — Z01.818 PRE-OP EXAM: ICD-10-CM

## 2019-11-18 DIAGNOSIS — I26.99 OTHER ACUTE PULMONARY EMBOLISM WITHOUT ACUTE COR PULMONALE (HCC): ICD-10-CM

## 2019-11-18 DIAGNOSIS — I82.452 ACUTE DEEP VEIN THROMBOSIS (DVT) OF LEFT PERONEAL VEIN (HCC): ICD-10-CM

## 2019-11-18 DIAGNOSIS — M21.372 LEFT FOOT DROP: ICD-10-CM

## 2019-11-18 DIAGNOSIS — E66.2 OBESITY HYPOVENTILATION SYNDROME (HCC): ICD-10-CM

## 2019-11-18 DIAGNOSIS — R60.1 ANASARCA: ICD-10-CM

## 2019-11-18 DIAGNOSIS — J38.1 VOCAL CORD POLYP: Primary | ICD-10-CM

## 2019-11-18 DIAGNOSIS — J45.40 MODERATE PERSISTENT ASTHMA WITHOUT COMPLICATION: ICD-10-CM

## 2019-11-18 DIAGNOSIS — F41.1 GAD (GENERALIZED ANXIETY DISORDER): ICD-10-CM

## 2019-11-18 DIAGNOSIS — R49.0 HOARSENESS OF VOICE: ICD-10-CM

## 2019-11-18 PROCEDURE — 93000 ELECTROCARDIOGRAM COMPLETE: CPT | Performed by: NURSE PRACTITIONER

## 2019-11-18 PROCEDURE — 99214 OFFICE O/P EST MOD 30 MIN: CPT | Performed by: NURSE PRACTITIONER

## 2019-11-18 RX ORDER — CITALOPRAM 20 MG/1
20 TABLET ORAL DAILY
Qty: 90 TABLET | Refills: 3 | Status: SHIPPED | OUTPATIENT
Start: 2019-11-18 | End: 2020-11-02 | Stop reason: SDUPTHER

## 2019-11-18 RX ORDER — M-VIT,TX,IRON,MINS/CALC/FOLIC 27MG-0.4MG
1 TABLET ORAL DAILY
COMMUNITY
End: 2021-11-16

## 2019-11-20 ENCOUNTER — TELEPHONE (OUTPATIENT)
Dept: INTERNAL MEDICINE CLINIC | Age: 59
End: 2019-11-20

## 2019-11-21 ENCOUNTER — TELEPHONE (OUTPATIENT)
Dept: INTERNAL MEDICINE CLINIC | Age: 59
End: 2019-11-21

## 2019-11-22 ENCOUNTER — TELEPHONE (OUTPATIENT)
Dept: INTERNAL MEDICINE CLINIC | Age: 59
End: 2019-11-22

## 2019-11-25 DIAGNOSIS — R60.1 ANASARCA: ICD-10-CM

## 2019-11-25 RX ORDER — FUROSEMIDE 40 MG/1
TABLET ORAL
Qty: 90 TABLET | Refills: 0 | Status: SHIPPED | OUTPATIENT
Start: 2019-11-25 | End: 2019-12-20 | Stop reason: CLARIF

## 2019-11-26 ENCOUNTER — OFFICE VISIT (OUTPATIENT)
Dept: PULMONOLOGY | Age: 59
End: 2019-11-26
Payer: COMMERCIAL

## 2019-11-26 ENCOUNTER — OFFICE VISIT (OUTPATIENT)
Dept: INTERNAL MEDICINE CLINIC | Age: 59
End: 2019-11-26

## 2019-11-26 VITALS
SYSTOLIC BLOOD PRESSURE: 120 MMHG | HEIGHT: 62 IN | WEIGHT: 255 LBS | BODY MASS INDEX: 46.93 KG/M2 | RESPIRATION RATE: 14 BRPM | HEART RATE: 70 BPM | DIASTOLIC BLOOD PRESSURE: 80 MMHG

## 2019-11-26 VITALS
DIASTOLIC BLOOD PRESSURE: 70 MMHG | HEART RATE: 89 BPM | BODY MASS INDEX: 47.18 KG/M2 | SYSTOLIC BLOOD PRESSURE: 118 MMHG | TEMPERATURE: 98.8 F | HEIGHT: 62 IN | WEIGHT: 256.4 LBS | OXYGEN SATURATION: 92 %

## 2019-11-26 DIAGNOSIS — E66.01 MORBID OBESITY WITH BMI OF 45.0-49.9, ADULT (HCC): ICD-10-CM

## 2019-11-26 DIAGNOSIS — F41.1 GAD (GENERALIZED ANXIETY DISORDER): ICD-10-CM

## 2019-11-26 DIAGNOSIS — Z12.11 SCREEN FOR COLON CANCER: ICD-10-CM

## 2019-11-26 DIAGNOSIS — Z00.00 ROUTINE GENERAL MEDICAL EXAMINATION AT A HEALTH CARE FACILITY: Primary | ICD-10-CM

## 2019-11-26 DIAGNOSIS — G47.33 OSA (OBSTRUCTIVE SLEEP APNEA): Primary | ICD-10-CM

## 2019-11-26 DIAGNOSIS — J45.40 MODERATE PERSISTENT ASTHMA WITHOUT COMPLICATION: ICD-10-CM

## 2019-11-26 DIAGNOSIS — E66.2 OBESITY HYPOVENTILATION SYNDROME (HCC): ICD-10-CM

## 2019-11-26 PROCEDURE — 99396 PREV VISIT EST AGE 40-64: CPT | Performed by: INTERNAL MEDICINE

## 2019-11-26 PROCEDURE — 82274 ASSAY TEST FOR BLOOD FECAL: CPT | Performed by: INTERNAL MEDICINE

## 2019-11-26 PROCEDURE — 99213 OFFICE O/P EST LOW 20 MIN: CPT | Performed by: INTERNAL MEDICINE

## 2019-11-26 RX ORDER — MULTIVIT-MIN/IRON/FOLIC ACID/K 18-600-40
CAPSULE ORAL DAILY
COMMUNITY
End: 2021-11-16

## 2019-11-26 ASSESSMENT — ENCOUNTER SYMPTOMS
ABDOMINAL PAIN: 0
SHORTNESS OF BREATH: 0
NAUSEA: 0
WHEEZING: 0
VOMITING: 0
RHINORRHEA: 0

## 2019-11-26 ASSESSMENT — SLEEP AND FATIGUE QUESTIONNAIRES
HOW LIKELY ARE YOU TO NOD OFF OR FALL ASLEEP WHEN YOU ARE A PASSENGER IN A CAR FOR AN HOUR WITHOUT A BREAK: 0
HOW LIKELY ARE YOU TO NOD OFF OR FALL ASLEEP WHILE SITTING QUIETLY AFTER LUNCH WITHOUT ALCOHOL: 0
HOW LIKELY ARE YOU TO NOD OFF OR FALL ASLEEP IN A CAR, WHILE STOPPED FOR A FEW MINUTES IN TRAFFIC: 0
NECK CIRCUMFERENCE (INCHES): 16.5
HOW LIKELY ARE YOU TO NOD OFF OR FALL ASLEEP WHILE LYING DOWN TO REST IN THE AFTERNOON WHEN CIRCUMSTANCES PERMIT: 3
HOW LIKELY ARE YOU TO NOD OFF OR FALL ASLEEP WHILE SITTING AND READING: 0
HOW LIKELY ARE YOU TO NOD OFF OR FALL ASLEEP WHILE SITTING INACTIVE IN A PUBLIC PLACE: 0
ESS TOTAL SCORE: 3
HOW LIKELY ARE YOU TO NOD OFF OR FALL ASLEEP WHILE SITTING AND TALKING TO SOMEONE: 0
HOW LIKELY ARE YOU TO NOD OFF OR FALL ASLEEP WHILE WATCHING TV: 0

## 2019-12-20 ENCOUNTER — OFFICE VISIT (OUTPATIENT)
Dept: PULMONOLOGY | Age: 59
End: 2019-12-20
Payer: COMMERCIAL

## 2019-12-20 VITALS
HEIGHT: 62 IN | RESPIRATION RATE: 16 BRPM | DIASTOLIC BLOOD PRESSURE: 70 MMHG | HEART RATE: 100 BPM | OXYGEN SATURATION: 91 % | BODY MASS INDEX: 48.55 KG/M2 | SYSTOLIC BLOOD PRESSURE: 112 MMHG | WEIGHT: 263.8 LBS

## 2019-12-20 DIAGNOSIS — G47.33 OSA (OBSTRUCTIVE SLEEP APNEA): Primary | ICD-10-CM

## 2019-12-20 PROCEDURE — 99213 OFFICE O/P EST LOW 20 MIN: CPT | Performed by: INTERNAL MEDICINE

## 2019-12-20 ASSESSMENT — SLEEP AND FATIGUE QUESTIONNAIRES
HOW LIKELY ARE YOU TO NOD OFF OR FALL ASLEEP WHILE LYING DOWN TO REST IN THE AFTERNOON WHEN CIRCUMSTANCES PERMIT: 3
HOW LIKELY ARE YOU TO NOD OFF OR FALL ASLEEP WHILE WATCHING TV: 0
HOW LIKELY ARE YOU TO NOD OFF OR FALL ASLEEP WHILE SITTING INACTIVE IN A PUBLIC PLACE: 0
HOW LIKELY ARE YOU TO NOD OFF OR FALL ASLEEP WHILE SITTING AND TALKING TO SOMEONE: 0
HOW LIKELY ARE YOU TO NOD OFF OR FALL ASLEEP WHILE SITTING QUIETLY AFTER LUNCH WITHOUT ALCOHOL: 0
HOW LIKELY ARE YOU TO NOD OFF OR FALL ASLEEP WHILE SITTING AND READING: 0
NECK CIRCUMFERENCE (INCHES): 16.5
HOW LIKELY ARE YOU TO NOD OFF OR FALL ASLEEP IN A CAR, WHILE STOPPED FOR A FEW MINUTES IN TRAFFIC: 0
ESS TOTAL SCORE: 3
HOW LIKELY ARE YOU TO NOD OFF OR FALL ASLEEP WHEN YOU ARE A PASSENGER IN A CAR FOR AN HOUR WITHOUT A BREAK: 0

## 2020-01-08 RX ORDER — APIXABAN 5 MG/1
TABLET, FILM COATED ORAL
Qty: 60 TABLET | Refills: 2 | Status: SHIPPED | OUTPATIENT
Start: 2020-01-08 | End: 2021-02-23

## 2020-08-12 ENCOUNTER — TELEPHONE (OUTPATIENT)
Dept: PULMONOLOGY | Age: 60
End: 2020-08-12

## 2020-08-12 NOTE — LETTER
[facility] Pulmonary, Critical Care, and Sleep  [address] [facility] [place] [address] [address]  Phone: [phone]  Fax: [phone]    Thalia Baez MD        August 12, 2020     Patient: Janice Chan   YOB: 1960     To Whom it May Concern:    Frederick Dickerson has hypoventilation syndrome and her oxygenation may be affected by breathing through mask. If it is possible to make exception, the ability to breath without mask in place may be helpful to her. A face shield may be an acceptable alternative that would not impede normal respiration. If you have any questions or concerns, please don't hesitate to call.     Sincerely,         Thalia Baez MD

## 2020-08-12 NOTE — TELEPHONE ENCOUNTER
Letter: patient has hypoventilation syndrome and her oxygenation may be affected by breathing through mask. If it is possible to make exception, the ability to breath without mask in place may be helpful to her. A face shield may be an acceptable alternative that would not impede normal respiration.

## 2020-09-11 RX ORDER — ALBUTEROL SULFATE 1.25 MG/3ML
1 SOLUTION RESPIRATORY (INHALATION) EVERY 6 HOURS PRN
Qty: 360 ML | Refills: 0 | Status: SHIPPED | OUTPATIENT
Start: 2020-09-11

## 2020-11-02 ENCOUNTER — OFFICE VISIT (OUTPATIENT)
Dept: INTERNAL MEDICINE CLINIC | Age: 60
End: 2020-11-02

## 2020-11-02 VITALS
WEIGHT: 230 LBS | DIASTOLIC BLOOD PRESSURE: 80 MMHG | BODY MASS INDEX: 42.33 KG/M2 | SYSTOLIC BLOOD PRESSURE: 138 MMHG | HEART RATE: 70 BPM | HEIGHT: 62 IN | TEMPERATURE: 99.4 F | RESPIRATION RATE: 12 BRPM

## 2020-11-02 PROCEDURE — 90732 PPSV23 VACC 2 YRS+ SUBQ/IM: CPT | Performed by: INTERNAL MEDICINE

## 2020-11-02 PROCEDURE — 90750 HZV VACC RECOMBINANT IM: CPT | Performed by: INTERNAL MEDICINE

## 2020-11-02 PROCEDURE — 90472 IMMUNIZATION ADMIN EACH ADD: CPT | Performed by: INTERNAL MEDICINE

## 2020-11-02 PROCEDURE — 99213 OFFICE O/P EST LOW 20 MIN: CPT | Performed by: INTERNAL MEDICINE

## 2020-11-02 PROCEDURE — 90471 IMMUNIZATION ADMIN: CPT | Performed by: INTERNAL MEDICINE

## 2020-11-02 RX ORDER — ALBUTEROL SULFATE 90 UG/1
2 AEROSOL, METERED RESPIRATORY (INHALATION) EVERY 4 HOURS PRN
Qty: 1 INHALER | Refills: 10 | Status: SHIPPED | OUTPATIENT
Start: 2020-11-02 | End: 2022-06-16 | Stop reason: SDUPTHER

## 2020-11-02 RX ORDER — CITALOPRAM 20 MG/1
20 TABLET ORAL DAILY
Qty: 90 TABLET | Refills: 3 | Status: SHIPPED | OUTPATIENT
Start: 2020-11-02 | End: 2020-12-14 | Stop reason: SDUPTHER

## 2020-11-02 ASSESSMENT — ENCOUNTER SYMPTOMS
ABDOMINAL PAIN: 0
RHINORRHEA: 0
SHORTNESS OF BREATH: 0
VOMITING: 0
WHEEZING: 0
NAUSEA: 0

## 2020-11-02 ASSESSMENT — PATIENT HEALTH QUESTIONNAIRE - PHQ9
1. LITTLE INTEREST OR PLEASURE IN DOING THINGS: 0
SUM OF ALL RESPONSES TO PHQ9 QUESTIONS 1 & 2: 0
SUM OF ALL RESPONSES TO PHQ QUESTIONS 1-9: 0
SUM OF ALL RESPONSES TO PHQ QUESTIONS 1-9: 0
2. FEELING DOWN, DEPRESSED OR HOPELESS: 0
SUM OF ALL RESPONSES TO PHQ QUESTIONS 1-9: 0

## 2020-11-02 NOTE — PROGRESS NOTES
Subjective:      Patient ID: Juvenal Johns is a 64 y.o. female. HPI     Patient is here follow up. She has history of DVT and PE. She is finished with NOAC. She has ZANE. She is on CPAP. She has morbid obesity. She has lost weight. She has quit smoking. She has a left foot drop. It is better. She uses a cane. Review of Systems   Constitutional: Negative for appetite change and fatigue. HENT: Negative for postnasal drip and rhinorrhea. Respiratory: Negative for shortness of breath and wheezing. Cardiovascular: Negative for chest pain and palpitations. Gastrointestinal: Negative for abdominal pain, nausea and vomiting. Musculoskeletal: Negative for joint swelling. Skin: Negative for rash. Neurological: Negative for light-headedness. Foot drop   Psychiatric/Behavioral: Negative for sleep disturbance.      Past Medical History:   Diagnosis Date    Acute deep vein thrombosis (DVT) of distal end of left lower extremity (HCC)     Acute deep vein thrombosis (DVT) of left peroneal vein     Anxiety state, unspecified 5/17/2010    Impacted cerumen 5/17/2010    Morbid obesity with BMI of 45.0-49.9, adult ([facility] 11/26/2019    Pulmonary embolism (HCC)     Rheumatoid arthritis ([facility] 5/17/2010    Rheumatoid arthritis(714.0) 5/17/2010    Tobacco abuse 5/31/2011     Past Surgical History:   Procedure Laterality Date    BRONCHOSCOPY N/A 5/6/2019    BRONCHOSCOPY ALVEOLAR LAVAGE performed by Yossi Anna MD at [address] [place] [address]      both hands 1990's   [facility] SURGERY  11/25/2019      Family History   Problem Relation Age of Onset    Stroke Paternal Grandmother     Heart Attack Paternal Grandfather 62    Diabetes Paternal Grandfather     Other Mother         Factor 5 Lieden     Cancer Father         Non Hodgkin's lymphoma       Social History     Tobacco Use    Smoking status: Former Smoker     Packs/day: 0.50 Years: 30.00     Pack years: 15.00     Types: Cigarettes     Start date: 1970     Last attempt to quit: 2019     Years since quittin.5    Smokeless tobacco: Never Used   Substance Use Topics    Alcohol use: No     Alcohol/week: 0.0 standard drinks    Drug use: No       /80 (Site: Left Upper Arm, Position: Sitting, Cuff Size: Large Adult)   Pulse 70   Temp 99.4 °F (37.4 °C)   Resp 12   Ht 5' 2\" (1.575 m)   Wt 230 lb (104.3 kg)   BMI 42.07 kg/m²     Objective:   Physical Exam  Constitutional:       Appearance: She is well-developed. HENT:      Head: Normocephalic. Eyes:      Conjunctiva/sclera: Conjunctivae normal.      Pupils: Pupils are equal, round, and reactive to light. Neck:      Musculoskeletal: Normal range of motion and neck supple. Thyroid: No thyroid mass or thyromegaly. Vascular: No carotid bruit or JVD. Trachea: Trachea normal.   Cardiovascular:      Rate and Rhythm: Normal rate and regular rhythm. Heart sounds: Normal heart sounds. No murmur. No gallop. Pulmonary:      Effort: Pulmonary effort is normal. No respiratory distress. Breath sounds: Normal breath sounds. No wheezing or rales. Abdominal:      General: Bowel sounds are normal. There is no distension. Palpations: Abdomen is soft. There is no hepatomegaly, splenomegaly or mass. Tenderness: There is no abdominal tenderness. Musculoskeletal: Normal range of motion. Lymphadenopathy:      Cervical: No cervical adenopathy. Skin:     General: Skin is warm and dry. Findings: No rash. Neurological:      Mental Status: She is alert and oriented to person, place, and time. Cranial Nerves: No cranial nerve deficit. Deep Tendon Reflexes: Reflexes are normal and symmetric. Psychiatric:         Behavior: Behavior normal.         Thought Content: Thought content normal.         Judgment: Judgment normal.         Assessment:       Diagnosis Orders   1.  Obesity hypoventilation syndrome ([facility]     2. Anxiety state  citalopram (CELEXA) 20 MG tablet   3. Mild intermittent asthma without complication  albuterol sulfate  (90 Base) MCG/ACT inhaler   4. Morbid obesity ([facility]     5. Other acute pulmonary embolism without acute cor pulmonale ([facility]     6. Morbid obesity with BMI of 45.0-49.9, adult [facility])            Plan:      #  PE. Has stopped Eliquis. 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 #  DVT see above. #  ZANE on CPAP. #  Left foot drop. Improving. #  SARAHI on Celexa.         Rupali Walls MD

## 2020-12-06 NOTE — PROGRESS NOTES
Hospitalist Progress Note        PCP: Julio César Agrawal MD    Date of Admission: 4/28/2019    Subjective: cont to be on vent, anasarca +    5/6-  she is sedated on the ventilator. Responds to commands like squeezing. Appears sick Plans for bronchoscopy today. 5/7- Bronchoscopy was done. No endobronchial lesion seen. Mucosa appears normal. She had yellow and white secretions were thick. 5/8- still on the vent. SBT with agitation.       Medications:  Reviewed    Infusion Medications    dexmedetomidine (PRECEDEX) IV infusion 0.6 mcg/kg/hr (05/08/19 1022)    heparin (porcine) 12.5 mL/hr (05/07/19 1711)    fentaNYL (SUBLIMAZE) infusion 125 mcg/hr (05/08/19 1854)    propofol 45 mcg/kg/min (05/08/19 1106)     Scheduled Medications    ALPRAZolam  0.25 mg Oral TID    sennosides-docusate sodium  2 tablet Oral BID    vancomycin  1,500 mg Intravenous Q12H    carboxymethylcellulose PF  1 drop Both Eyes Q4H    And    STYE   Both Eyes Q4H    chlorhexidine  15 mL Mouth/Throat BID    famotidine (PEPCID) injection  20 mg Intravenous BID    albuterol sulfate HFA  4 puff Inhalation Q4H    And    ipratropium  4 puff Inhalation Q4H    lidocaine 1 % injection  5 mL Intradermal Once    sodium chloride flush  10 mL Intravenous 2 times per day    methylPREDNISolone  40 mg Intravenous Daily    diclofenac sodium  2 g Topical BID    citalopram  20 mg Oral Daily    sodium chloride flush  10 mL Intravenous 2 times per day     PRN Meds: polyethylene glycol, heparin (porcine), heparin (porcine), fentanNYL, sodium chloride flush, perflutren lipid microspheres, ibuprofen, sodium chloride flush, magnesium hydroxide, ondansetron, albuterol, albuterol sulfate HFA      Intake/Output Summary (Last 24 hours) at 5/8/2019 1150  Last data filed at 5/8/2019 1130  Gross per 24 hour   Intake 3505 ml   Output 2805 ml   Net 700 ml       Physical Exam Performed:    /67   Pulse 75   Temp 99.8 °F (37.7 °C) (Axillary)   Resp 16   Ht 5' 3\" (1.6 m)   Wt 234 lb 3.2 oz (106.2 kg)   SpO2 90%   BMI 41.49 kg/m²       General:  Sedated, intubated appears sick  Mucous Membranes:  Pink , anicteric  Neck: No JVD, no carotid bruit, no thyromegaly  Chest: no wheezes/coarse BS  Cardiovascular:  RRR S1S2 heard, no murmurs or gallops  Abdomen:  Soft, undistended, non tender, no organomegaly, BS present  Extremities: No edema or cyanosis. Distal pulses well felt  Neurological : SERGE      Labs:   Recent Labs     05/06/19  0559 05/07/19  0500 05/08/19  0530   WBC 9.6 9.1 11.4*   HGB 16.7* 17.5* 17.3*   HCT 51.5* 53.7* 53.9*    140 150     Recent Labs     05/06/19  0559 05/07/19  0500 05/08/19  0530    136 137   K 3.4* 3.6 3.5   CL 93* 89* 88*   CO2 38* 39* 38*   BUN 15 15 16   CREATININE <0.5* <0.5* <0.5*   CALCIUM 9.0 9.5 9.7     No results for input(s): AST, ALT, BILIDIR, BILITOT, ALKPHOS in the last 72 hours. No results for input(s): INR in the last 72 hours. No results for input(s): [place] in the last 72 hours. Urinalysis:      Lab Results   Component Value Date    NITRU Negative 04/28/2019    WBCUA 0-2 04/28/2019    BACTERIA 1+ 04/28/2019    RBCUA 0-2 04/28/2019    BLOODU TRACE-INTACT 04/28/2019    SPECGRAV <=1.005 04/28/2019    GLUCOSEU Negative 04/28/2019       Radiology:  XR CHEST PORTABLE   Final Result   Worsening bibasilar atelectasis and/or pneumonia. XR CHEST PORTABLE   Final Result   Improving bilateral airspace disease and pleural effusions. XR CHEST PORTABLE   Final Result   Increasing right basilar and new bilateral upper lobe airspace disease could   represent edema or pneumonia. Left basilar pleuroparenchymal disease is   stable. XR CHEST PORTABLE   Final Result   1. No significant change. IR PICC WO SQ PORT/PUMP > 5 YEARS   Final Result   Successful placement of PICC line. XR CHEST PORTABLE   Final Result   Supportive devices are positioned appropriately.   There is problems. *         1. Acute hypoxic resp failure. possibly secondary to acute pulmonary edema and acute congestive heart failure. was on IV Lasix-->now on prn lasix.  Echocardiogram has been obtained.  Echocardiogram however shows normal LV function does not show diastolic heart failure. shows moderate pulm HTN. Likely acute PE. Venous doppler - DVT below knee. CTA was suboptimal( also severe hypoxia otherwise unexplained). Started anticoagulation with heparin gtt. Her DVT and PE were likely present at the time of her admission. Intubated 5/3 for worsening hypercarbia. She is on a PEEP of 10. Continue with ventilator support for now. Bronchoscopy was done on 5/7/19. Precedex started.      2.  Chronic hypercarbic respiratory failure likely obesity hypoventilation syndrome. mod pulm HTN on ECHO      3.  Acute bronchitis.  No fevers.  Has mildly elevated leukocytosis.  Pro-calcitonin however is negative. Vancomycin day 7/7 and Levaquin day 7/7.     4.  Morbid Obesity  - Body mass index is 41.49 kg/m². - Complicating assessment and treatment. Placing patient at risk for multiple co-morbidities as well as early death and contributing to the patient's presentation.        5. Anasarca - monitor I/O net -15L, ?nutritional, received lasix           Diet: DIET TUBE FEED CONTINUOUS/CYCLIC NPO; Low Calorie High Protein (vital high protein);  Orogastric; Continuous; 25; 25; 20 (hours)  Code Status: Full Code    PT/OT Eval Status: not indicated    Dispo - cont care in ICU    Bret Go 5/8/2019 11:50 AM 06-Dec-2020 17:34

## 2020-12-14 RX ORDER — CITALOPRAM 20 MG/1
20 TABLET ORAL DAILY
Qty: 90 TABLET | Refills: 0 | Status: SHIPPED | OUTPATIENT
Start: 2020-12-14 | End: 2021-03-22

## 2020-12-16 ENCOUNTER — TELEPHONE (OUTPATIENT)
Dept: PULMONOLOGY | Age: 60
End: 2020-12-16

## 2020-12-16 NOTE — TELEPHONE ENCOUNTER
Within this Telehealth Consent, the terms you and yours refer to the person using the Telehealth Service (Service), or in the case of a use of the Service by or on behalf of a minor, you and yours refer to and include (i) the parent or legal guardian who provides consent to the use of the Service by such minor or uses the Service on behalf of such minor, and (ii) the minor for whom consent is being provided or on whose behalf the Service is being utilized. When using Service, you will be consulting with your health care providers via the use of Telehealth.   Telehealth involves the delivery of healthcare services using electronic communications, information technology or other means between a healthcare provider and a patient who are not in the same physical location. Telehealth may be used for diagnosis, treatment, follow-up and/or patient education, and may include, but is not limited to, one or more of the following:    Electronic transmission of medical records, photo images, personal health information or other data between a patient and a healthcare provider    Interactions between a patient and healthcare provider via audio, video and/or data communications    Use of output data from medical devices, sound and video files    Anticipated Benefits   The use of Telehealth by your Provider(s) through the Service may have the following possible benefits:    Making it easier and more efficient for you to access medical care and treatment for the conditions treated by such Provider(s) utilizing the Service    Allowing you to obtain medical care and treatment by Provider(s) at times that are convenient for you    Enabling you to interact with Provider(s) without the necessity of an in-office appointment     Possible Risks   While the use of Telehealth can provide potential benefits for you, there are also potential risks associated with the use of Telehealth.  These risks include, but may not be limited to the following:    Your Provider(s) may not able to provide medical treatment for your particular condition and you may be required to seek alternative healthcare or emergency care services.  The electronic systems or other security protocols or safeguards used in the Service could fail, causing a breach of privacy of your medical or other information.  Given regulatory requirements in certain jurisdictions, your Provider(s) diagnosis and/or treatment options, especially pertaining to certain prescriptions, may be limited. Acceptance   1. You understand that Services will be provided via Telehealth. This process involves the use of HIPAA compliant and secure, real-time audio-visual interfacing with a qualified and appropriately trained provider located at [facility]. 2. You understand that, under no circumstances, will this session be recorded. 3. You understand that the Provider(s) at [facility] and other clinical participants will be party to the information obtained during the Telehealth session in accordance with best medical practices. 4. You understand that the information obtained during the Telehealth session will be used to help determine the most appropriate treatment options. 5. You understand that You have the right to revoke this consent at any point in time. 6. You understand that Telehealth is voluntary, and that continued treatment is not dependent upon consent. 7. You understand that, in the event of non-consent to Telehealth services and/or technical difficulties, you will obtain services as typically provided in the absence of Telehealth technology. 8. You understand that this consent will be kept in Your medical record. 9. No potential benefits from the use of Telehealth or specific results can be guaranteed. Your condition may not be cured or improved and, in some cases, may get worse.    10. There are limitations in the provision of medical care and treatment via Telehealth and the Service and you may not be able to receive diagnosis and/or treatment through the Service for every condition for which you seek diagnosis and/or treatment. 11. There are potential risks to the use of Telehealth, including but not limited to the risks described in this Telehealth Consent. 12. Your Provider(s) have discussed the use of Telehealth and the Service with you, including the benefits and risks of such and you have provided oral consent to your Provider(s) for the use of Telehealth and the Service. 15. You understand that it is your duty to provide your Provider(s) truthful, accurate and complete information, including all relevant information regarding care that you may have received or may be receiving from other healthcare providers outside of the Service. 14. You understand that each of your Provider(s) may determine in his or sole discretion that your condition is not suitable for diagnosis and/or treatment using the Service, and that you may need to seek medical care and treatment a specialist or other healthcare provider, outside of the Service. 15. You understand that you are fully responsible for payment for all services provided by Provider(s) or through use of the Service and that you may not be able to use third-party insurance. 16. You represent that (a) you have read this Telehealth Consent carefully, (b) you understand the risks and benefits of the Service and the use of Telehealth in the medical care and treatment provided to you by Provider(s) using the Service, and (c) you have the legal capacity and authority to provide this consent for yourself and/or the minor for which you are consenting under applicable federal and state laws, including laws relating to the age of [de-identified] and/or parental/guardian consent.    17. You give your informed consent to the use of Telehealth by Provider(s) using the Service under the terms described in the Terms of Service and this Telehealth Consent. The patient was read the following statement and has consented to the visit as of 12/16/20. The patient has been scheduled for their first telehealth visit on 12/22/20 with Dr Last Wright.

## 2020-12-21 ENCOUNTER — TELEPHONE (OUTPATIENT)
Dept: PULMONOLOGY | Age: 60
End: 2020-12-21

## 2020-12-21 NOTE — TELEPHONE ENCOUNTER
Patient cancelled appointment on 12/22/20 with Dr Jerrica Calabrese for 1 yr ZANE. Reason: does not need appt     Patient did not reschedule appointment. Appointment rescheduled for patient states she will call to rolanda appt at a later date. Last OV 12/20/19    Assessment:  · ZANE. /OHS     Not addressed today  · LLE DVT April 2019  · Tobacco abuse in remission in 2019, 15-20 pack year history (less than 1/2 ppd)     Plan:   · APAP 5-15, using with benefit  · Patient has been advised: no driving while sleepy; weight loss recommended; systemic benefits of CPAP therapy have been discussed.

## 2021-02-01 NOTE — PROGRESS NOTES
[facility]    [address] ,  [address] [place] [address]  [place], [facility]  Phone: [phone]   IEZ:[phone]    CHIEF COMPLAINT     Chief Complaint   Patient presents with    New Patient     Epigastric/Abdominal pain & vomiting, PCP ordered abd U/S which has not been scheduled yet        HPI     Thank you Cesar Mahan MD for asking me to see Madeleine Salas in consultation. Madeleine Salas is a 64 y.o. female Single [1] White [1] with medical history of DVT/PE status post Eliquis (discontinued 11/2020), asthma, generalized anxiety disorder, obesity (BMI 37), obstructive sleep apnea on CPAP osteoarthritis of hands and left foot drop seen independently with referral for epigastric pain of 3 months duration. Pain is sharp, intermittent, non radiating, lasting up to 10 hours at a time. Associated with nonbilious nonbloody vomiting. Pain is aggravated with meals containing tomato sauce. Patient has tried Prilosec for the past 2 weeks without relief. She reports positive NSAID use with Alleve and Advil 3 tabs for 4-5 days weekly for years due to arthritic hand pains. Of note patient has been on weight watchers with about 68 pounds intentional weight loss since August 2020. Patient saw her PCP today who was ordered labs including CBC, BMP, LFTs and ultrasound right upper quadrant. Denies fever, chills, unintentional weight loss, constipation, diarrhea, hematochezia or melenic stools. Denies alcohol use or illicit drug use including marijuana. Last Encounter Reviewed: None  Pertinent PMH, FH, SH is reviewed below. Last EGD: None  Last Colonoscopy: None    Review of available records reveals:   Wt Readings from Last 50 Encounters:   02/03/21 206 lb (93.4 kg)   02/03/21 208 lb (94.3 kg)   11/02/20 230 lb (104.3 kg)   12/20/19 263 lb 12.8 oz (119.7 kg)   11/26/19 255 lb (115.7 kg)   11/26/19 256 lb 6.4 oz (116.3 kg)   11/18/19 252 lb (114.3 kg)   08/01/19 236 lb (107 kg) 07/29/19 232 lb (105.2 kg)   07/02/19 225 lb (102.1 kg)   05/31/19 222 lb (100.7 kg)   05/30/19 218 lb 9.6 oz (99.2 kg)   05/20/19 220 lb 4.8 oz (99.9 kg)   04/16/19 266 lb (120.7 kg)   05/23/18 246 lb (111.6 kg)   06/08/17 241 lb (109.3 kg)   06/05/17 235 lb (106.6 kg)   05/11/17 244 lb (110.7 kg)   04/15/16 263 lb (119.3 kg)   07/23/15 264 lb (119.7 kg)   04/23/15 258 lb (117 kg)   03/03/15 262 lb (118.8 kg)   07/25/14 261 lb (118.4 kg)   08/30/13 263 lb (119.3 kg)   06/18/12 236 lb (107 kg)   06/28/11 232 lb (105.2 kg)   06/09/11 203 lb (92.1 kg)   05/31/11 221 lb (100.2 kg)   11/19/10 219 lb 9.6 oz (99.6 kg)   05/18/10 222 lb (100.7 kg)       No components found for: HGBA1C  BP Readings from Last 3 Encounters:   02/03/21 128/72   02/03/21 130/80   11/02/20 138/80     Health Maintenance   Topic Date Due    HIV screen  04/22/1977    Diabetes screen  04/22/2002    Cervical cancer screen  03/03/2018    Shingles Vaccine (2 of 2) 12/28/2020    Breast cancer screen  09/27/2021    Lipid screen  04/29/2024    Colon cancer screen colonoscopy  03/03/2025    DTaP/Tdap/Td vaccine (3 - Td) 07/29/2029    Flu vaccine  Completed    Pneumococcal 0-64 years Vaccine  Completed    Hepatitis C screen  Completed    Hepatitis A vaccine  Aged Out    Hepatitis B vaccine  Aged Out    Hib vaccine  Aged Out    Meningococcal (ACWY) vaccine  Aged Out       No components found for: [employer]     PAST MEDICAL HISTORY     Past Medical History:   Diagnosis Date    Acute deep vein thrombosis (DVT) of distal end of left lower extremity ([address]     Acute deep vein thrombosis (DVT) of left peroneal vein [facility]     Anxiety state, unspecified 5/17/2010    Impacted cerumen 5/17/2010    Morbid obesity with BMI of 45.0-49.9, adult [facility] 11/26/2019    Pulmonary embolism ([facility]     Rheumatoid arthritis ([facility] 5/17/2010    Rheumatoid arthritis(714.0) 5/17/2010    Tobacco abuse 5/31/2011     FAMILY HISTORY     Family History BRONCHOSCOPY ALVEOLAR LAVAGE performed by Jeimy Wilson MD at [address] [facility]      both hands 1990's   [address]  11/25/2019     CURRENT MEDICATIONS   (This list may include medications prescribed during this encounter as epic can not insert only the list prior to this encounter.)  Current Outpatient Rx   Medication Sig Dispense Refill    famotidine (PEPCID) 20 MG tablet Take 20 mg by mouth daily      pantoprazole (PROTONIX) 40 MG tablet Take 1 tablet by mouth 2 times daily (before meals) 180 tablet 1    bisacodyl (DULCOLAX) 5 MG EC tablet Take 4 tablets by mouth once for 1 dose Take as directed for colonoscopy.  4 tablet 0    polyethylene glycol (MIRALAX) 17 GM/SCOOP POWD powder Take 238 g by mouth daily Take as directed for colonoscopy 255 g 0    citalopram (CELEXA) 20 MG tablet Take 1 tablet by mouth daily 90 tablet 0    albuterol sulfate  (90 Base) MCG/ACT inhaler Inhale 2 puffs into the lungs every 4 hours as needed for Wheezing or Shortness of Breath 1 Inhaler 10    albuterol (ACCUNEB) 1.25 MG/3ML nebulizer solution Inhale 3 mLs into the lungs every 6 hours as needed for Wheezing 360 mL 0    aspirin 81 MG tablet Take 81 mg by mouth daily      Cholecalciferol (VITAMIN D) 50 MCG (2000 UT) CAPS capsule Take by mouth      Multiple Vitamins-Minerals (THERAPEUTIC MULTIVITAMIN-MINERALS) tablet Take 1 tablet by mouth daily      ELIQUIS 5 MG TABS tablet TAKE ONE TABLET BY MOUTH TWICE A DAY (Patient not taking: Reported on 2/3/2021) 60 tablet 2     ALLERGIES   No Known Allergies  IMMUNIZATIONS     Immunization History   Administered Date(s) Administered    Influenza Virus Vaccine 10/02/2020    Influenza, Quadv, IM, PF (6 mo and older Fluzone, Flulaval, Fluarix, and 3 yrs and older Afluria) 10/21/2019    Pneumococcal Conjugate 13-valent ([id number]) 05/16/2019    Pneumococcal Polysaccharide ([id number]) 11/02/2020  Tdap (Boostrix, Adacel) 07/29/2019    Zoster Recombinant (Shingrix) 11/02/2020     REVIEW OF SYSTEMS   See HPI for further details and pertinent postiives. Negative for the following:  Constitutional: Negative for weight change. Negative for appetite change and fatigue. HENT: Negative for nosebleeds, sore throat, mouth sores, and voice change. Respiratory: Negative for cough, choking and chest tightness. Cardiovascular: Negative for chest pain   Gastrointestinal: See HPI  Musculoskeletal: Negative for arthralgias. Skin: Negative for pallor. Neurological: Negative for weakness and light-headedness. Hematological: Negative for adenopathy. Does not bruise/bleed easily. Psychiatric/Behavioral: Negative for suicidal ideas. PHYSICAL EXAM   VITAL SIGNS: /72 (Site: Left Upper Arm, Position: Sitting, Cuff Size: Large Adult)   Pulse 82   Temp 97.7 °F (36.5 °C) (Temporal)   Ht 5' 2\" (1.575 m)   Wt 206 lb (93.4 kg)   SpO2 95%   BMI 37.68 kg/m²   Wt Readings from Last 3 Encounters:   02/03/21 206 lb (93.4 kg)   02/03/21 208 lb (94.3 kg)   11/02/20 230 lb (104.3 kg)     Constitutional: Well developed, Well nourished, No acute distress, Non-toxic appearance. HENT: Normocephalic, Atraumatic, Bilateral external ears normal, Oropharynx moist, No oral exudates, Nose normal.   Eyes: Conjunctiva normal, No discharge. Neck: Normal range of motion, No tenderness, Supple, No stridor. Lymphatic: No cervical, subclavian, or axillary lymphadenopathy. Cardiovascular: Normal heart rate, Normal rhythm, No murmurs, No rubs, No gallops. Thorax & Lungs: Normal breath sounds, No respiratory distress, No wheezing, No chest tenderness. Abdomen: Pannus abdomen, normal bowel sounds, soft, non tender, non distended, no hernias   Rectal:  Deferred. Skin: Warm, Dry, No erythema, No rash. No bruising. No spider hemangiomas. Lower Extremities: Intact distal pulses, No edema, No tenderness, No cyanosis, No clubbing. Neurologic: Alert & oriented x 3, Normal motor function, Normal sensory function, No focal deficits noted. No asterixis. RADIOLOGY/PROCEDURES   No results found. FINAL IMPRESSION   Kimberly Arce was seen today for new patient. Diagnoses and all orders for this visit:    Epigastric pain; differentials include but not limited to peptic ulcer disease, esophagitis, gastritis, duodenitis, hepatobiliary pathology, acute pancreatitis versus superior mesenteric artery syndrome (given recent significant weight loss)  -     LIPASE; Future  -     EGD with biopsy  -     Covid-19 Ambulatory; Future  -     Trial of Protonix 40 mg orally twice daily  - Counseled on cessation of NSAIDs including Aleve, Advil, Motrin, ibuprofen. To consider acetaminophen for arthritic pains, no more than 4 g daily.  -      To follow-up labs ordered, CBC, BMP, LFTs and ultrasound right upper quadrant.  - Consider CT abdomen and pelvis with p.o. and IV contrast if above work-up unremarkable to investigate SMA syndrome. Esophagogastroduodenoscopy (EGD) with alternatives, rationale, benefits and risks, not limited to bleeding, infection, perforation, need of surgery, prolong hospital stay and anesthesia side effects were explained. Patient verbalized understanding and agrees to proceed with EGD. Colon cancer screening; average risk  -     COLONOSCOPY W/ OR W/O BIOPSY  -     Covid-19 Ambulatory; Future    Colonoscopy with alternatives, rationale, benefits and risks, not limited to bleeding, infection, perforation, need of surgery, prolong hospital stay and anesthesia side effects were explained. Patient verbalized understanding and agrees to proceed with colonoscopy. Other orders  -     pantoprazole (PROTONIX) 40 MG tablet;  Take 1 tablet by mouth 2 times daily (before meals) If anticoagulation can not be held because recent cardiac stent, elective endoscopic procedures should be delayed until they have received the minimum duration of recommended antiplatlet therapy and it can safely be held. Again if unsure, patient should discuss with prescribing physician/service. If anticoagulation can not be stopped, endoscopic procedures can still be performed either diagnostically at a somewhat higher risk. Understand that any therapeutic procedure where anything beyond looking is performed, carries higher risks. For this reason without overt bleeding other testing       such as cologuard may be more appropriate. High risk endoscopic procedures that require stopping antiplatelet and anticoagulation therapy include polypectomy, biliary or pancreatic sphincterotomy, pneumatic or bougie dilation, PEG placement, therapeutic balloon-assisted enteroscopy, EUS and FNA, tumor ablation by any technique,       cystogastrostomy,and treatment of varices. Order Specific Question:   Screening or Diagnostic? Answer:   Diagnostic    LIPASE     Standing Status:   Future     Standing Expiration Date:   2/3/2022    Covid-19 Ambulatory     Standing Status:   Future     Standing Expiration Date:   2/3/2022     Scheduling Instructions:      Saline media preferred given current shortage of viral transport media but both acceptable     Order Specific Question:   Status     Answer:   Asymptomatic/Surveillance (e.g. pre-op/pre-procedure, pre-delivery, transfer)     Order Specific Question:   Reason for Test     Answer:   Upcoming elective surgery/procedure/delivery, return to work, or discharge to another facility    EGD     Scheduling Instructions:      Schedule with anesthesia provided diprivan sedation. Please provide prep of choice instructions and prescription. General guidelines for holding blood thinners/anticoagulants around endoscopic procedure are but patients are encouraged to check with their prescribing physician. The patient may hold Plavix, Effient, Brilinta 5 days prior to the procedure unless:       A drug eluting stent has been placed within past 12 months. A nondrug eluting stent has been placed within past 1 month. Coumadin may be held 4 days prior to the procedure unless:        Mechanical mitral valve replacement (requires heparin bridge while Coumadin held and is managed by pharmacy)      Pradaxa, Xarelto, Eliquis may be held 2-3 days prior to procedure. According to pharmacokinetics of the drug, package insert, cardiology practice patterns, and T1/2 of theses drugs (12 hrs), Eliquis and Xarelto are held 48hrs prior to any procedure, including major surgical procedures w/o       increased bleeding.  That is usually the standard of care, as coagulation would/should be normalized at 48hrs. Every attempt should be made to maintain ASA 81mg per day throughout the juan luis-operative period in patients with diagnosis of ASHD. These recommendations may need to be modified by the provider/ based on risk /benefit analysis of the procedure and the patients history. If anticoagulation can not be held because recent cardiac stent, elective endoscopic procedures should be delayed until they have received the minimum duration of recommended antiplatlet therapy and it can safely be held. Again if unsure, patient should discuss with prescribing physician/service. If anticoagulation can not be stopped, endoscopic procedures can still be performed either diagnostically at a somewhat higher risk. Understand that any therapeutic procedure where anything beyond looking is performed, carries higher risks. For this reason without overt bleeding other testing       such as cologuard may be more appropriate. High risk endoscopic procedures that require stopping antiplatelet and anticoagulation therapy include polypectomy, biliary or pancreatic sphincterotomy, pneumatic or bougie dilation, PEG placement, therapeutic balloon-assisted enteroscopy, EUS and FNA, tumor ablation by any technique,       cystogastrostomy,and treatment of varices. Order Specific Question:   Screening or Diagnostic? Answer:   Diagnostic       ORDERED FUTURE/PENDING TESTS     Lab Frequency Next Occurrence       FOLLOWUP   Return in about 6 weeks (around 3/17/2021).           Chandana Mckeon 2/1/21 8:19 AM EST    CC:  Tha Belle MD

## 2021-02-03 ENCOUNTER — INITIAL CONSULT (OUTPATIENT)
Dept: GASTROENTEROLOGY | Age: 61
End: 2021-02-03
Payer: COMMERCIAL

## 2021-02-03 ENCOUNTER — OFFICE VISIT (OUTPATIENT)
Dept: INTERNAL MEDICINE CLINIC | Age: 61
End: 2021-02-03

## 2021-02-03 VITALS
SYSTOLIC BLOOD PRESSURE: 130 MMHG | WEIGHT: 208 LBS | DIASTOLIC BLOOD PRESSURE: 80 MMHG | BODY MASS INDEX: 38.28 KG/M2 | HEIGHT: 62 IN | TEMPERATURE: 97.9 F

## 2021-02-03 VITALS
TEMPERATURE: 97.7 F | DIASTOLIC BLOOD PRESSURE: 72 MMHG | SYSTOLIC BLOOD PRESSURE: 128 MMHG | HEART RATE: 82 BPM | OXYGEN SATURATION: 95 % | BODY MASS INDEX: 37.91 KG/M2 | HEIGHT: 62 IN | WEIGHT: 206 LBS

## 2021-02-03 DIAGNOSIS — Z00.00 ROUTINE GENERAL MEDICAL EXAMINATION AT A HEALTH CARE FACILITY: Primary | ICD-10-CM

## 2021-02-03 DIAGNOSIS — Z12.11 COLON CANCER SCREENING: ICD-10-CM

## 2021-02-03 DIAGNOSIS — Z00.00 ROUTINE GENERAL MEDICAL EXAMINATION AT A HEALTH CARE FACILITY: ICD-10-CM

## 2021-02-03 DIAGNOSIS — J45.20 MILD INTERMITTENT ASTHMA WITHOUT COMPLICATION: ICD-10-CM

## 2021-02-03 DIAGNOSIS — Z86.711 HISTORY OF PULMONARY EMBOLISM: ICD-10-CM

## 2021-02-03 DIAGNOSIS — R10.13 EPIGASTRIC PAIN: Primary | ICD-10-CM

## 2021-02-03 DIAGNOSIS — R10.13 EPIGASTRIC ABDOMINAL PAIN: ICD-10-CM

## 2021-02-03 LAB
A/G RATIO: 1.6 (ref 1.1–2.2)
ALBUMIN SERPL-MCNC: 4.2 G/DL (ref 3.4–5)
ALP BLD-CCNC: 83 U/L (ref 40–129)
ALT SERPL-CCNC: 10 U/L (ref 10–40)
ANION GAP SERPL CALCULATED.3IONS-SCNC: 10 MMOL/L (ref 3–16)
AST SERPL-CCNC: 10 U/L (ref 15–37)
BASOPHILS ABSOLUTE: 0.1 K/UL (ref 0–0.2)
BASOPHILS RELATIVE PERCENT: 1.3 %
BILIRUB SERPL-MCNC: 0.3 MG/DL (ref 0–1)
BUN BLDV-MCNC: 12 MG/DL (ref 7–20)
CALCIUM SERPL-MCNC: 9.8 MG/DL (ref 8.3–10.6)
CHLORIDE BLD-SCNC: 102 MMOL/L (ref 99–110)
CHOLESTEROL, TOTAL: 217 MG/DL (ref 0–199)
CO2: 30 MMOL/L (ref 21–32)
CREAT SERPL-MCNC: 0.6 MG/DL (ref 0.6–1.1)
EOSINOPHILS ABSOLUTE: 0.4 K/UL (ref 0–0.6)
EOSINOPHILS RELATIVE PERCENT: 4.7 %
GFR AFRICAN AMERICAN: >60
GFR NON-AFRICAN AMERICAN: >60
GLOBULIN: 2.7 G/DL
GLUCOSE BLD-MCNC: 94 MG/DL (ref 70–99)
HCT VFR BLD CALC: 49.1 % (ref 36–48)
HDLC SERPL-MCNC: 32 MG/DL (ref 40–60)
HEMOGLOBIN: 16.5 G/DL (ref 12–16)
LDL CHOLESTEROL CALCULATED: 147 MG/DL
LIPASE: 27 U/L (ref 13–60)
LYMPHOCYTES ABSOLUTE: 3 K/UL (ref 1–5.1)
LYMPHOCYTES RELATIVE PERCENT: 33.9 %
MCH RBC QN AUTO: 30.8 PG (ref 26–34)
MCHC RBC AUTO-ENTMCNC: 33.5 G/DL (ref 31–36)
MCV RBC AUTO: 91.7 FL (ref 80–100)
MONOCYTES ABSOLUTE: 0.6 K/UL (ref 0–1.3)
MONOCYTES RELATIVE PERCENT: 7.1 %
NEUTROPHILS ABSOLUTE: 4.7 K/UL (ref 1.7–7.7)
NEUTROPHILS RELATIVE PERCENT: 53 %
PDW BLD-RTO: 13.2 % (ref 12.4–15.4)
PLATELET # BLD: 312 K/UL (ref 135–450)
PMV BLD AUTO: 8.5 FL (ref 5–10.5)
POTASSIUM SERPL-SCNC: 4.7 MMOL/L (ref 3.5–5.1)
RBC # BLD: 5.36 M/UL (ref 4–5.2)
SODIUM BLD-SCNC: 142 MMOL/L (ref 136–145)
TOTAL PROTEIN: 6.9 G/DL (ref 6.4–8.2)
TRIGL SERPL-MCNC: 188 MG/DL (ref 0–150)
TSH SERPL DL<=0.05 MIU/L-ACNC: 0.07 UIU/ML (ref 0.27–4.2)
URIC ACID, SERUM: 4.3 MG/DL (ref 2.6–6)
VLDLC SERPL CALC-MCNC: 38 MG/DL
WBC # BLD: 8.8 K/UL (ref 4–11)

## 2021-02-03 PROCEDURE — 99244 OFF/OP CNSLTJ NEW/EST MOD 40: CPT | Performed by: INTERNAL MEDICINE

## 2021-02-03 PROCEDURE — 99396 PREV VISIT EST AGE 40-64: CPT | Performed by: INTERNAL MEDICINE

## 2021-02-03 RX ORDER — POLYETHYLENE GLYCOL 3350 17 G/17G
238 POWDER ORAL DAILY
Qty: 255 G | Refills: 0 | Status: SHIPPED | OUTPATIENT
Start: 2021-02-03 | End: 2021-02-23

## 2021-02-03 RX ORDER — PANTOPRAZOLE SODIUM 40 MG/1
40 TABLET, DELAYED RELEASE ORAL
Qty: 180 TABLET | Refills: 1 | Status: SHIPPED | OUTPATIENT
Start: 2021-02-03 | End: 2021-05-17 | Stop reason: ALTCHOICE

## 2021-02-03 RX ORDER — FAMOTIDINE 20 MG/1
20 TABLET, FILM COATED ORAL DAILY
COMMUNITY
End: 2021-03-03 | Stop reason: ALTCHOICE

## 2021-02-03 ASSESSMENT — ENCOUNTER SYMPTOMS
VOMITING: 0
SHORTNESS OF BREATH: 0
NAUSEA: 0
ABDOMINAL PAIN: 0
RHINORRHEA: 0
WHEEZING: 0

## 2021-02-03 NOTE — PROGRESS NOTES
Subjective:      Patient ID: Bernice Carlson is a 64 y.o. female. HPI     Patient is here an annual exam.    She has had abdominal pain for two months. She has epigastric pain after she eats. She has epigastric pain and she has emesis. The pain lasts for a few hours. She has history of DVT and PE. She is finished with Eliquis and takes a baby ASA. She has ZANE. She is using CPAP. She has been on Weight Watchers and has lost 68 lbs in 9 months. She has quit smoking. Her foot drop on the left is improved. Review of Systems   Constitutional: Negative for appetite change and fatigue. HENT: Negative for postnasal drip and rhinorrhea. Respiratory: Negative for shortness of breath and wheezing. Cardiovascular: Negative for chest pain and palpitations. Gastrointestinal: Negative for abdominal pain, nausea and vomiting. Musculoskeletal: Negative for joint swelling. Skin: Negative for rash. Neurological: Negative for light-headedness. Foot drop   Psychiatric/Behavioral: Negative for sleep disturbance.      Past Medical History:   Diagnosis Date    Acute deep vein thrombosis (DVT) of distal end of left lower extremity (HCC)     Acute deep vein thrombosis (DVT) of left peroneal vein (HCC)     Anxiety state, unspecified 5/17/2010    Impacted cerumen 5/17/2010    Morbid obesity with BMI of 45.0-49.9, adult ([facility] 11/26/2019    Pulmonary embolism (HCC)     Rheumatoid arthritis ([facility] 5/17/2010    Rheumatoid arthritis(714.0) 5/17/2010    Tobacco abuse 5/31/2011     Past Surgical History:   Procedure Laterality Date    BRONCHOSCOPY N/A 5/6/2019    BRONCHOSCOPY ALVEOLAR LAVAGE performed by Jeimy Wilson MD at [address] [place] [address]      both hands 1990's   [facility]ERY  11/25/2019      Family History   Problem Relation Age of Onset    Stroke Paternal Grandmother     Heart Attack Paternal Grandfather 62    Diabetes Paternal Grandfather    [facility] Other Mother         Factor 5 Lieden     Cancer Father         Non Hodgkin's lymphoma       Social History     Tobacco Use    Smoking status: Former Smoker     Packs/day: 0.50     Years: 30.00     Pack years: 15.00     Types: Cigarettes     Start date: 1970     Quit date: 2019     Years since quittin.7    Smokeless tobacco: Never Used   Substance Use Topics    Alcohol use: No     Alcohol/week: 0.0 standard drinks    Drug use: No       /80 (Site: Right Upper Arm, Position: Sitting, Cuff Size: Large Adult)   Temp 97.9 °F (36.6 °C)   Ht 5' 2\" (1.575 m)   Wt 208 lb (94.3 kg)   BMI 38.04 kg/m²     Objective:   Physical Exam  Constitutional:       Appearance: She is well-developed. HENT:      Head: Normocephalic. Eyes:      Conjunctiva/sclera: Conjunctivae normal.      Pupils: Pupils are equal, round, and reactive to light. Neck:      Musculoskeletal: Normal range of motion and neck supple. Thyroid: No thyroid mass or thyromegaly. Vascular: No carotid bruit or JVD. Trachea: Trachea normal.   Cardiovascular:      Rate and Rhythm: Normal rate and regular rhythm. Heart sounds: Normal heart sounds. No murmur. No gallop. Pulmonary:      Effort: Pulmonary effort is normal. No respiratory distress. Breath sounds: Normal breath sounds. No wheezing or rales. Abdominal:      General: Bowel sounds are normal. There is no distension. Palpations: Abdomen is soft. There is no hepatomegaly, splenomegaly or mass. Tenderness: There is no abdominal tenderness. Musculoskeletal: Normal range of motion. Lymphadenopathy:      Cervical: No cervical adenopathy. Skin:     General: Skin is warm and dry. Findings: No rash. Neurological:      Mental Status: She is alert and oriented to person, place, and time. Cranial Nerves: No cranial nerve deficit. Deep Tendon Reflexes: Reflexes are normal and symmetric.       Comments: Mild left foot drop   Psychiatric:         Behavior: Behavior normal.         Thought Content: Thought content normal.         Judgment: Judgment normal.         Assessment:       Diagnosis Orders   1. Routine general medical examination at a health care facility  Comprehensive Metabolic Panel    CBC Auto Differential    Lipid Panel    Uric Acid    TSH without Reflex    HIV Screen   2. Epigastric abdominal pain  US GALLBLADDER RUQ   3. Mild intermittent asthma without complication     4. History of pulmonary embolism            Plan:        #  Annual exam done. #  History of pulmonary embolism. On ASA. #  Vocal cord polyp removed. #  DVT see above. #  ZANE - on CPAP. #  Left foot drop. Much improved. #  SARAHI on Celexa. #  Morbid Obesity resolved. She has lost 68 lbs. #  Epigastric abdominal pain. I suspect GB disease. She has mild RUB tenderness. She has lost over 68 lbs which makes her more susceptible to getting gall stones. Check labs. Check RUQ abd US. She has an appointment to see Gi today. #  I had a long conversation with the patient about having a screening colonoscopy.        Esme Sagastume MD

## 2021-02-04 LAB
HIV AG/AB: NORMAL
HIV ANTIGEN: NORMAL
HIV-1 ANTIBODY: NORMAL
HIV-2 AB: NORMAL

## 2021-02-05 ENCOUNTER — TELEPHONE (OUTPATIENT)
Dept: INTERNAL MEDICINE CLINIC | Age: 61
End: 2021-02-05

## 2021-02-05 DIAGNOSIS — E05.90 HYPERTHYROIDISM: ICD-10-CM

## 2021-02-05 DIAGNOSIS — E05.90 HYPERTHYROIDISM: Primary | ICD-10-CM

## 2021-02-05 LAB
T3 FREE: 3.9 PG/ML (ref 2.3–4.2)
T4 FREE: 1.2 NG/DL (ref 0.9–1.8)

## 2021-02-05 NOTE — TELEPHONE ENCOUNTER
----- Message from Cassandria Frankel, MD sent at 2/5/2021 10:15 AM EST -----  Add t4free and t3 free

## 2021-02-10 ENCOUNTER — HOSPITAL ENCOUNTER (OUTPATIENT)
Dept: ULTRASOUND IMAGING | Age: 61
Discharge: HOME | End: 2021-02-10
Payer: COMMERCIAL

## 2021-02-10 DIAGNOSIS — R10.13 EPIGASTRIC ABDOMINAL PAIN: ICD-10-CM

## 2021-02-10 PROCEDURE — 76705 ECHO EXAM OF ABDOMEN: CPT

## 2021-02-23 ENCOUNTER — INITIAL CONSULT (OUTPATIENT)
Dept: SURGERY | Age: 61
End: 2021-02-23
Payer: COMMERCIAL

## 2021-02-23 VITALS
BODY MASS INDEX: 38.09 KG/M2 | DIASTOLIC BLOOD PRESSURE: 76 MMHG | WEIGHT: 207 LBS | SYSTOLIC BLOOD PRESSURE: 128 MMHG | TEMPERATURE: 97.7 F | HEIGHT: 62 IN

## 2021-02-23 DIAGNOSIS — Z01.818 PRE-OP TESTING: ICD-10-CM

## 2021-02-23 DIAGNOSIS — K80.20 GALLSTONES: Primary | ICD-10-CM

## 2021-02-23 PROCEDURE — 99243 OFF/OP CNSLTJ NEW/EST LOW 30: CPT | Performed by: SURGERY

## 2021-02-23 RX ORDER — HEPARIN SODIUM 5000 [USP'U]/ML
5000 INJECTION, SOLUTION INTRAVENOUS; SUBCUTANEOUS ONCE
Status: CANCELLED | OUTPATIENT
Start: 2021-02-23

## 2021-02-23 RX ORDER — SODIUM CHLORIDE 0.9 % (FLUSH) 0.9 %
10 SYRINGE (ML) INJECTION EVERY 12 HOURS SCHEDULED
Status: CANCELLED | OUTPATIENT
Start: 2021-02-23

## 2021-02-23 RX ORDER — INDOCYANINE GREEN AND WATER 25 MG
5 KIT INJECTION ONCE
Status: CANCELLED | OUTPATIENT
Start: 2021-02-23 | End: 2021-02-23

## 2021-02-23 RX ORDER — SODIUM CHLORIDE 0.9 % (FLUSH) 0.9 %
10 SYRINGE (ML) INJECTION PRN
Status: CANCELLED | OUTPATIENT
Start: 2021-02-23

## 2021-02-23 NOTE — PROGRESS NOTES
New Patient Via Spencer Sen MD    [address], [address] [facility]  [place] [phone]    Safia Elias   YOB: 1960    Date of Visit:  2/23/2021    Lion Kern MD    Chief Complaint: Abdominal pain    HPI: Patient presents for evaluation of epigastric and right upper quadrant abdominal pain. This has been going on for several months. She has intermittent episodes associated with nausea and vomiting. She states they are getting more frequent, about once a week now. She has been watching what she eats, and this seems to help.   Eating fattier foods seems to exacerbate her symptoms    No Known Allergies  Outpatient Medications Marked as Taking for the 2/23/21 encounter (Initial consult) with Boubacar Dee MD   Medication Sig Dispense Refill    pantoprazole (PROTONIX) 40 MG tablet Take 1 tablet by mouth 2 times daily (before meals) 180 tablet 1    citalopram (CELEXA) 20 MG tablet Take 1 tablet by mouth daily 90 tablet 0    albuterol sulfate  (90 Base) MCG/ACT inhaler Inhale 2 puffs into the lungs every 4 hours as needed for Wheezing or Shortness of Breath 1 Inhaler 10    albuterol (ACCUNEB) 1.25 MG/3ML nebulizer solution Inhale 3 mLs into the lungs every 6 hours as needed for Wheezing 360 mL 0    aspirin 81 MG tablet Take 81 mg by mouth daily      Cholecalciferol (VITAMIN D) 50 MCG (2000 UT) CAPS capsule Take by mouth      Multiple Vitamins-Minerals (THERAPEUTIC MULTIVITAMIN-MINERALS) tablet Take 1 tablet by mouth daily         Past Medical History:   Diagnosis Date    Acute deep vein thrombosis (DVT) of distal end of left lower extremity (HCC)     Acute deep vein thrombosis (DVT) of left peroneal vein (HCC)     Anxiety state, unspecified 5/17/2010    Impacted cerumen 5/17/2010    Morbid obesity with BMI of 45.0-49.9, adult ([facility] [address] 11/26/2019    [facility]  Rheumatoid arthritis ([address] 2010    Rheumatoid arthritis(714.0) 2010    Tobacco abuse 2011     Past Surgical History:   Procedure Laterality Date    BRONCHOSCOPY N/A 2019    BRONCHOSCOPY ALVEOLAR LAVAGE performed by Manuel Hernandez MD at [address] [facility]      both hands 's   [facility] SURGERY  2019     Family History   Problem Relation Age of Onset    Stroke Paternal Grandmother     Heart Attack Paternal Grandfather 62    Diabetes Paternal Grandfather     Other Mother         Factor 5 Lieden     Cancer Father         Non Hodgkin's lymphoma     Social History     Socioeconomic History    Marital status: Single     Spouse name: Not on file    Number of children: 0    Years of education: Not on file    Highest education level: Not on file   Occupational History    Not on file   Social Needs    Financial resource strain: Not on file    Food insecurity     Worry: Not on file     Inability: Not on file   Untangle needs     Medical: Not on file     Non-medical: Not on file   Tobacco Use    Smoking status: Former Smoker     Packs/day: 0.50     Years: 30.00     Pack years: 15.00     Types: Cigarettes     Start date: 1970     Quit date: 2019     Years since quittin.8    Smokeless tobacco: Never Used   Substance and Sexual Activity    Alcohol use: No     Alcohol/week: 0.0 standard drinks    Drug use: No    Sexual activity: Not on file   Lifestyle    Physical activity     Days per week: Not on file     Minutes per session: Not on file    Stress: Not on file   Relationships    Social connections     Talks on phone: Not on file     Gets together: Not on file     Attends Taoist service: Not on file     Active member of club or organization: Not on file     Attends meetings of clubs or organizations: Not on file     Relationship status: Not on file    Intimate partner violence     Fear of current or ex partner: Not on file     Emotionally abused: Not on file     Physically abused: Not on file     Forced sexual activity: Not on file   Other Topics Concern    Not on file   Social History Narrative    Not on file          Vitals:    02/23/21 0824   BP: 128/76   Site: Left Upper Arm   Position: Sitting   Cuff Size: Large Adult   Temp: 97.7 °F (36.5 °C)   TempSrc: Temporal   Weight: 207 lb (93.9 kg)   Height: 5' 2\" (1.575 m)     Body mass index is 37.86 kg/m². Wt Readings from Last 3 Encounters:   02/23/21 207 lb (93.9 kg)   02/03/21 206 lb (93.4 kg)   02/03/21 208 lb (94.3 kg)     BP Readings from Last 3 Encounters:   02/23/21 128/76   02/03/21 128/72   02/03/21 130/80        REVIEW OF SYSTEMS:  CONSTITUTIONAL:  negative  HEENT:  Negative  RESPIRATORY:  negative  CARDIOVASCULAR:  negative  GASTROINTESTINAL:  positive for nausea, vomiting and abdominal pain  GENITOURINARY:  negative  HEMATOLOGIC/LYMPHATIC:  negative  ENDOCRINE:  Negative  NEUROLOGICAL:  Negative  * All other ROS reviewed and negative.      PE:  Constitutional:  Well developed, well nourished, no acute distress, non-toxic appearance   Eyes:  PERRL, conjunctiva normal   HENT:  Atraumatic, external ears normal, nose normal. Neck- normal range of motion, no tenderness, supple   Respiratory:  No respiratory distress, normal breath sounds, no rales, no wheezing   Cardiovascular:  Normal rate, normal rhythm  GI: Bowel sounds positive, soft, minimal tenderness in the right upper quadrant  :  No costovertebral angle tenderness   Integument:  Well hydrated, no rash   Lymphatic:  No lymphadenopathy noted   Neurologic:  Alert & oriented x 3, no focal deficits noted   Psychiatric:  Speech and behavior appropriate       DATA:  CBC with Differential:    Lab Results   Component Value Date    WBC 8.8 02/03/2021    RBC 5.36 02/03/2021    HGB 16.5 02/03/2021    HCT 49.1 02/03/2021     02/03/2021    MCV 91.7 02/03/2021    MCH 30.8 02/03/2021    MCHC 33.5 02/03/2021    RDW 13.2 02/03/2021    SEGSPCT 68 04/17/2019    LYMPHOPCT 33.9 02/03/2021    MONOPCT 7.1 02/03/2021    BASOPCT 1.3 02/03/2021    MONOSABS 0.6 02/03/2021    LYMPHSABS 3.0 02/03/2021    EOSABS 0.4 02/03/2021    BASOSABS 0.1 02/03/2021    DIFFTYPE Auto-K 06/18/2012     CMP:    Lab Results   Component Value Date     02/03/2021    K 4.7 02/03/2021    K 3.6 05/30/2019     02/03/2021    CO2 30 02/03/2021    BUN 12 02/03/2021    CREATININE 0.6 02/03/2021    GFRAA >60 02/03/2021    GFRAA >60 06/18/2012    AGRATIO 1.6 02/03/2021    LABGLOM >60 02/03/2021    GLUCOSE 94 02/03/2021    PROT 6.9 02/03/2021    PROT 6.8 06/18/2012    LABALBU 4.2 02/03/2021    CALCIUM 9.8 02/03/2021    BILITOT 0.3 02/03/2021    ALKPHOS 83 02/03/2021    AST 10 02/03/2021    ALT 10 02/03/2021     Radiology Review: Ultrasound images reviewed and show gallstones without secondary signs of cholecystitis      Assessment:  1. Gallstones        Plan: Robotic cholecystectomy, possible open cholecystectomy. I explained the procedure including risks, benefits, and alternatives. Questions were answered and the patient agrees to proceed.

## 2021-03-01 ENCOUNTER — HOSPITAL ENCOUNTER (OUTPATIENT)
Age: 61
Discharge: HOME | End: 2021-03-01
Payer: COMMERCIAL

## 2021-03-01 ENCOUNTER — TELEPHONE (OUTPATIENT)
Dept: GASTROENTEROLOGY | Age: 61
End: 2021-03-01

## 2021-03-01 DIAGNOSIS — Z01.818 PRE-OP TESTING: ICD-10-CM

## 2021-03-01 LAB — SARS-COV-2: NOT DETECTED

## 2021-03-01 PROCEDURE — U0003 INFECTIOUS AGENT DETECTION BY NUCLEIC ACID (DNA OR RNA); SEVERE ACUTE RESPIRATORY SYNDROME CORONAVIRUS 2 (SARS-COV-2) (CORONAVIRUS DISEASE [COVID-19]), AMPLIFIED PROBE TECHNIQUE, MAKING USE OF HIGH THROUGHPUT TECHNOLOGIES AS DESCRIBED BY CMS-2020-01-R: HCPCS

## 2021-03-03 NOTE — PROGRESS NOTES
Preoperative Screening for Elective Surgery/Invasive Procedures While COVID-19 present in the community    ? Have you had any of the following symptoms?no  o Fever, chills  o Cough  o Shortness of breath  o Muscle aches/pain  o Diarrhea  o Abdominal pain, nausea, vomiting  o Loss or decrease in taste and / or smell  ? Risk of Exposure  o Have you recently been hospitalized for COVID-19 or flu-like illness, if so when?no  o Recently diagnosed with COVID-19, if so when?no  o Recently tested for COVID-19, if so when?yes 3/1/21 for surgery  o Have you been in close contact with a person or family member who currently has or recently had [phone]? If yes, when and in what context?no  o Do you live with anybody who in the last 14 days has had fever, chills, shortness of breath, muscle aches, flu-like illness?no  o Do you have any close contacts or family members who are currently in the hospital for COVID-19 or flu-like illness? If yes, assess recent close contact with this person. no    Indicate if the patient has a positive screen by answering yes to one or more of the above questions. Patients who test positive or screen positive prior to surgery or on the day of surgery should be evaluated in conjunction with the surgeon/proceduralist/anesthesiologist to determine the urgency of the procedure.

## 2021-03-03 NOTE — PROGRESS NOTES
PRE OP INSTRUCTION SHEET   1. Do not eat or drink anything after 12 midnight  prior to surgery. This includes no water, chewing gum or mints. 2. Take the following pills will a small sip of water (see MAR)                                        3. Aspirin, Ibuprofen, Advil, Naproxen, Vitamin E, fish oil and other Anti-inflammatory products should be stopped for 5 days before surgery or as directed by your physician. 4. Check with your Doctor regarding stopping Plavix, Coumadin, Lovenox, Fragmin or other blood thinners   5. Do not smoke, and do not drink any alcoholic beverages 24 hours prior to surgery. This includes NA Beer. 6. You may brush your teeth and gargle the morning of surgery. DO NOT SWALLOW WATER   7. You MUST make arrangements for a responsible adult to take you home after your surgery. You will not be allowed to leave alone or drive yourself home. It is strongly suggested someone stay with you the first 24 hrs. Your surgery will be cancelled if you do not have a ride home. 8. A parent/legal guardian must accompany a child scheduled for surgery and plan to stay at the hospital until the child is discharged. Please do not bring other children with you. 9. Please wear simple, loose fitting clothing to the hospital.  [employer] not bring valuables (money, credit cards, checkbooks, etc.) Do not wear any makeup (including no eye makeup) or nail polish on your fingers or toes. 10. DO NOT wear any jewelry or piercings on day of surgery. All body piercing jewelry must be removed. 11. If you have dentures,glasses, or contacts they will be removed before going to the OR; we will provide you a container. 12. Please see your family doctor/and cardiologist for a history & physical and/or concerning medications. Bring any test results/reports from your physician's office.  Have history and labs faxed to [phone] 13. Remember to bring Blood Bank bracelet on the day of surgery. 14. If you have a Living Will and Durable Power of  for Healthcare, please bring in a copy. 13. Notify your Surgeon if you develop any illness between now and surgery  time, cough, cold, fever, sore throat, nausea, vomiting, etc.  Please notify your surgeon if you experience dizziness, shortness of breath or blurred vision between now & the time of your surgery   16. DO NOT shave your operative site 96 hours prior to surgery. For face & neck surgery, men may use an electric razor 48 hours prior to surgery. 17. Shower with _x__Antibacterial soap (x_chlorhexidine for total joint  Pt's) shower two times before surgery.(the morning of and the night before. 18. To provide excellent care visitors will be limited to one in the room at any given time.   Please call pre admission testing if you any further questions [phone] or [phone]

## 2021-03-05 ENCOUNTER — ANESTHESIA EVENT (OUTPATIENT)
Dept: OPERATING ROOM | Age: 61
End: 2021-03-05
Payer: COMMERCIAL

## 2021-03-05 ENCOUNTER — ANESTHESIA (OUTPATIENT)
Dept: OPERATING ROOM | Age: 61
End: 2021-03-05
Payer: COMMERCIAL

## 2021-03-05 ENCOUNTER — HOSPITAL ENCOUNTER (OUTPATIENT)
Age: 61
Setting detail: OUTPATIENT SURGERY
Discharge: HOME | End: 2021-03-05
Attending: SURGERY | Admitting: SURGERY
Payer: COMMERCIAL

## 2021-03-05 VITALS
DIASTOLIC BLOOD PRESSURE: 83 MMHG | SYSTOLIC BLOOD PRESSURE: 167 MMHG | OXYGEN SATURATION: 92 % | BODY MASS INDEX: 36.14 KG/M2 | RESPIRATION RATE: 20 BRPM | WEIGHT: 204 LBS | HEIGHT: 63 IN | HEART RATE: 85 BPM | TEMPERATURE: 97.4 F

## 2021-03-05 VITALS — TEMPERATURE: 97.5 F | SYSTOLIC BLOOD PRESSURE: 165 MMHG | OXYGEN SATURATION: 96 % | DIASTOLIC BLOOD PRESSURE: 72 MMHG

## 2021-03-05 DIAGNOSIS — K80.20 GALLSTONES: Primary | ICD-10-CM

## 2021-03-05 LAB
EKG ATRIAL RATE: 73 BPM
EKG DIAGNOSIS: NORMAL
EKG P AXIS: 52 DEGREES
EKG P-R INTERVAL: 144 MS
EKG Q-T INTERVAL: 396 MS
EKG QRS DURATION: 98 MS
EKG QTC CALCULATION (BAZETT): 436 MS
EKG R AXIS: 78 DEGREES
EKG T AXIS: 39 DEGREES
EKG VENTRICULAR RATE: 73 BPM

## 2021-03-05 PROCEDURE — 93005 ELECTROCARDIOGRAM TRACING: CPT | Performed by: ANESTHESIOLOGY

## 2021-03-05 PROCEDURE — 47562 LAPAROSCOPIC CHOLECYSTECTOMY: CPT | Performed by: SURGERY

## 2021-03-05 PROCEDURE — 7100000000 HC PACU RECOVERY - FIRST 15 MIN: Performed by: SURGERY

## 2021-03-05 PROCEDURE — 2709999900 HC NON-CHARGEABLE SUPPLY: Performed by: SURGERY

## 2021-03-05 PROCEDURE — 3700000000 HC ANESTHESIA ATTENDED CARE: Performed by: SURGERY

## 2021-03-05 PROCEDURE — 93010 ELECTROCARDIOGRAM REPORT: CPT | Performed by: INTERNAL MEDICINE

## 2021-03-05 PROCEDURE — 2580000003 HC RX 258: Performed by: ANESTHESIOLOGY

## 2021-03-05 PROCEDURE — 7100000001 HC PACU RECOVERY - ADDTL 15 MIN: Performed by: SURGERY

## 2021-03-05 PROCEDURE — 7100000011 HC PHASE II RECOVERY - ADDTL 15 MIN: Performed by: SURGERY

## 2021-03-05 PROCEDURE — 2500000003 HC RX 250 WO HCPCS: Performed by: SURGERY

## 2021-03-05 PROCEDURE — 6360000002 HC RX W HCPCS: Performed by: NURSE ANESTHETIST, CERTIFIED REGISTERED

## 2021-03-05 PROCEDURE — 3600000019 HC SURGERY ROBOT ADDTL 15MIN: Performed by: SURGERY

## 2021-03-05 PROCEDURE — 7100000010 HC PHASE II RECOVERY - FIRST 15 MIN: Performed by: SURGERY

## 2021-03-05 PROCEDURE — 2500000003 HC RX 250 WO HCPCS: Performed by: ANESTHESIOLOGY

## 2021-03-05 PROCEDURE — 2500000003 HC RX 250 WO HCPCS: Performed by: NURSE ANESTHETIST, CERTIFIED REGISTERED

## 2021-03-05 PROCEDURE — 3600000009 HC SURGERY ROBOT BASE: Performed by: SURGERY

## 2021-03-05 PROCEDURE — 88304 TISSUE EXAM BY PATHOLOGIST: CPT

## 2021-03-05 PROCEDURE — S2900 ROBOTIC SURGICAL SYSTEM: HCPCS | Performed by: SURGERY

## 2021-03-05 PROCEDURE — 6360000002 HC RX W HCPCS: Performed by: ANESTHESIOLOGY

## 2021-03-05 PROCEDURE — 6360000002 HC RX W HCPCS: Performed by: SURGERY

## 2021-03-05 PROCEDURE — 3700000001 HC ADD 15 MINUTES (ANESTHESIA): Performed by: SURGERY

## 2021-03-05 PROCEDURE — 6370000000 HC RX 637 (ALT 250 FOR IP): Performed by: ANESTHESIOLOGY

## 2021-03-05 RX ORDER — HEPARIN SODIUM 5000 [USP'U]/ML
5000 INJECTION, SOLUTION INTRAVENOUS; SUBCUTANEOUS ONCE
Status: COMPLETED | OUTPATIENT
Start: 2021-03-05 | End: 2021-03-05

## 2021-03-05 RX ORDER — HYDROMORPHONE HCL 110MG/55ML
PATIENT CONTROLLED ANALGESIA SYRINGE INTRAVENOUS PRN
Status: DISCONTINUED | OUTPATIENT
Start: 2021-03-05 | End: 2021-03-05 | Stop reason: SDUPTHER

## 2021-03-05 RX ORDER — LABETALOL HYDROCHLORIDE 5 MG/ML
5 INJECTION, SOLUTION INTRAVENOUS
Status: DISCONTINUED | OUTPATIENT
Start: 2021-03-05 | End: 2021-03-05 | Stop reason: HOSPADM

## 2021-03-05 RX ORDER — INDOCYANINE GREEN AND WATER 25 MG
5 KIT INJECTION ONCE
Status: COMPLETED | OUTPATIENT
Start: 2021-03-05 | End: 2021-03-05

## 2021-03-05 RX ORDER — FENTANYL CITRATE 50 UG/ML
INJECTION, SOLUTION INTRAMUSCULAR; INTRAVENOUS PRN
Status: DISCONTINUED | OUTPATIENT
Start: 2021-03-05 | End: 2021-03-05 | Stop reason: SDUPTHER

## 2021-03-05 RX ORDER — HYDRALAZINE HYDROCHLORIDE 20 MG/ML
5 INJECTION INTRAMUSCULAR; INTRAVENOUS EVERY 30 MIN PRN
Status: DISCONTINUED | OUTPATIENT
Start: 2021-03-05 | End: 2021-03-05 | Stop reason: HOSPADM

## 2021-03-05 RX ORDER — SODIUM CHLORIDE 0.9 % (FLUSH) 0.9 %
10 SYRINGE (ML) INJECTION PRN
Status: DISCONTINUED | OUTPATIENT
Start: 2021-03-05 | End: 2021-03-05 | Stop reason: HOSPADM

## 2021-03-05 RX ORDER — ROCURONIUM BROMIDE 10 MG/ML
INJECTION, SOLUTION INTRAVENOUS PRN
Status: DISCONTINUED | OUTPATIENT
Start: 2021-03-05 | End: 2021-03-05 | Stop reason: SDUPTHER

## 2021-03-05 RX ORDER — OXYCODONE HYDROCHLORIDE AND ACETAMINOPHEN 5; 325 MG/1; MG/1
2 TABLET ORAL PRN
Status: COMPLETED | OUTPATIENT
Start: 2021-03-05 | End: 2021-03-05

## 2021-03-05 RX ORDER — LIDOCAINE HYDROCHLORIDE 20 MG/ML
INJECTION, SOLUTION EPIDURAL; INFILTRATION; INTRACAUDAL; PERINEURAL PRN
Status: DISCONTINUED | OUTPATIENT
Start: 2021-03-05 | End: 2021-03-05 | Stop reason: SDUPTHER

## 2021-03-05 RX ORDER — KETOROLAC TROMETHAMINE 30 MG/ML
INJECTION, SOLUTION INTRAMUSCULAR; INTRAVENOUS PRN
Status: DISCONTINUED | OUTPATIENT
Start: 2021-03-05 | End: 2021-03-05 | Stop reason: SDUPTHER

## 2021-03-05 RX ORDER — LIDOCAINE HYDROCHLORIDE 10 MG/ML
0.3 INJECTION, SOLUTION EPIDURAL; INFILTRATION; INTRACAUDAL; PERINEURAL
Status: COMPLETED | OUTPATIENT
Start: 2021-03-05 | End: 2021-03-05

## 2021-03-05 RX ORDER — OXYCODONE HYDROCHLORIDE AND ACETAMINOPHEN 5; 325 MG/1; MG/1
1 TABLET ORAL PRN
Status: COMPLETED | OUTPATIENT
Start: 2021-03-05 | End: 2021-03-05

## 2021-03-05 RX ORDER — ONDANSETRON 2 MG/ML
4 INJECTION INTRAMUSCULAR; INTRAVENOUS EVERY 30 MIN PRN
Status: DISCONTINUED | OUTPATIENT
Start: 2021-03-05 | End: 2021-03-05 | Stop reason: HOSPADM

## 2021-03-05 RX ORDER — MEPERIDINE HYDROCHLORIDE 25 MG/ML
12.5 INJECTION INTRAMUSCULAR; INTRAVENOUS; SUBCUTANEOUS EVERY 5 MIN PRN
Status: DISCONTINUED | OUTPATIENT
Start: 2021-03-05 | End: 2021-03-05 | Stop reason: HOSPADM

## 2021-03-05 RX ORDER — ONDANSETRON 2 MG/ML
INJECTION INTRAMUSCULAR; INTRAVENOUS PRN
Status: DISCONTINUED | OUTPATIENT
Start: 2021-03-05 | End: 2021-03-05 | Stop reason: SDUPTHER

## 2021-03-05 RX ORDER — SODIUM CHLORIDE 0.9 % (FLUSH) 0.9 %
10 SYRINGE (ML) INJECTION EVERY 12 HOURS SCHEDULED
Status: DISCONTINUED | OUTPATIENT
Start: 2021-03-05 | End: 2021-03-05 | Stop reason: HOSPADM

## 2021-03-05 RX ORDER — SODIUM CHLORIDE, SODIUM LACTATE, POTASSIUM CHLORIDE, CALCIUM CHLORIDE 600; 310; 30; 20 MG/100ML; MG/100ML; MG/100ML; MG/100ML
INJECTION, SOLUTION INTRAVENOUS CONTINUOUS
Status: DISCONTINUED | OUTPATIENT
Start: 2021-03-05 | End: 2021-03-05 | Stop reason: HOSPADM

## 2021-03-05 RX ORDER — BUPIVACAINE HYDROCHLORIDE 5 MG/ML
INJECTION, SOLUTION EPIDURAL; INTRACAUDAL PRN
Status: DISCONTINUED | OUTPATIENT
Start: 2021-03-05 | End: 2021-03-05 | Stop reason: ALTCHOICE

## 2021-03-05 RX ORDER — MIDAZOLAM HYDROCHLORIDE 1 MG/ML
INJECTION INTRAMUSCULAR; INTRAVENOUS PRN
Status: DISCONTINUED | OUTPATIENT
Start: 2021-03-05 | End: 2021-03-05 | Stop reason: SDUPTHER

## 2021-03-05 RX ORDER — PROPOFOL 10 MG/ML
INJECTION, EMULSION INTRAVENOUS PRN
Status: DISCONTINUED | OUTPATIENT
Start: 2021-03-05 | End: 2021-03-05 | Stop reason: SDUPTHER

## 2021-03-05 RX ORDER — OXYCODONE HYDROCHLORIDE AND ACETAMINOPHEN 5; 325 MG/1; MG/1
1 TABLET ORAL EVERY 6 HOURS PRN
Qty: 20 TABLET | Refills: 0 | Status: SHIPPED | OUTPATIENT
Start: 2021-03-05 | End: 2021-03-10

## 2021-03-05 RX ORDER — DIPHENHYDRAMINE HYDROCHLORIDE 50 MG/ML
6.25 INJECTION INTRAMUSCULAR; INTRAVENOUS
Status: DISCONTINUED | OUTPATIENT
Start: 2021-03-05 | End: 2021-03-05 | Stop reason: HOSPADM

## 2021-03-05 RX ORDER — DEXAMETHASONE SODIUM PHOSPHATE 4 MG/ML
INJECTION, SOLUTION INTRA-ARTICULAR; INTRALESIONAL; INTRAMUSCULAR; INTRAVENOUS; SOFT TISSUE PRN
Status: DISCONTINUED | OUTPATIENT
Start: 2021-03-05 | End: 2021-03-05 | Stop reason: SDUPTHER

## 2021-03-05 RX ADMIN — SUGAMMADEX 200 MG: 100 INJECTION, SOLUTION INTRAVENOUS at 08:15

## 2021-03-05 RX ADMIN — INDOCYANINE GREEN AND WATER 5 MG: KIT at 07:08

## 2021-03-05 RX ADMIN — HEPARIN SODIUM 5000 UNITS: 5000 INJECTION, SOLUTION INTRAVENOUS; SUBCUTANEOUS at 07:07

## 2021-03-05 RX ADMIN — DEXAMETHASONE SODIUM PHOSPHATE 8 MG: 4 INJECTION, SOLUTION INTRAMUSCULAR; INTRAVENOUS at 07:33

## 2021-03-05 RX ADMIN — SODIUM CHLORIDE, POTASSIUM CHLORIDE, SODIUM LACTATE AND CALCIUM CHLORIDE: 600; 310; 30; 20 INJECTION, SOLUTION INTRAVENOUS at 07:11

## 2021-03-05 RX ADMIN — KETOROLAC TROMETHAMINE 30 MG: 30 INJECTION, SOLUTION INTRAMUSCULAR at 08:07

## 2021-03-05 RX ADMIN — ROCURONIUM BROMIDE 50 MG: 10 INJECTION, SOLUTION INTRAVENOUS at 07:33

## 2021-03-05 RX ADMIN — ONDANSETRON 4 MG: 2 INJECTION, SOLUTION INTRAMUSCULAR; INTRAVENOUS at 07:33

## 2021-03-05 RX ADMIN — LIDOCAINE HYDROCHLORIDE 60 MG: 20 INJECTION, SOLUTION EPIDURAL; INFILTRATION; INTRACAUDAL; PERINEURAL at 07:32

## 2021-03-05 RX ADMIN — PROPOFOL 200 MG: 10 INJECTION, EMULSION INTRAVENOUS at 07:32

## 2021-03-05 RX ADMIN — ONDANSETRON HYDROCHLORIDE 4 MG: 2 SOLUTION INTRAMUSCULAR; INTRAVENOUS at 09:11

## 2021-03-05 RX ADMIN — LIDOCAINE HYDROCHLORIDE 0.3 ML: 10 INJECTION, SOLUTION EPIDURAL; INFILTRATION; INTRACAUDAL; PERINEURAL at 07:11

## 2021-03-05 RX ADMIN — HYDROMORPHONE HYDROCHLORIDE 1 MG: 2 INJECTION, SOLUTION INTRAMUSCULAR; INTRAVENOUS; SUBCUTANEOUS at 08:17

## 2021-03-05 RX ADMIN — MIDAZOLAM 2 MG: 1 INJECTION INTRAMUSCULAR; INTRAVENOUS at 07:24

## 2021-03-05 RX ADMIN — FENTANYL CITRATE 100 MCG: 50 INJECTION INTRAMUSCULAR; INTRAVENOUS at 07:32

## 2021-03-05 RX ADMIN — OXYCODONE HYDROCHLORIDE AND ACETAMINOPHEN 1 TABLET: 5; 325 TABLET ORAL at 09:40

## 2021-03-05 ASSESSMENT — PULMONARY FUNCTION TESTS
PIF_VALUE: 28
PIF_VALUE: 19
PIF_VALUE: 0
PIF_VALUE: 26
PIF_VALUE: 21
PIF_VALUE: 2
PIF_VALUE: 20
PIF_VALUE: 1
PIF_VALUE: 1
PIF_VALUE: 27
PIF_VALUE: 2
PIF_VALUE: 22
PIF_VALUE: 20
PIF_VALUE: 20
PIF_VALUE: 21
PIF_VALUE: 20
PIF_VALUE: 25
PIF_VALUE: 28
PIF_VALUE: 21
PIF_VALUE: 28
PIF_VALUE: 18
PIF_VALUE: 25
PIF_VALUE: 25
PIF_VALUE: 27
PIF_VALUE: 2
PIF_VALUE: 22
PIF_VALUE: 0
PIF_VALUE: 21
PIF_VALUE: 1
PIF_VALUE: 18
PIF_VALUE: 13
PIF_VALUE: 26
PIF_VALUE: 18
PIF_VALUE: 25
PIF_VALUE: 13
PIF_VALUE: 26
PIF_VALUE: 20
PIF_VALUE: 26

## 2021-03-05 ASSESSMENT — PAIN DESCRIPTION - PAIN TYPE: TYPE: SURGICAL PAIN

## 2021-03-05 ASSESSMENT — PAIN SCALES - GENERAL: PAINLEVEL_OUTOF10: 4

## 2021-03-05 ASSESSMENT — ENCOUNTER SYMPTOMS: SHORTNESS OF BREATH: 1

## 2021-03-05 NOTE — ANESTHESIA PRE PROCEDURE
Department of Anesthesiology  Preprocedure Note       Name:  Pepe Mcdowell   Age:  64 y.o.  :  1960                                          MRN:  [id number]         Date:  3/5/2021      Surgeon: Shannon Cardenas):  Ean De La Rosa MD    Procedure: Procedure(s):  ROBOTIC CHOLECYSTECTOMY, POSSIBLE OPEN PROCEDURE    Medications prior to admission:   Prior to Admission medications    Medication Sig Start Date End Date Taking?  Authorizing Provider   pantoprazole (PROTONIX) 40 MG tablet Take 1 tablet by mouth 2 times daily (before meals) 2/3/21  Yes Marybeth Vega MD   citalopram (CELEXA) 20 MG tablet Take 1 tablet by mouth daily 20  Yes Angella Philip MD   aspirin 81 MG tablet Take 81 mg by mouth daily   Yes Historical Provider, MD   Cholecalciferol (VITAMIN D) 50 MCG (2000 UT) CAPS capsule Take by mouth   Yes Historical Provider, MD   albuterol sulfate  (90 Base) MCG/ACT inhaler Inhale 2 puffs into the lungs every 4 hours as needed for Wheezing or Shortness of Breath 20   Angella Philip MD   albuterol (ACCUNEB) 1.25 MG/3ML nebulizer solution Inhale 3 mLs into the lungs every 6 hours as needed for Wheezing 20   Angella Philip MD   Multiple Vitamins-Minerals (THERAPEUTIC MULTIVITAMIN-MINERALS) tablet Take 1 tablet by mouth daily    Historical Provider, MD       Current medications:    Current Facility-Administered Medications   Medication Dose Route Frequency Provider Last Rate Last Admin    lactated ringers infusion   Intravenous Continuous Jason Astorga MD        sodium chloride flush 0.9 % injection 10 mL  10 mL Intravenous 2 times per day Jason Astorga MD        sodium chloride flush 0.9 % injection 10 mL  10 mL Intravenous PRN Jason Astorga MD        lidocaine PF 1 % injection 0.3 mL  0.3 mL Intradermal Once PRN Jason Astorga MD        heparin (porcine) injection 5,000 Units  5,000 Units Subcutaneous Once Ean De La Rosa MD  indocyanine green (IC-GREEN) syringe 5 mg  5 mg Intravenous Once Caity Keene MD        meperidine (DEMEROL) injection 12.5 mg  12.5 mg Intravenous Q5 Min PRN Melissa Blevins MD        HYDROmorphone (DILAUDID) injection 0.5 mg  0.5 mg Intravenous Q10 Min PRN Melissa Blevins MD        HYDROmorphone (DILAUDID) injection 0.5 mg  0.5 mg Intravenous Q5 Min PRN Melissa Blevins MD        oxyCODONE-acetaminophen (PERCOCET) 5-325 MG per tablet 1 tablet  1 tablet Oral PRN Melissa Blevins MD        Or    oxyCODONE-acetaminophen (PERCOCET) 5-325 MG per tablet 2 tablet  2 tablet Oral PRN Melissa Blevins MD        ondansetron [facility]) injection 4 mg  4 mg Intravenous Q30 Min PRN Melissa Blevins MD        diphenhydrAMINE (BENADRYL) injection 6.25 mg  6.25 mg Intravenous Once PRN Melissa Blevins MD        labetalol (NORMODYNE;TRANDATE) injection 5 mg  5 mg Intravenous Q15 Min PRN Melissa Blevins MD        hydrALAZINE (APRESOLINE) injection 5 mg  5 mg Intravenous Q30 Min PRN Melissa Blevins MD           Allergies:  No Known Allergies    Problem List:    Patient Active Problem List   Diagnosis Code    Anxiety state F41.1    SOB (shortness of breath) R06.02    Morbid obesity ([facility]) E66.01    Obesity hypoventilation syndrome ([facility]) E66.2    Erythrocytosis D75.1    Pulmonary embolus ([facility]) I26.99    Moderate protein-calorie malnutrition ([facility]) E44.0    Disorder of electrolytes E87.8    Anasarca R60.1    SARAHI (generalized anxiety disorder) F41.1    Left foot drop M21.372    Hoarseness of voice R49.0    Head injury S09.90XA    Closed fracture of nasal bones S02. 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    Morbid obesity with BMI of 45.0-49.9, adult ([facility]) E66.01, Z68.42       Past Medical History:        Diagnosis Date    A-fib ([facility]     Acute deep vein [facility] (DVT) [facility] distal end [facility]  Acute deep vein thrombosis (DVT) of left peroneal vein (HCC)     Anxiety state, unspecified 2010    COPD (chronic obstructive pulmonary disease) ([facility]     Impacted cerumen 2010    Morbid obesity with BMI of 45.0-49.9, adult ([facility] 2019    Pulmonary embolism (HCC)     Rheumatoid arthritis ([facility] 2010    Rheumatoid arthritis(714.0) 2010    Tobacco abuse 2011       Past Surgical History:        Procedure Laterality Date    BRONCHOSCOPY N/A 2019    BRONCHOSCOPY ALVEOLAR LAVAGE performed by Oneil Miller MD at [address]      both hands    [address]  2019       Social History:    Social History     Tobacco Use    Smoking status: Former Smoker     Packs/day: 0.50     Years: 30.00     Pack years: 15.00     Types: Cigarettes     Start date: 1970     Quit date: 2019     Years since quittin.8    Smokeless tobacco: Never Used   Substance Use Topics    Alcohol use: No     Alcohol/week: 0.0 standard drinks                                Counseling given: Not Answered      Vital Signs (Current):   Vitals:    21 1430 21 0634   BP:  (!) 118/56   Pulse:  79   Resp:  16   Temp:  97.5 °F (36.4 °C)   TempSrc:  Temporal   SpO2:  95%   Weight: 204 lb (92.5 kg)    Height: 5' 3\" (1.6 m)                                               BP Readings from Last 3 Encounters:   21 (!) 118/56   21 128/76   21 128/72       NPO Status: Time of last liquid consumption: 0000                        Time of last solid consumption: 0000                        Date of last liquid consumption: 21                        Date of last solid food consumption: 21    BMI:   Wt Readings from Last 3 Encounters:   21 204 lb (92.5 kg)   21 207 lb (93.9 kg)   21 206 lb (93.4 kg)     Body mass index is 36.14 kg/m².     CBC:   Lab Results   Component Value Date WBC 8.8 02/03/2021    RBC 5.36 02/03/2021    HGB 16.5 02/03/2021    HCT 49.1 02/03/2021    MCV 91.7 02/03/2021    RDW 13.2 02/03/2021     02/03/2021       CMP:   Lab Results   Component Value Date     02/03/2021    K 4.7 02/03/2021    K 3.6 05/30/2019     02/03/2021    CO2 30 02/03/2021    BUN 12 02/03/2021    CREATININE 0.6 02/03/2021    GFRAA >60 02/03/2021    GFRAA >60 06/18/2012    AGRATIO 1.6 02/03/2021    LABGLOM >60 02/03/2021    GLUCOSE 94 02/03/2021    PROT 6.9 02/03/2021    PROT 6.8 06/18/2012    CALCIUM 9.8 02/03/2021    BILITOT 0.3 02/03/2021    ALKPHOS 83 02/03/2021    AST 10 02/03/2021    ALT 10 02/03/2021       POC Tests: No results for input(s): POCGLU, POCNA, POCK, POCCL, POCBUN, POCHEMO, POCHCT in the last 72 hours. Coags:   Lab Results   Component Value Date    PROTIME 19.9 05/20/2019    INR 1.75 05/20/2019    APTT 71.3 05/14/2019       HCG (If Applicable): No results found for: PREGTESTUR, PREGSERUM, HCG, HCGQUANT     ABGs:   Lab Results   Component Value Date    PHART 7.359 05/11/2019    PO2ART 73.9 05/11/2019    AVY6OWN 60.0 05/11/2019    ZWN6WDR 33.1 05/11/2019    BEART 5.1 05/11/2019    Q8GOIORJ 94.0 05/11/2019        Type & Screen (If Applicable):  No results found for: LABABO, LABRH    Drug/Infectious Status (If Applicable):  No results found for: HIV, HEPCAB    COVID-19 Screening (If Applicable):   Lab Results   Component Value Date    COVID19 Not Detected 03/01/2021         Anesthesia Evaluation  Patient summary reviewed and Nursing notes reviewed no history of anesthetic complications:   Airway: Mallampati: III     Neck ROM: full   Dental:          Pulmonary:   (+) COPD:  shortness of breath:                             Cardiovascular:                      Neuro/Psych:   (+) psychiatric history:            GI/Hepatic/Renal:            ROS comment: obesity.    Endo/Other:    (+) : arthritis:., .                 Abdominal:           Vascular:

## 2021-03-05 NOTE — H&P
I have reviewed the progress note serving as history and physical dated February/23/ 2021 and examined the patient and find no relevant changes. I have reviewed with the patient and/or family the risks, benefits, and alternatives to the procedure.

## 2021-03-05 NOTE — PROGRESS NOTES
Arrived from OR respirations unlabored and oral airway removed per anesthesia shortly after arrival.  Abdomen soft and dressings times four dry and intact. Ice pack applied. Prisca jensen on. Report received from Marc Lubin RN.   No discomfort noted

## 2021-03-05 NOTE — BRIEF OP NOTE
Brief Postoperative Note      Patient: Gina Jack  YOB: 1960  MRN: [id number]    Date of Procedure: 3/5/2021    Pre-Op Diagnosis: GALLSTONES    Post-Op Diagnosis: Same       Procedure(s):  ROBOTIC CHOLECYSTECTOMY, POSSIBLE OPEN PROCEDURE    Surgeon(s):  Wilbert Godoy MD    Assistant:  Surgical Assistant: Johnnie Mcfarland    Anesthesia: General    Estimated Blood Loss (mL): Minimal    Complications: None    Specimens:   ID Type Source Tests Collected by Time Destination   A : GALLBLADDER AND CONTENTS Tissue Tissue SURGICAL PATHOLOGY Wilbert Godoy MD 3/5/2021 3595        Implants:  * No implants in log *      Drains: * No LDAs found *    Findings: as above    Electronically signed by Sarah Sanford MD on 3/5/2021 at 8:08 AM

## 2021-03-05 NOTE — PROGRESS NOTES
Detailed verbal discharge instructions given to family via telephone and update given. Verbalized understanding.

## 2021-03-05 NOTE — OP NOTE
[address]                 [address] [place] [address]                                OPERATIVE REPORT    PATIENT NAME: Colletta Hanly                 :        1960  MED REC NO:   [id number]                          ROOM:  ACCOUNT NO:   [de-identified]                           ADMIT DATE: 2021  PROVIDER:     Jeffery Ramirez MD    DATE OF PROCEDURE:  2021    PREOPERATIVE DIAGNOSIS:  Gallstones. POSTOPERATIVE DIAGNOSIS:  Gallstones. PROCEDURE:  Robotic cholecystectomy. ANESTHESIA:  General.    SURGEON:  Jeffery Ramirez MD    ESTIMATED BLOOD LOSS:  Minimal.    INDICATIONS:  The patient is a 53-year-old woman who presented with  abdominal pain and was found to have gallstones. OPERATIVE SUMMARY:  After preoperative evaluation, the patient was  brought in the operating suite and placed in a comfortable supine  position on the operating room table. Monitoring equipment was attached  and general anesthesia was induced. Her abdomen was sterilely prepped  and draped and a small incision was made at the inferior aspect of the  umbilicus. This was dissected down to the fascia and a suture of  0-Ethibond was placed on either side of the midline. The midline fascia  was opened and the peritoneal cavity was entered directly. A Tawanna  trocar was inserted after placing a figure-of-eight suture across the  defect for later closure. The abdomen was insufflated to a pressure of  15 mmHg. The remaining ports were placed under direct internal  visualization. The patient was placed in reverse Trendelenburg and  rotated to the left and the robot was docked. The gallbladder was  grasped and elevated cephalad over the liver edge. The infundibular  structures were dissected out and the cystic duct and artery were  identified.   Each was tracked down to its origin, and anatomic identification was facilitated with the use of Firefly imaging. The  cystic duct was triply clipped and divided. The cystic artery was  likewise clipped and divided. The posterior branch artery was  identified, clipped, and divided as well. The gallbladder was then  dissected free off the liver bed using electrocautery and set aside. The gallbladder fossa was examined. There was no evidence of bleeding. There was no bile leakage either by direct visualization or Firefly  imaging. The clips were intact in the proximal structures. The ports  were removed and the gallbladder was withdrawn through the umbilical  trocar site. The umbilical fascial defect was closed with the  previously placed figure-of-eight suture of 0-Ethibond. The wounds were  injected with Marcaine and the skin incisions closed with subcuticular  sutures of 4-0 Vicryl followed by Benzoin, Steri-Strips, and dry sterile  dressings. All sponge, needle, and instrument counts were correct at  the end of the case. The patient tolerated the procedure well. She was  taken to the recovery area in stable condition.       Nicci Olivera MD    D: 03/05/2021 8:27:49       T: 03/05/2021 8:38:13     [email or web address]  Job#: [id number]     Doc#: [id number]    CC:    _____

## 2021-03-06 NOTE — ANESTHESIA POSTPROCEDURE EVALUATION
RENAL  Lab Results       Component                Value               Date/Time                  NA                       142                 02/03/2021 09:11 AM        K                        4.7                 02/03/2021 09:11 AM        K                        3.6                 05/30/2019 07:48 AM        CL                       102                 02/03/2021 09:11 AM        CO2                      30                  02/03/2021 09:11 AM        BUN                      12                  02/03/2021 09:11 AM        CREATININE               0.6                 02/03/2021 09:11 AM        GLUCOSE                  94                  02/03/2021 09:11 AM   COAGS  Lab Results       Component                Value               Date/Time                  PROTIME                  19.9 (H)            05/20/2019 06:15 AM        INR                      1.75 (H)            05/20/2019 06:15 AM        APTT                     71.3 (H)            05/14/2019 04:30 AM     Intake & Output: In: 6253 (P.O.:25; I.V.:1850)  Out: -     Nausea & Vomiting:  No    Level of Consciousness:  Awake    Pain Assessment:  Adequate analgesia    Anesthesia Complications:  No apparent anesthetic complications    SUMMARY      Vital signs stable  OK to discharge from Stage I post anesthesia care.   Care transferred from Anesthesiology department on discharge from perioperative area

## 2021-03-10 ENCOUNTER — TELEPHONE (OUTPATIENT)
Dept: SURGERY | Age: 61
End: 2021-03-10

## 2021-03-10 NOTE — TELEPHONE ENCOUNTER
Artist Sweetie from the patient's employment called in regards to the [address] [address] paperwork that was sent over, on page 3 part B # 5 she needs dates put in on the paperwork and also needs clarification on the restrictions that the patient will have for 3 weeks.  Her phone number is  [phone] and her fax number is [phone]

## 2021-03-18 ENCOUNTER — NURSE ONLY (OUTPATIENT)
Dept: INTERNAL MEDICINE CLINIC | Age: 61
End: 2021-03-18

## 2021-03-18 ENCOUNTER — OFFICE VISIT (OUTPATIENT)
Dept: SURGERY | Age: 61
End: 2021-03-18

## 2021-03-18 VITALS
WEIGHT: 204 LBS | BODY MASS INDEX: 36.14 KG/M2 | DIASTOLIC BLOOD PRESSURE: 71 MMHG | HEIGHT: 63 IN | SYSTOLIC BLOOD PRESSURE: 139 MMHG | HEART RATE: 100 BPM | TEMPERATURE: 97.5 F

## 2021-03-18 DIAGNOSIS — Z09 POSTOP CHECK: Primary | ICD-10-CM

## 2021-03-18 DIAGNOSIS — Z23 NEED FOR ZOSTER VACCINATION: ICD-10-CM

## 2021-03-18 DIAGNOSIS — Z00.00 ROUTINE GENERAL MEDICAL EXAMINATION AT A HEALTH CARE FACILITY: Primary | ICD-10-CM

## 2021-03-18 DIAGNOSIS — Z00.00 ROUTINE GENERAL MEDICAL EXAMINATION AT A HEALTH CARE FACILITY: ICD-10-CM

## 2021-03-18 LAB
T3 TOTAL: 1.45 NG/ML (ref 0.8–2)
T4 FREE: 1 NG/DL (ref 0.9–1.8)
TSH REFLEX: 0.06 UIU/ML (ref 0.27–4.2)

## 2021-03-18 PROCEDURE — 90471 IMMUNIZATION ADMIN: CPT | Performed by: INTERNAL MEDICINE

## 2021-03-18 PROCEDURE — 99024 POSTOP FOLLOW-UP VISIT: CPT | Performed by: SURGERY

## 2021-03-18 PROCEDURE — 90750 HZV VACC RECOMBINANT IM: CPT | Performed by: INTERNAL MEDICINE

## 2021-03-18 NOTE — PROGRESS NOTES
[facility] GENERAL SURGERY      S:   Patient presents s/p Robotic cholecystectomy. She reports doing well. O:   Comfortable         Incision sites healing well. A:   S/P robotic cholecystectomy    P:   Follow up as needed.

## 2021-03-23 DIAGNOSIS — E03.9 HYPOTHYROIDISM, UNSPECIFIED TYPE: Primary | ICD-10-CM

## 2021-04-06 ENCOUNTER — HOSPITAL ENCOUNTER (OUTPATIENT)
Dept: NUCLEAR MEDICINE | Age: 61
Discharge: HOME | End: 2021-04-06
Payer: COMMERCIAL

## 2021-04-06 DIAGNOSIS — E03.9 HYPOTHYROIDISM, UNSPECIFIED TYPE: ICD-10-CM

## 2021-04-06 PROCEDURE — 78014 THYROID IMAGING W/BLOOD FLOW: CPT

## 2021-04-06 PROCEDURE — 3430000000 HC RX DIAGNOSTIC RADIOPHARMACEUTICAL: Performed by: INTERNAL MEDICINE

## 2021-04-06 PROCEDURE — A9516 IODINE I-123 SOD IODIDE MIC: HCPCS | Performed by: INTERNAL MEDICINE

## 2021-04-06 RX ADMIN — SODIUM IODIDE I 123 305 MICRO CURIE: 100 CAPSULE, GELATIN COATED ORAL at 08:15

## 2021-04-07 ENCOUNTER — HOSPITAL ENCOUNTER (OUTPATIENT)
Dept: NUCLEAR MEDICINE | Age: 61
Discharge: HOME | End: 2021-04-07
Payer: COMMERCIAL

## 2021-05-17 ENCOUNTER — OFFICE VISIT (OUTPATIENT)
Dept: INTERNAL MEDICINE CLINIC | Age: 61
End: 2021-05-17

## 2021-05-17 VITALS
RESPIRATION RATE: 12 BRPM | DIASTOLIC BLOOD PRESSURE: 80 MMHG | SYSTOLIC BLOOD PRESSURE: 130 MMHG | HEIGHT: 63 IN | HEART RATE: 80 BPM | BODY MASS INDEX: 35.79 KG/M2 | WEIGHT: 202 LBS

## 2021-05-17 DIAGNOSIS — Z86.711 HISTORY OF PULMONARY EMBOLISM: ICD-10-CM

## 2021-05-17 DIAGNOSIS — E66.2 OBESITY HYPOVENTILATION SYNDROME (HCC): ICD-10-CM

## 2021-05-17 DIAGNOSIS — F41.1 GAD (GENERALIZED ANXIETY DISORDER): Primary | ICD-10-CM

## 2021-05-17 PROCEDURE — 99213 OFFICE O/P EST LOW 20 MIN: CPT | Performed by: INTERNAL MEDICINE

## 2021-05-17 ASSESSMENT — ENCOUNTER SYMPTOMS
RHINORRHEA: 0
WHEEZING: 0
NAUSEA: 0
SHORTNESS OF BREATH: 0
ABDOMINAL PAIN: 0
VOMITING: 0

## 2021-05-17 NOTE — PROGRESS NOTES
Subjective:      Patient ID: Manuel Mccarthy is a 63 y.o. female. HPI     Patient is here for a follow up. She had her gall bladder surgery. Her abdominal symptoms are completely resolved. She has history of DVT and PE. She is finished with Eliquis and takes a baby ASA. She is not always compliant. Her TSH was low. T4 free was normal. Thyroid uptake scan was normal.  She has ZANE. She is using CPAP. She has been on Weight Watchers and has been losing weight. She has quit smoking. Her foot drop on the left is improved. She is taking Celexa. It helps with anxiety. Review of Systems   Constitutional: Negative for appetite change and fatigue. HENT: Negative for postnasal drip and rhinorrhea. Respiratory: Negative for shortness of breath and wheezing. Cardiovascular: Negative for chest pain and palpitations. Gastrointestinal: Negative for abdominal pain, nausea and vomiting. Musculoskeletal: Negative for joint swelling. Skin: Negative for rash. Neurological: Negative for light-headedness. Foot drop   Psychiatric/Behavioral: Negative for sleep disturbance.      Past Medical History:   Diagnosis Date    A-fib [facility])     Acute deep vein thrombosis (DVT) of distal end of left lower extremity (HCC)     Acute deep vein thrombosis (DVT) of left peroneal vein (HCC)     Anxiety state, unspecified 05/17/2010    COPD (ch[facility] obstructive pulmonary disease) [facility]     Impacted cerumen 05/17/2010    Morbid obesity with BMI of 45.0-49.9, adult ([facility] 11/26/2019    Pulmonary embolism (HCC)     Rheumatoid arthritis ([facility] 05/17/2010    Rheumatoid arthritis(714.0) 05/17/2010    Tobacco abuse 05/31/2011     Past Surgical History:   Procedure Laterality Date    BRONCHOSCOPY N/A 5/6/2019    BRONCHOSCOPY ALVEOLAR LAVAGE performed by Seth Bryant MD at [address] [place] [address]      both hands 1990's    CHOLECYSTECTOMY, LAPAROSCOPIC N/A 3/5/2021    ROBOTIC CHOLECYSTECTOMY performed by Mustapha Blue MD at [address] [facility] [place] OTHER SURGICAL HISTORY  2021    robotic cholecystectomy    VOCAL CORD SURGERY  2019      Family History   Problem Relation Age of Onset    Stroke Paternal Grandmother     Heart Attack Paternal Grandfather 62    Diabetes Paternal Grandfather     Other Mother         Factor 5 Lieden     Cancer Father         Non Hodgkin's lymphoma       Social History     Tobacco Use    Smoking status: Former Smoker     Packs/day: 0.50     Years: 30.00     Pack years: 15.00     Types: Cigarettes     Start date: 1970     Quit date: 2019     Years since quittin.0    Smokeless tobacco: Never Used   Vaping Use    Vaping Use: Never used   Substance Use Topics    Alcohol use: No     Alcohol/week: 0.0 standard drinks    Drug use: No       /80 (Site: Right Upper Arm, Position: Supine, Cuff Size: Large Adult)   Pulse 80   Resp 12   Ht 5' 3\" (1.6 m)   Wt 202 lb (91.6 kg)   BMI 35.78 kg/m²     Objective:   Physical Exam  Constitutional:       Appearance: She is well-developed. HENT:      Head: Normocephalic. Eyes:      Conjunctiva/sclera: Conjunctivae normal.      Pupils: Pupils are equal, round, and reactive to light. Neck:      Thyroid: No thyroid mass or thyromegaly. Vascular: No carotid bruit or JVD. Trachea: Trachea normal.   Cardiovascular:      Rate and Rhythm: Normal rate and regular rhythm. Heart sounds: Normal heart sounds. No murmur heard. No gallop. Pulmonary:      Effort: Pulmonary effort is normal. No respiratory distress. Breath sounds: Normal breath sounds. No wheezing or rales. Abdominal:      General: Bowel sounds are normal. There is no distension. Palpations: Abdomen is soft. There is no hepatomegaly, splenomegaly or mass. Tenderness: There is no abdominal tenderness. Musculoskeletal:         General: Normal range of motion.       Cervical back: Normal range of motion and neck supple. Lymphadenopathy:      Cervical: No cervical adenopathy. Skin:     General: Skin is warm and dry. Findings: No rash. Neurological:      Mental Status: She is alert and oriented to person, place, and time. Cranial Nerves: No cranial nerve deficit. Deep Tendon Reflexes: Reflexes are normal and symmetric. Comments: Mild left foot drop   Psychiatric:         Behavior: Behavior normal.         Thought Content: Thought content normal.         Judgment: Judgment normal.         Assessment:       Diagnosis Orders   1. SARAHI (generalized anxiety disorder)     2. Obesity hypoventilation syndrome ([facility]     3. History of pulmonary embolism            Plan:          #  History of pulmonary embolism. On ASA. stressed compliance. #  DVT see above. #  ZANE - on CPAP. #  Left foot drop. Much improved. #  SARAHI on Celexa. #  Morbid Obesity resolved. She has lost 68 lbs. #  I had a long conversation with the patient about having a screening colonoscopy. She will have it done soon.       Davonte Moody MD

## 2021-05-25 ENCOUNTER — HOSPITAL ENCOUNTER (OUTPATIENT)
Dept: MAMMOGRAPHY | Age: 61
Discharge: HOME | End: 2021-05-30
Payer: COMMERCIAL

## 2021-05-25 DIAGNOSIS — Z12.31 VISIT FOR SCREENING MAMMOGRAM: ICD-10-CM

## 2021-08-06 ENCOUNTER — HOSPITAL ENCOUNTER (OUTPATIENT)
Dept: GENERAL RADIOLOGY | Age: 61
Discharge: HOME | End: 2021-08-06
Payer: COMMERCIAL

## 2021-08-06 ENCOUNTER — HOSPITAL ENCOUNTER (OUTPATIENT)
Age: 61
Discharge: HOME | End: 2021-08-06
Payer: COMMERCIAL

## 2021-08-06 DIAGNOSIS — G89.29 CHRONIC MIDLINE LOW BACK PAIN WITH LEFT-SIDED SCIATICA: ICD-10-CM

## 2021-08-06 DIAGNOSIS — M54.2 CHRONIC NECK PAIN: ICD-10-CM

## 2021-08-06 DIAGNOSIS — M54.42 CHRONIC MIDLINE LOW BACK PAIN WITH LEFT-SIDED SCIATICA: ICD-10-CM

## 2021-08-06 DIAGNOSIS — G89.29 CHRONIC NECK PAIN: ICD-10-CM

## 2021-08-06 DIAGNOSIS — M89.9: ICD-10-CM

## 2021-08-06 PROCEDURE — 72040 X-RAY EXAM NECK SPINE 2-3 VW: CPT

## 2021-08-06 PROCEDURE — 72100 X-RAY EXAM L-S SPINE 2/3 VWS: CPT

## 2021-08-06 PROCEDURE — 73030 X-RAY EXAM OF SHOULDER: CPT

## 2021-09-22 DIAGNOSIS — Z12.11 COLON CANCER SCREENING: Primary | ICD-10-CM

## 2021-11-15 ENCOUNTER — HOSPITAL ENCOUNTER (OUTPATIENT)
Age: 61
Discharge: HOME | End: 2021-11-15
Payer: COMMERCIAL

## 2021-11-15 PROCEDURE — U0005 INFEC AGEN DETEC AMPLI PROBE: HCPCS

## 2021-11-15 PROCEDURE — U0003 INFECTIOUS AGENT DETECTION BY NUCLEIC ACID (DNA OR RNA); SEVERE ACUTE RESPIRATORY SYNDROME CORONAVIRUS 2 (SARS-COV-2) (CORONAVIRUS DISEASE [COVID-19]), AMPLIFIED PROBE TECHNIQUE, MAKING USE OF HIGH THROUGHPUT TECHNOLOGIES AS DESCRIBED BY CMS-2020-01-R: HCPCS

## 2021-11-16 LAB — SARS-COV-2: NOT DETECTED

## 2021-11-16 RX ORDER — ASPIRIN 81 MG/1
81 TABLET ORAL DAILY
COMMUNITY

## 2021-11-16 NOTE — PROGRESS NOTES
Obstructive Sleep Apnea (ZANE) Screening     Patient:  Laura Lebron    YOB: 1960      Medical Record #:  [id number]                     Date:  11/16/2021     1. Are you a loud and/or regular snorer? []  Yes       [x] No    2. Have you been observed to gasp or stop breathing during sleep? []  Yes       [x] No    3. Do you feel tired or groggy upon awakening or do you awaken with a headache?           []  Yes       [] No    4. Are you often tired or fatigued during the wake time hours? []  Yes       [] No    5. Do you fall asleep sitting, reading, watching TV or driving? []  Yes       [] No    6. Do you often have problems with memory or concentration? []  Yes       [] No    **If patient's score is ? 3 they are considered high risk for ZANE. An Anesthesia provider will evaluate the patient and develop a plan of care the day of surgery. Note:  If the patient's BMI is more than 35 kg m¯² , has neck circumference > 40 cm, and/or high blood pressure the risk is greater (© American Sleep Apnea Association, 2006).

## 2021-11-16 NOTE — PROGRESS NOTES
Preoperative Screening for Elective Surgery/Invasive Procedures While COVID-19 present in the community     Have you had any of the following symptoms? No  o Fever, chills  o Cough  o Shortness of breath  o Muscle aches/pain  o Diarrhea  o Abdominal pain, nausea, vomiting  o Loss or decrease in taste and / or smell   Risk of Exposure  o Have you recently been hospitalized for COVID-19 or flu-like illness, if so when? No  o Recently diagnosed with COVID-19, if so when? No  o Recently tested for COVID-19, if so when? 11/15/21  o Have you been in close contact with a person or family member who currently has or recently had COVID-23? If yes, when and in what context? No  o Do you live with anybody who in the last 14 days has had fever, chills, shortness of breath, muscle aches, flu-like illness? No  o Do you have any close contacts or family members who are currently in the hospital for COVID-19 or flu-like illness? No  If yes, assess recent close contact with this person. Indicate if the patient has a positive screen by answering yes to one or more of the above questions. Patients who test positive or screen positive prior to surgery or on the day of surgery should be evaluated in conjunction with the surgeon/proceduralist/anesthesiologist to determine the urgency of the procedure.

## 2021-11-17 ENCOUNTER — OFFICE VISIT (OUTPATIENT)
Dept: INTERNAL MEDICINE CLINIC | Age: 61
End: 2021-11-17

## 2021-11-17 VITALS
HEIGHT: 63 IN | SYSTOLIC BLOOD PRESSURE: 120 MMHG | WEIGHT: 208 LBS | BODY MASS INDEX: 36.86 KG/M2 | RESPIRATION RATE: 12 BRPM | HEART RATE: 70 BPM | DIASTOLIC BLOOD PRESSURE: 80 MMHG

## 2021-11-17 DIAGNOSIS — F41.1 ANXIETY STATE: ICD-10-CM

## 2021-11-17 DIAGNOSIS — F41.1 GAD (GENERALIZED ANXIETY DISORDER): ICD-10-CM

## 2021-11-17 DIAGNOSIS — E66.01 MORBID OBESITY (HCC): ICD-10-CM

## 2021-11-17 DIAGNOSIS — E66.2 OBESITY HYPOVENTILATION SYNDROME (HCC): ICD-10-CM

## 2021-11-17 DIAGNOSIS — I26.99 OTHER ACUTE PULMONARY EMBOLISM WITHOUT ACUTE COR PULMONALE (HCC): Primary | ICD-10-CM

## 2021-11-17 PROCEDURE — 99213 OFFICE O/P EST LOW 20 MIN: CPT | Performed by: INTERNAL MEDICINE

## 2021-11-17 RX ORDER — CITALOPRAM 20 MG/1
TABLET ORAL
Qty: 30 TABLET | Refills: 2 | Status: SHIPPED | OUTPATIENT
Start: 2021-11-17 | End: 2021-11-29

## 2021-11-17 ASSESSMENT — ENCOUNTER SYMPTOMS
ABDOMINAL PAIN: 0
NAUSEA: 0
RHINORRHEA: 0
WHEEZING: 0
SHORTNESS OF BREATH: 0
VOMITING: 0

## 2021-11-17 ASSESSMENT — PATIENT HEALTH QUESTIONNAIRE - PHQ9
1. LITTLE INTEREST OR PLEASURE IN DOING THINGS: 0
SUM OF ALL RESPONSES TO PHQ QUESTIONS 1-9: 0
2. FEELING DOWN, DEPRESSED OR HOPELESS: 0
SUM OF ALL RESPONSES TO PHQ QUESTIONS 1-9: 0
SUM OF ALL RESPONSES TO PHQ QUESTIONS 1-9: 0
SUM OF ALL RESPONSES TO PHQ9 QUESTIONS 1 & 2: 0

## 2021-11-17 NOTE — H&P
[address] [facility] [address] [facility]  Phone: [phone]   Fax:[phone]    CHIEF COMPLAINT     Epigastric pain/colon cancer screening     HPI     Thank you Silvana Purvis MD for asking me to see Jalil Heredia in consultation. Jalil Heredia is a 64 y.o. female Single [1] White [1] with medical history of DVT/PE status post Eliquis (discontinued 11/2020), asthma, generalized anxiety disorder, obesity (BMI 37), obstructive sleep apnea on CPAP osteoarthritis of hands and left foot drop seen independently with referral for epigastric pain of 3 months duration. Pain is sharp, intermittent, non radiating, lasting up to 10 hours at a time. Associated with nonbilious nonbloody vomiting. Pain is aggravated with meals containing tomato sauce. Patient has tried Prilosec for the past 2 weeks without relief. She reports positive NSAID use with Alleve and Advil 3 tabs for 4-5 days weekly for years due to arthritic hand pains. Of note patient has been on weight watchers with about 68 pounds intentional weight loss since August 2020. Patient saw her PCP today who was ordered labs including CBC, BMP, LFTs and ultrasound right upper quadrant. Denies fever, chills, unintentional weight loss, constipation, diarrhea, hematochezia or melenic stools. Denies alcohol use or illicit drug use including marijuana.     Last Encounter Reviewed: None  Pertinent PMH, FH, SH is reviewed below. Last EGD: None  Last Colonoscopy: None    Review of available records reveals:   Wt Readings from Last 50 Encounters:   05/17/21 202 lb (91.6 kg)   03/18/21 204 lb (92.5 kg)   03/03/21 204 lb (92.5 kg)   02/23/21 207 lb (93.9 kg)   02/03/21 206 lb (93.4 kg)   02/03/21 208 lb (94.3 kg)   11/02/20 230 lb (104.3 kg)   12/20/19 263 lb 12.8 oz (119.7 kg)   11/26/19 255 lb (115.7 kg)   11/26/19 256 lb 6.4 oz (116.3 kg)   11/18/19 252 lb (114.3 kg)   08/01/19 236 lb (107 kg)   07/29/19 232 lb (105.2 kg)   07/02/19 225 lb (102.1 kg)   05/31/19 222 lb (100.7 kg)   05/30/19 218 lb 9.6 oz (99.2 kg)   05/20/19 220 lb 4.8 oz (99.9 kg)   04/16/19 266 lb (120.7 kg)   05/23/18 246 lb (111.6 kg)   06/08/17 241 lb (109.3 kg)   06/05/17 235 lb (106.6 kg)   05/11/17 244 lb (110.7 kg)   04/15/16 263 lb (119.3 kg)   07/23/15 264 lb (119.7 kg)   04/23/15 258 lb (117 kg)   03/03/15 262 lb (118.8 kg)   07/25/14 261 lb (118.4 kg)   08/30/13 263 lb (119.3 kg)   06/18/12 236 lb (107 kg)   06/28/11 232 lb (105.2 kg)   06/09/11 203 lb (92.1 kg)   05/31/11 221 lb (100.2 kg)   11/19/10 219 lb 9.6 oz (99.6 kg)   05/18/10 222 lb (100.7 kg)       No components found for: HGBA1C  BP Readings from Last 3 Encounters:   05/17/21 130/80   03/18/21 139/71   03/05/21 (!) 167/83     Health Maintenance   Topic Date Due    Cervical cancer screen  Never done    COVID-19 Vaccine (3 - Booster for Moderna series) 07/28/2021    Breast cancer screen  09/27/2021    Colon cancer screen colonoscopy  03/03/2025    Lipid screen  02/03/2026    Pneumococcal 0-64 years Vaccine (2 of 2 - PPSV23) 04/22/2027    DTaP/Tdap/Td vaccine (3 - Td or Tdap) 07/29/2029    Flu vaccine  Completed    Shingles Vaccine  Completed    Hepatitis C screen  Completed    HIV screen  Completed    Hepatitis A vaccine  Aged Out    Hepatitis B vaccine  Aged Out    Hib vaccine  Aged Out    Meningococcal (ACWY) vaccine  Aged Out       No components found for: [employer]     PAST MEDICAL HISTORY     Past Medical History:   Diagnosis Date    A-fib [facility])     Acute deep vein thrombosis (DVT) of distal end of left lower extremity ([address]     Acute deep vein thrombosis (DVT) of left peroneal vein ([address] 2019    Anxiety state, unspecified 05/17/2010    COPD (chronic obstructive pulmonary disease) ([address]     Hx of blood clots     Impacted cerumen 05/17/2010    Morbid obesity with BMI of 45.0-49.9, adult ([address] 11/26/2019    Pulmonary embolism ([facility] 2019    Rheumatoid arthritis ([address] 2010    Rheumatoid arthritis(714.0) 2010    Tobacco abuse 2011     FAMILY HISTORY     Family History   Problem Relation Age of Onset    Stroke Paternal Grandmother     Heart Attack Paternal Grandfather 62    Diabetes Paternal Grandfather     Other Mother         Factor 5 Lieden     Cancer Father         Non Hodgkin's lymphoma     SOCIAL HISTORY     Social History     Socioeconomic History    Marital status: Single     Spouse name: Not on file    Number of children: 0    Years of education: Not on file    Highest education level: Not on file   Occupational History    Not on file   Tobacco Use    Smoking status: Former Smoker     Packs/day: 0.50     Years: 30.00     Pack years: 15.00     Types: Cigarettes     Start date: 1970     Quit date: 2019     Years since quittin.5    Smokeless tobacco: Never Used   Vaping Use    Vaping Use: Never used   Substance and Sexual Activity    Alcohol use: No     Alcohol/week: 0.0 standard drinks    Drug use: No    Sexual activity: Not on file   Other Topics Concern    Not on file   Social History Narrative    Not on file     Social Determinants of Health     Financial Resource Strain:     Difficulty of Paying Living Expenses: Not on file   Food Insecurity:     Worried About Running Out of Food in the Last Year: Not on file    [employer]co of Food in the Last Year: Not on file   Transportation Needs:     Lack of Transportation (Medical): Not on file    Lack of Transportation (Non-Medical):  Not on file   Physical Activity:     Days of Exercise per Week: Not on file    Minutes of Exercise per Session: Not on file   Stress:     Feeling of Stress : Not on file   Social Connections:     Frequency of Communication with Friends and Family: Not on file    Frequency of Social Gatherings with Friends and Family: Not on file    Attends Jew Services: Not on file    Active Member of Clubs or Organizations: Not on file  Attends Club or Organization Meetings: Not on file    Marital Status: Not on file   Intimate Partner Violence:     Fear of Current or Ex-Partner: Not on file    Emotionally Abused: Not on file    Physically Abused: Not on file    Sexually Abused: Not on file   Housing Stability:     Unable to Pay for Housing in the Last Year: Not on file    Number of Lilli in the Last Year: Not on file    Unstable Housing in the Last Year: Not on file     SURGICAL HISTORY     Past Surgical History:   Procedure Laterality Date    BRONCHOSCOPY N/A 5/6/2019    BRONCHOSCOPY ALVEOLAR LAVAGE performed by Princess Patti MD at [address] [facility] Bilateral     both hands 1990's   [address] [place], LAPAROSCOPIC N/A 3/5/2021    ROBOTIC CHOLECYSTECTOMY performed by Refugio Wright MD at [address]  11/25/2019     CURRENT MEDICATIONS   (This list may include medications prescribed during this encounter as epic can not insert only the list prior to this encounter.)  Current Outpatient Rx   Medication Sig Dispense Refill    aspirin 81 MG EC tablet Take 81 mg by mouth daily      citalopram (CELEXA) 20 MG tablet TAKE ONE TABLET BY MOUTH DAILY 30 tablet 1    albuterol sulfate  (90 Base) MCG/ACT inhaler Inhale 2 puffs into the lungs every 4 hours as needed for Wheezing or Shortness of Breath 1 Inhaler 10    albuterol (ACCUNEB) 1.25 MG/3ML nebulizer solution Inhale 3 mLs into the lungs every 6 hours as needed for Wheezing 360 mL 0     ALLERGIES   No Known Allergies  IMMUNIZATIONS     Immunization History   Administered Date(s) Administered    COVID-19, Moderna, Primary or Immunocompromised, PF, 100mcg/0.5mL 12/30/2020, 01/28/2021    Influenza Virus Vaccine 10/02/2020, 10/08/2021    Influenza, Quadv, IM, PF (6 mo and older Fluzone, Flulaval, Fluarix, and 3 yrs and older Afluria) 10/21/2019    Pneumococcal Conjugate 13-valent (Miri Sermon) 05/16/2019    Pneumococcal Polysaccharide ([id number]) 11/02/2020    Tdap (Boostrix, Adacel) 07/29/2019    Zoster Recombinant (Shingrix) 11/02/2020, 03/18/2021     REVIEW OF SYSTEMS   See HPI for further details and pertinent postiives. Negative for the following:  Constitutional: Negative for weight change. Negative for appetite change and fatigue. HENT: Negative for nosebleeds, sore throat, mouth sores, and voice change. Respiratory: Negative for cough, choking and chest tightness. Cardiovascular: Negative for chest pain   Gastrointestinal: See HPI  Musculoskeletal: Negative for arthralgias. Skin: Negative for pallor. Neurological: Negative for weakness and light-headedness. Hematological: Negative for adenopathy. Does not bruise/bleed easily. Psychiatric/Behavioral: Negative for suicidal ideas. PHYSICAL EXAM   VITAL SIGNS: Ht 5' 3\" (1.6 m)   Wt 200 lb (90.7 kg)   BMI 35.43 kg/m²   Wt Readings from Last 3 Encounters:   05/17/21 202 lb (91.6 kg)   03/18/21 204 lb (92.5 kg)   03/03/21 204 lb (92.5 kg)     Constitutional: Well developed, Well nourished, No acute distress, Non-toxic appearance. HENT: Normocephalic, Atraumatic, Bilateral external ears normal, Oropharynx moist, No oral exudates, Nose normal.   Eyes: Conjunctiva normal, No discharge. Neck: Normal range of motion, No tenderness, Supple  Cardiovascular: Normal heart rate, Normal rhythm, No murmurs, No rubs, No gallops. Thorax & Lungs: Normal breath sounds, No respiratory distress, No wheezing  Abdomen: Pannus abdomen, normal bowel sounds, soft, non tender, non distended, no hernias  Rectal:  Deferred. Skin: Warm, Dry, No erythema, No rash. No bruising. No spider hemangiomas. Back: No tenderness, No CVA tenderness. Lower Extremities: Intact distal pulses, No edema, No tenderness, No cyanosis, No clubbing. Neurologic: Alert & oriented x 3. RADIOLOGY/PROCEDURES   No results found.      FINAL IMPRESSION   Epigastric pain; resolved status post cholecystectomy on 3/5/2021.  -     LIPASE; Future  -     Defers EGD with biopsy for now. -     Covid-19 Ambulatory; Future  -     Trial of Protonix 40 mg orally twice daily  -      CBC, BMP, LFTs unremarkable  -    Ultrasound right upper quadrant 2/10/21 - 1.8 cm stone within the gallbladder and additional small stones and large amount of gallbladder sludge. -      Status post cholecystectomy on 3/5/2021 by Dr. Meaghan Mcgovern          Esophagogastroduodenoscopy (EGD) with alternatives, rationale, benefits and risks, not limited to bleeding, infection, perforation, need of surgery, prolong hospital stay and anesthesia side effects were explained. Patient verbalized understanding and agrees to proceed with EGD.         Colon cancer screening; average risk  -     COLONOSCOPY W/ OR W/O BIOPSY  -     Covid-19 Ambulatory; Future     Colonoscopy with alternatives, rationale, benefits and risks, not limited to bleeding, infection, perforation, need of surgery, prolong hospital stay and anesthesia side effects were explained. Patient verbalized understanding and agrees to proceed with colonoscopy.        ORDERED FUTURE/PENDING TESTS     Lab Frequency Next Occurrence   LIPASE Once 02/03/2021   Covid-19 Ambulatory Once 09/22/2021       FOLLOWUP   No follow-ups on file.           Lalo Ashby MD 11/17/21 10:06 AM EST    CC:  Marcy Cha MD

## 2021-11-17 NOTE — PROGRESS NOTES
Subjective:      Patient ID: Faraz Centeno is a 63 y.o. female. HPI     Patient is here for a follow up. She has history of DVT and PE. She is finished with Eliquis and takes a baby ASA. She is not always compliant. Her TSH was low. T4 free was normal. Thyroid uptake scan was normal. It needs checked. She has ZANE. She is using CPAP. She has been on Weight Watchers and has been losing weight. She has quit smoking. Her foot drop on the left is improved. She is taking Celexa. It helps with anxiety. Review of Systems   Constitutional: Negative for appetite change and fatigue. HENT: Negative for postnasal drip and rhinorrhea. Respiratory: Negative for shortness of breath and wheezing. Cardiovascular: Negative for chest pain and palpitations. Gastrointestinal: Negative for abdominal pain, nausea and vomiting. Musculoskeletal: Negative for joint swelling. Skin: Negative for rash. Neurological: Negative for light-headedness. Foot drop   Psychiatric/Behavioral: Negative for sleep disturbance.      Past Medical History:   Diagnosis Date    A-fib [facility])     Acute deep vein thrombosis (DVT) of distal end of left lower extremity (HCC)     Acute deep vein thrombosis (DVT) of left peroneal vein ([address] 2019    Anxiety state, unspecified 05/17/2010    COPD (chronic obstructive pulmonary disease) ([address]     Hx of blood clots     Impacted cerumen 05/17/2010    Morbid obesity with BMI of 45.0-49.9, adult ([address] 11/26/2019    Pulmonary embolism ([address] 2019    Rheumatoid arthritis ([address] 05/17/2010    Rheumatoid arthritis(714.0) 05/17/2010    Tobacco abuse 05/31/2011     Past Surgical History:   Procedure Laterality Date    BRONCHOSCOPY N/A 5/6/2019    BRONCHOSCOPY ALVEOLAR LAVAGE performed by Zeke Addison MD at [facility] Bilateral     both hands 1990's    CHOLECYSTECTOMY, LAPAROSCOPIC N/A 3/5/2021    ROBOTIC CHOLECYSTECTOMY performed by Lisa Lane Tessy Horn MD at [address]  2019      Family History   Problem Relation Age of Onset    Other Mother         Factor 5 [place]     Cancer Father         Non Hodgkin's lymphoma    Stroke Paternal Grandmother     Heart Attack Paternal Grandfather 62    Diabetes Paternal Grandfather        Social History     Tobacco Use    Smoking status: Former Smoker     Packs/day: 0.50     Years: 30.00     Pack years: 15.00     Types: Cigarettes     Start date: 1970     Quit date: 2019     Years since quittin.5    Smokeless tobacco: Never Used   Vaping Use    Vaping Use: Never used   Substance Use Topics    Alcohol use: No     Alcohol/week: 0.0 standard drinks    Drug use: No       /80 (Site: Right Upper Arm, Position: Supine, Cuff Size: Large Adult)   Pulse 70   Resp 12   Ht 5' 3\" (1.6 m)   Wt 208 lb (94.3 kg)   BMI 36.85 kg/m²     Objective:   Physical Exam  Constitutional:       Appearance: She is well-developed. HENT:      Head: Normocephalic. Eyes:      Conjunctiva/sclera: Conjunctivae normal.      Pupils: Pupils are equal, round, and reactive to light. Neck:      Thyroid: No thyroid mass or thyromegaly. Vascular: No carotid bruit or JVD. Trachea: Trachea normal.   Cardiovascular:      Rate and Rhythm: Normal rate and regular rhythm. Heart sounds: Normal heart sounds. No murmur heard. No gallop. Pulmonary:      Effort: Pulmonary effort is normal. No respiratory distress. Breath sounds: Normal breath sounds. No wheezing or rales. Abdominal:      General: Bowel sounds are normal. There is no distension. Palpations: Abdomen is soft. There is no hepatomegaly, splenomegaly or mass. Tenderness: There is no abdominal tenderness. Musculoskeletal:         General: Normal range of motion. Cervical back: Normal range of motion and neck supple. Lymphadenopathy:      Cervical: No cervical adenopathy. Skin:     General: Skin is warm and dry. Findings: No rash. Neurological:      Mental Status: She is alert and oriented to person, place, and time. Cranial Nerves: No cranial nerve deficit. Deep Tendon Reflexes: Reflexes are normal and symmetric. Comments: Mild left foot drop   Psychiatric:         Behavior: Behavior normal.         Thought Content: Thought content normal.         Judgment: Judgment normal.         Assessment:       Diagnosis Orders   1. Other acute pulmonary embolism without acute cor pulmonale ([facility]     2. Anxiety state  citalopram (CELEXA) 20 MG tablet   3. Morbid obesity ([facility]     4. Obesity hypoventilation syndrome ([facility]     5. SARAHI (generalized anxiety disorder)            Plan:          #  History of pulmonary embolism. On ASA. stressed compliance. #  DVT see above. #  ZANE - on CPAP. #  Left foot drop. Much improved. #  SARAHI on Celexa. #  Morbid Obesity resolved. She has lost 68 lbs. #  I had a long conversation with the patient about having a screening colonoscopy. She will have it this Friday.       Venita Brownlee MD

## 2021-11-19 ENCOUNTER — ANESTHESIA EVENT (OUTPATIENT)
Dept: ENDOSCOPY | Age: 61
End: 2021-11-19
Payer: COMMERCIAL

## 2021-11-19 ENCOUNTER — ANESTHESIA (OUTPATIENT)
Dept: ENDOSCOPY | Age: 61
End: 2021-11-19
Payer: COMMERCIAL

## 2021-11-19 ENCOUNTER — HOSPITAL ENCOUNTER (OUTPATIENT)
Age: 61
Setting detail: OUTPATIENT SURGERY
Discharge: HOME | End: 2021-11-19
Attending: INTERNAL MEDICINE | Admitting: INTERNAL MEDICINE
Payer: COMMERCIAL

## 2021-11-19 VITALS
OXYGEN SATURATION: 97 % | RESPIRATION RATE: 16 BRPM | SYSTOLIC BLOOD PRESSURE: 162 MMHG | BODY MASS INDEX: 35.44 KG/M2 | TEMPERATURE: 32 F | HEIGHT: 63 IN | DIASTOLIC BLOOD PRESSURE: 77 MMHG | HEART RATE: 68 BPM | WEIGHT: 200 LBS

## 2021-11-19 VITALS — DIASTOLIC BLOOD PRESSURE: 54 MMHG | SYSTOLIC BLOOD PRESSURE: 118 MMHG | OXYGEN SATURATION: 97 %

## 2021-11-19 DIAGNOSIS — Z12.11 COLON CANCER SCREENING: ICD-10-CM

## 2021-11-19 PROCEDURE — 7100000010 HC PHASE II RECOVERY - FIRST 15 MIN: Performed by: INTERNAL MEDICINE

## 2021-11-19 PROCEDURE — 3609010600 HC COLONOSCOPY POLYPECTOMY SNARE/COLD BIOPSY: Performed by: INTERNAL MEDICINE

## 2021-11-19 PROCEDURE — 2580000003 HC RX 258: Performed by: INTERNAL MEDICINE

## 2021-11-19 PROCEDURE — 6360000002 HC RX W HCPCS: Performed by: NURSE ANESTHETIST, CERTIFIED REGISTERED

## 2021-11-19 PROCEDURE — 7100000011 HC PHASE II RECOVERY - ADDTL 15 MIN: Performed by: INTERNAL MEDICINE

## 2021-11-19 PROCEDURE — 2500000003 HC RX 250 WO HCPCS: Performed by: NURSE ANESTHETIST, CERTIFIED REGISTERED

## 2021-11-19 PROCEDURE — 3700000001 HC ADD 15 MINUTES (ANESTHESIA): Performed by: INTERNAL MEDICINE

## 2021-11-19 PROCEDURE — 45385 COLONOSCOPY W/LESION REMOVAL: CPT | Performed by: INTERNAL MEDICINE

## 2021-11-19 PROCEDURE — 88305 TISSUE EXAM BY PATHOLOGIST: CPT

## 2021-11-19 PROCEDURE — 3700000000 HC ANESTHESIA ATTENDED CARE: Performed by: INTERNAL MEDICINE

## 2021-11-19 PROCEDURE — 2709999900 HC NON-CHARGEABLE SUPPLY: Performed by: INTERNAL MEDICINE

## 2021-11-19 RX ORDER — PROPOFOL 10 MG/ML
INJECTION, EMULSION INTRAVENOUS PRN
Status: DISCONTINUED | OUTPATIENT
Start: 2021-11-19 | End: 2021-11-19 | Stop reason: SDUPTHER

## 2021-11-19 RX ORDER — ONDANSETRON 2 MG/ML
INJECTION INTRAMUSCULAR; INTRAVENOUS PRN
Status: DISCONTINUED | OUTPATIENT
Start: 2021-11-19 | End: 2021-11-19 | Stop reason: SDUPTHER

## 2021-11-19 RX ORDER — SODIUM CHLORIDE, SODIUM LACTATE, POTASSIUM CHLORIDE, CALCIUM CHLORIDE 600; 310; 30; 20 MG/100ML; MG/100ML; MG/100ML; MG/100ML
INJECTION, SOLUTION INTRAVENOUS ONCE
Status: COMPLETED | OUTPATIENT
Start: 2021-11-19 | End: 2021-11-19

## 2021-11-19 RX ORDER — LIDOCAINE HYDROCHLORIDE 20 MG/ML
INJECTION, SOLUTION INFILTRATION; PERINEURAL PRN
Status: DISCONTINUED | OUTPATIENT
Start: 2021-11-19 | End: 2021-11-19 | Stop reason: SDUPTHER

## 2021-11-19 RX ADMIN — PROPOFOL 50 MG: 10 INJECTION, EMULSION INTRAVENOUS at 08:22

## 2021-11-19 RX ADMIN — ONDANSETRON 4 MG: 2 INJECTION INTRAMUSCULAR; INTRAVENOUS at 08:21

## 2021-11-19 RX ADMIN — PROPOFOL 50 MG: 10 INJECTION, EMULSION INTRAVENOUS at 08:09

## 2021-11-19 RX ADMIN — PROPOFOL 50 MG: 10 INJECTION, EMULSION INTRAVENOUS at 08:18

## 2021-11-19 RX ADMIN — PROPOFOL 50 MG: 10 INJECTION, EMULSION INTRAVENOUS at 08:14

## 2021-11-19 RX ADMIN — LIDOCAINE HYDROCHLORIDE 50 MG: 20 INJECTION, SOLUTION INFILTRATION; PERINEURAL at 08:01

## 2021-11-19 RX ADMIN — PROPOFOL 50 MG: 10 INJECTION, EMULSION INTRAVENOUS at 08:26

## 2021-11-19 RX ADMIN — PROPOFOL 50 MG: 10 INJECTION, EMULSION INTRAVENOUS at 08:02

## 2021-11-19 RX ADMIN — SODIUM CHLORIDE, POTASSIUM CHLORIDE, SODIUM LACTATE AND CALCIUM CHLORIDE: 600; 310; 30; 20 INJECTION, SOLUTION INTRAVENOUS at 07:56

## 2021-11-19 RX ADMIN — SODIUM CHLORIDE, POTASSIUM CHLORIDE, SODIUM LACTATE AND CALCIUM CHLORIDE: 600; 310; 30; 20 INJECTION, SOLUTION INTRAVENOUS at 07:30

## 2021-11-19 RX ADMIN — PROPOFOL 50 MG: 10 INJECTION, EMULSION INTRAVENOUS at 08:04

## 2021-11-19 RX ADMIN — PROPOFOL 50 MG: 10 INJECTION, EMULSION INTRAVENOUS at 08:06

## 2021-11-19 ASSESSMENT — PAIN SCALES - GENERAL
PAINLEVEL_OUTOF10: 0
PAINLEVEL_OUTOF10: 0

## 2021-11-19 ASSESSMENT — ENCOUNTER SYMPTOMS: SHORTNESS OF BREATH: 1

## 2021-11-19 ASSESSMENT — COPD QUESTIONNAIRES: CAT_SEVERITY: MILD

## 2021-11-19 NOTE — OP NOTE
[address] ,  [address] [place] [facility] [facility]  Phone: [phone] [facility],  [address] [facility], [address] [place] [address]  Phone: [phone]   R:[phone]    Colonoscopy Procedure Note    Patient: Sowmya Raya  : 1960    Procedure: Colonoscopy with polypectomy (cold snare)    Date:  2021     Endoscopist:  Marisol Ellington MD    Referring Physician:  [facility], MD    Preoperative Diagnosis:  Colon cancer screening [Z12.11]    Postoperative Diagnosis:  See impression    Anesthesia: Anesthesia: MAC  Sedation: Propofol per anesthesia  Start Time:   Stop Time: 831  ASA Class: 3  Mallampati: III (soft palate, base of uvula visible)    Indications: This is a 63y.o. year old female with medical history of DVT/PE status post Eliquis (discontinued 2020), asthma, generalized anxiety disorder, obesity ([facility]), obstructive sleep apnea on CPAP osteoarthritis of hands and left foot drop seen independently with referral for colon cancer screening    Procedure Details  Informed consent was obtained for the procedure, including sedation. Risks of perforation, hemorrhage, adverse drug reaction and aspiration were discussed. The patient was placed in the left lateral decubitus position. Based on the pre-procedure assessment, including review of the patient's medical history, medications, allergies, and review of systems, she had been deemed to be an appropriate candidate for sedation; she was therefore sedated with the medications listed below. The patient was monitored continuously with ECG tracing, pulse oximetry, blood pressure monitoring, and direct observations. rectal examination was performed. There were medium size external hemorrhoids but no fissures or skin tags.   The colonoscope was inserted into the rectum and advanced under direct vision to the cecum, which was identified by the ileocecal valve and appendiceal orifice. The right colon was examined twice as this increases polyp detection especially if other right colon polyps, older age, male, or hopkins syndrome. When segments could not be distended with CO2, it was filled/distended with water. The quality of the colonic preparation was poor. A careful inspection was made as the colonoscope was withdrawn, including a retroflexed view of the rectum; findings and interventions are described below. Appropriate photodocumentation Was Obtained: appendiceal orifice and ileocecal valve. Findings:   Copious amount of brown liquid stool throughout colon precluding adequate visualization  Medium sized non bleeding diverticula in the sigmoid and descending colon. Two 6 mm sessile polyp in the sigmoid colon and rectum, removed by cold snare polypectomy and retrieved for pathology. No bleeding noted. Medium sized non bleeding internal hemorrhoids.     - PREP: MiraLAX and Dulcolax  - Overall difficulty: mild in degree  - Abdominal pressure: yes - left lower quadrant  - Change in position: no  - Anesthesia issues: no  - Endocoff/Amplieye use: no    Specimens: Was Obtained:    Complications:   None; patient tolerated the procedure well. Disposition:   PACU - hemodynamically stable. Estimated Blood loss:  none    Withdrawal Time:  13 minutes    Impression:   Brown liquid stool throughout colon precluding adequate visualization  Medium sized non bleeding diverticula in the sigmoid and descending colon. Two 6 mm sessile polyp in the sigmoid colon and rectum, removed by cold snare polypectomy and retrieved. Medium sized non bleeding internal hemorrhoids. Recommendations:  -Await pathology. ,   -High fiber diet. ,   -Repeat colonoscopy with extended 2-day bowel prep within 1 year due to poor bowel prep.   -Follow up with primary care physician.         Gita De Jesus MD 11/19/21 8:31 AM EST

## 2021-11-19 NOTE — ANESTHESIA POSTPROCEDURE EVALUATION
Department of Anesthesiology  Postprocedure Note    Patient: Gomez Carr  MRN: [id number]  YOB: 1960  Date of evaluation: 11/19/2021  Time:  8:54 AM     Procedure Summary     Date: 11/19/21 Room / Location: [address] [place] [address]    Anesthesia Start: 3319 Anesthesia Stop: 0846    Procedure: COLONOSCOPY POLYPECTOMY SNARE/COLD BIOPSY (N/A ) Diagnosis:       Colon cancer screening      (Colon cancer screening [Z12.11])    Surgeons: Melanie Yueng MD Responsible Provider: Ifrah Urbina MD    Anesthesia Type: MAC ASA Status: 3          Anesthesia Type: MAC    Lina Phase I: Lina Score: 10    Lina Phase II: Lina Score: 10    Last vitals: Reviewed and per EMR flowsheets.        Anesthesia Post Evaluation    Patient location during evaluation: PACU  Patient participation: complete - patient participated  Level of consciousness: awake and alert  Pain score: 0  Airway patency: patent  Nausea & Vomiting: no nausea and no vomiting  Complications: no  Cardiovascular status: blood pressure returned to baseline  Respiratory status: acceptable  Hydration status: stable

## 2021-11-19 NOTE — ANESTHESIA PRE PROCEDURE
Department of Anesthesiology  Preprocedure Note       Name:  Michelle Bautista   Age:  63 y.o.  :  1960                                          MRN:  [id number]         Date:  2021      Surgeon: Lonna Frankel):  Miri Russell MD    Procedure: Procedure(s):  COLONOSCOPY    Medications prior to admission:   Prior to Admission medications    Medication Sig Start Date End Date Taking? Authorizing Provider   citalopram (CELEXA) 20 MG tablet TAKE ONE TABLET BY MOUTH DAILY 21  Yes Marco Antonio Stewart MD   aspirin 81 MG EC tablet Take 81 mg by mouth daily   Yes Historical Provider, MD   albuterol sulfate  (90 Base) MCG/ACT inhaler Inhale 2 puffs into the lungs every 4 hours as needed for Wheezing or Shortness of Breath 20   Marco Antonio Stewart MD   albuterol (ACCUNEB) 1.25 MG/3ML nebulizer solution Inhale 3 mLs into the lungs every 6 hours as needed for Wheezing 20   Marco Antonio Stewart MD       Current medications:    No current facility-administered medications for this encounter. Allergies:  No Known Allergies    Problem List:    Patient Active Problem List   Diagnosis Code    Anxiety state F41.1    SOB (shortness of breath) R06.02    Morbid obesity ([facility]) E66.01    Obesity hypoventilation syndrome (HCC) E66.2    Erythrocytosis D75.1    Pulmonary embolus ([facility]) I26.99    Moderate protein-calorie malnutrition ([facility]) E44.0    Disorder of electrolytes E87.8    Anasarca R60.1    SARAHI (generalized anxiety disorder) F41.1    Left foot drop M21.372    Hoarseness of voice R49.0    Head injury S09.90XA    Closed fracture of nasal bones S02. 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    Morbid obesity with BMI of 45.0-49.9, adult ([facility]) E66.01, Z68.42    Gallstones K80.20       Past Medical History:        Diagnosis Date    [facility]fib ([facility]     Acute deep vein thrombosis (DVT) of distal end of left lower extremity (HCC)     Acute deep vein thrombosis (DVT) of left peroneal vein ([facility] 2019    Anxiety state, unspecified 2010    COPD (chronic obstructive pulmonary disease) ([facility]     Hx of blood clots     Impacted cerumen 2010    Morbid obesity with BMI of 45.0-49.9, adult ([facility] 2019    PONV (postoperative nausea and vomiting)     Pulmonary embolism ([facility] 2019    Rheumatoid arthritis ([facility] 2010    Rheumatoid arthritis(714.0) 2010    Tobacco abuse 2011       Past Surgical History:        Procedure Laterality Date    BRONCHOSCOPY N/A 2019    BRONCHOSCOPY ALVEOLAR LAVAGE performed by Tommie Decker MD at [facility] Bilateral     both hands     CHOLECYSTECTOMY, LAPAROSCOPIC N/A 3/5/2021    ROBOTIC CHOLECYSTECTOMY performed by Milagro Bernal MD at [address]  2019       Social History:    Social History     Tobacco Use    Smoking status: Former Smoker     Packs/day: 0.50     Years: 30.00     Pack years: 15.00     Types: Cigarettes     Start date: 1970     Quit date: 2019     Years since quittin.5    Smokeless tobacco: Never Used   Substance Use Topics    Alcohol use: No     Alcohol/week: 0.0 standard drinks                                Counseling given: Not Answered      Vital Signs (Current):   Vitals:    21 1011 21 0727   BP:  (!) 132/91   Weight: 200 lb (90.7 kg)    Height: 5' 3\" (1.6 m)                                               BP Readings from Last 3 Encounters:   21 (!) 132/91   21 (!) 147/76   21 120/80       NPO Status: Time of last liquid consumption: 0330                        Time of last solid consumption: 1800                        Date of last liquid consumption: 21                        Date of last solid food consumption: 21    BMI:   Wt Readings from Last 3 Encounters:   21 200 lb (90.7 kg)   21 208 lb (94.3 kg)   21 202 lb (91.6 kg)     Body mass index is 35.43 kg/m². CBC:   Lab Results   Component Value Date    WBC 8.8 02/03/2021    RBC 5.36 02/03/2021    HGB 16.5 02/03/2021    HCT 49.1 02/03/2021    MCV 91.7 02/03/2021    RDW 13.2 02/03/2021     02/03/2021       CMP:   Lab Results   Component Value Date     02/03/2021    K 4.7 02/03/2021    K 3.6 05/30/2019     02/03/2021    CO2 30 02/03/2021    BUN 12 02/03/2021    CREATININE 0.6 02/03/2021    GFRAA >60 02/03/2021    GFRAA >60 06/18/2012    AGRATIO 1.6 02/03/2021    LABGLOM >60 02/03/2021    GLUCOSE 94 02/03/2021    PROT 6.9 02/03/2021    PROT 6.8 06/18/2012    CALCIUM 9.8 02/03/2021    BILITOT 0.3 02/03/2021    ALKPHOS 83 02/03/2021    AST 10 02/03/2021    ALT 10 02/03/2021       POC Tests: No results for input(s): POCGLU, POCNA, POCK, POCCL, POCBUN, POCHEMO, POCHCT in the last 72 hours. Coags:   Lab Results   Component Value Date    PROTIME 19.9 05/20/2019    INR 1.75 05/20/2019    APTT 71.3 05/14/2019       HCG (If Applicable): No results found for: PREGTESTUR, PREGSERUM, HCG, HCGQUANT     ABGs:   Lab Results   Component Value Date    PHART 7.359 05/11/2019    PO2ART 73.9 05/11/2019    ZZC5TJB 60.0 05/11/2019    KUW5CEG 33.1 05/11/2019    BEART 5.1 05/11/2019    V3AWYPRU 94.0 05/11/2019        Type & Screen (If Applicable):  No results found for: LABABO, LABRH    Drug/Infectious Status (If Applicable):  No results found for: HIV, HEPCAB    COVID-19 Screening (If Applicable):   Lab Results   Component Value Date    COVID19 Not Detected 11/15/2021           Anesthesia Evaluation  Patient summary reviewed and Nursing notes reviewed   history of anesthetic complications: PONV.   Airway: Mallampati: II  TM distance: >3 FB   Neck ROM: full  Mouth opening: < 3 FB Dental: normal exam         Pulmonary:normal exam  breath sounds clear to auscultation  (+) COPD: mild,  shortness of breath:                             Cardiovascular:Negative CV ROS            Rhythm: regular  Rate: normal                    Neuro/Psych:   (+) depression/anxiety             GI/Hepatic/Renal: Neg GI/Hepatic/Renal ROS            Endo/Other:    (+) : arthritis:., .                 Abdominal:   (+) obese,           Vascular: negative vascular ROS. Other Findings:             Anesthesia Plan      MAC     ASA 3       Induction: intravenous. Anesthetic plan and risks discussed with patient. Plan discussed with CRNA.                   Triston Chapman MD   11/19/2021

## 2021-11-22 ENCOUNTER — TELEPHONE (OUTPATIENT)
Dept: GASTROENTEROLOGY | Age: 61
End: 2021-11-22

## 2021-11-22 NOTE — TELEPHONE ENCOUNTER
----- Message from Evangelina Barnes MD sent at 11/19/2021  8:35 AM EST -----  Repeat colonoscopy with extended 2-day bowel prep within 1 year due to poor bowel prep.

## 2021-11-28 DIAGNOSIS — F41.1 ANXIETY STATE: ICD-10-CM

## 2021-11-29 RX ORDER — CITALOPRAM 20 MG/1
TABLET ORAL
Qty: 30 TABLET | Refills: 2 | Status: SHIPPED | OUTPATIENT
Start: 2021-11-29 | End: 2022-03-28

## 2022-03-03 NOTE — PROGRESS NOTES
Name:  Jewel Chavez  Room:  0204/0204-01  MRN:    [id number]    Discharge Summary      This discharge summary is in conjunction with a complete physical exam done on the day of discharge. Discharging Physician: Annabelle Catalan MD      Admit: 2/26/2022  Discharge:   3/3/2022     Diagnoses this Admission    Principal Problem:    COPD exacerbation ([facility] [address]  Active Problems:    GERD (gastroesophageal reflux disease)    Elevated blood pressure reading    Fibromyalgia    Hypoxia    Anxiety  Resolved Problems:    * No resolved hospital problems. *      Procedures (Please Review Full Report for Details)  none    Consults    IP CONSULT TO HOSPITALIST      HPI:      74 y.o. yo female with history of  has a past medical history of COPD (chronic obstructive pulmonary disease) ([facility] [address] DDD (degenerative disc disease), Fibromyalgia, Hyperlipidemia, and Sciatica. presents to [facility] complaining of  Shortness of breath that began 2 weeks ago w/o inciting event, denies sick contacts. Occurring at rest, no exacerbating factors, no improvements with breathing treatments or a course of azithromycin or augmentin prescribed by pcp. Ems found her in a home with no power with o2 sats in 80s%. Denies chest pain, palpitations. In the ed she is normoxic after breathing treatments. Still getting very short of breath with exertion. Her cxr only showed emphysema. Her procalcitonin is 0.5. will admit to obs for copd exacerbation. Physical Exam at Discharge:  /66   Pulse 90   Temp 97.8 °F (36.6 °C) (Oral)   Resp 18   Ht 5' 5\" (1.651 m)   Wt 129 lb 6 oz (58.7 kg)   SpO2 94%   BMI 21.53 kg/m²     Gen: No distress. Alert. Eyes: PERRL. No sclera icterus. No conjunctival injection. ENT: No discharge. Pharynx clear. Neck: Trachea midline. Resp: No accessory muscle use. No crackles. LS diminished with mild wheezes. No rhonchi. CV: Regular rate. Regular rhythm. No murmur. No rub. No edema. Patient care assumed, assessment completed as charted. Patient resting in bed, SpO2 88% on 8L/HFNC, patient having difficulty maintaining sats >90%. O2 increased to 11L/HFNC, patient currently 91%. Heparin drip infusing at 12.5mL/hr through intact JONATHAN PICC, andujar catheter patent. Patient states no needs at present. Will monitor. GI: Non-tender. Non-distended. Normal bowel sounds. No hernia. Skin: Warm and dry. No nodule on exposed extremities. No rash on exposed extremities. M/S: No cyanosis. No joint deformity. No clubbing. Neuro: Awake. Grossly nonfocal    Psych: Oriented x 3. No anxiety or agitation         Hospital Course    #COPD exacerbation  #Hypoxia  - does not use home O2. Required up to 2L. - no infiltrate on imaging  - prednisone -> solumedrol 40 mg BID -> prednisone taper   - on doxy D#3   - inhaled bronchodilators scheduled and PRN  - checked sputum cultures - no growth so far   - cont Symbicort   - try zanaflex for muscle spasms  - discharge on PO Prednisone and Doxy. Discussed face to face with her. She was discharged on 2 L of oxygen.      #Elevated BP without diagnosis of Hypertension   -resolved.     #Fibromyalgia  #DDD  - cont Gabapentin     #GERD  - cont PPI     #Hyperlipidemia  - on Atorvastatin     #Iron deficiency anemia  - cont ferrous sulfate     #Anxiety  - cont Buspar, Atarax. CBC:   Recent Labs     03/02/22  0513 03/03/22  0535   WBC 9.4 10.1   HGB 11.6* 11.7*   HCT 34.8* 35.5*   MCV 85.9 86.0    307     BMP:   Recent Labs     03/02/22  0513 03/03/22  0535    135*   K 4.9 4.7    103   CO2 26 23   BUN 17 19   CREATININE 0.7 0.6     LIVER PROFILE: No results for input(s): AST, ALT, LIPASE, BILIDIR, BILITOT, ALKPHOS in the last 72 hours. Invalid input(s): AMYLASE,  ALB  PT/INR: No results for input(s): PROTIME, INR in the last 72 hours. APTT: No results for input(s): APTT in the last 72 hours. UA:No results for input(s): NITRITE, COLORU, PHUR, LABCAST, WBCUA, RBCUA, MUCUS, TRICHOMONAS, YEAST, BACTERIA, CLARITYU, SPECGRAV, LEUKOCYTESUR, UROBILINOGEN, BILIRUBINUR, BLOODU, GLUCOSEU, AMORPHOUS in the last 72 hours.     Invalid input(s): KETONESU        CT ABDOMEN PELVIS W IV CONTRAST Additional Contrast? None   Final Result   CT PA:      No evidence of pulmonary embolism or aortic dissection. Diffuse bronchial wall thickening. Filling defects are noted in the lower   lung airways bilaterally, greater on the right. Differential considerations   include mucous plugging and aspiration. Emphysema. Abdomen and pelvis CT:      Extensive diverticulosis of the large bowel, especially the region of the   sigmoid colon, but without convincing CT evidence of diverticulitis. Status post cholecystectomy. CT CHEST PULMONARY EMBOLISM W CONTRAST   Final Result   CT PA:      No evidence of pulmonary embolism or aortic dissection. Diffuse bronchial wall thickening. Filling defects are noted in the lower   lung airways bilaterally, greater on the right. Differential considerations   include mucous plugging and aspiration. Emphysema. Abdomen and pelvis CT:      Extensive diverticulosis of the large bowel, especially the region of the   sigmoid colon, but without convincing CT evidence of diverticulitis. Status post cholecystectomy. XR CHEST (2 VW)   Final Result   Hyperinflated, clear lungs. Discharge Medications     Medication List      START taking these medications    doxycycline hyclate 100 MG tablet  Commonly known as: VIBRA-TABS  Take 1 tablet by mouth every 12 hours for 5 days     ondansetron 4 MG disintegrating tablet  Commonly known as: ZOFRAN-ODT  Take 1 tablet by mouth 3 times daily as needed for Nausea or Vomiting     polyethylene glycol 17 GM/SCOOP powder  Commonly known as: GLYCOLAX  Take 17 g by mouth daily     predniSONE 10 MG tablet  Commonly known as: DELTASONE  4 tabs for 3 days 3 tabs for 3 days 2 tabs for 3 days 1 tabs for 3 days     tiZANidine 4 MG tablet  Commonly known as: ZANAFLEX  Take 1 tablet by mouth every 6 hours as needed (spasm)        CONTINUE taking these medications    * albuterol sulfate  (90 Base) MCG/ACT inhaler  Use 2 puffs 4 times daily for 7 days then as needed for wheezing.  Dispense with Spacer and instruct in use. At patient's preference may use 60 dose MDI. May Sub Pro-Air or Proventil as needed per insurance. * albuterol 2.5 MG/0.5ML Nebu nebulizer solution  Commonly known as: PROVENTIL  Take 0.5 mLs by nebulization every 6 hours as needed for Wheezing or Shortness of Breath     atorvastatin 40 MG tablet  Commonly known as: LIPITOR     diclofenac sodium 1 % Gel  Commonly known as: VOLTAREN  Apply 2 g topically 4 times daily     esomeprazole Magnesium 40 MG Pack  Commonly known as: NEXIUM     gabapentin 300 MG capsule  Commonly known as: NEURONTIN     guaiFENesin 600 MG extended release tablet  Commonly known as: MUCINEX  Take 1 tablet by mouth 2 times daily     hydrOXYzine 50 MG tablet  Commonly known as: ATARAX     traZODone 100 MG tablet  Commonly known as: DESYREL     Trelegy Ellipta 100-62.5-25 MCG/INH Aepb  Generic drug: fluticasone-umeclidin-vilant     zafirlukast 20 MG tablet  Commonly known as: ACCOLATE         * This list has 2 medication(s) that are the same as other medications prescribed for you. Read the directions carefully, and ask your doctor or other care provider to review them with you. Where to Get Your Medications      These medications were sent to [address] [place] [address]    Phone: [phone]   · doxycycline hyclate 100 MG tablet  · ondansetron 4 MG disintegrating tablet  · polyethylene glycol 17 GM/SCOOP powder  · predniSONE 10 MG tablet  · tiZANidine 4 MG tablet           Discharge Condition/Location: Stable    Follow Up: Follow up with PCP. More than 30 mts spent.      Micah Gardner MD 3/3/2022 11:26 AM

## 2022-03-23 ENCOUNTER — OFFICE VISIT (OUTPATIENT)
Dept: INTERNAL MEDICINE CLINIC | Age: 62
End: 2022-03-23

## 2022-03-23 VITALS
RESPIRATION RATE: 12 BRPM | HEART RATE: 70 BPM | BODY MASS INDEX: 35.97 KG/M2 | HEIGHT: 63 IN | WEIGHT: 203 LBS | DIASTOLIC BLOOD PRESSURE: 80 MMHG | SYSTOLIC BLOOD PRESSURE: 128 MMHG

## 2022-03-23 DIAGNOSIS — R79.89 LOW TSH LEVEL: ICD-10-CM

## 2022-03-23 DIAGNOSIS — Z86.711 HISTORY OF PULMONARY EMBOLISM: ICD-10-CM

## 2022-03-23 DIAGNOSIS — E78.5 HYPERLIPIDEMIA, UNSPECIFIED HYPERLIPIDEMIA TYPE: ICD-10-CM

## 2022-03-23 DIAGNOSIS — D75.1 ERYTHROCYTOSIS: ICD-10-CM

## 2022-03-23 DIAGNOSIS — E66.2 OBESITY HYPOVENTILATION SYNDROME (HCC): ICD-10-CM

## 2022-03-23 DIAGNOSIS — Z00.00 ENCOUNTER FOR WELL ADULT EXAM WITHOUT ABNORMAL FINDINGS: ICD-10-CM

## 2022-03-23 DIAGNOSIS — F41.1 GAD (GENERALIZED ANXIETY DISORDER): ICD-10-CM

## 2022-03-23 DIAGNOSIS — Z00.00 ROUTINE GENERAL MEDICAL EXAMINATION AT A HEALTH CARE FACILITY: Primary | ICD-10-CM

## 2022-03-23 PROCEDURE — 99396 PREV VISIT EST AGE 40-64: CPT | Performed by: INTERNAL MEDICINE

## 2022-03-23 RX ORDER — BUSPIRONE HYDROCHLORIDE 5 MG/1
5 TABLET ORAL 3 TIMES DAILY
Qty: 90 TABLET | Refills: 0 | Status: SHIPPED | OUTPATIENT
Start: 2022-03-23 | End: 2022-04-18

## 2022-03-23 SDOH — ECONOMIC STABILITY: HOUSING INSECURITY
IN THE LAST 12 MONTHS, WAS THERE A TIME WHEN YOU DID NOT HAVE A STEADY PLACE TO SLEEP OR SLEPT IN A SHELTER (INCLUDING NOW)?: NO

## 2022-03-23 SDOH — HEALTH STABILITY: PHYSICAL HEALTH: ON AVERAGE, HOW MANY DAYS PER WEEK DO YOU ENGAGE IN MODERATE TO STRENUOUS EXERCISE (LIKE A BRISK WALK)?: 0 DAYS

## 2022-03-23 SDOH — ECONOMIC STABILITY: INCOME INSECURITY: IN THE LAST 12 MONTHS, WAS THERE A TIME WHEN YOU WERE NOT ABLE TO PAY THE MORTGAGE OR RENT ON TIME?: NO

## 2022-03-23 SDOH — ECONOMIC STABILITY: HOUSING INSECURITY: IN THE LAST 12 MONTHS, HOW MANY PLACES HAVE YOU LIVED?: 1

## 2022-03-23 ASSESSMENT — ENCOUNTER SYMPTOMS
NAUSEA: 0
SHORTNESS OF BREATH: 0
ABDOMINAL PAIN: 0
VOMITING: 0
WHEEZING: 0
RHINORRHEA: 0

## 2022-03-23 NOTE — PROGRESS NOTES
Subjective:      Patient ID: Collin Girard is a 63 y.o. female. HPI     Patient is here an annual exam.      She has history of DVT and PE. She is finished with Eliquis and takes a baby ASA. She has ZANE. She is using CPAP. She has been on Weight Watchers and has lost 68 lbs in 9 months. Her weight has been stable. She has quit smoking. Her foot drop on the left is improved. She has anxiety. She takes Celexa. She is taking care of her mom. She feels overwhelmed. Review of Systems   Constitutional: Negative for appetite change and fatigue. HENT: Negative for postnasal drip and rhinorrhea. Respiratory: Negative for shortness of breath and wheezing. Cardiovascular: Negative for chest pain and palpitations. Gastrointestinal: Negative for abdominal pain, nausea and vomiting. Musculoskeletal: Negative for joint swelling. Skin: Negative for rash. Neurological: Negative for light-headedness. Foot drop   Psychiatric/Behavioral: Positive for sleep disturbance. The patient is nervous/anxious.       Past Medical History:   Diagnosis Date    A-fib [facility])     Acute deep vein thrombosis (DVT) of distal end of left lower extremity (HCC)     Acute deep vein thrombosis (DVT) of left peroneal vein ([address] 2019    Anxiety state, unspecified 05/17/2010    COPD (chronic obstructive pulmonary disease) ([address]     Hx of blood clots     Impacted cerumen 05/17/2010    Morbid obesity with BMI of 45.0-49.9, adult ([address] 11/26/2019    PONV (postoperative nausea and vomiting)     Pulmonary embolism ([address] 2019    Rheumatoid arthritis ([address] 05/17/2010    Rheumatoid arthritis(714.0) 05/17/2010    Tobacco abuse 05/31/2011     Past Surgical History:   Procedure Laterality Date    BRONCHOSCOPY N/A 5/6/2019    BRONCHOSCOPY ALVEOLAR LAVAGE performed by Anabel Marin MD at [address] [place] [address] Bilateral     both hands 1990's    CHOLECYSTECTOMY, LAPAROSCOPIC N/A 3/5/2021    ROBOTIC CHOLECYSTECTOMY performed by Janelle Castillo MD at [facility] COLONOSCOPY N/A 2021    COLONOSCOPY POLYPECTOMY SNARE/COLD BIOPSY performed by Alicia Maria MD at [facility]  2019      Family History   Problem Relation Age of Onset    Other Mother         Factor 5 [place]     Cancer Father         Non Hodgkin's lymphoma    No Known Problems Sister     Stroke Paternal Grandmother     Heart Attack Paternal Grandfather 62    Diabetes Paternal Grandfather        Social History     Tobacco Use    Smoking status: Former Smoker     Packs/day: 0.50     Years: 30.00     Pack years: 15.00     Types: Cigarettes     Start date: 1970     Quit date: 2019     Years since quittin.9    Smokeless tobacco: Never Used   Vaping Use    Vaping Use: Never used   Substance Use Topics    Alcohol use: No     Alcohol/week: 0.0 standard drinks    Drug use: No       /80 (Site: Right Upper Arm, Position: Sitting, Cuff Size: Large Adult)   Pulse 70   Resp 12   Ht 5' 3\" (1.6 m)   Wt 203 lb (92.1 kg)   BMI 35.96 kg/m²     Objective:   Physical Exam  Constitutional:       Appearance: She is well-developed. HENT:      Head: Normocephalic. Eyes:      Conjunctiva/sclera: Conjunctivae normal.      Pupils: Pupils are equal, round, and reactive to light. Neck:      Thyroid: No thyroid mass or thyromegaly. Vascular: No carotid bruit or JVD. Trachea: Trachea normal.   Cardiovascular:      Rate and Rhythm: Normal rate and regular rhythm. Heart sounds: Normal heart sounds. No murmur heard. No gallop. Pulmonary:      Effort: Pulmonary effort is normal. No respiratory distress. Breath sounds: Normal breath sounds. No wheezing or rales. Abdominal:      General: Bowel sounds are normal. There is no distension. Palpations: Abdomen is soft. There is no hepatomegaly, splenomegaly or mass. Tenderness:  There is no abdominal tenderness. Musculoskeletal:         General: Normal range of motion. Cervical back: Normal range of motion and neck supple. Lymphadenopathy:      Cervical: No cervical adenopathy. Skin:     General: Skin is warm and dry. Findings: No rash. Neurological:      Mental Status: She is alert and oriented to person, place, and time. Cranial Nerves: No cranial nerve deficit. Deep Tendon Reflexes: Reflexes are normal and symmetric. Comments: Mild left foot drop   Psychiatric:         Behavior: Behavior normal.         Thought Content: Thought content normal.         Judgment: Judgment normal.         Assessment:       Diagnosis Orders   1. Routine general medical examination at a health care facility     2. History of pulmonary embolism     3. SARAHI (generalized anxiety disorder)     4. Erythrocytosis     5. Obesity hypoventilation syndrome ([facility] [address]     6. Low TSH level  TSH    T4, Free    T3, Free   7. Hyperlipidemia, unspecified hyperlipidemia type  Comprehensive Metabolic Panel    CBC with Auto Differential    Uric Acid    Lipid Panel          Plan:        #  Annual exam done. #  History of pulmonary embolism. On ASA. #  Vocal cord polyp removed. #  DVT see above. #  ZANE - on CPAP. #  Left foot drop. Much improved. #  SARAHI on Celexa. Her anxiety is worse. I will add Buspar to see if it helps. #  Morbid Obesity resolved. She has lost 68 lbs. # Low TSH. Thyroid uptake scan normal. Repeat labs.          Latonya Grier MD

## 2022-03-23 NOTE — PATIENT INSTRUCTIONS
Well Visit, Women 48 to 72: Care Instructions  Overview     Well visits can help you stay healthy. Your doctor has checked your overall health and may have suggested ways to take good care of yourself. Your doctor also may have recommended tests. At home, you can help prevent illness with healthy eating, regular exercise, and other steps. Follow-up care is a key part of your treatment and safety. Be sure to make and go to all appointments, and call your doctor if you are having problems. It's also a good idea to know your test results and keep a list of the medicines you take. How can you care for yourself at home? · Get screening tests that you and your doctor decide on. Screening helps find diseases before any symptoms appear. · Eat healthy foods. Choose fruits, vegetables, whole grains, protein, and low-fat dairy foods. Limit fat, especially saturated fat. Reduce salt in your diet. · Limit alcohol. Have no more than 1 drink a day or 7 drinks a week. · Get at least 30 minutes of exercise on most days of the week. Walking is a good choice. You also may want to do other activities, such as running, swimming, cycling, or playing tennis or team sports. · Reach and stay at a healthy weight. This will lower your risk for many problems, such as obesity, diabetes, heart disease, and high blood pressure. · Do not smoke. Smoking can make health problems worse. If you need help quitting, talk to your doctor about stop-smoking programs and medicines. These can increase your chances of quitting for good. · Care for your mental health. It is easy to get weighed down by worry and stress. Learn strategies to manage stress, like deep breathing and mindfulness, and stay connected with your family and community. If you find you often feel sad or hopeless, talk with your doctor. Treatment can help. · Talk to your doctor about whether you have any risk factors for sexually transmitted infections (STIs).  You can help prevent STIs if you wait to have sex with a new partner (or partners) until you've each been tested for STIs. It also helps if you use condoms (male or female condoms) and if you limit your sex partners to one person who only has sex with you. Vaccines are available for some STIs. · If you think you may have a problem with alcohol or drug use, talk to your doctor. This includes prescription medicines (such as amphetamines and opioids) and illegal drugs (such as cocaine and methamphetamine). Your doctor can help you figure out what type of treatment is best for you. · Protect your skin from too much sun. When you're outdoors from 10 a.m. to 4 p.m., stay in the shade or cover up with clothing and a hat with a wide brim. Wear sunglasses that block UV rays. Even when it's cloudy, put broad-spectrum sunscreen (SPF 30 or higher) on any exposed skin. · See a dentist one or two times a year for checkups and to have your teeth cleaned. · Wear a seat belt in the car. When should you call for help? Watch closely for changes in your health, and be sure to contact your doctor if you have any problems or symptoms that concern you. Where can you learn more? Go to https://[employer]. org and sign in to your [employer] account. Enter T491 in the [facility] box to learn more about \"Well Visit, Women 50 to 72: Care Instructions. \"     If you do not have an account, please click on the \"Sign Up Now\" link. Current as of: October 6, 2021               Content Version: 13.1  © 9676-0691 Healthwise, Incorporated. Care instructions adapted under license by [facility] ([facility]). If you have questions about a medical condition or this instruction, always ask your healthcare professional. [place] [address] any warranty or liability for your use of this information.

## 2022-03-27 DIAGNOSIS — F41.1 ANXIETY STATE: ICD-10-CM

## 2022-03-28 RX ORDER — CITALOPRAM 20 MG/1
TABLET ORAL
Qty: 30 TABLET | Refills: 2 | Status: SHIPPED | OUTPATIENT
Start: 2022-03-28 | End: 2022-05-31

## 2022-04-18 RX ORDER — BUSPIRONE HYDROCHLORIDE 5 MG/1
TABLET ORAL
Qty: 90 TABLET | Refills: 0 | Status: SHIPPED | OUTPATIENT
Start: 2022-04-18 | End: 2022-04-22 | Stop reason: SDUPTHER

## 2022-04-22 ENCOUNTER — OFFICE VISIT (OUTPATIENT)
Dept: INTERNAL MEDICINE CLINIC | Age: 62
End: 2022-04-22

## 2022-04-22 VITALS
RESPIRATION RATE: 12 BRPM | HEIGHT: 63 IN | WEIGHT: 208 LBS | HEART RATE: 70 BPM | BODY MASS INDEX: 36.86 KG/M2 | SYSTOLIC BLOOD PRESSURE: 120 MMHG | DIASTOLIC BLOOD PRESSURE: 80 MMHG

## 2022-04-22 DIAGNOSIS — F41.1 GAD (GENERALIZED ANXIETY DISORDER): ICD-10-CM

## 2022-04-22 DIAGNOSIS — E06.9 THYROIDITIS: ICD-10-CM

## 2022-04-22 DIAGNOSIS — E78.5 HYPERLIPIDEMIA, UNSPECIFIED HYPERLIPIDEMIA TYPE: Primary | ICD-10-CM

## 2022-04-22 PROCEDURE — 99213 OFFICE O/P EST LOW 20 MIN: CPT | Performed by: INTERNAL MEDICINE

## 2022-04-22 RX ORDER — ATORVASTATIN CALCIUM 20 MG/1
20 TABLET, FILM COATED ORAL DAILY
Qty: 30 TABLET | Refills: 2 | Status: SHIPPED | OUTPATIENT
Start: 2022-04-22 | End: 2022-07-28

## 2022-04-22 RX ORDER — BUSPIRONE HYDROCHLORIDE 5 MG/1
10 TABLET ORAL 3 TIMES DAILY
Qty: 90 TABLET | Refills: 2 | Status: SHIPPED | OUTPATIENT
Start: 2022-04-22 | End: 2022-05-23

## 2022-04-22 NOTE — PROGRESS NOTES
Di Patterson (:  1960) is a 63 y.o. female,Established patient, here for evaluation of the following chief complaint(s):  Follow-up         ASSESSMENT/PLAN:  1. Hyperlipidemia, unspecified hyperlipidemia type  2. SARAHI (generalized anxiety disorder)  3. Thyroiditis    Start Lipitor  Increase Buspar. Monitor TSH. No follow-ups on file. Subjective   SUBJECTIVE/OBJECTIVE:  HPI     Patient is here for follow up. She is taking Buspar for anxiety. It is helping some. Her TSH is slowly coming up. She has HLD seen on lab work. Review of Systems   see hpi    There are no changes to past medical history, family history, social history or review of systems(except as noted in the history section) since prior note (all reviewed with patient). Current Outpatient Medications   Medication Instructions    albuterol (ACCUNEB) 1.25 mg, Inhalation, EVERY 6 HOURS PRN    albuterol sulfate  (90 Base) MCG/ACT inhaler 2 puffs, Inhalation, EVERY 4 HOURS PRN    aspirin 81 mg, Oral, DAILY    atorvastatin (LIPITOR) 20 mg, Oral, DAILY    busPIRone (BUSPAR) 10 mg, Oral, 3 TIMES DAILY    citalopram (CELEXA) 20 MG tablet TAKE ONE TABLET BY MOUTH DAILY       /80 (Site: Right Upper Arm, Position: Sitting, Cuff Size: Large Adult)   Pulse 70   Resp 12   Ht 5' 3\" (1.6 m)   Wt 208 lb (94.3 kg)   BMI 36.85 kg/m²       Objective   Physical Exam  Constitutional:       Appearance: She is well-developed. HENT:      Head: Normocephalic and atraumatic. Eyes:      Pupils: Pupils are equal, round, and reactive to light. Neck:      Thyroid: No thyromegaly. Cardiovascular:      Rate and Rhythm: Normal rate and regular rhythm. Heart sounds: Normal heart sounds. No murmur heard. Pulmonary:      Effort: Pulmonary effort is normal.      Breath sounds: Normal breath sounds. No wheezing. Musculoskeletal:      Cervical back: Normal range of motion and neck supple.                   An electronic signature was used to authenticate this note.     --Balaji Buchanan MD

## 2022-05-23 RX ORDER — BUSPIRONE HYDROCHLORIDE 5 MG/1
TABLET ORAL
Qty: 90 TABLET | Refills: 2 | Status: SHIPPED
Start: 2022-05-23 | End: 2022-06-16 | Stop reason: DRUGHIGH

## 2022-05-26 ENCOUNTER — HOSPITAL ENCOUNTER (OUTPATIENT)
Dept: MAMMOGRAPHY | Age: 62
Discharge: HOME | End: 2022-05-26
Payer: COMMERCIAL

## 2022-05-26 VITALS — HEIGHT: 63 IN | BODY MASS INDEX: 36.32 KG/M2 | WEIGHT: 205 LBS

## 2022-05-26 DIAGNOSIS — Z12.31 VISIT FOR SCREENING MAMMOGRAM: ICD-10-CM

## 2022-05-26 PROCEDURE — 77063 BREAST TOMOSYNTHESIS BI: CPT

## 2022-05-28 DIAGNOSIS — F41.1 ANXIETY STATE: ICD-10-CM

## 2022-05-31 RX ORDER — CITALOPRAM 20 MG/1
TABLET ORAL
Qty: 30 TABLET | Refills: 2 | Status: SHIPPED | OUTPATIENT
Start: 2022-05-31 | End: 2022-06-16 | Stop reason: SDUPTHER

## 2022-06-16 ENCOUNTER — OFFICE VISIT (OUTPATIENT)
Dept: INTERNAL MEDICINE CLINIC | Age: 62
End: 2022-06-16

## 2022-06-16 VITALS
DIASTOLIC BLOOD PRESSURE: 80 MMHG | WEIGHT: 210 LBS | RESPIRATION RATE: 12 BRPM | SYSTOLIC BLOOD PRESSURE: 128 MMHG | HEIGHT: 63 IN | BODY MASS INDEX: 37.21 KG/M2 | HEART RATE: 70 BPM

## 2022-06-16 DIAGNOSIS — J44.9 CHRONIC OBSTRUCTIVE PULMONARY DISEASE, UNSPECIFIED COPD TYPE (HCC): ICD-10-CM

## 2022-06-16 DIAGNOSIS — D75.1 ERYTHROCYTOSIS: Primary | ICD-10-CM

## 2022-06-16 DIAGNOSIS — E78.5 HYPERLIPIDEMIA, UNSPECIFIED HYPERLIPIDEMIA TYPE: ICD-10-CM

## 2022-06-16 DIAGNOSIS — F41.1 ANXIETY STATE: ICD-10-CM

## 2022-06-16 DIAGNOSIS — F41.1 GAD (GENERALIZED ANXIETY DISORDER): ICD-10-CM

## 2022-06-16 DIAGNOSIS — E06.0 ACUTE THYROIDITIS: ICD-10-CM

## 2022-06-16 DIAGNOSIS — J45.20 MILD INTERMITTENT ASTHMA WITHOUT COMPLICATION: ICD-10-CM

## 2022-06-16 DIAGNOSIS — Z86.711 HISTORY OF PULMONARY EMBOLISM: ICD-10-CM

## 2022-06-16 LAB
A/G RATIO: 1.7 (ref 1.1–2.2)
ALBUMIN SERPL-MCNC: 4.5 G/DL (ref 3.4–5)
ALP BLD-CCNC: 94 U/L (ref 40–129)
ALT SERPL-CCNC: 15 U/L (ref 10–40)
ANION GAP SERPL CALCULATED.3IONS-SCNC: 14 MMOL/L (ref 3–16)
AST SERPL-CCNC: 16 U/L (ref 15–37)
BASOPHILS ABSOLUTE: 0.1 K/UL (ref 0–0.2)
BASOPHILS RELATIVE PERCENT: 0.8 %
BILIRUB SERPL-MCNC: 0.3 MG/DL (ref 0–1)
BUN BLDV-MCNC: 15 MG/DL (ref 7–20)
CALCIUM SERPL-MCNC: 9.7 MG/DL (ref 8.3–10.6)
CHLORIDE BLD-SCNC: 105 MMOL/L (ref 99–110)
CHOLESTEROL, TOTAL: 172 MG/DL (ref 0–199)
CO2: 25 MMOL/L (ref 21–32)
CREAT SERPL-MCNC: 0.7 MG/DL (ref 0.6–1.2)
EOSINOPHILS ABSOLUTE: 0.3 K/UL (ref 0–0.6)
EOSINOPHILS RELATIVE PERCENT: 3.6 %
GFR AFRICAN AMERICAN: >60
GFR NON-AFRICAN AMERICAN: >60
GLUCOSE BLD-MCNC: 94 MG/DL (ref 70–99)
HCT VFR BLD CALC: 46.3 % (ref 36–48)
HDLC SERPL-MCNC: 51 MG/DL (ref 40–60)
HEMOGLOBIN: 15.7 G/DL (ref 12–16)
LDL CHOLESTEROL CALCULATED: 101 MG/DL
LYMPHOCYTES ABSOLUTE: 3.2 K/UL (ref 1–5.1)
LYMPHOCYTES RELATIVE PERCENT: 37 %
MCH RBC QN AUTO: 31.4 PG (ref 26–34)
MCHC RBC AUTO-ENTMCNC: 33.9 G/DL (ref 31–36)
MCV RBC AUTO: 92.5 FL (ref 80–100)
MONOCYTES ABSOLUTE: 0.6 K/UL (ref 0–1.3)
MONOCYTES RELATIVE PERCENT: 6.8 %
NEUTROPHILS ABSOLUTE: 4.5 K/UL (ref 1.7–7.7)
NEUTROPHILS RELATIVE PERCENT: 51.8 %
PDW BLD-RTO: 12.6 % (ref 12.4–15.4)
PLATELET # BLD: 298 K/UL (ref 135–450)
PMV BLD AUTO: 7.9 FL (ref 5–10.5)
POTASSIUM SERPL-SCNC: 4.9 MMOL/L (ref 3.5–5.1)
RBC # BLD: 5 M/UL (ref 4–5.2)
SODIUM BLD-SCNC: 144 MMOL/L (ref 136–145)
T3 FREE: 3.1 PG/ML (ref 2.3–4.2)
T4 FREE: 0.9 NG/DL (ref 0.9–1.8)
TOTAL PROTEIN: 7.1 G/DL (ref 6.4–8.2)
TRIGL SERPL-MCNC: 99 MG/DL (ref 0–150)
TSH SERPL DL<=0.05 MIU/L-ACNC: 0.41 UIU/ML (ref 0.27–4.2)
VLDLC SERPL CALC-MCNC: 20 MG/DL
WBC # BLD: 8.6 K/UL (ref 4–11)

## 2022-06-16 PROCEDURE — 99214 OFFICE O/P EST MOD 30 MIN: CPT | Performed by: INTERNAL MEDICINE

## 2022-06-16 RX ORDER — ALBUTEROL SULFATE 90 UG/1
2 AEROSOL, METERED RESPIRATORY (INHALATION) EVERY 4 HOURS PRN
Qty: 18 G | Refills: 1 | Status: SHIPPED | OUTPATIENT
Start: 2022-06-16

## 2022-06-16 RX ORDER — BUSPIRONE HYDROCHLORIDE 10 MG/1
10 TABLET ORAL 3 TIMES DAILY
Qty: 90 TABLET | Refills: 2 | Status: SHIPPED | OUTPATIENT
Start: 2022-06-16 | End: 2022-06-17

## 2022-06-16 RX ORDER — CITALOPRAM 20 MG/1
TABLET ORAL
Qty: 30 TABLET | Refills: 2 | Status: SHIPPED | OUTPATIENT
Start: 2022-06-16

## 2022-06-16 ASSESSMENT — ENCOUNTER SYMPTOMS
SHORTNESS OF BREATH: 0
ABDOMINAL PAIN: 0
WHEEZING: 0
VOMITING: 0
RHINORRHEA: 0
NAUSEA: 0

## 2022-06-16 NOTE — PROGRESS NOTES
Subjective:      Patient ID: Tuan Villarreal is a 63 y.o. female. HPI     Patient is here for a follow up. She has history of DVT and PE. She is finished with Eliquis and takes a baby ASA. She is compliant. She has history of thyroiditis. She has hyperlipidemia. She takes lipitor. No myalgias. She has ZANE. She is using CPAP. She has gained a little more weight. Her foot drop on the left is improved. She is taking Celexa. It helps with anxiety. I added Buspar and it has helped. She has COPD. She is a former smoker. She uses albuterol prn. Review of Systems   Constitutional: Negative for appetite change and fatigue. HENT: Negative for postnasal drip and rhinorrhea. Respiratory: Negative for shortness of breath and wheezing. Cardiovascular: Negative for chest pain and palpitations. Gastrointestinal: Negative for abdominal pain, nausea and vomiting. Musculoskeletal: Negative for joint swelling. Skin: Negative for rash. Neurological: Negative for light-headedness. Foot drop   Psychiatric/Behavioral: Negative for sleep disturbance.      Past Medical History:   Diagnosis Date    A-fib [facility])     Acute deep vein thrombosis (DVT) of distal end of left lower extremity (HCC)     Acute deep vein thrombosis (DVT) of left peroneal vein ([address] 2019    Anxiety state, unspecified 05/17/2010    COPD (chronic obstructive pulmonary disease) ([address]     Hx of blood clots     Impacted cerumen 05/17/2010    Morbid obesity with BMI of 45.0-49.9, adult ([address] 11/26/2019    PONV (postoperative nausea and vomiting)     Pulmonary embolism ([address] 2019    Rheumatoid arthritis ([address] 05/17/2010    Rheumatoid arthritis(714.0) 05/17/2010    Tobacco abuse 05/31/2011     Past Surgical History:   Procedure Laterality Date    BRONCHOSCOPY N/A 5/6/2019    BRONCHOSCOPY ALVEOLAR LAVAGE performed by Nahum Sousa MD at [address] [place] [address] Bilateral     both hands 1990's    CHOLECYSTECTOMY, LAPAROSCOPIC N/A 3/5/2021    ROBOTIC CHOLECYSTECTOMY performed by Yohan Perry MD at [address] COLONOSCOPY N/A 11/19/2021    COLONOSCOPY POLYPECTOMY SNARE/COLD BIOPSY performed by King Rita MD at [facility]  11/25/2019      Family History   Problem Relation Age of Onset    Other Mother         Factor 5 [place]     Cancer Father         Non Hodgkin's lymphoma    No Known Problems Sister     Stroke Paternal Grandmother     Heart Attack Paternal Grandfather 62    Diabetes Paternal Grandfather        Social History     Tobacco Use    Smoking status: Former Smoker     Packs/day: 0.50     Years: 30.00     Pack years: 15.00     Types: Cigarettes     Start date: 8/1/1970     Quit date: 4/28/2019     Years since quitting: 3.1    Smokeless tobacco: Never Used   Vaping Use    Vaping Use: Never used   Substance Use Topics    Alcohol use: No     Alcohol/week: 0.0 standard drinks    Drug use: No       Ht 5' 3\" (1.6 m)   Wt 210 lb (95.3 kg)   BMI 37.20 kg/m²     Objective:   Physical Exam  Constitutional:       Appearance: She is well-developed. HENT:      Head: Normocephalic. Eyes:      Conjunctiva/sclera: Conjunctivae normal.      Pupils: Pupils are equal, round, and reactive to light. Neck:      Thyroid: No thyroid mass or thyromegaly. Vascular: No carotid bruit or JVD. Trachea: Trachea normal.   Cardiovascular:      Rate and Rhythm: Normal rate and regular rhythm. Heart sounds: Normal heart sounds. No murmur heard. No gallop. Pulmonary:      Effort: Pulmonary effort is normal. No respiratory distress. Breath sounds: Normal breath sounds. No wheezing or rales. Abdominal:      General: Bowel sounds are normal. There is no distension. Palpations: Abdomen is soft. There is no hepatomegaly, splenomegaly or mass. Tenderness: There is no abdominal tenderness.    Musculoskeletal: General: Normal range of motion. Cervical back: Normal range of motion and neck supple. Lymphadenopathy:      Cervical: No cervical adenopathy. Skin:     General: Skin is warm and dry. Findings: No rash. Neurological:      Mental Status: She is alert and oriented to person, place, and time. Cranial Nerves: No cranial nerve deficit. Deep Tendon Reflexes: Reflexes are normal and symmetric. Comments: Mild left foot drop   Psychiatric:         Behavior: Behavior normal.         Thought Content: Thought content normal.         Judgment: Judgment normal.         Assessment:       Diagnosis Orders   1. Erythrocytosis     2. Anxiety state  citalopram (CELEXA) 20 MG tablet   3. Mild intermittent asthma without complication  albuterol sulfate HFA (PROVENTIL;VENTOLIN;PROAIR) 108 (90 Base) MCG/ACT inhaler   4. SARAHI (generalized anxiety disorder)     5. Chronic obstructive pulmonary disease, unspecified COPD type ([facility]     6. Acute thyroiditis  CBC with Auto Differential    Comprehensive Metabolic Panel    TSH    T4, Free    T3, Free   7. History of pulmonary embolism     8. Hyperlipidemia, unspecified hyperlipidemia type  Lipid Panel          Plan:        #  Thyroiditis. Check labs. #  HLD. Check lipids. #  History of pulmonary embolism. #  DVT see above. #  ZANE - on CPAP. #  Left foot drop. Much improved. #  SARAHI on Celexa. #  Morbid Obesity resolved. #  Copd. Stable. On Albuterol prn.        Ame Valdes MD

## 2022-06-17 RX ORDER — BUSPIRONE HYDROCHLORIDE 10 MG/1
TABLET ORAL
Qty: 90 TABLET | Refills: 0 | Status: SHIPPED | OUTPATIENT
Start: 2022-06-17 | End: 2022-09-26

## 2022-07-28 RX ORDER — ATORVASTATIN CALCIUM 20 MG/1
TABLET, FILM COATED ORAL
Qty: 30 TABLET | Refills: 2 | Status: SHIPPED | OUTPATIENT
Start: 2022-07-28 | End: 2022-10-31

## 2022-09-26 RX ORDER — BUSPIRONE HYDROCHLORIDE 10 MG/1
TABLET ORAL
Qty: 90 TABLET | Refills: 0 | Status: SHIPPED | OUTPATIENT
Start: 2022-09-26

## 2022-10-31 RX ORDER — ATORVASTATIN CALCIUM 20 MG/1
TABLET, FILM COATED ORAL
Qty: 30 TABLET | Refills: 0 | Status: SHIPPED | OUTPATIENT
Start: 2022-10-31

## 2022-12-02 RX ORDER — ATORVASTATIN CALCIUM 20 MG/1
TABLET, FILM COATED ORAL
Qty: 90 TABLET | Refills: 0 | Status: SHIPPED | OUTPATIENT
Start: 2022-12-02

## 2022-12-02 RX ORDER — BUSPIRONE HYDROCHLORIDE 10 MG/1
TABLET ORAL
Qty: 90 TABLET | Refills: 0 | Status: SHIPPED | OUTPATIENT
Start: 2022-12-02 | End: 2023-01-09

## 2022-12-05 RX ORDER — ATORVASTATIN CALCIUM 20 MG/1
TABLET, FILM COATED ORAL
Qty: 30 TABLET | OUTPATIENT
Start: 2022-12-05

## 2022-12-05 RX ORDER — BUSPIRONE HYDROCHLORIDE 10 MG/1
TABLET ORAL
Qty: 90 TABLET | Refills: 0 | OUTPATIENT
Start: 2022-12-05

## 2022-12-13 DIAGNOSIS — F41.1 ANXIETY STATE: ICD-10-CM

## 2022-12-14 RX ORDER — CITALOPRAM 20 MG/1
TABLET ORAL
Qty: 30 TABLET | Refills: 2 | Status: SHIPPED | OUTPATIENT
Start: 2022-12-14

## 2023-01-09 RX ORDER — BUSPIRONE HYDROCHLORIDE 10 MG/1
TABLET ORAL
Qty: 90 TABLET | Refills: 0 | Status: SHIPPED | OUTPATIENT
Start: 2023-01-09

## 2023-02-23 ENCOUNTER — OFFICE VISIT (OUTPATIENT)
Dept: INTERNAL MEDICINE CLINIC | Age: 63
End: 2023-02-23

## 2023-02-23 VITALS
OXYGEN SATURATION: 94 % | HEART RATE: 80 BPM | SYSTOLIC BLOOD PRESSURE: 138 MMHG | RESPIRATION RATE: 12 BRPM | WEIGHT: 226 LBS | BODY MASS INDEX: 40.04 KG/M2 | DIASTOLIC BLOOD PRESSURE: 80 MMHG | HEIGHT: 63 IN

## 2023-02-23 DIAGNOSIS — R06.02 SOB (SHORTNESS OF BREATH): ICD-10-CM

## 2023-02-23 DIAGNOSIS — D75.1 ERYTHROCYTOSIS: Primary | ICD-10-CM

## 2023-02-23 DIAGNOSIS — E66.01 MORBID OBESITY (HCC): ICD-10-CM

## 2023-02-23 DIAGNOSIS — K63.5 POLYP OF SIGMOID COLON, UNSPECIFIED TYPE: ICD-10-CM

## 2023-02-23 DIAGNOSIS — F41.1 ANXIETY STATE: ICD-10-CM

## 2023-02-23 RX ORDER — CITALOPRAM 20 MG/1
TABLET ORAL
Qty: 90 TABLET | Refills: 0 | Status: SHIPPED | OUTPATIENT
Start: 2023-02-23

## 2023-02-23 RX ORDER — ATORVASTATIN CALCIUM 20 MG/1
TABLET, FILM COATED ORAL
Qty: 90 TABLET | Refills: 0 | Status: SHIPPED | OUTPATIENT
Start: 2023-02-23

## 2023-02-23 ASSESSMENT — ENCOUNTER SYMPTOMS
NAUSEA: 0
ABDOMINAL PAIN: 0
WHEEZING: 0
VOMITING: 0
RHINORRHEA: 0
SHORTNESS OF BREATH: 0

## 2023-02-23 ASSESSMENT — PATIENT HEALTH QUESTIONNAIRE - PHQ9
SUM OF ALL RESPONSES TO PHQ QUESTIONS 1-9: 0
2. FEELING DOWN, DEPRESSED OR HOPELESS: 0
SUM OF ALL RESPONSES TO PHQ9 QUESTIONS 1 & 2: 0
2. FEELING DOWN, DEPRESSED OR HOPELESS: 0
SUM OF ALL RESPONSES TO PHQ QUESTIONS 1-9: 0
1. LITTLE INTEREST OR PLEASURE IN DOING THINGS: 0
SUM OF ALL RESPONSES TO PHQ QUESTIONS 1-9: 0
SUM OF ALL RESPONSES TO PHQ9 QUESTIONS 1 & 2: 0
1. LITTLE INTEREST OR PLEASURE IN DOING THINGS: 0
SUM OF ALL RESPONSES TO PHQ QUESTIONS 1-9: 0

## 2023-02-23 NOTE — PROGRESS NOTES
Physical Therapy Daily Treatment /Discharge Summary    INSURANCE / VISIT COUNT:   Visit Number: 7    Plan of Care Dates: Initial: 7/13/2018 Through: 9/7/2018  Insurance Information:   THERAPY BENEFITS:  PAYOR: AMI   VISIT LIMIT: 20V PT  PER YEAR  AUTHORIZATION NEEDED: EVAL THEN AUTH ORTHONET  NOTES:      DEDUCTIBLE: $ 2000       MET: $Y  OUT OF POCKET: $5000        MET: $4120 REMAINS      Referred by: CHELSI GastelumC   Medical Diagnosis (from order):    Femoroacetabular impingement of left hip   Strain of flexor muscle of left hip     Treatment Diagnosis: Hip Symptoms with Pain, Impaired Joint Mobility, Impaired Range of Motion, Impaired Motor Function/Muscle Performance and Impaired Gait/Locomotion Deficits  Insurance: 1. ANTHEM/BCBS  2. N/A        Diagnosis Precautions: none  Relevant co-morbidities and medications:   Medical History:   Unspecified otitis media                                        Comment: Otitits Media, recurrent, PE tubes, bilat.                 4/99, Rt tube out 2/00    Fracture                                                        Comment: Wrist  Relevant Surgery/Injury:     Current Medications:         Current Outpatient Prescriptions   Medication Sig    • amphetamine-dextroamphetamine (ADDERALL XR) 20 MG 24 hr capsule Take one in am daily.    • amphetamine-dextroamphetamine (ADDERALL) 5 MG tablet Take one at 4 PM daily    • albuterol 108 (90 Base) MCG/ACT inhaler Inhale 2 puffs into the lungs every 4 hours as needed for Shortness of Breath or Wheezing.       No current facility-administered medications for this encounter.              SUBJECTIVE   No pain  Feeling stronger  Noticing with skate boarding, soccer    Current Pain: 0/10.    Functional Change: See above    OBJECTIVE   Palpation:  Reduced tissue tension and tenderness throuhout iliopsoas, rectus femoris, and TFL.    Functional:  Able to fully IR and ER left hip without popping and pain.    Significant weakness and  Subjective:      Patient ID: Keyur Hu is a 63 y.o. female. HPI     Patient is here for a follow up. She has history of DVT and PE. She is finished with Eliquis and takes a baby ASA. She is compliant. She has history of thyroiditis. She has hyperlipidemia. She takes lipitor. No myalgias. She has ZANE. She is using CPAP. She has gained a little more weight. Her foot drop on the left is improved. She is taking Celexa. It helps with anxiety. I added Buspar and it has helped. She has COPD. She is a former smoker. She uses albuterol prn. She is due for another screening colonoscopy. She had one in 11/2021. It was poor prep. Repeat recommended. She cannot afford one as she lost her job. Review of Systems   Constitutional:  Negative for appetite change and fatigue. HENT:  Negative for postnasal drip and rhinorrhea. Respiratory:  Negative for shortness of breath and wheezing. Cardiovascular:  Negative for chest pain and palpitations. Gastrointestinal:  Negative for abdominal pain, nausea and vomiting. Musculoskeletal:  Negative for joint swelling. Skin:  Negative for rash. Neurological:  Negative for light-headedness. Foot drop   Psychiatric/Behavioral:  Negative for sleep disturbance.         Past Medical History:   Diagnosis Date    A-fib [facility])     Acute deep vein thrombosis (DVT) of distal end of left lower extremity (HCC)     Acute deep vein thrombosis (DVT) of left peroneal vein ([address] 2019    Anxiety state, unspecified 05/17/2010    COPD (chronic obstructive pulmonary disease) (HCC)     Hx of blood clots     Impacted cerumen 05/17/2010    Morbid obesity with BMI of 45.0-49.9, adult ([address] 11/26/2019    PONV (postoperative nausea and vomiting)     Pulmonary embolism ([address] 2019    Rheumatoid arthritis ([facility] 05/17/2010    Rheumatoid arthritis(714.0) 05/17/2010    Tobacco abuse 05/31/2011     Past Surgical History:   Procedure Laterality Date    BRONCHOSCOPY N/A 5/6/2019    BRONCHOSCOPY ALVEOLAR LAVAGE performed by Christiano Cortés MD at [facility] Bilateral     both hands 1990's    CHOLECYSTECTOMY, LAPAROSCOPIC N/A 3/5/2021    ROBOTIC CHOLECYSTECTOMY performed by Carolina Brush MD at [facility] OR    COLONOSCOPY N/A 11/19/2021    COLONOSCOPY POLYPECTOMY SNARE/COLD BIOPSY performed by Gigi Coker MD at [address] [place] [address]  11/25/2019      Family History   Problem Relation Age of Onset    Other Mother         Factor 5 Maritaz Earnest     Cancer Father         Non Hodgkin's lymphoma    No Known Problems Sister     Stroke Paternal Grandmother     Heart Attack Paternal Grandfather 62    Diabetes Paternal Grandfather        Social History     Tobacco Use    Smoking status: Former     Packs/day: 0.50     Years: 30.00     Pack years: 15.00     Types: Cigarettes     Start date: 8/1/1970     Quit date: 4/28/2019     Years since quitting: 3.8    Smokeless tobacco: Never   Vaping Use    Vaping Use: Never used   Substance Use Topics    Alcohol use: No     Alcohol/week: 0.0 standard drinks    Drug use: No       /80 (Site: Right Upper Arm, Position: Sitting, Cuff Size: Large Adult)   Pulse 80   Resp 12   Ht 5' 3\" (1.6 m)   Wt 226 lb (102.5 kg)   SpO2 94%   BMI 40.03 kg/m²     Objective:   Physical Exam  Constitutional:       Appearance: She is well-developed. HENT:      Head: Normocephalic. Eyes:      Conjunctiva/sclera: Conjunctivae normal.      Pupils: Pupils are equal, round, and reactive to light. Neck:      Thyroid: No thyroid mass or thyromegaly. Vascular: No carotid bruit or JVD. Trachea: Trachea normal.   Cardiovascular:      Rate and Rhythm: Normal rate and regular rhythm. Heart sounds: Normal heart sounds. No murmur heard. No gallop. Pulmonary:      Effort: Pulmonary effort is normal. No respiratory distress. Breath sounds: Normal breath sounds.  No wheezing or lack of stability with left side plank compared to right.      Treatment   Therapeutic Exercise:   Elliptical x 6 min warm up discussed sxs and HEP  New: single leg squats/pistols  New: lunges with trunk rotation, lateral lunges, speed skating  New: figure 4 ext rot stretch  New:  HS and adductor stretches    Current Home Program:   Access Code: [email or web address]   URL: [email or web address]   Date: 07/18/2018   Prepared by: Delaney Conklin     Exercises  Standing Hip Flexor Stretch - 3 reps - 20 hold - 1x daily  Hip External Rotation Stretch - 3 reps - 20 hold - 1x daily  Reverse Lunge - 10 reps - 3-5 hold - 1x daily  Walking Forward Lunge - 20 reps - 3-5 hold - 1x daily  Sidelying Diagonal Hip Abduction - 10 reps - 2 sets - 3-5 hold - 1x daily  Tree Pose - 3-5 reps - 10-20 hold - 1x daily  Lunge progression above    ASSESSMENT   Outcome Measures:  Lower Extremity Functional Scale: LEFS Calculated Total: 73 (0=extreme difficulty; 80=no difficulty)   Goals met        Pain after treatment: nr/10  Result of above outlined education: Verbalizes understanding and Demonstrates understanding    Goals:       To be obtained by end of this plan of care:     ?   Patient will complete higher level activities for 30 min with pain no greater than 1/10 without gait deviation.  ?  Patient will ascend and descend stairs with reciprocal gait pattern with pain no greater than 0/10 using no support without demonstrating gait deviation.  ?  Patient will be independent in a home exercise program for strength, range of motion, gait and balance and understand progression of all exercises.       PLAN   D/C PT goals met 100%    THERAPY DAILY BILLING   Primary Insurance: ANTHEM/BCBS  Secondary Insurance: N/A    Evaluation Procedures:  No evaluation codes were used on this date of service    Timed Procedures:  Therapeutic Exercise, 15 minutes    Untimed Procedures:  No untimed codes were used on this date of service    Total Treatment  Time:15 minutes    Discharge Measures:   Total Number of Visits: 7  Treatment Category: Knee, Other Non-Surgical  Outcome Measure: Lower Extremity Functional Scale   Initial Outcome Score:  59   Discharge Outcome Score: 73  Primary Clinician: Delaney Conklin            rales.   Abdominal:      General: Bowel sounds are normal. There is no distension. Palpations: Abdomen is soft. There is no hepatomegaly, splenomegaly or mass. Tenderness: There is no abdominal tenderness. Musculoskeletal:         General: Normal range of motion. Cervical back: Normal range of motion and neck supple. Lymphadenopathy:      Cervical: No cervical adenopathy. Skin:     General: Skin is warm and dry. Findings: No rash. Neurological:      Mental Status: She is alert and oriented to person, place, and time. Cranial Nerves: No cranial nerve deficit. Deep Tendon Reflexes: Reflexes are normal and symmetric. Comments: Mild left foot drop   Psychiatric:         Behavior: Behavior normal.         Thought Content: Thought content normal.         Judgment: Judgment normal.       Assessment:       Diagnosis Orders   1. Erythrocytosis        2. Anxiety state  citalopram (CELEXA) 20 MG tablet      3. Morbid obesity ([address]        4. Polyp of sigmoid colon, unspecified type        5. SOB (shortness of breath)               Plan:        #  Thyroiditis. Check labs. #  HLD. At goal.   #  History of pulmonary embolism. #  DVT see above. #  ZANE - on CPAP. #  Left foot drop. Much improved. #  SARAHI on Celexa. #  Morbid Obesity resolved. #  Copd. Stable. On Albuterol prn. She had COVID a few months ago and has noted a mild cough. She needs a lung cancer screening test but cannot afford one at present.        Mya Thomas MD

## 2023-03-09 RX ORDER — ATORVASTATIN CALCIUM 20 MG/1
TABLET, FILM COATED ORAL
Qty: 90 TABLET | Refills: 0 | Status: SHIPPED | OUTPATIENT
Start: 2023-03-09

## 2023-03-22 DIAGNOSIS — F41.1 ANXIETY STATE: ICD-10-CM

## 2023-03-22 RX ORDER — CITALOPRAM 20 MG/1
TABLET ORAL
Qty: 30 TABLET | Refills: 1 | Status: SHIPPED | OUTPATIENT
Start: 2023-03-22

## 2023-06-06 ENCOUNTER — HOSPITAL ENCOUNTER (OUTPATIENT)
Age: 63
Discharge: HOME | End: 2023-06-06
Payer: COMMERCIAL

## 2023-06-06 ENCOUNTER — HOSPITAL ENCOUNTER (OUTPATIENT)
Dept: GENERAL RADIOLOGY | Age: 63
Discharge: HOME | End: 2023-06-06
Payer: COMMERCIAL

## 2023-06-06 DIAGNOSIS — M54.2 CHRONIC NECK PAIN: ICD-10-CM

## 2023-06-06 DIAGNOSIS — G89.29 CHRONIC NECK PAIN: ICD-10-CM

## 2023-06-06 PROCEDURE — 72040 X-RAY EXAM NECK SPINE 2-3 VW: CPT

## 2023-08-08 ENCOUNTER — HOSPITAL ENCOUNTER (OUTPATIENT)
Dept: MAMMOGRAPHY | Age: 63
Discharge: HOME | End: 2023-08-08
Payer: COMMERCIAL

## 2023-08-08 VITALS — HEIGHT: 63 IN | BODY MASS INDEX: 38.98 KG/M2 | WEIGHT: 220 LBS

## 2023-08-08 DIAGNOSIS — Z12.31 VISIT FOR SCREENING MAMMOGRAM: ICD-10-CM

## 2023-08-08 PROCEDURE — 77063 BREAST TOMOSYNTHESIS BI: CPT

## 2023-09-05 RX ORDER — ATORVASTATIN CALCIUM 20 MG/1
TABLET, FILM COATED ORAL
Qty: 90 TABLET | Refills: 0 | OUTPATIENT
Start: 2023-09-05

## 2024-02-13 DIAGNOSIS — F41.1 ANXIETY STATE: ICD-10-CM

## 2024-02-13 RX ORDER — CITALOPRAM 20 MG/1
TABLET ORAL
Qty: 30 TABLET | Refills: 1 | OUTPATIENT
Start: 2024-02-13

## 2024-03-16 DIAGNOSIS — F41.1 ANXIETY STATE: ICD-10-CM

## 2024-03-18 RX ORDER — CITALOPRAM 20 MG/1
TABLET ORAL
Qty: 30 TABLET | Refills: 1 | OUTPATIENT
Start: 2024-03-18

## 2024-03-20 DIAGNOSIS — F41.1 ANXIETY STATE: ICD-10-CM

## 2024-03-20 RX ORDER — CITALOPRAM 20 MG/1
TABLET ORAL
Qty: 30 TABLET | Refills: 1 | OUTPATIENT
Start: 2024-03-20

## 2024-06-07 ENCOUNTER — HOSPITAL ENCOUNTER (OUTPATIENT)
Dept: CT IMAGING | Age: 64
Discharge: HOME | End: 2024-06-07
Payer: COMMERCIAL

## 2024-06-07 DIAGNOSIS — F17.211 CIGARETTE NICOTINE DEPENDENCE IN REMISSION: ICD-10-CM

## 2024-06-07 DIAGNOSIS — Z12.2 ENCOUNTER FOR SCREENING FOR MALIGNANT NEOPLASM OF RESPIRATORY ORGANS: ICD-10-CM

## 2024-06-07 PROCEDURE — 71271 CT THORAX LUNG CANCER SCR C-: CPT

## 2024-12-11 ENCOUNTER — TRANSCRIBE ORDERS (OUTPATIENT)
Dept: ADMINISTRATIVE | Age: 64
End: 2024-12-11

## 2024-12-11 DIAGNOSIS — E05.90 SUBCLINICAL HYPERTHYROIDISM: Primary | ICD-10-CM

## 2024-12-31 ENCOUNTER — HOSPITAL ENCOUNTER (OUTPATIENT)
Dept: ULTRASOUND IMAGING | Age: 64
Discharge: HOME | End: 2024-12-31
Attending: STUDENT IN AN ORGANIZED HEALTH CARE EDUCATION/TRAINING PROGRAM
Payer: COMMERCIAL

## 2024-12-31 DIAGNOSIS — E05.90 SUBCLINICAL HYPERTHYROIDISM: ICD-10-CM

## 2024-12-31 PROCEDURE — 76536 US EXAM OF HEAD AND NECK: CPT

## 2025-01-27 ENCOUNTER — TELEPHONE (OUTPATIENT)
Dept: INTERVENTIONAL RADIOLOGY/VASCULAR | Age: 65
End: 2025-01-27

## 2025-01-27 NOTE — TELEPHONE ENCOUNTER
Contacted Dr. Fitzpatrick office and requested signed order. Pt called and left VM to schedule. Unable to schedule until signed order received

## 2025-01-28 ENCOUNTER — TELEPHONE (OUTPATIENT)
Dept: INTERVENTIONAL RADIOLOGY/VASCULAR | Age: 65
End: 2025-01-28

## 2025-01-28 NOTE — TELEPHONE ENCOUNTER
Pt returned call to schedule thyroid nodule biopsy.     Procedure: Thyroid nodule biopsy /FNA  Approving Radiologist: Deedee     Pt informed of the following:  Date of procedure: 2/6/25  Arrival time of procedure: 0930  Time of procedure: 1000  Check in at Main Entrance prior to procedure.    Allergies:  Reviewed?Yes  Have you ever had a reaction to Contrast/ IV Dye? No  Lidocaine Allergy?  No      Medications:  On any blood thinners? Yes.   If yes: ASA  Instructed to hold: N/A per SIR guidelines aspirin does not need to be held.

## 2025-01-28 NOTE — TELEPHONE ENCOUNTER
Phone call placed to [phone]  to schedule Thyroid nodule biopsy /FNA No answer, left voicemail for return call.

## 2025-02-06 ENCOUNTER — HOSPITAL ENCOUNTER (OUTPATIENT)
Dept: ULTRASOUND IMAGING | Age: 65
Discharge: HOME | End: 2025-02-06
Payer: COMMERCIAL

## 2025-02-06 VITALS — DIASTOLIC BLOOD PRESSURE: 95 MMHG | SYSTOLIC BLOOD PRESSURE: 188 MMHG

## 2025-02-06 DIAGNOSIS — E04.1 NODULE OF LEFT LOBE OF THYROID GLAND: ICD-10-CM

## 2025-02-06 PROCEDURE — 88305 TISSUE EXAM BY PATHOLOGIST: CPT

## 2025-02-06 PROCEDURE — 88173 CYTOPATH EVAL FNA REPORT: CPT

## 2025-02-06 PROCEDURE — 10005 FNA BX W/US GDN 1ST LES: CPT

## 2025-02-06 NOTE — PROCEDURES
PROCEDURE NOTE  Date: 2/6/2025   Name: Karolina Gonsales  YOB: 1960    Procedures    Pt arrived for Image Guided left Fine Needle Aspiration of Thyroid . Dr. Alcaraz explained the procedure including the risk and benefits of the procedure. All questions answered. Pt verbalizes understanding of the procedure and states no more questions. Consent confirmed. Vital signs stable. Labs, allergies, medications, and code status reviewed. No contraindications noted. Time out completed prior to procedure.      Vital Signs  There were no vitals filed for this visit. (vital signs in table format)

## 2025-02-06 NOTE — PROCEDURES
PROCEDURE NOTE  Date: 2/6/2025   Name: Karolina Gonsales  YOB: 1960    Procedures  Image Guided left Fine Needle Aspiration completed. 5 passes completed on the left thyroid nodule. Specimens sent with lab who was present for procedure. Pt tolerated procedure without any signs or symptoms of distress. Vital signs stable.      Pt cleared for discharge. Pt denies any pain or pressure at the aspiration procedure site. Aspiration site dressing clean, dry, and intact. Pt verbalizes understanding of the discharge instructions and states no more questions. Pt discharged in stable condition with all belongings.     Vital Signs  Vitals:    02/06/25 1000   BP: (!) 188/95    (vital signs in table format)

## 2025-07-17 ENCOUNTER — TELEPHONE (OUTPATIENT)
Dept: TELEMETRY | Age: 65
End: 2025-07-17

## 2025-07-17 NOTE — TELEPHONE ENCOUNTER
Pt due for Annual CT Lung Screening, reminder letter mailed, Central Scheduling phone number provided.    Scan already ordered.    Kylee Grant RN

## 2025-08-18 ENCOUNTER — TELEPHONE (OUTPATIENT)
Dept: TELEMETRY | Age: 65
End: 2025-08-18

## (undated) DEVICE — REDUCER: Brand: ENDOWRIST

## (undated) DEVICE — SNARE ENDOSCP L240CM SHTH DIA24MM LOOP W10MM POLYP RND REINF

## (undated) DEVICE — GLOVE,SURG,SENSICARE,ALOE,LF,PF,7: Brand: MEDLINE

## (undated) DEVICE — SEAL

## (undated) DEVICE — GLOVE,SURG,SENSICARE SLT,LF,PF,7.5: Brand: MEDLINE

## (undated) DEVICE — SYRINGE MED 50ML LUERSLIP TIP

## (undated) DEVICE — SINGLE USE SUCTION VALVE MAJ-209: Brand: SINGLE USE SUCTION VALVE (STERILE)

## (undated) DEVICE — SUTURE ETHBND EXCEL SZ 0 L18IN NONABSORBABLE GRN L22MM MO-7 CX41D

## (undated) DEVICE — ENDOSCOPIC KIT 2 12 FT OP4 DE2 GWN SYR

## (undated) DEVICE — Device

## (undated) DEVICE — SINGLE USE BIOPSY VALVE MAJ-210: Brand: SINGLE USE BIOPSY VALVE (STERILE)

## (undated) DEVICE — SYRINGE MED 10ML SLIP TIP BLNT FILL AND LUERLOCK DISP

## (undated) DEVICE — CANNULA NSL AD TBNG L7FT PVC STR NONFLARED PRNG O2 DEL W STD

## (undated) DEVICE — GLOVE SURG SZ 65 L12IN FNGR THK94MIL STD WHT LTX FREE

## (undated) DEVICE — CANNULA SEAL

## (undated) DEVICE — TROCAR: Brand: KII FIOS FIRST ENTRY

## (undated) DEVICE — ELECTRODE,ECG,STRESS,FOAM,3PK: Brand: MEDLINE

## (undated) DEVICE — BLADELESS OBTURATOR: Brand: WECK VISTA

## (undated) DEVICE — Z INACTIVE USE 2641839 CLIP INT M L POLYMER LOK LIG HEM O LOK

## (undated) DEVICE — ARM DRAPE

## (undated) DEVICE — SUTURE VCRL SZ 4-0 L18IN ABSRB UD L19MM PS-2 3/8 CIR PRIM J496H

## (undated) DEVICE — TRAP SPEC POLYPR SGL CHMBR FN MESH SCRN